# Patient Record
Sex: FEMALE | Race: WHITE | NOT HISPANIC OR LATINO | Employment: OTHER | URBAN - METROPOLITAN AREA
[De-identification: names, ages, dates, MRNs, and addresses within clinical notes are randomized per-mention and may not be internally consistent; named-entity substitution may affect disease eponyms.]

---

## 2017-01-20 ENCOUNTER — ALLIED HEALTH/NURSE VISIT (OUTPATIENT)
Dept: FAMILY MEDICINE | Facility: OTHER | Age: 57
End: 2017-01-20
Payer: COMMERCIAL

## 2017-01-20 DIAGNOSIS — Z23 NEED FOR PROPHYLACTIC VACCINATION AND INOCULATION AGAINST INFLUENZA: Primary | ICD-10-CM

## 2017-01-20 PROCEDURE — 90471 IMMUNIZATION ADMIN: CPT

## 2017-01-20 PROCEDURE — 99207 ZZC NO CHARGE NURSE ONLY: CPT

## 2017-01-20 PROCEDURE — 90686 IIV4 VACC NO PRSV 0.5 ML IM: CPT

## 2017-01-20 NOTE — NURSING NOTE
Prior to injection verified patient identity using patient's name and date of birth.Patient instructed to remain in clinic for 20 minutes afterwards, and to report any adverse reaction to me immediately. Mari Gonsalez CMA

## 2017-01-20 NOTE — PROGRESS NOTES
Injectable Influenza Immunization Documentation    1.  Is the person to be vaccinated sick today?  No    2. Does the person to be vaccinated have an allergy to eggs or to a component of the vaccine?  No    3. Has the person to be vaccinated today ever had a serious reaction to influenza vaccine in the past?  No    4. Has the person to be vaccinated ever had Guillain-Orient syndrome?  No     Form completed by Eva Solis

## 2017-02-02 DIAGNOSIS — I10 HYPERTENSION GOAL BP (BLOOD PRESSURE) < 140/90: Primary | ICD-10-CM

## 2017-02-02 NOTE — TELEPHONE ENCOUNTER
Furosemide 20 mg      Last Written Prescription Date: 02/09/2016  Last Fill Quantity: 90, # refills: 3  Last Office Visit with [facility], [facility] or [facility] prescribing provider: 11/14/2016       POTASSIUM   Date Value Ref Range Status   06/21/2016 4.1 3.4 - 5.3 mmol/L Final     CREATININE   Date Value Ref Range Status   06/21/2016 0.89 0.52 - 1.04 mg/dL Final     BP Readings from Last 3 Encounters:   11/14/16 118/80   06/21/16 152/85   04/06/16 128/78

## 2017-02-03 RX ORDER — FUROSEMIDE 20 MG
TABLET ORAL
Qty: 90 TABLET | Refills: 1 | Status: SHIPPED | OUTPATIENT
Start: 2017-02-03 | End: 2017-07-11

## 2017-02-14 DIAGNOSIS — F41.8 MIXED ANXIETY DEPRESSIVE DISORDER: ICD-10-CM

## 2017-02-14 RX ORDER — BUSPIRONE HYDROCHLORIDE 5 MG/1
TABLET ORAL
Qty: 90 TABLET | Refills: 3 | Status: SHIPPED | OUTPATIENT
Start: 2017-02-14 | End: 2017-07-11

## 2017-02-14 NOTE — TELEPHONE ENCOUNTER
Buspirone 5 mg       Last Written Prescription Date: 12/6/2016  Last Fill Quantity: 90; # refills: 1  Last Office Visit with [facility], [facility] or [facility] prescribing provider:  11/14/2016        Last PHQ-9 score on record=   PHQ-9 SCORE 8/16/2016   Total Score MyChart -   Total Score 7       Lab Results   Component Value Date    AST 20 11/05/2015     Lab Results   Component Value Date    ALT 31 11/05/2015

## 2017-04-18 DIAGNOSIS — I10 HTN, GOAL BELOW 140/90: ICD-10-CM

## 2017-04-18 NOTE — TELEPHONE ENCOUNTER
spironolactone (ALDACTONE) 25 MG tablet      Last Written Prescription Date: 4/6/16  Last Fill Quantity: 90, # refills: 3  Last Office Visit with [facility], [facility] or [facility] prescribing provider: 11/14/16 DRISS  Next 5 appointments (look out 90 days)     Jun 20, 2017  8:30 AM CDT   Return Visit with Narciso Leger MD   [facility]    [address] [place] [id number] [phone]                   Potassium   Date Value Ref Range Status   06/21/2016 4.1 3.4 - 5.3 mmol/L Final     Creatinine   Date Value Ref Range Status   06/21/2016 0.89 0.52 - 1.04 mg/dL Final     BP Readings from Last 3 Encounters:   11/14/16 118/80   06/21/16 152/85   04/06/16 128/78

## 2017-04-19 RX ORDER — SPIRONOLACTONE 25 MG/1
TABLET ORAL
Qty: 90 TABLET | Refills: 0 | Status: SHIPPED | OUTPATIENT
Start: 2017-04-19 | End: 2017-07-11

## 2017-06-09 DIAGNOSIS — I10 HTN, GOAL BELOW 140/90: ICD-10-CM

## 2017-06-12 NOTE — TELEPHONE ENCOUNTER
cozaar      Last Written Prescription Date: 04/06/16  Last Fill Quantity: 90, # refills: 3  Last Office Visit with [facility], [facility] or [facility] prescribing provider: 12/05/16- e-visit  Next 5 appointments (look out 90 days)     Jun 20, 2017  8:30 AM CDT   Return Visit with Narciso Leger MD   [facility]    [address] [place] [id number]   [phone]                   Potassium   Date Value Ref Range Status   06/21/2016 4.1 3.4 - 5.3 mmol/L Final     Creatinine   Date Value Ref Range Status   06/21/2016 0.89 0.52 - 1.04 mg/dL Final     BP Readings from Last 3 Encounters:   11/14/16 118/80   06/21/16 152/85   04/06/16 128/78

## 2017-06-13 RX ORDER — LOSARTAN POTASSIUM 100 MG/1
TABLET ORAL
Qty: 30 TABLET | Refills: 2 | Status: SHIPPED | OUTPATIENT
Start: 2017-06-13 | End: 2017-07-11

## 2017-06-13 NOTE — TELEPHONE ENCOUNTER
Routing refill request to provider for review/approval because:  Appears to be a break in medication - should have been out in April    Dina العراقي, RN, BSN

## 2017-06-13 NOTE — TELEPHONE ENCOUNTER
1 month given, she is due for blood work this month, suspect this will be done at upcoming Cardiology appointment.

## 2017-06-14 DIAGNOSIS — F41.8 MIXED ANXIETY DEPRESSIVE DISORDER: ICD-10-CM

## 2017-06-14 NOTE — TELEPHONE ENCOUNTER
Lexapro     Last Written Prescription Date: 12/6/2016  Last Fill Quantity: 90, # refills: 1  Last Office Visit with [facility] primary care provider:  11/14/2016   Next 5 appointments (look out 90 days)     Jun 20, 2017  8:30 AM CDT   Return Visit with Narciso Leger MD   [facility]    [address] [place] [address]   [phone]                   Last PHQ-9 score on record=   PHQ-9 SCORE 8/16/2016   Total Score MyChart -   Total Score 7       Karla Evans MA

## 2017-06-15 NOTE — TELEPHONE ENCOUNTER
Routing refill request to provider for review/approval because:  PHQ-9 not up to date.     Dina العراقي, RN, BSN

## 2017-06-16 RX ORDER — ESCITALOPRAM OXALATE 20 MG/1
20 TABLET ORAL DAILY
Qty: 30 TABLET | Refills: 0 | Status: SHIPPED | OUTPATIENT
Start: 2017-06-16 | End: 2017-07-11

## 2017-07-06 NOTE — PROGRESS NOTES
SUBJECTIVE:   CC: Eva Solis is an 57 year old woman who presents for preventive health visit.     Physical   Annual:     Getting at least 3 servings of Calcium per day::  NO    Bi-annual eye exam::  Yes    Dental care twice a year::  NO    Sleep apnea or symptoms of sleep apnea::  None    Frequency of exercise::  None    Taking medications regularly::  Yes    Medication side effects::  None    Additional concerns today::  YES    Pt would like to discuss numbness happens in both feet. Feels like there is something underneath her toenails.     Today's PHQ-2 Score:   PHQ-2 ( 1999 [employer]) 7/10/2017   Q1: Little interest or pleasure in doing things 2   Q2: Feeling down, depressed or hopeless 1   PHQ-2 Score 3   Q1: Little interest or pleasure in doing things More than half the days   Q2: Feeling down, depressed or hopeless Several days   PHQ-2 Score 3       Abuse: Current or Past(Physical, Sexual or Emotional)- No  Do you feel safe in your environment - Yes    Social History   Substance Use Topics     Smoking status: Never Smoker     Smokeless tobacco: Never Used      Comment: no smokers in household     Alcohol use No     The patient does not drink >3 drinks per day nor >7 drinks per week.    Reviewed orders with patient.  Reviewed health maintenance and updated orders accordingly - Yes    Patient over age 50, mutual decision to screen reflected in health maintenance.    Pertinent mammograms are reviewed under the imaging tab.  History of abnormal Pap smear: Status post benign hysterectomy. Health Maintenance and Surgical History updated.    Reviewed and updated as needed this visit by clinical staff  Tobacco  Allergies  Meds  Problems  Med Hx  Surg Hx  Fam Hx  Soc Hx          Reviewed and updated as needed this visit by Provider  Allergies  Meds  Problems          ROS:  C: NEGATIVE for fever, chills, change in weight  I: NEGATIVE for worrisome rashes, moles or lesions  E: NEGATIVE for vision changes  "or irritation  ENT: NEGATIVE for ear, mouth and throat problems  R: NEGATIVE for significant cough or SOB  B: NEGATIVE for masses, tenderness or discharge  CV: NEGATIVE for chest pain, palpitations or peripheral edema  GI: NEGATIVE for nausea, abdominal pain, heartburn, or change in bowel habits  : NEGATIVE for unusual urinary or vaginal symptoms. No vaginal bleeding.  M: NEGATIVE for significant arthralgias or myalgia  N: she complains of numbness in bilateral feet for \"a long time,\"  Feels like there is something under her toenails despite there not being anything there, seems to involve all toes, plantar and dorsal feet.  P: NEGATIVE for changes in mood or affect      OBJECTIVE:   /60 (BP Location: Other (Comment), Patient Position: Chair, Cuff Size: Adult Large)  Pulse 81  Temp 97.7  F (36.5  C) (Temporal)  Ht 5' 7\" (1.702 m)  Wt (!) 345 lb (156.5 kg)  SpO2 97%  BMI 54.03 kg/m2  EXAM:  GENERAL APPEARANCE: healthy, alert and no distress  EYES: Eyes grossly normal to inspection, PERRL and conjunctivae and sclerae normal  HENT: ear canals and TM's normal, nose and mouth without ulcers or lesions, oropharynx clear and oral mucous membranes moist  NECK: no adenopathy, no asymmetry, masses, or scars and thyroid normal to palpation  RESP: lungs clear to auscultation - no rales, rhonchi or wheezes  BREAST: normal without masses, tenderness or nipple discharge and no palpable axillary masses or adenopathy  CV: regular rate and rhythm, normal S1 S2, no S3 or S4, no murmur, click or rub, no peripheral edema and peripheral pulses strong  ABDOMEN: soft, nontender, no hepatosplenomegaly, no masses and bowel sounds normal  MS: no musculoskeletal defects are noted and gait is age appropriate without ataxia  SKIN: no suspicious lesions or rashes  NEURO: Normal strength and tone, sensory exam grossly normal, mentation intact and speech normal.    Feet:  Pedal pulses strong, capillary refill <3 seconds, moves toes " without restrictions, some mild pedal edema noted  PSYCH: mentation appears normal and affect normal/bright    ASSESSMENT/PLAN:   1. Encounter for routine adult health examination without abnormal findings      2. Hypertension goal BP (blood pressure) < 140/90  Well controlled, refills given, labs done today and reviewed at her visit    - spironolactone (ALDACTONE) 25 MG tablet; TAKE 1 TABLET (25 MG) BY MOUTH DAILY  Dispense: 90 tablet; Refill: 3  - losartan (COZAAR) 100 MG tablet; TAKE 1 TABLET (100 MG) BY MOUTH DAILY  Dispense: 90 tablet; Refill: 3  - furosemide (LASIX) 20 MG tablet; TAKE 1 TABLET (20 MG) BY MOUTH DAILY  Dispense: 90 tablet; Refill: 3    3. Mixed anxiety depressive disorder  Controlled, refills given.  She is stressed as just sold house, moving stuff into storage and living in son's [facility] basement until son finds new home (she bought her son's [facility]), so suspect PHQ-9 is related to this stress, patient agrees.    - busPIRone (BUSPAR) 5 MG tablet; Take 1 tablet (5 mg) by mouth 3 times daily  Dispense: 270 tablet; Refill: 3  - escitalopram (LEXAPRO) 20 MG tablet; Take 1 tablet (20 mg) by mouth daily  Dispense: 90 tablet; Refill: 3    4. Migraine without aura and without status migrainosus, not intractable  Stable.    - amitriptyline (ELAVIL) 25 MG tablet; TAKE 1 TABLET (25 MG) BY MOUTH AT BEDTIME  Dispense: 90 tablet; Refill: 3  - butalbital-aspirin-caffeine (FIORINAL) capsule; Take 1 capsule by mouth every 4 hours as needed for headaches  Dispense: 20 capsule; Refill: 0    5. Numbness in feet  Glucose is mildly elevated, but not to level of diabetes and her numbness has been ongoing, suspect neuropathy, discussed labs, EMG or Neurology consult, she prefers to see Neurology.    - NEUROLOGY ADULT REFERRAL    COUNSELING:  Reviewed preventive health counseling, as reflected in patient instructions     reports that she has never smoked. She has never used smokeless tobacco.    Estimated body mass  "index is 54.03 kg/(m^2) as calculated from the following:    Height as of this encounter: 5' 7\" (1.702 m).    Weight as of this encounter: 345 lb (156.5 kg).   Weight management plan: Discussed healthy diet and exercise guidelines and patient will follow up in 12 months in clinic to re-evaluate.    Counseling Resources:  ATP IV Guidelines  Pooled Cohorts Equation Calculator  Breast Cancer Risk Calculator  FRAX Risk Assessment  ICSI Preventive Guidelines  Dietary Guidelines for Americans, 2010  [employer] MyPlate  ASA Prophylaxis  Lung CA Screening    Renetta Benitez MD  [facility]  Answers for HPI/ROS submitted by the patient on 7/10/2017   PHQ-2 Score: 3  If you checked off any problems, how difficult have these problems made it for you to do your work, take care of things at home, or get along with other people?: Somewhat difficult  PHQ9 TOTAL SCORE: 11      The 10-year ASCVD risk score ([place] CAPUTO Jr, et al., 2013) is: 3.5%    Values used to calculate the score:      Age: 57 years      Sex: Female      Is Non- : No      Diabetic: No      Tobacco smoker: No      Systolic Blood Pressure: 118 mmHg      Is BP treated: Yes      HDL Cholesterol: 44 mg/dL      Total Cholesterol: 230 mg/dL    "

## 2017-07-10 ENCOUNTER — MYC MEDICAL ADVICE (OUTPATIENT)
Dept: FAMILY MEDICINE | Facility: OTHER | Age: 57
End: 2017-07-10

## 2017-07-10 DIAGNOSIS — I10 HTN, GOAL BELOW 140/90: Primary | ICD-10-CM

## 2017-07-10 DIAGNOSIS — Z13.6 ENCOUNTER FOR SCREENING FOR CARDIOVASCULAR DISORDERS: ICD-10-CM

## 2017-07-10 ASSESSMENT — PATIENT HEALTH QUESTIONNAIRE - PHQ9
10. IF YOU CHECKED OFF ANY PROBLEMS, HOW DIFFICULT HAVE THESE PROBLEMS MADE IT FOR YOU TO DO YOUR WORK, TAKE CARE OF THINGS AT HOME, OR GET ALONG WITH OTHER PEOPLE: SOMEWHAT DIFFICULT
SUM OF ALL RESPONSES TO PHQ QUESTIONS 1-9: 11
SUM OF ALL RESPONSES TO PHQ QUESTIONS 1-9: 11

## 2017-07-10 NOTE — TELEPHONE ENCOUNTER
Labs placed/pended. Please review/sign in TC absence if appropriate.  Can message patient back once labs are signed.  Emerald Frankel, CMA

## 2017-07-11 ENCOUNTER — OFFICE VISIT (OUTPATIENT)
Dept: FAMILY MEDICINE | Facility: OTHER | Age: 57
End: 2017-07-11
Payer: COMMERCIAL

## 2017-07-11 VITALS
TEMPERATURE: 97.7 F | DIASTOLIC BLOOD PRESSURE: 60 MMHG | BODY MASS INDEX: 45.99 KG/M2 | SYSTOLIC BLOOD PRESSURE: 118 MMHG | HEART RATE: 81 BPM | HEIGHT: 67 IN | WEIGHT: 293 LBS | OXYGEN SATURATION: 97 %

## 2017-07-11 DIAGNOSIS — G43.009 MIGRAINE WITHOUT AURA AND WITHOUT STATUS MIGRAINOSUS, NOT INTRACTABLE: ICD-10-CM

## 2017-07-11 DIAGNOSIS — I10 HTN, GOAL BELOW 140/90: ICD-10-CM

## 2017-07-11 DIAGNOSIS — I10 HYPERTENSION GOAL BP (BLOOD PRESSURE) < 140/90: ICD-10-CM

## 2017-07-11 DIAGNOSIS — R20.0 NUMBNESS IN FEET: ICD-10-CM

## 2017-07-11 DIAGNOSIS — Z13.6 ENCOUNTER FOR SCREENING FOR CARDIOVASCULAR DISORDERS: ICD-10-CM

## 2017-07-11 DIAGNOSIS — Z00.00 ENCOUNTER FOR ROUTINE ADULT HEALTH EXAMINATION WITHOUT ABNORMAL FINDINGS: Primary | ICD-10-CM

## 2017-07-11 DIAGNOSIS — F41.8 MIXED ANXIETY DEPRESSIVE DISORDER: ICD-10-CM

## 2017-07-11 LAB
ALBUMIN SERPL-MCNC: 3.5 G/DL (ref 3.4–5)
ALP SERPL-CCNC: 76 U/L (ref 40–150)
ALT SERPL W P-5'-P-CCNC: 34 U/L (ref 0–50)
ANION GAP SERPL CALCULATED.3IONS-SCNC: 10 MMOL/L (ref 3–14)
AST SERPL W P-5'-P-CCNC: 24 U/L (ref 0–45)
BILIRUB SERPL-MCNC: 0.7 MG/DL (ref 0.2–1.3)
BUN SERPL-MCNC: 14 MG/DL (ref 7–30)
CALCIUM SERPL-MCNC: 8.8 MG/DL (ref 8.5–10.1)
CHLORIDE SERPL-SCNC: 106 MMOL/L (ref 94–109)
CHOLEST SERPL-MCNC: 230 MG/DL
CO2 SERPL-SCNC: 26 MMOL/L (ref 20–32)
CREAT SERPL-MCNC: 0.89 MG/DL (ref 0.52–1.04)
GFR SERPL CREATININE-BSD FRML MDRD: 66 ML/MIN/1.7M2
GLUCOSE SERPL-MCNC: 106 MG/DL (ref 70–99)
HDLC SERPL-MCNC: 44 MG/DL
LDLC SERPL CALC-MCNC: 154 MG/DL
NONHDLC SERPL-MCNC: 186 MG/DL
POTASSIUM SERPL-SCNC: 4 MMOL/L (ref 3.4–5.3)
PROT SERPL-MCNC: 7.3 G/DL (ref 6.8–8.8)
SODIUM SERPL-SCNC: 142 MMOL/L (ref 133–144)
TRIGL SERPL-MCNC: 159 MG/DL

## 2017-07-11 PROCEDURE — 80061 LIPID PANEL: CPT | Performed by: STUDENT IN AN ORGANIZED HEALTH CARE EDUCATION/TRAINING PROGRAM

## 2017-07-11 PROCEDURE — 99396 PREV VISIT EST AGE 40-64: CPT | Performed by: FAMILY MEDICINE

## 2017-07-11 PROCEDURE — 80053 COMPREHEN METABOLIC PANEL: CPT | Performed by: STUDENT IN AN ORGANIZED HEALTH CARE EDUCATION/TRAINING PROGRAM

## 2017-07-11 PROCEDURE — 36415 COLL VENOUS BLD VENIPUNCTURE: CPT | Performed by: STUDENT IN AN ORGANIZED HEALTH CARE EDUCATION/TRAINING PROGRAM

## 2017-07-11 RX ORDER — BUTALBITAL, ASPIRIN, AND CAFFEINE 325; 50; 40 MG/1; MG/1; MG/1
1 CAPSULE ORAL EVERY 4 HOURS PRN
Qty: 20 CAPSULE | Refills: 0 | Status: SHIPPED | OUTPATIENT
Start: 2017-07-11 | End: 2019-10-16

## 2017-07-11 RX ORDER — FUROSEMIDE 20 MG
TABLET ORAL
Qty: 90 TABLET | Refills: 3 | Status: SHIPPED | OUTPATIENT
Start: 2017-07-11 | End: 2018-08-24

## 2017-07-11 RX ORDER — BUSPIRONE HYDROCHLORIDE 5 MG/1
5 TABLET ORAL 3 TIMES DAILY
Qty: 270 TABLET | Refills: 3 | Status: SHIPPED | OUTPATIENT
Start: 2017-07-11 | End: 2018-08-24

## 2017-07-11 RX ORDER — SPIRONOLACTONE 25 MG/1
TABLET ORAL
Qty: 90 TABLET | Refills: 3 | Status: SHIPPED | OUTPATIENT
Start: 2017-07-11 | End: 2018-07-01

## 2017-07-11 RX ORDER — ESCITALOPRAM OXALATE 20 MG/1
20 TABLET ORAL DAILY
Qty: 90 TABLET | Refills: 3 | Status: SHIPPED | OUTPATIENT
Start: 2017-07-11 | End: 2018-08-14

## 2017-07-11 RX ORDER — LOSARTAN POTASSIUM 100 MG/1
TABLET ORAL
Qty: 90 TABLET | Refills: 3 | Status: SHIPPED | OUTPATIENT
Start: 2017-07-11 | End: 2018-07-01

## 2017-07-11 ASSESSMENT — PATIENT HEALTH QUESTIONNAIRE - PHQ9: SUM OF ALL RESPONSES TO PHQ QUESTIONS 1-9: 11

## 2017-07-11 ASSESSMENT — PAIN SCALES - GENERAL: PAINLEVEL: NO PAIN (0)

## 2017-07-11 NOTE — NURSING NOTE
"Chief Complaint   Patient presents with     Physical     Panel Management     penuvmoax, height, honoring choices, dap, phq9, bmp, lipid, cbc, alt       Initial /60 (BP Location: Other (Comment), Patient Position: Chair, Cuff Size: Adult Large)  Pulse 81  Temp 97.7  F (36.5  C) (Temporal)  Ht 5' 7\" (1.702 m)  Wt (!) 345 lb (156.5 kg)  SpO2 97%  BMI 54.03 kg/m2 Estimated body mass index is 54.03 kg/(m^2) as calculated from the following:    Height as of this encounter: 5' 7\" (1.702 m).    Weight as of this encounter: 345 lb (156.5 kg).  Medication Reconciliation: complete   Isa Plunkett MA  July 11, 2017      "

## 2017-07-11 NOTE — MR AVS SNAPSHOT
After Visit Summary   7/11/2017    Eva Solis    MRN: [id number]           Patient Information     Date Of Birth          1960        Visit Information        Provider Department      7/11/2017 11:40 AM Renetta Benitez MD [facility]        Today's Diagnoses     Need for prophylactic vaccination against Streptococcus pneumoniae (pneumococcus)    -  1    HTN, goal below 140/90        Hypertension goal BP (blood pressure) < 140/90        Mixed anxiety depressive disorder        Migraine without aura and without status migrainosus, not intractable        Numbness in feet          Care Instructions      Preventive Health Recommendations  Female Ages 50 - 64    Yearly exam: See your health care provider every year in order to  o Review health changes.   o Discuss preventive care.    o Review your medicines if your doctor has prescribed any.      Get a Pap test every three years (unless you have an abnormal result and your provider advises testing more often).    If you get Pap tests with HPV test, you only need to test every 5 years, unless you have an abnormal result.     You do not need a Pap test if your uterus was removed (hysterectomy) and you have not had cancer.    You should be tested each year for STDs (sexually transmitted diseases) if you're at risk.     Have a mammogram every 1 to 2 years.    Have a colonoscopy at age 50, or have a yearly FIT test (stool test). These exams screen for colon cancer.      Have a cholesterol test every 5 years, or more often if advised.    Have a diabetes test (fasting glucose) every three years. If you are at risk for diabetes, you should have this test more often.     If you are at risk for osteoporosis (brittle bone disease), think about having a bone density scan (DEXA).    Shots: Get a flu shot each year. Get a tetanus shot every 10 years.    Nutrition:     Eat at least 5 servings of fruits and vegetables each day.    Eat  whole-grain bread, whole-wheat pasta and brown rice instead of white grains and rice.    Talk to your provider about Calcium and Vitamin D.     Lifestyle    Exercise at least 150 minutes a week (30 minutes a day, 5 days a week). This will help you control your weight and prevent disease.    Limit alcohol to one drink per day.    No smoking.     Wear sunscreen to prevent skin cancer.     See your dentist every six months for an exam and cleaning.    See your eye doctor every 1 to 2 years.            Follow-ups after your visit        Additional Services     NEUROLOGY ADULT REFERRAL       Your provider has referred you for the following:   Consult at [facility]: [facility] [phone]   [email or web address]    Please be aware that coverage of these services is subject to the terms and limitations of your health insurance plan.  Call member services at your health plan with any benefit or coverage questions.      Please bring the following with you to your appointment:    (1) Any X-Rays, CTs or MRIs which have been performed.  Contact the facility where they were done to arrange for  prior to your scheduled appointment.    (2) List of current medications  (3) This referral request   (4) Any documents/labs given to you for this referral                  Who to contact     If you have questions or need follow up information about today's clinic visit or your schedule please contact [facility] directly at [phone].  Normal or non-critical lab and imaging results will be communicated to you by MyChart, letter or phone within 4 business days after the clinic has received the results. If you do not hear from us within 7 days, please contact the clinic through MyChart or phone. If you have a critical or abnormal lab result, we will notify you by phone as soon as possible.  Submit refill requests through [employer] or call  "your pharmacy and they will forward the refill request to us. Please allow 3 business days for your refill to be completed.          Additional Information About Your Visit        [facility] [employer] Information     [employer] gives you secure access to your electronic health record. If you see a primary care provider, you can also send messages to your care team and make appointments. If you have questions, please call your primary care clinic.  If you do not have a primary care provider, please call [phone] and they will assist you.        Care EveryWhere ID     This is your Care EveryWhere ID. This could be used by other organizations to access your [facility] medical records  [id number]        Your Vitals Were     Pulse Temperature Height Pulse Oximetry BMI (Body Mass Index)       81 97.7  F (36.5  C) (Temporal) 5' 7\" (1.702 m) 97% 54.03 kg/m2        Blood Pressure from Last 3 Encounters:   07/11/17 118/60   11/14/16 118/80   06/21/16 152/85    Weight from Last 3 Encounters:   07/11/17 (!) 345 lb (156.5 kg)   11/14/16 (!) 337 lb (152.9 kg)   06/21/16 (!) 331 lb (150.1 kg)              We Performed the Following     NEUROLOGY ADULT REFERRAL          Today's Medication Changes          These changes are accurate as of: 7/11/17 12:20 PM.  If you have any questions, ask your nurse or doctor.               These medicines have changed or have updated prescriptions.        Dose/Directions    busPIRone 5 MG tablet   Commonly known as:  BUSPAR   This may have changed:  See the new instructions.   Used for:  Mixed anxiety depressive disorder   Changed by:  Renetta Benitez MD        Dose:  5 mg   Take 1 tablet (5 mg) by mouth 3 times daily   Quantity:  270 tablet   Refills:  3       escitalopram 20 MG tablet   Commonly known as:  LEXAPRO   This may have changed:  additional instructions   Used for:  Mixed anxiety depressive disorder   Changed by:  Renetta Benitez MD        Dose:  20 mg   Take 1 tablet (20 mg) by mouth " daily   Quantity:  90 tablet   Refills:  3       furosemide 20 MG tablet   Commonly known as:  LASIX   This may have changed:  See the new instructions.   Used for:  Hypertension goal BP (blood pressure) < 140/90   Changed by:  Renetta Benitez MD        TAKE 1 TABLET (20 MG) BY MOUTH DAILY   Quantity:  90 tablet   Refills:  3       losartan 100 MG tablet   Commonly known as:  COZAAR   This may have changed:  See the new instructions.   Used for:  HTN, goal below 140/90   Changed by:  Renetta Benitez MD        TAKE 1 TABLET (100 MG) BY MOUTH DAILY   Quantity:  90 tablet   Refills:  3       spironolactone 25 MG tablet   Commonly known as:  ALDACTONE   This may have changed:  See the new instructions.   Used for:  HTN, goal below 140/90   Changed by:  Renetta Benitez MD        TAKE 1 TABLET (25 MG) BY MOUTH DAILY   Quantity:  90 tablet   Refills:  3            Where to get your medicines      These medications were sent to [facility] [address] [place] [address]     Phone:  [phone]     amitriptyline 25 MG tablet    busPIRone 5 MG tablet    escitalopram 20 MG tablet    furosemide 20 MG tablet    losartan 100 MG tablet    spironolactone 25 MG tablet         Some of these will need a paper prescription and others can be bought over the counter.  Ask your nurse if you have questions.     Bring a paper prescription for each of these medications     butalbital-aspirin-caffeine capsule                Primary Care Provider Office Phone # Fax #    Renetta Benitez [phone]       [address]  [facility] [address]        Equal Access to Services     [facility]: Jeff brown Sodeanna, waaxda luqadaha, qaybta kaalmada lucy, julián danielle. So [place] -[phone].    ATENCIÓN: Si habla español, tiene a chaudhary disposición servicios gratuitos de asistencia  lingüística. Sherine al [phone].    We comply with applicable federal civil rights laws and [place] laws. We do not discriminate on the basis of race, color, national origin, age, disability sex, sexual orientation or gender identity.            Thank you!     Thank you for choosing [facility]  for your care. Our goal is always to provide you with excellent care. Hearing back from our patients is one way we can continue to improve our services. Please take a few minutes to complete the written survey that you may receive in the mail after your visit with us. Thank you!             Your Updated Medication List - Protect others around you: Learn how to safely use, store and throw away your medicines at www.disposemymeds.org.          This list is accurate as of: 7/11/17 12:20 PM.  Always use your most recent med list.                   Brand Name Dispense Instructions for use Diagnosis    ALPRAZolam 0.25 MG tablet    XANAX    15 tablet    Take 1-2 tabs 30 minutes before flight    Flying phobia       amitriptyline 25 MG tablet    ELAVIL    90 tablet    TAKE 1 TABLET (25 MG) BY MOUTH AT BEDTIME    Migraine without aura and without status migrainosus, not intractable       busPIRone 5 MG tablet    BUSPAR    270 tablet    Take 1 tablet (5 mg) by mouth 3 times daily    Mixed anxiety depressive disorder       butalbital-aspirin-caffeine capsule    FIORINAL    20 capsule    Take 1 capsule by mouth every 4 hours as needed for headaches    Migraine without aura and without status migrainosus, not intractable       escitalopram 20 MG tablet    LEXAPRO    90 tablet    Take 1 tablet (20 mg) by mouth daily    Mixed anxiety depressive disorder       furosemide 20 MG tablet    LASIX    90 tablet    TAKE 1 TABLET (20 MG) BY MOUTH DAILY    Hypertension goal BP (blood pressure) < 140/90       IBUPROFEN PO      Take 4 tablets by mouth as needed.        losartan 100 MG tablet    COZAAR    90 tablet    TAKE 1 TABLET  (100 MG) BY MOUTH DAILY    HTN, goal below 140/90       MIRALAX powder   Generic drug:  polyethylene glycol     1 Bottle    17 GM DAILY    Slow transit constipation       spironolactone 25 MG tablet    ALDACTONE    90 tablet    TAKE 1 TABLET (25 MG) BY MOUTH DAILY    HTN, goal below 140/90

## 2017-08-10 ENCOUNTER — TELEPHONE (OUTPATIENT)
Dept: FAMILY MEDICINE | Facility: OTHER | Age: 57
End: 2017-08-10

## 2017-08-10 NOTE — TELEPHONE ENCOUNTER
Reason for Call:  Form, our goal is to have forms completed with 72 hours, however, some forms may require a visit or additional information.    Type of letter, form or note:  medical    Who is the form from?: [facility]  (if other please explain)    Where did the form come from: Patient or family brought in       What clinic location was the form placed at?: [facility]   Where the form was placed: [facility]    What number is listed as a contact on the form?: [phone]       Additional comments: Please contact patient at [phone] when form is complete she will  at the   Call taken on 8/10/2017 at 5:19 PM by Rima Martinez

## 2017-08-11 NOTE — TELEPHONE ENCOUNTER
Form filled out. Needs signature from TC. Will call patient when form is complete.  Emerald Frankel, CMA

## 2017-08-11 NOTE — TELEPHONE ENCOUNTER
Copy made and placed in scanning. Left message for patient to call back. Please let her know her form is at the  for .  Emerald Frankel, JO

## 2017-08-23 ENCOUNTER — MYC MEDICAL ADVICE (OUTPATIENT)
Dept: FAMILY MEDICINE | Facility: OTHER | Age: 57
End: 2017-08-23

## 2017-08-23 NOTE — TELEPHONE ENCOUNTER
Patient was seen on 07/11/2017.   Patient could to e-visit for migraines to discuss mediations.    Andrei Jaffe RN, BSN

## 2017-08-30 ENCOUNTER — E-VISIT (OUTPATIENT)
Dept: FAMILY MEDICINE | Facility: OTHER | Age: 57
End: 2017-08-30
Payer: COMMERCIAL

## 2017-08-30 DIAGNOSIS — G43.009 MIGRAINE WITHOUT AURA AND WITHOUT STATUS MIGRAINOSUS, NOT INTRACTABLE: Primary | ICD-10-CM

## 2017-08-30 PROCEDURE — 99444 ZZC PHYSICIAN ONLINE EVALUATION & MANAGEMENT SERVICE: CPT | Performed by: FAMILY MEDICINE

## 2017-08-30 RX ORDER — TOPIRAMATE 25 MG/1
TABLET, FILM COATED ORAL
Qty: 120 TABLET | Refills: 0 | Status: SHIPPED | OUTPATIENT
Start: 2017-08-30 | End: 2017-10-07

## 2017-08-30 RX ORDER — TOPIRAMATE 25 MG/1
TABLET, FILM COATED ORAL
Qty: 120 TABLET | Refills: 0 | Status: SHIPPED | OUTPATIENT
Start: 2017-08-30 | End: 2017-08-30

## 2017-10-07 DIAGNOSIS — G43.009 MIGRAINE WITHOUT AURA AND WITHOUT STATUS MIGRAINOSUS, NOT INTRACTABLE: ICD-10-CM

## 2017-10-09 NOTE — TELEPHONE ENCOUNTER
topiramate (TOPAMAX) 25 MG tablet       Last Written Prescription Date: 08/30/17  Last Fill Quantity: 120, # refills: 0    Last Office Visit with [facility], [facility] or [facility] prescribing provider:  07/11/17   Future Office Visit:    Next 5 appointments (look out 90 days)     Oct 17, 2017 12:00 PM CDT   [facility]   [facility]    [address] [address] [phone]                  Creatinine   Date Value Ref Range Status   07/11/2017 0.89 0.52 - 1.04 mg/dL Final

## 2017-10-10 RX ORDER — TOPIRAMATE 25 MG/1
TABLET, FILM COATED ORAL
Qty: 90 TABLET | Refills: 0 | Status: SHIPPED | OUTPATIENT
Start: 2017-10-10 | End: 2017-10-17

## 2017-10-10 NOTE — TELEPHONE ENCOUNTER
Pending Prescriptions:                       Disp   Refills    topiramate (TOPAMAX) 25 MG tablet [Pharma*120 ta*0            Sig: TAKE 1 TABLET BY MOUTH AT BEDTIME FOR 1 WEEK,           THEN 1 TABLET TWICE DAILY FOR 1 WEEK, THEN 1 IN           THE MORNING AND 2 IN THE EVENING FOR 1 WEEK, T    Prescription approved per [facility] Refill Protocol.  Andrei Jaffe, RN, BSN

## 2017-10-13 NOTE — PROGRESS NOTES
SUBJECTIVE:                                                    Eva Solis is a 57 year old female who presents to clinic today for the following health issues:    HPI       Migraine Follow-Up    Headaches symptoms:  Improved     Frequency: Only one migraine since starting Topiramate     Duration of headaches: Difficult to determine as she usually wakes up with them, can then take 3-4 hours to resolve    Able to do normal daily activities/work with migraines: Depends    Rescue/Relief medication:Fiorinal              Effectiveness: Unsure if she has ever taken     Preventative medication: Topiramate    Neurologic complications: blurred vision    In the past 4 weeks, how often have you gone to Urgent Care or the emergency room because of your headaches?  0    Answers for HPI/ROS submitted by the patient on 10/17/2017   If you checked off any problems, how difficult have these problems made it for you to do your work, take care of things at home, or get along with other people?: Somewhat difficult  PHQ9 TOTAL SCORE: 11  LORETA 7 TOTAL SCORE: 11      Problem list and histories reviewed & adjusted, as indicated.  Additional history: as documented    Patient Active Problem List   Diagnosis     Slow transit constipation     HTN, goal below 140/90     Diastolic CHF (H)     Decreased calculated GFR     Morbid obesity, unspecified obesity type (H)     Mixed anxiety depressive disorder     Migraine without aura and without status migrainosus, not intractable     Past Surgical History:   Procedure Laterality Date     C  DELIVERY ONLY      , Low Cervical     C  DELIVERY ONLY       C VAGINAL HYSTERECTOMY      without oophorectomy     COLONOSCOPY  10/06/10    attempt at colonscopy to 50cm     HC COLONOSCOPY W/WO BRUSH/WASH  2005    Diverticulosis.  Internal hemorrhoids.     JOINT REPLACEMTN, KNEE RT/LT  2006    Right total knee arthroplasty.     JOINT REPLACEMTN, KNEE RT/LT   "07/19/2006    Left total knee arthroplasty.       Social History   Substance Use Topics     Smoking status: Never Smoker     Smokeless tobacco: Never Used      Comment: no smokers in household     Alcohol use No     Family History   Problem Relation Age of Onset     CANCER Father      squamous cell ca     CANCER Maternal Grandmother      lung     CEREBROVASCULAR DISEASE Maternal Grandmother      CANCER Paternal Grandmother      squamous cell ca     DIABETES Sister      CANCER Sister      kidney cancer     Kidney Cancer Sister      Connective Tissue Disorder Brother      lupus     Lupus Brother      KIDNEY DISEASE Mother      atrophic kidney     KIDNEY DISEASE Maternal Uncle      unclear kidney issue     Hypertension No family hx of      Colon Cancer No family hx of      Anxiety Disorder No family hx of      Asthma No family hx of              ROS:  C: NEGATIVE for fever, chills, change in weight  E/M: NEGATIVE for ear, mouth and throat problems  R: NEGATIVE for significant cough or SOB  CV: NEGATIVE for chest pain, palpitations or peripheral edema    OBJECTIVE:     /76 (BP Location: Right arm, Patient Position: Chair, Cuff Size: Adult Large)  Pulse 77  Temp 97.6  F (36.4  C) (Temporal)  Resp 18  Ht 5' 7\" (1.702 m)  Wt (!) 350 lb (158.8 kg)  SpO2 95%  BMI 54.82 kg/m2  Body mass index is 54.82 kg/(m^2).  Gen: no apparent distress  Neuro:  Normal gait  Psych: Alert and oriented times 3; coherent speech, normal   rate and volume, able to articulate logical thoughts, able   to abstract reason, no tangential thoughts, no hallucinations   or delusions  Her affect is neutral.    ASSESSMENT/PLAN:     BMI:   Estimated body mass index is 54.82 kg/(m^2) as calculated from the following:    Height as of this encounter: 5' 7\" (1.702 m).    Weight as of this encounter: 350 lb (158.8 kg).   Weight management plan: Discussed healthy diet and exercise guidelines and patient will follow up in 12 months in clinic to " re-evaluate.        1. Migraine without aura and without status migrainosus, not intractable  Much improved, continue on Topamax.  Stop amitriptyline.    - topiramate (TOPAMAX) 25 MG tablet; Take 2 tablets (50 mg) by mouth 2 times daily  Dispense: 180 tablet; Refill: 1    2. Visit for screening mammogram    - MA SCREENING DIGITAL BILAT - Future  (s+30); Future    3. Need for prophylactic vaccination and inoculation against influenza    - FLU VAC, SPLIT VIRUS IM > 3 YO (QUADRIVALENT) [84836]  - Vaccine Administration, Initial [50400]    Renetta Benitez MD  [facility]  Injectable Influenza Immunization Documentation    1.  Is the person to be vaccinated sick today?   No    2. Does the person to be vaccinated have an allergy to a component   of the vaccine?   No    3. Has the person to be vaccinated ever had a serious reaction   to influenza vaccine in the past?   No    4. Has the person to be vaccinated ever had Guillain-Barré syndrome?   No    Form completed by     Prior to injection verified patient identity using patient's name and date of birth.

## 2017-10-17 ENCOUNTER — OFFICE VISIT (OUTPATIENT)
Dept: FAMILY MEDICINE | Facility: OTHER | Age: 57
End: 2017-10-17
Payer: COMMERCIAL

## 2017-10-17 VITALS
TEMPERATURE: 97.6 F | HEART RATE: 77 BPM | BODY MASS INDEX: 45.99 KG/M2 | OXYGEN SATURATION: 95 % | WEIGHT: 293 LBS | SYSTOLIC BLOOD PRESSURE: 132 MMHG | RESPIRATION RATE: 18 BRPM | HEIGHT: 67 IN | DIASTOLIC BLOOD PRESSURE: 76 MMHG

## 2017-10-17 DIAGNOSIS — G43.009 MIGRAINE WITHOUT AURA AND WITHOUT STATUS MIGRAINOSUS, NOT INTRACTABLE: Primary | ICD-10-CM

## 2017-10-17 DIAGNOSIS — Z23 NEED FOR PROPHYLACTIC VACCINATION AND INOCULATION AGAINST INFLUENZA: ICD-10-CM

## 2017-10-17 DIAGNOSIS — Z12.31 VISIT FOR SCREENING MAMMOGRAM: ICD-10-CM

## 2017-10-17 PROCEDURE — 90471 IMMUNIZATION ADMIN: CPT | Performed by: FAMILY MEDICINE

## 2017-10-17 PROCEDURE — 99213 OFFICE O/P EST LOW 20 MIN: CPT | Mod: 25 | Performed by: FAMILY MEDICINE

## 2017-10-17 PROCEDURE — 90686 IIV4 VACC NO PRSV 0.5 ML IM: CPT | Performed by: FAMILY MEDICINE

## 2017-10-17 RX ORDER — TOPIRAMATE 25 MG/1
50 TABLET, FILM COATED ORAL 2 TIMES DAILY
Qty: 180 TABLET | Refills: 1 | Status: SHIPPED | OUTPATIENT
Start: 2017-10-17 | End: 2017-11-07

## 2017-10-17 ASSESSMENT — ANXIETY QUESTIONNAIRES
1. FEELING NERVOUS, ANXIOUS, OR ON EDGE: NEARLY EVERY DAY
3. WORRYING TOO MUCH ABOUT DIFFERENT THINGS: NEARLY EVERY DAY
4. TROUBLE RELAXING: NOT AT ALL
GAD7 TOTAL SCORE: 11
6. BECOMING EASILY ANNOYED OR IRRITABLE: SEVERAL DAYS
7. FEELING AFRAID AS IF SOMETHING AWFUL MIGHT HAPPEN: SEVERAL DAYS
GAD7 TOTAL SCORE: 11
2. NOT BEING ABLE TO STOP OR CONTROL WORRYING: NEARLY EVERY DAY
5. BEING SO RESTLESS THAT IT IS HARD TO SIT STILL: NOT AT ALL
GAD7 TOTAL SCORE: 11
7. FEELING AFRAID AS IF SOMETHING AWFUL MIGHT HAPPEN: SEVERAL DAYS

## 2017-10-17 ASSESSMENT — PATIENT HEALTH QUESTIONNAIRE - PHQ9
SUM OF ALL RESPONSES TO PHQ QUESTIONS 1-9: 11
10. IF YOU CHECKED OFF ANY PROBLEMS, HOW DIFFICULT HAVE THESE PROBLEMS MADE IT FOR YOU TO DO YOUR WORK, TAKE CARE OF THINGS AT HOME, OR GET ALONG WITH OTHER PEOPLE: SOMEWHAT DIFFICULT
SUM OF ALL RESPONSES TO PHQ QUESTIONS 1-9: 11

## 2017-10-17 NOTE — NURSING NOTE
"Chief Complaint   Patient presents with     Recheck Medication     Panel Management     pneumovax, flu, mammogram, honoring choices, DAP, HF action plan       Initial /76 (BP Location: Right arm, Patient Position: Chair, Cuff Size: Adult Large)  Pulse 77  Temp 97.6  F (36.4  C) (Temporal)  Resp 18  Ht 5' 7\" (1.702 m)  Wt (!) 350 lb (158.8 kg)  SpO2 95%  BMI 54.82 kg/m2 Estimated body mass index is 54.82 kg/(m^2) as calculated from the following:    Height as of this encounter: 5' 7\" (1.702 m).    Weight as of this encounter: 350 lb (158.8 kg).  Medication Reconciliation: complete   Santa Awan CMA      "

## 2017-10-17 NOTE — MR AVS SNAPSHOT
After Visit Summary   10/17/2017    Eva Solis    MRN: [id number]           Patient Information     Date Of Birth          1960        Visit Information        Provider Department      10/17/2017 12:00 PM Renetta Benitez MD [facility]        Today's Diagnoses     Migraine without aura and without status migrainosus, not intractable    -  1    Visit for screening mammogram        Need for prophylactic vaccination and inoculation against influenza           Follow-ups after your visit        Future tests that were ordered for you today     Open Future Orders        Priority Expected Expires Ordered    MA SCREENING DIGITAL BILAT - Future  (s+30) Routine  10/13/2018 10/17/2017            Who to contact     If you have questions or need follow up information about today's clinic visit or your schedule please contact [facility] directly at [phone].  Normal or non-critical lab and imaging results will be communicated to you by [facility], letter or phone within 4 business days after the clinic has received the results. If you do not hear from us within 7 days, please contact the clinic through [facility] or phone. If you have a critical or abnormal lab result, we will notify you by phone as soon as possible.  Submit refill requests through [facility] or call your pharmacy and they will forward the refill request to us. Please allow 3 business days for your refill to be completed.          Additional Information About Your Visit        MyChart Information     [facility] gives you secure access to your electronic health record. If you see a primary care provider, you can also send messages to your care team and make appointments. If you have questions, please call your primary care clinic.  If you do not have a primary care provider, please call [phone] and they will assist you.        Care EveryWhere ID     This is your Care EveryWhere ID. This could be used by  "other organizations to access your [facility] medical records  [id number]        Your Vitals Were     Pulse Temperature Respirations Height Pulse Oximetry BMI (Body Mass Index)    77 97.6  F (36.4  C) (Temporal) 18 5' 7\" (1.702 m) 95% 54.82 kg/m2       Blood Pressure from Last 3 Encounters:   10/17/17 132/76   07/11/17 118/60   11/14/16 118/80    Weight from Last 3 Encounters:   10/17/17 (!) 350 lb (158.8 kg)   07/11/17 (!) 345 lb (156.5 kg)   11/14/16 (!) 337 lb (152.9 kg)              We Performed the Following     FLU VAC, SPLIT VIRUS IM > 3 YO (QUADRIVALENT) [66275]     Vaccine Administration, Initial [69963]          Today's Medication Changes          These changes are accurate as of: 10/17/17 12:47 PM.  If you have any questions, ask your nurse or doctor.               These medicines have changed or have updated prescriptions.        Dose/Directions    topiramate 25 MG tablet   Commonly known as:  TOPAMAX   This may have changed:    - how much to take  - how to take this  - when to take this  - additional instructions   Used for:  Migraine without aura and without status migrainosus, not intractable   Changed by:  Renetta Benitez MD        Dose:  50 mg   Take 2 tablets (50 mg) by mouth 2 times daily   Quantity:  180 tablet   Refills:  1         Stop taking these medicines if you haven't already. Please contact your care team if you have questions.     amitriptyline 25 MG tablet   Commonly known as:  ELAVIL   Stopped by:  Renetta Benitez MD                Where to get your medicines      These medications were sent to [facility] [address] [facility] [address] [facility]  [address] [facility] [address]     Phone:  [phone]     topiramate 25 MG tablet                Primary Care Provider Office Phone # Fax #    Renetta Benitez [phone]       [address] [facility] [address]  [facility] [address]        Equal Access to Services     TREMAYNE SYLVESTER AH: Jeff fuentes " stephanie Augustine, waaxda luqadaha, qaybta kaalmada lucy, julián leelashae lolis. So [facility] [place] [facility] [phone].    ATENCIÓN: Si coreyla jeannine, tiene a chaudhary disposición servicios gratuitos de asistencia lingüística. Sherine al [phone].    We comply with applicable federal civil rights laws and [place] laws. We do not discriminate on the basis of race, color, national origin, age, disability, sex, sexual orientation, or gender identity.            Thank you!     Thank you for choosing [facility]  for your care. Our goal is always to provide you with excellent care. Hearing back from our patients is one way we can continue to improve our services. Please take a few minutes to complete the written survey that you may receive in the mail after your visit with us. Thank you!             Your Updated Medication List - Protect others around you: Learn how to safely use, store and throw away your medicines at www.disposemymeds.org.          This list is accurate as of: 10/17/17 12:47 PM.  Always use your most recent med list.                   Brand Name Dispense Instructions for use Diagnosis    ALPRAZolam 0.25 MG tablet    XANAX    15 tablet    Take 1-2 tabs 30 minutes before flight    Flying phobia       busPIRone 5 MG tablet    BUSPAR    270 tablet    Take 1 tablet (5 mg) by mouth 3 times daily    Mixed anxiety depressive disorder       butalbital-aspirin-caffeine capsule    FIORINAL    20 capsule    Take 1 capsule by mouth every 4 hours as needed for headaches    Migraine without aura and without status migrainosus, not intractable       escitalopram 20 MG tablet    LEXAPRO    90 tablet    Take 1 tablet (20 mg) by mouth daily    Mixed anxiety depressive disorder       furosemide 20 MG tablet    LASIX    90 tablet    TAKE 1 TABLET (20 MG) BY MOUTH DAILY    Hypertension goal BP (blood pressure) < 140/90       IBUPROFEN PO      Take 4 tablets by mouth as needed.        losartan 100 MG  tablet    COZAAR    90 tablet    TAKE 1 TABLET (100 MG) BY MOUTH DAILY    Hypertension goal BP (blood pressure) < 140/90       MIRALAX powder   Generic drug:  polyethylene glycol     1 Bottle    17 GM DAILY    Slow transit constipation       spironolactone 25 MG tablet    ALDACTONE    90 tablet    TAKE 1 TABLET (25 MG) BY MOUTH DAILY    Hypertension goal BP (blood pressure) < 140/90       topiramate 25 MG tablet    TOPAMAX    180 tablet    Take 2 tablets (50 mg) by mouth 2 times daily    Migraine without aura and without status migrainosus, not intractable

## 2017-10-17 NOTE — LETTER
My Depression Action Plan  Name: Eva Solis   Date of Birth 1960  Date: 10/13/2017    My doctor: Renetta Benitez   My clinic: [address]  [facility] [address] [phone]          GREEN    ZONE   Good Control    What it looks like:     Things are going generally well. You have normal up s and down s. You may even feel depressed from time to time, but bad moods usually last less than a day.   What you need to do:  1. Continue to care for yourself (see self care plan)  2. Check your depression survival kit and update it as needed  3. Follow your physician s recommendations including any medication.  4. Do not stop taking medication unless you consult with your physician first.           YELLOW         ZONE Getting Worse    What it looks like:     Depression is starting to interfere with your life.     It may be hard to get out of bed; you may be starting to isolate yourself from others.    Symptoms of depression are starting to last most all day and this has happened for several days.     You may have suicidal thoughts but they are not constant.   What you need to do:     1. Call your care team, your response to treatment will improve if you keep your care team informed of your progress. Yellow periods are signs an adjustment may need to be made.     2. Continue your self-care, even if you have to fake it!    3. Talk to someone in your support network    4. Open up your depression survival kit           RED    ZONE Medical Alert - Get Help    What it looks like:     Depression is seriously interfering with your life.     You may experience these or other symptoms: You can t get out of bed most days, can t work or engage in other necessary activities, you have trouble taking care of basic hygiene, or basic responsibilities, thoughts of suicide or death that will not go away, self-injurious behavior.     What you need to do:  1. Call your care team  and request a same-day appointment. If they are not available (weekends or after hours) call your local crisis line, emergency room or 911.      Electronically signed by: Santa Awan, October 13, 2017    Depression Self Care Plan / Survival Kit    Self-Care for Depression  Here s the deal. Your body and mind are really not as separate as most people think.  What you do and think affects how you feel and how you feel influences what you do and think. This means if you do things that people who feel good do, it will help you feel better.  Sometimes this is all it takes.  There is also a place for medication and therapy depending on how severe your depression is, so be sure to consult with your medical provider and/ or Behavioral Health Consultant if your symptoms are worsening or not improving.     In order to better manage my stress, I will:    Exercise  Get some form of exercise, every day. This will help reduce pain and release endorphins, the  feel good  chemicals in your brain. This is almost as good as taking antidepressants!  This is not the same as joining a gym and then never going! (they count on that by the way ) It can be as simple as just going for a walk or doing some gardening, anything that will get you moving.      Hygiene   Maintain good hygiene (Get out of bed in the morning, Make your bed, Brush your teeth, Take a shower, and Get dressed like you were going to work, even if you are unemployed).  If your clothes don't fit try to get ones that do.    Diet  I will strive to eat foods that are good for me, drink plenty of water, and avoid excessive sugar, caffeine, alcohol, and other mood-altering substances.  Some foods that are helpful in depression are: complex carbohydrates, B vitamins, flaxseed, fish or fish oil, fresh fruits and vegetables.    Psychotherapy  I agree to participate in Individual Therapy (if recommended).    Medication  If prescribed medications, I agree to take them.  Missing  doses can result in serious side effects.  I understand that drinking alcohol, or other illicit drug use, may cause potential side effects.  I will not stop my medication abruptly without first discussing it with my provider.    Staying Connected With Others  I will stay in touch with my friends, family members, and my primary care provider/team.    Use your imagination  Be creative.  We all have a creative side; it doesn t matter if it s oil painting, sand castles, or mud pies! This will also kick up the endorphins.    Witness Beauty  (AKA stop and smell the roses) Take a look outside, even in mid-winter. Notice colors, textures. Watch the squirrels and birds.     Service to others  Be of service to others.  There is always someone else in need.  By helping others we can  get out of ourselves  and remember the really important things.  This also provides opportunities for practicing all the other parts of the program.    Humor  Laugh and be silly!  Adjust your TV habits for less news and crime-drama and more comedy.    Control your stress  Try breathing deep, massage therapy, biofeedback, and meditation. Find time to relax each day.     My support system    Clinic Contact:  Phone number:    Contact 1:  Phone number:    Contact 2:  Phone number:    Sikh/:  Phone number:    Therapist:  Phone number:    Local crisis center:    Phone number:    Other community support:  Phone number:

## 2017-10-18 ASSESSMENT — PATIENT HEALTH QUESTIONNAIRE - PHQ9: SUM OF ALL RESPONSES TO PHQ QUESTIONS 1-9: 11

## 2017-10-18 ASSESSMENT — ANXIETY QUESTIONNAIRES: GAD7 TOTAL SCORE: 11

## 2017-11-06 ENCOUNTER — MYC MEDICAL ADVICE (OUTPATIENT)
Dept: FAMILY MEDICINE | Facility: OTHER | Age: 57
End: 2017-11-06

## 2017-11-06 DIAGNOSIS — G43.009 MIGRAINE WITHOUT AURA AND WITHOUT STATUS MIGRAINOSUS, NOT INTRACTABLE: ICD-10-CM

## 2017-11-06 NOTE — TELEPHONE ENCOUNTER
Routing to TC: Please review and advise. Patient has been experiencing increasing itching since starting Topamax. At this time is scratching so much that she is bruseing her legs. Please review and advise if you have recommendations for the patient or if you would like the patient to follow up via phone, YASMEEN Spangler.     Eva Solis is a 57 year old female who calls with itching all over.    NURSING ASSESSMENT:  Description:  Has been having very itchy skin for a while, but it is getting worse. Believes this is related to Topamax, which it is a possible side effect. Taking topamax 2 tablets in the morning and 2 at night. Legs are bruised from scratching. Is able to sleep. Scratches her skin all day long. Has not tried any medications to decrease itching. Denies rashes, stomach concerns, fevers, hives, breathing concerns.    Onset/duration:  After starting Topamax   Pain scale (0-10)   Just itching   LMP/preg/breast feeding:  No LMP recorded. Patient has had a hysterectomy.  Last exam/Treatment:  10/17/2017  Allergies:   Allergies   Allergen Reactions     Lisinopril Cough     NURSING PLAN: Routed to provider Yes    RECOMMENDED DISPOSITION:  Home care advice - for itching, advised following up as this is a known side effect of Topamax  Will comply with recommendation: No- Barriers to comply with plan of care patient declined OV, would like TC to review and advise.  If further questions/concerns or if symptoms do not improve, worsen or new symptoms develop, call your PCP or [facility] as soon as possible.    NOTES:  Disposition was determined by the first positive assessment question, therefore all previous assessment questions were negative    Guideline used:  Telephone Triage Protocols for Nurses, Fifth Edition, Yuly Metcalf  Itching  UpToDate Clinical Reference  Nursing judgment  Routing to  to review and advise    Hayley Hood, RN, BSN

## 2017-11-07 RX ORDER — TOPIRAMATE 25 MG/1
25 TABLET, FILM COATED ORAL 2 TIMES DAILY
Qty: 180 TABLET | Refills: 1
Start: 2017-11-07 | End: 2018-08-24

## 2017-12-22 ENCOUNTER — TELEPHONE (OUTPATIENT)
Dept: FAMILY MEDICINE | Facility: OTHER | Age: 57
End: 2017-12-22

## 2017-12-22 NOTE — LETTER
[facility]  [address]   [place] [address]  Phone: [phone]  January 26, 2018      Eva Solis  [address]  [place] [address]      Dear Eva,    We care about your health and have reviewed your health plan including your medical conditions, medications, and lab results.  Based on this review, it is recommended that you follow up regarding the following health topic(s):  -Breast Cancer Screening    We recommend you take the following action(s):  -schedule a MAMMOGRAM which is due. Please disregard this reminder if you have had this exam elsewhere within the last 1-2 years please let us know so we can update your records.     Please call us at the [facility] - [phone] (or use [employer]) to address the above recommendations.     Thank you for trusting [facility] and we appreciate the opportunity to serve you.  We look forward to supporting your healthcare needs in the future.    Healthy Regards,    Your Health Care Team  [facility]

## 2017-12-22 NOTE — TELEPHONE ENCOUNTER
Summary:    Patient is due/failing the following:   MAMMOGRAM    Action needed:   Schedule a mammogram     Type of outreach:    Phone, left message for patient to call back.     Questions for provider review:    None                                                                                                                                    Nati Sanchez       Chart routed to Care Team .        Panel Management Review      Patient has the following on her problem list:     Hypertension   Last three blood pressure readings:  BP Readings from Last 3 Encounters:   10/17/17 132/76   07/11/17 118/60   11/14/16 118/80     Blood pressure: Passed    HTN Guidelines:  Age 18-59 BP range:  Less than 140/90  Age 60-85 with Diabetes:  Less than 140/90  Age 60-85 without Diabetes:  less than 150/90        Composite cancer screening  Chart review shows that this patient is due/due soon for the following Mammogram

## 2018-03-30 DIAGNOSIS — G43.009 MIGRAINE WITHOUT AURA AND WITHOUT STATUS MIGRAINOSUS, NOT INTRACTABLE: ICD-10-CM

## 2018-03-30 RX ORDER — TOPIRAMATE 25 MG/1
TABLET, FILM COATED ORAL
Qty: 180 TABLET | Refills: 0 | Status: SHIPPED | OUTPATIENT
Start: 2018-03-30 | End: 2018-06-07

## 2018-04-27 ENCOUNTER — TELEPHONE (OUTPATIENT)
Dept: FAMILY MEDICINE | Facility: OTHER | Age: 58
End: 2018-04-27

## 2018-04-27 NOTE — TELEPHONE ENCOUNTER
Summary:    Patient is due/failing the following:   BMP, CBC, MAMMOGRAM and PHQ9    Action needed:   Patient needs to do PHQ9., Patient needs nurse only appointment. and schedule a mammogram     Type of outreach:    Phone, left message for patient to call back.     Questions for provider review:    None                                                                                                                                    Nati Sanchez         Chart routed to Care Team .        Panel Management Review      Patient has the following on her problem list:     Hypertension   Last three blood pressure readings:  BP Readings from Last 3 Encounters:   10/17/17 132/76   07/11/17 118/60   11/14/16 118/80     Blood pressure: Passed    HTN Guidelines:  Age 18-59 BP range:  Less than 140/90  Age 60-85 with Diabetes:  Less than 140/90  Age 60-85 without Diabetes:  less than 150/90      Composite cancer screening  Chart review shows that this patient is due/due soon for the following Mammogram

## 2018-04-27 NOTE — LETTER
[facility]  [address]   [facility] [facility] [address]  Phone: [phone]  May 2, 2018      Eva Solis  [address] [facility] [address]      Dear Eva,    We care about your health and have reviewed your health plan including your medical conditions, medications, and lab results.  Based on this review, it is recommended that you follow up regarding the following health topic(s):  -Depression  -Breast Cancer Screening    We recommend you take the following action(s):  -schedule a LAB ONLY APPOINTMENT to recheck your: BMP (basic metabolic panel) and CBC (complete blood cell counts) within the next 1-4 weeks.  If' you have had labs completed eslewhere, please let us know so we can update your records.    -schedule a MAMMOGRAM which is due. Please disregard this reminder if you have had this exam elsewhere within the last 1-2 years please let us know so we can update your records.  -Complete and return the attached PHQ-9 Form.  If your total score is greater than 9, please schedule a followup appointment.  If you answer Yes to question 9, call your clinic between the hours of 8 to 5.  You may also call the Suicide Hotline at 0-831-404-KYIL (2101) any time.     Please call us at the [facility] - [phone] (or use [employer]) to address the above recommendations.     Thank you for trusting [facility] and we appreciate the opportunity to serve you.  We look forward to supporting your healthcare needs in the future.    Healthy Regards,    Your Health Care Team  [facility]

## 2018-07-01 DIAGNOSIS — I10 HYPERTENSION GOAL BP (BLOOD PRESSURE) < 140/90: ICD-10-CM

## 2018-07-01 NOTE — LETTER
[facility]  [address]  [place] [facility] [address]  [phone]        July 5, 2018    Eva Solis  [address]  [facility] [facility] [address]              Dear Eva Solis    This is to remind you that you're due for an office visit and labs    You may call our office at [phone] to schedule an appointment.    Please disregard this notice if you have already had your labs drawn or made an appointment.        Sincerely,        Renetta Benitez MD

## 2018-07-02 RX ORDER — SPIRONOLACTONE 25 MG/1
TABLET ORAL
Qty: 30 TABLET | Refills: 0 | Status: SHIPPED | OUTPATIENT
Start: 2018-07-02 | End: 2018-08-24

## 2018-07-02 RX ORDER — LOSARTAN POTASSIUM 100 MG/1
TABLET ORAL
Qty: 30 TABLET | Refills: 0 | Status: SHIPPED | OUTPATIENT
Start: 2018-07-02 | End: 2018-08-24

## 2018-07-02 NOTE — TELEPHONE ENCOUNTER
"Please call patient and help schedule labs and and office visit.      Requested Prescriptions   Pending Prescriptions Disp Refills     losartan (COZAAR) 100 MG tablet [Pharmacy Med Name: Losartan Potassium Oral Tablet 100 MG] 90 tablet 2     Sig: TAKE ONE TABLET BY MOUTH ONE TIME DAILY    Angiotensin-II Receptors Passed    7/1/2018  7:00 AM       Passed - Blood pressure under 140/90 in past 12 months    BP Readings from Last 3 Encounters:   10/17/17 132/76   07/11/17 118/60   11/14/16 118/80                Passed - Recent (12 mo) or future (30 days) visit within the authorizing provider's specialty    Patient had office visit in the last 12 months or has a visit in the next 30 days with authorizing provider or within the authorizing provider's specialty.  See \"Patient Info\" tab in inbasket, or \"Choose Columns\" in Meds & Orders section of the refill encounter.           Passed - Patient is age 18 or older       Passed - No active pregnancy on record       Passed - Normal serum creatinine on file in past 12 months    Recent Labs   Lab Test  07/11/17   0838   CR  0.89            Passed - Normal serum potassium on file in past 12 months    Recent Labs   Lab Test  07/11/17   0838   POTASSIUM  4.0                   Passed - No positive pregnancy test in past 12 months        spironolactone (ALDACTONE) 25 MG tablet [Pharmacy Med Name: Spironolactone Oral Tablet 25 MG] 90 tablet 2     Sig: TAKE ONE TABLET BY MOUTH ONE TIME DAILY    Diuretics (Including Combos) Protocol Passed    7/1/2018  7:00 AM       Passed - Blood pressure under 140/90 in past 12 months    BP Readings from Last 3 Encounters:   10/17/17 132/76   07/11/17 118/60   11/14/16 118/80                Passed - Recent (12 mo) or future (30 days) visit within the authorizing provider's specialty    Patient had office visit in the last 12 months or has a visit in the next 30 days with authorizing provider or within the authorizing provider's specialty.  See \"Patient " "Info\" tab in inbasket, or \"Choose Columns\" in Meds & Orders section of the refill encounter.           Passed - Patient is age 18 or older       Passed - No active pregancy on record       Passed - Normal serum creatinine on file in past 12 months    Recent Labs   Lab Test  07/11/17   0838   CR  0.89             Passed - Normal serum potassium on file in past 12 months    Recent Labs   Lab Test  07/11/17   0838   POTASSIUM  4.0                   Passed - Normal serum sodium on file in past 12 months    Recent Labs   Lab Test  07/11/17   0838   NA  142             Passed - No positive pregnancy test in past 12 months        Medication is being filled for 1 time refill only due to:  Patient needs to be seen because needs labs and office visit.      "

## 2018-07-03 NOTE — TELEPHONE ENCOUNTER
LM for patient to return phone call to clinic about message below.  Bee Maher CMA ([facility])

## 2018-07-19 DIAGNOSIS — G43.009 MIGRAINE WITHOUT AURA AND WITHOUT STATUS MIGRAINOSUS, NOT INTRACTABLE: ICD-10-CM

## 2018-07-20 RX ORDER — TOPIRAMATE 25 MG/1
TABLET, FILM COATED ORAL
Qty: 180 TABLET | Refills: 0 | Status: SHIPPED | OUTPATIENT
Start: 2018-07-20 | End: 2018-08-24

## 2018-07-20 NOTE — TELEPHONE ENCOUNTER
Topamax    Routing refill request to provider for review/approval because:  Labs not current:  CBC and pl    Elizabeth Burris, RN, BSN

## 2018-08-14 ENCOUNTER — TELEPHONE (OUTPATIENT)
Dept: FAMILY MEDICINE | Facility: OTHER | Age: 58
End: 2018-08-14

## 2018-08-14 NOTE — TELEPHONE ENCOUNTER
Summary:    Patient is due/failing the following:   CBC, BMP, ALT, LDL, MAMMOGRAM and PHYSICAL    Action needed:   Patient needs office visit for Physical ., Patient needs fasting lab only appointment and schedule a mammogram     Type of outreach:    phone no answer or voicemail.   Reminder letter sent  Questions for provider review:    None                                                                                                                                    Nati Sanchez       Chart routed to Care Team .      Panel Management Review      Patient has the following on her problem list:     Hypertension   Last three blood pressure readings:  BP Readings from Last 3 Encounters:   10/17/17 132/76   07/11/17 118/60   11/14/16 118/80     Blood pressure: Passed    HTN Guidelines:  Age 18-59 BP range:  Less than 140/90  Age 60-85 with Diabetes:  Less than 140/90  Age 60-85 without Diabetes:  less than 150/90      Composite cancer screening  Chart review shows that this patient is due/due soon for the following Mammogram

## 2018-08-14 NOTE — LETTER
[facility]  [address]   [facility] [address]  Phone: [phone]  August 14, 2018      Eva Solis  [address]  [facility] [address]      Dear Eva,    We care about your health and have reviewed your health plan including your medical conditions, medications, and lab results.  Based on this review, it is recommended that you follow up regarding the following health topic(s):  -Breast Cancer Screening  -Wellness (Physical) Visit     We recommend you take the following action(s):  -schedule a WELLNESS (Physical) APPOINTMENT.  We will perform the following labs: Lipids (fasting cholesterol - nothing to eat except water and/or meds for 8-10 hours), BMP (basic metabolic panel), ALT/AST and CBC (complete blood cell counts).  -schedule a MAMMOGRAM which is due. Please disregard this reminder if you have had this exam elsewhere within the last 1-2 years please let us know so we can update your records.     Please call us at the [facility] - [phone] (or use [employer]) to address the above recommendations.     Thank you for trusting [facility] and we appreciate the opportunity to serve you.  We look forward to supporting your healthcare needs in the future.    Healthy Regards,    Your Health Care Team  [facility]

## 2018-08-22 ENCOUNTER — TELEPHONE (OUTPATIENT)
Dept: FAMILY MEDICINE | Facility: OTHER | Age: 58
End: 2018-08-22

## 2018-08-22 NOTE — TELEPHONE ENCOUNTER
Patient last physical was 07/11/2017 - so would need a new one in order to fill out this form. I helped her schedule for 9:00 on Friday.    Form placed on Santa L's desk to attach to chart prep for Friday. Will postpone until then.  Emerald Frankel, [facility]

## 2018-08-22 NOTE — TELEPHONE ENCOUNTER
Reason for Call:  Form, our goal is to have forms completed with 72 hours, however, some forms may require a visit or additional information.    Type of letter, form or note:  health provider screening form    Who is the form from?: Patient    Where did the form come from: Patient or family brought in       What clinic location was the form placed at?: [facility] - [phone]    Where the form was placed: 's Box    What number is listed as a contact on the form?: no number listed       Additional comments: please patient when ready to .  Thank you    Call taken on 8/22/2018 at 12:33 PM by Rosa Yeung

## 2018-08-22 NOTE — PROGRESS NOTES
SUBJECTIVE:   CC: Eva Solis is an 58 year old woman who presents for preventive health visit.     Physical   Annual:     Getting at least 3 servings of Calcium per day:  NO    Bi-annual eye exam:  Yes    Dental care twice a year:  NO    Sleep apnea or symptoms of sleep apnea:  Daytime drowsiness    Diet:  Regular (no restrictions)    Frequency of exercise:  None    Taking medications regularly:  Yes    Additional concerns today:  YES      Today's PHQ-2 Score:   PHQ-2 ( 1999 [employer]) 8/24/2018   Q1: Little interest or pleasure in doing things 3   Q2: Feeling down, depressed or hopeless 2   PHQ-2 Score 5   Q1: Little interest or pleasure in doing things Nearly every day   Q2: Feeling down, depressed or hopeless More than half the days   PHQ-2 Score 5   Answers for HPI/ROS submitted by the patient on 8/24/2018   PHQ-2 Score: 5  PHQ9 TOTAL SCORE: Incomplete      Abuse: Current or Past(Physical, Sexual or Emotional)- No  Do you feel safe in your environment - Yes    Social History   Substance Use Topics     Smoking status: Never Smoker     Smokeless tobacco: Never Used      Comment: no smokers in household     Alcohol use No     Alcohol Use 8/24/2018   If you drink alcohol do you typically have greater than 3 drinks per day OR greater than 7 drinks per week? Not Applicable   No flowsheet data found.    Reviewed orders with patient.  Reviewed health maintenance and updated orders accordingly - Yes    Patient over age 50, mutual decision to screen reflected in health maintenance.    Pertinent mammograms are reviewed under the imaging tab.  History of abnormal Pap smear: Status post benign hysterectomy. Health Maintenance and Surgical History updated.  PAP / HPV 11/15/2005   PAP NIL     Reviewed and updated as needed this visit by clinical staff  Tobacco  Allergies  Meds  Problems  Med Hx  Surg Hx  Fam Hx  Soc Hx          Reviewed and updated as needed this visit by Provider  Allergies  Meds  Problems       "      Review of Systems   Constitutional: Negative for chills and fever.   HENT: Negative for congestion, ear pain, hearing loss and sore throat.    Eyes: Negative for pain and visual disturbance.   Respiratory: Negative for cough and shortness of breath.    Cardiovascular: Negative for chest pain, palpitations and peripheral edema.   Gastrointestinal: Negative for abdominal pain, constipation, diarrhea, heartburn, hematochezia and nausea.   Genitourinary: Negative for difficulty urinating, dysuria, genital sores, hematuria, pelvic pain, urgency, vaginal bleeding and vaginal discharge.   Musculoskeletal: Negative for arthralgias, joint swelling and myalgias.   Skin: Negative for rash.   Neurological: Positive for numbness (right ball of foot.  Wears Crocs.) and headaches (Only been taking total of 75 mg topiramate a day). Negative for dizziness, weakness and paresthesias.   Psychiatric/Behavioral: Negative for mood changes. The patient is nervous/anxious (States extended family has cut her off, including her mother.  However, her , kids and grandkids are great.).        OBJECTIVE:   /72 (BP Location: Left arm, Patient Position: Chair, Cuff Size: Adult Large)  Pulse 61  Temp 98.2  F (36.8  C) (Temporal)  Resp 16  Ht 5' 7\" (1.702 m)  Wt (!) 346 lb (156.9 kg)  SpO2 98%  BMI 54.19 kg/m2  Physical Exam   Constitutional: She is oriented to person, place, and time. She appears well-developed and well-nourished. No distress.   HENT:   Right Ear: Tympanic membrane and external ear normal.   Left Ear: Tympanic membrane and external ear normal.   Nose: Nose normal.   Mouth/Throat: Oropharynx is clear and moist. No oropharyngeal exudate.   Eyes: Conjunctivae are normal. Pupils are equal, round, and reactive to light. Right eye exhibits no discharge. Left eye exhibits no discharge.   Neck: Neck supple. No tracheal deviation present. No thyromegaly present.   Cardiovascular: Normal rate, regular rhythm, S1 " normal, S2 normal, normal heart sounds and normal pulses.  Exam reveals no S3, no S4 and no friction rub.    No murmur heard.  Pulmonary/Chest: Effort normal and breath sounds normal. No respiratory distress. She has no wheezes. She has no rales. Right breast exhibits no mass, no nipple discharge and no tenderness. Left breast exhibits no mass, no nipple discharge and no tenderness.   Abdominal: Soft. Bowel sounds are normal. She exhibits no mass. There is no hepatosplenomegaly. There is no tenderness.   Musculoskeletal: Normal range of motion. She exhibits no edema.   Lymphadenopathy:     She has no cervical adenopathy.   Neurological: She is alert and oriented to person, place, and time. She has normal strength and normal reflexes. She exhibits normal muscle tone.   Skin: Skin is warm and dry. No rash noted.   Psychiatric: She has a normal mood and affect. Judgment and thought content normal. Cognition and memory are normal.         ASSESSMENT/PLAN:   1. Encounter for routine adult health examination without abnormal findings    2. Chronic diastolic congestive heart failure (H)  Weight stable.  Thought to have been secondary to uncontrolled hypertension.  BP has been well controlled and she is no longer following with Cardiology.  Labs drawn, refills given.      - Lipid panel reflex to direct LDL Fasting  - spironolactone (ALDACTONE) 25 MG tablet; Take 1 tablet (25 mg) by mouth daily  Dispense: 90 tablet; Refill: 3  - losartan (COZAAR) 100 MG tablet; Take 1 tablet (100 mg) by mouth daily  Dispense: 90 tablet; Refill: 3  - furosemide (LASIX) 20 MG tablet; TAKE 1 TABLET (20 MG) BY MOUTH DAILY  Dispense: 90 tablet; Refill: 3    3. HTN, goal below 140/90  Well controlled.    - Lipid panel reflex to direct LDL Fasting  - Comprehensive metabolic panel  - CBC with platelets  - TSH with free T4 reflex  - spironolactone (ALDACTONE) 25 MG tablet; Take 1 tablet (25 mg) by mouth daily  Dispense: 90 tablet; Refill: 3  -  losartan (COZAAR) 100 MG tablet; Take 1 tablet (100 mg) by mouth daily  Dispense: 90 tablet; Refill: 3  - furosemide (LASIX) 20 MG tablet; TAKE 1 TABLET (20 MG) BY MOUTH DAILY  Dispense: 90 tablet; Refill: 3    4. Morbid obesity, unspecified obesity type (H)  Discussed portion controlled diet.    5. Migraine without aura and without status migrainosus, not intractable  She does not recall why she is not taking 50 mg twice daily of her topiramate.  I have encouraged her to increase this and will see if this helps out with her increased migraines.  Suspect the increase in her migraines is secondary to her stress.    - topiramate (TOPAMAX) 25 MG tablet; Take 2 tablets (50 mg) by mouth 2 times daily  Dispense: 180 tablet; Refill: 3    6. Mixed anxiety depressive disorder  She finds it very distressing and her extended family has nothing to do with her and she does not know why.  She plans to seek out counseling from her [employer].  We discussed that this can be as effective as medication.  I have encouraged her to seek this out soon.  She will continue on her Lexapro at this point.  If, is not helpful she will then return and can consider changing her medications at this time.  Patient really would like to try to minimize medication changes.    - escitalopram (LEXAPRO) 20 MG tablet; Take 1 tablet (20 mg) by mouth daily  Dispense: 90 tablet; Refill: 3  - busPIRone (BUSPAR) 5 MG tablet; Take 1 tablet (5 mg) by mouth 3 times daily  Dispense: 270 tablet; Refill: 3    7. Numbness and tingling of right lower extremity  She complains of a numbness sensation on the ball of her right foot.  This been going on for over a year.  She gets some of this in her left side but she really feels the right side is worse.  She wears crocs her shoes.  Will check a B12 level.  Will also be checking glucose.  Cysts have encouraged her to wear better supportive shoes.    - Vitamin B12  - NEUROLOGY ADULT REFERRAL    8. Screening for HIV (human  "immunodeficiency virus)  She agrees to screening.  - HIV Screening    COUNSELING:  Reviewed preventive health counseling, as reflected in patient instructions    BP Readings from Last 1 Encounters:   08/24/18 132/72     Estimated body mass index is 54.19 kg/(m^2) as calculated from the following:    Height as of this encounter: 5' 7\" (1.702 m).    Weight as of this encounter: 346 lb (156.9 kg).      Weight management plan: Discussed healthy diet and exercise guidelines and patient will follow up in 12 months in clinic to re-evaluate.     reports that she has never smoked. She has never used smokeless tobacco.      Counseling Resources:  ATP IV Guidelines  Pooled Cohorts Equation Calculator  Breast Cancer Risk Calculator  FRAX Risk Assessment  ICSI Preventive Guidelines  Dietary Guidelines for Americans, 2010  USDA's MyPlate  ASA Prophylaxis  Lung CA Screening    Renetta Benitez MD  [facility]"

## 2018-08-24 ENCOUNTER — OFFICE VISIT (OUTPATIENT)
Dept: FAMILY MEDICINE | Facility: OTHER | Age: 58
End: 2018-08-24
Payer: COMMERCIAL

## 2018-08-24 VITALS
SYSTOLIC BLOOD PRESSURE: 132 MMHG | BODY MASS INDEX: 45.99 KG/M2 | RESPIRATION RATE: 16 BRPM | TEMPERATURE: 98.2 F | DIASTOLIC BLOOD PRESSURE: 72 MMHG | WEIGHT: 293 LBS | HEIGHT: 67 IN | OXYGEN SATURATION: 98 % | HEART RATE: 61 BPM

## 2018-08-24 DIAGNOSIS — Z11.4 SCREENING FOR HIV (HUMAN IMMUNODEFICIENCY VIRUS): ICD-10-CM

## 2018-08-24 DIAGNOSIS — R20.2 NUMBNESS AND TINGLING OF RIGHT LOWER EXTREMITY: ICD-10-CM

## 2018-08-24 DIAGNOSIS — R20.0 NUMBNESS AND TINGLING OF RIGHT LOWER EXTREMITY: ICD-10-CM

## 2018-08-24 DIAGNOSIS — G43.009 MIGRAINE WITHOUT AURA AND WITHOUT STATUS MIGRAINOSUS, NOT INTRACTABLE: ICD-10-CM

## 2018-08-24 DIAGNOSIS — I10 HTN, GOAL BELOW 140/90: ICD-10-CM

## 2018-08-24 DIAGNOSIS — F41.8 MIXED ANXIETY DEPRESSIVE DISORDER: ICD-10-CM

## 2018-08-24 DIAGNOSIS — Z00.00 ENCOUNTER FOR ROUTINE ADULT HEALTH EXAMINATION WITHOUT ABNORMAL FINDINGS: Primary | ICD-10-CM

## 2018-08-24 DIAGNOSIS — I50.32 CHRONIC DIASTOLIC CONGESTIVE HEART FAILURE (H): ICD-10-CM

## 2018-08-24 DIAGNOSIS — E66.01 MORBID OBESITY, UNSPECIFIED OBESITY TYPE (H): ICD-10-CM

## 2018-08-24 LAB
ALBUMIN SERPL-MCNC: 3.6 G/DL (ref 3.4–5)
ALP SERPL-CCNC: 83 U/L (ref 40–150)
ALT SERPL W P-5'-P-CCNC: 25 U/L (ref 0–50)
ANION GAP SERPL CALCULATED.3IONS-SCNC: 10 MMOL/L (ref 3–14)
AST SERPL W P-5'-P-CCNC: 22 U/L (ref 0–45)
BILIRUB SERPL-MCNC: 0.7 MG/DL (ref 0.2–1.3)
BUN SERPL-MCNC: 16 MG/DL (ref 7–30)
CALCIUM SERPL-MCNC: 8.7 MG/DL (ref 8.5–10.1)
CHLORIDE SERPL-SCNC: 105 MMOL/L (ref 94–109)
CHOLEST SERPL-MCNC: 248 MG/DL
CO2 SERPL-SCNC: 24 MMOL/L (ref 20–32)
CREAT SERPL-MCNC: 0.91 MG/DL (ref 0.52–1.04)
ERYTHROCYTE [DISTWIDTH] IN BLOOD BY AUTOMATED COUNT: 13.7 % (ref 10–15)
GFR SERPL CREATININE-BSD FRML MDRD: 64 ML/MIN/1.7M2
GLUCOSE SERPL-MCNC: 91 MG/DL (ref 70–99)
HCT VFR BLD AUTO: 41.6 % (ref 35–47)
HDLC SERPL-MCNC: 41 MG/DL
HGB BLD-MCNC: 14.1 G/DL (ref 11.7–15.7)
LDLC SERPL CALC-MCNC: 174 MG/DL
MCH RBC QN AUTO: 29.4 PG (ref 26.5–33)
MCHC RBC AUTO-ENTMCNC: 33.9 G/DL (ref 31.5–36.5)
MCV RBC AUTO: 87 FL (ref 78–100)
NONHDLC SERPL-MCNC: 207 MG/DL
PLATELET # BLD AUTO: 258 10E9/L (ref 150–450)
POTASSIUM SERPL-SCNC: 4 MMOL/L (ref 3.4–5.3)
PROT SERPL-MCNC: 7.7 G/DL (ref 6.8–8.8)
RBC # BLD AUTO: 4.79 10E12/L (ref 3.8–5.2)
SODIUM SERPL-SCNC: 139 MMOL/L (ref 133–144)
TRIGL SERPL-MCNC: 166 MG/DL
TSH SERPL DL<=0.005 MIU/L-ACNC: 1.78 MU/L (ref 0.4–4)
VIT B12 SERPL-MCNC: 258 PG/ML (ref 193–986)
WBC # BLD AUTO: 7.6 10E9/L (ref 4–11)

## 2018-08-24 PROCEDURE — 99396 PREV VISIT EST AGE 40-64: CPT | Performed by: FAMILY MEDICINE

## 2018-08-24 PROCEDURE — 87389 HIV-1 AG W/HIV-1&-2 AB AG IA: CPT | Performed by: FAMILY MEDICINE

## 2018-08-24 PROCEDURE — 80061 LIPID PANEL: CPT | Performed by: FAMILY MEDICINE

## 2018-08-24 PROCEDURE — 82607 VITAMIN B-12: CPT | Performed by: FAMILY MEDICINE

## 2018-08-24 PROCEDURE — 85027 COMPLETE CBC AUTOMATED: CPT | Performed by: FAMILY MEDICINE

## 2018-08-24 PROCEDURE — 36415 COLL VENOUS BLD VENIPUNCTURE: CPT | Performed by: FAMILY MEDICINE

## 2018-08-24 PROCEDURE — 80053 COMPREHEN METABOLIC PANEL: CPT | Performed by: FAMILY MEDICINE

## 2018-08-24 PROCEDURE — 84443 ASSAY THYROID STIM HORMONE: CPT | Performed by: FAMILY MEDICINE

## 2018-08-24 RX ORDER — TOPIRAMATE 25 MG/1
50 TABLET, FILM COATED ORAL 2 TIMES DAILY
Qty: 180 TABLET | Refills: 3 | Status: SHIPPED | OUTPATIENT
Start: 2018-08-24 | End: 2019-03-06

## 2018-08-24 RX ORDER — SPIRONOLACTONE 25 MG/1
25 TABLET ORAL DAILY
Qty: 90 TABLET | Refills: 3 | Status: SHIPPED | OUTPATIENT
Start: 2018-08-24 | End: 2019-08-12

## 2018-08-24 RX ORDER — ESCITALOPRAM OXALATE 20 MG/1
20 TABLET ORAL DAILY
Qty: 90 TABLET | Refills: 3 | Status: SHIPPED | OUTPATIENT
Start: 2018-08-24 | End: 2019-08-27

## 2018-08-24 RX ORDER — BUSPIRONE HYDROCHLORIDE 5 MG/1
5 TABLET ORAL 3 TIMES DAILY
Qty: 270 TABLET | Refills: 3 | Status: SHIPPED | OUTPATIENT
Start: 2018-08-24 | End: 2019-08-12

## 2018-08-24 RX ORDER — FUROSEMIDE 20 MG
TABLET ORAL
Qty: 90 TABLET | Refills: 3 | Status: SHIPPED | OUTPATIENT
Start: 2018-08-24 | End: 2019-08-12

## 2018-08-24 RX ORDER — LOSARTAN POTASSIUM 100 MG/1
100 TABLET ORAL DAILY
Qty: 90 TABLET | Refills: 3 | Status: SHIPPED | OUTPATIENT
Start: 2018-08-24 | End: 2019-08-12

## 2018-08-24 ASSESSMENT — ENCOUNTER SYMPTOMS
PALPITATIONS: 0
PARESTHESIAS: 0
DIZZINESS: 0
DIARRHEA: 0
COUGH: 0
DIFFICULTY URINATING: 0
EYE PAIN: 0
HEMATURIA: 0
NAUSEA: 0
HEARTBURN: 0
ARTHRALGIAS: 0
FEVER: 0
DYSURIA: 0
CONSTIPATION: 0
NUMBNESS: 1
JOINT SWELLING: 0
HEMATOCHEZIA: 0
CHILLS: 0
MYALGIAS: 0
SORE THROAT: 0
HEADACHES: 1
ABDOMINAL PAIN: 0
SHORTNESS OF BREATH: 0
WEAKNESS: 0
NERVOUS/ANXIOUS: 1

## 2018-08-24 NOTE — TELEPHONE ENCOUNTER
Client was informed that this form was faxed.   Original was mailed to client.   Copy made for chart.   Isa Plunkett MA  August 24, 2018

## 2018-08-24 NOTE — MR AVS SNAPSHOT
After Visit Summary   8/24/2018    Eva Solis    MRN: [id number]           Patient Information     Date Of Birth          1960        Visit Information        Provider Department      8/24/2018 9:00 AM Renetta Benitez MD [facility]        Today's Diagnoses     Encounter for routine adult health examination without abnormal findings    -  1    Screening for HIV (human immunodeficiency virus)        Need for prophylactic vaccination against Streptococcus pneumoniae (pneumococcus)        Chronic diastolic congestive heart failure (H)        HTN, goal below 140/90        Morbid obesity, unspecified obesity type (H)        Migraine without aura and without status migrainosus, not intractable        Mixed anxiety depressive disorder        Numbness and tingling of right lower extremity          Care Instructions      Preventive Health Recommendations  Female Ages 50 - 64    Yearly exam: See your health care provider every year in order to  o Review health changes.   o Discuss preventive care.    o Review your medicines if your doctor has prescribed any.      Get a Pap test every three years (unless you have an abnormal result and your provider advises testing more often).    If you get Pap tests with HPV test, you only need to test every 5 years, unless you have an abnormal result.     You do not need a Pap test if your uterus was removed (hysterectomy) and you have not had cancer.    You should be tested each year for STDs (sexually transmitted diseases) if you're at risk.     Have a mammogram every 1 to 2 years.    Have a colonoscopy at age 50, or have a yearly FIT test (stool test). These exams screen for colon cancer.      Have a cholesterol test every 5 years, or more often if advised.    Have a diabetes test (fasting glucose) every three years. If you are at risk for diabetes, you should have this test more often.     If you are at risk for osteoporosis (brittle bone  disease), think about having a bone density scan (DEXA).    Shots: Get a flu shot each year. Get a tetanus shot every 10 years.    Nutrition:     Eat at least 5 servings of fruits and vegetables each day.    Eat whole-grain bread, whole-wheat pasta and brown rice instead of white grains and rice.    Get adequate Calcium and Vitamin D.     Lifestyle    Exercise at least 150 minutes a week (30 minutes a day, 5 days a week). This will help you control your weight and prevent disease.    Limit alcohol to one drink per day.    No smoking.     Wear sunscreen to prevent skin cancer.     See your dentist every six months for an exam and cleaning.    See your eye doctor every 1 to 2 years.            Follow-ups after your visit        Additional Services     NEUROLOGY ADULT REFERRAL       Your provider has referred you for the following:   EMG at [facility]: [facility] [phone]   [email or web address]    Please be aware that coverage of these services is subject to the terms and limitations of your health insurance plan.  Call member services at your health plan with any benefit or coverage questions.      Please bring the following with you to your appointment:    (1) Any X-Rays, CTs or MRIs which have been performed.  Contact the facility where they were done to arrange for  prior to your scheduled appointment.    (2) List of current medications  (3) This referral request   (4) Any documents/labs given to you for this referral                  Your next 10 appointments already scheduled     Aug 28, 2018 10:00 AM CDT   MA SCREENING DIGITAL BILATERAL with MGMA1, MG MA [facility] ([facility])    [address] [address] [place] [address] [phone]           Do not use any powder, lotion or deodorant under your arms or on your breast. If you do, we will ask you to remove it before your  "exam.  Wear comfortable, two-piece clothing.  If you have any allergies, tell your care team.  Bring any previous mammograms from other facilities or have them mailed to the breast center. Three-dimensional (3D) mammograms are available at [facility] in [facility], [facility], [facility], [facility], and [place]. [facility] locations include [place] and [facility] in [place]. Benefits of 3D mammograms include: - Improved rate of cancer detection - Decreases your chance of having to go back for more tests, which means fewer: - \"False-positive\" results (This means that there is an abnormal area but it isn't cancer.) - Invasive testing procedures, such as a biopsy or surgery - Can provide clearer images of the breast if you have dense breast tissue. 3D mammography is an optional exam that anyone can have with a 2D mammogram. It doesn't replace or take the place of a 2D mammogram. 2D mammograms remain an effective screening test for all women.  Not all insurance companies cover the cost of a 3D mammogram. Check with your insurance.            Sep 24, 2018  2:30 PM CDT   EMG with Ambrocio See MD   [facility] ([facility]    [address] [place] [address] [phone]              Who to contact     If you have questions or need follow up information about today's clinic visit or your schedule please contact [facility] directly at [phone].  Normal or non-critical lab and imaging results will be communicated to you by MyChart, letter or phone within 4 business days after the clinic has received the results. If you do not hear from us within 7 days, please contact the clinic through BitWallhart or phone. If you have a critical or abnormal lab result, we will notify you by phone as soon as possible.  Submit refill requests through RingCaptcha or call your pharmacy and they will forward the refill request to " "us. Please allow 3 business days for your refill to be completed.          Additional Information About Your Visit        [email or web address] Information     [employer] gives you secure access to your electronic health record. If you see a primary care provider, you can also send messages to your care team and make appointments. If you have questions, please call your primary care clinic.  If you do not have a primary care provider, please call [phone] and they will assist you.        Care EveryWhere ID     This is your Care EveryWhere ID. This could be used by other organizations to access your [facility] medical records  [id number]        Your Vitals Were     Pulse Temperature Respirations Height Pulse Oximetry BMI (Body Mass Index)    61 98.2  F (36.8  C) (Temporal) 16 5' 7\" (1.702 m) 98% 54.19 kg/m2       Blood Pressure from Last 3 Encounters:   08/24/18 132/72   10/17/17 132/76   07/11/17 118/60    Weight from Last 3 Encounters:   08/24/18 (!) 346 lb (156.9 kg)   10/17/17 (!) 350 lb (158.8 kg)   07/11/17 (!) 345 lb (156.5 kg)              We Performed the Following     CBC with platelets     Comprehensive metabolic panel     HIV Screening     Lipid panel reflex to direct LDL Fasting     NEUROLOGY ADULT REFERRAL     TSH with free T4 reflex     Vitamin B12          Today's Medication Changes          These changes are accurate as of 8/24/18  9:57 AM.  If you have any questions, ask your nurse or doctor.               These medicines have changed or have updated prescriptions.        Dose/Directions    escitalopram 20 MG tablet   Commonly known as:  LEXAPRO   This may have changed:  See the new instructions.   Used for:  Mixed anxiety depressive disorder   Changed by:  Renetta Benitez MD        Dose:  20 mg   Take 1 tablet (20 mg) by mouth daily   Quantity:  90 tablet   Refills:  3       losartan 100 MG tablet   Commonly known as:  COZAAR   This may have changed:  See the new instructions.   Used for:  Chronic " diastolic congestive heart failure (H), HTN, goal below 140/90   Changed by:  Renetta Benitez MD        Dose:  100 mg   Take 1 tablet (100 mg) by mouth daily   Quantity:  90 tablet   Refills:  3       spironolactone 25 MG tablet   Commonly known as:  ALDACTONE   This may have changed:  See the new instructions.   Used for:  Chronic diastolic congestive heart failure (H), HTN, goal below 140/90   Changed by:  Renetta Benitez MD        Dose:  25 mg   Take 1 tablet (25 mg) by mouth daily   Quantity:  90 tablet   Refills:  3       topiramate 25 MG tablet   Commonly known as:  TOPAMAX   This may have changed:  See the new instructions.   Used for:  Migraine without aura and without status migrainosus, not intractable   Changed by:  Renetta Benitez MD        Dose:  50 mg   Take 2 tablets (50 mg) by mouth 2 times daily   Quantity:  180 tablet   Refills:  3            Where to get your medicines      These medications were sent to [facility] [address] [place], [place] [id number]     Phone:  [phone]     busPIRone 5 MG tablet    escitalopram 20 MG tablet    furosemide 20 MG tablet    losartan 100 MG tablet    spironolactone 25 MG tablet    topiramate 25 MG tablet                Primary Care Provider Office Phone # Fax #    Renetta Benitez [phone]       [address] [place] [address] 100  [facility] [address]        Equal Access to Services     [facility] AH: Hadii aad ku hadasho Soomaali, waaxda luqadaha, qaybta kaalmada adeegyada, julián lucas haymarlonn dulce dupree . So [facility] [phone].    ATENCIÓN: Si habla español, tiene a chaudhary disposición servicios gratuitos de asistencia lingüística. Llame al [phone].    We comply with applicable federal civil rights laws and [place] laws. We do not discriminate on the basis of race, color, national origin, age, disability, sex, sexual orientation, or gender identity.            Thank  you!     Thank you for choosing [facility]  for your care. Our goal is always to provide you with excellent care. Hearing back from our patients is one way we can continue to improve our services. Please take a few minutes to complete the written survey that you may receive in the mail after your visit with us. Thank you!             Your Updated Medication List - Protect others around you: Learn how to safely use, store and throw away your medicines at www.disposemymeds.org.          This list is accurate as of 8/24/18  9:57 AM.  Always use your most recent med list.                   Brand Name Dispense Instructions for use Diagnosis    ALPRAZolam 0.25 MG tablet    XANAX    15 tablet    Take 1-2 tabs 30 minutes before flight    Flying phobia       busPIRone 5 MG tablet    BUSPAR    270 tablet    Take 1 tablet (5 mg) by mouth 3 times daily    Mixed anxiety depressive disorder       butalbital-aspirin-caffeine capsule    FIORINAL    20 capsule    Take 1 capsule by mouth every 4 hours as needed for headaches    Migraine without aura and without status migrainosus, not intractable       escitalopram 20 MG tablet    LEXAPRO    90 tablet    Take 1 tablet (20 mg) by mouth daily    Mixed anxiety depressive disorder       furosemide 20 MG tablet    LASIX    90 tablet    TAKE 1 TABLET (20 MG) BY MOUTH DAILY    Chronic diastolic congestive heart failure (H), HTN, goal below 140/90       IBUPROFEN PO      Take 4 tablets by mouth as needed.        losartan 100 MG tablet    COZAAR    90 tablet    Take 1 tablet (100 mg) by mouth daily    Chronic diastolic congestive heart failure (H), HTN, goal below 140/90       MIRALAX powder   Generic drug:  polyethylene glycol     1 Bottle    17 GM DAILY    Slow transit constipation       spironolactone 25 MG tablet    ALDACTONE    90 tablet    Take 1 tablet (25 mg) by mouth daily    Chronic diastolic congestive heart failure (H), HTN, goal below 140/90       topiramate 25 MG  tablet    TOPAMAX    180 tablet    Take 2 tablets (50 mg) by mouth 2 times daily    Migraine without aura and without status migrainosus, not intractable

## 2018-08-27 LAB — HIV 1+2 AB+HIV1 P24 AG SERPL QL IA: NONREACTIVE

## 2018-08-28 ENCOUNTER — RADIANT APPOINTMENT (OUTPATIENT)
Dept: MAMMOGRAPHY | Facility: CLINIC | Age: 58
End: 2018-08-28
Attending: FAMILY MEDICINE
Payer: COMMERCIAL

## 2018-08-28 DIAGNOSIS — Z12.31 VISIT FOR SCREENING MAMMOGRAM: ICD-10-CM

## 2018-08-28 PROCEDURE — 77067 SCR MAMMO BI INCL CAD: CPT | Performed by: RADIOLOGY

## 2018-09-24 ENCOUNTER — OFFICE VISIT (OUTPATIENT)
Dept: NEUROLOGY | Facility: CLINIC | Age: 58
End: 2018-09-24
Attending: FAMILY MEDICINE
Payer: COMMERCIAL

## 2018-09-24 DIAGNOSIS — R20.2 NUMBNESS AND TINGLING OF RIGHT LOWER EXTREMITY: ICD-10-CM

## 2018-09-24 DIAGNOSIS — R20.0 NUMBNESS AND TINGLING OF RIGHT LOWER EXTREMITY: ICD-10-CM

## 2018-09-24 DIAGNOSIS — R29.898 WEAKNESS OF FOOT, RIGHT: Primary | ICD-10-CM

## 2018-09-24 PROCEDURE — 95886 MUSC TEST DONE W/N TEST COMP: CPT | Performed by: PSYCHIATRY & NEUROLOGY

## 2018-09-24 PROCEDURE — 95885 MUSC TST DONE W/NERV TST LIM: CPT | Mod: 59 | Performed by: PSYCHIATRY & NEUROLOGY

## 2018-09-24 PROCEDURE — 95911 NRV CNDJ TEST 9-10 STUDIES: CPT | Performed by: PSYCHIATRY & NEUROLOGY

## 2018-09-24 NOTE — MR AVS SNAPSHOT
After Visit Summary   9/24/2018    Eva Solis    MRN: [id number]           Patient Information     Date Of Birth          1960        Visit Information        Provider Department      9/24/2018 2:30 PM Ambrocio See MD; PROC RM 1 PEDS [facility]        Today's Diagnoses     Weakness of foot, right    -  1       Follow-ups after your visit        Future tests that were ordered for you today     Open Future Orders        Priority Expected Expires Ordered    Needle EMG Each Extremity w/Paraspinal Area Complete (58887) Routine  9/24/2019 9/24/2018    NCS Motor with or without F-Wave, 7-8 nerves Routine  9/24/2019 9/24/2018            Who to contact     If you have questions or need follow up information about today's clinic visit or your schedule please contact [facility] directly at [phone].  Normal or non-critical lab and imaging results will be communicated to you by Omgilihart, letter or phone within 4 business days after the clinic has received the results. If you do not hear from us within 7 days, please contact the clinic through Omgilihart or phone. If you have a critical or abnormal lab result, we will notify you by phone as soon as possible.  Submit refill requests through [facility] or call your pharmacy and they will forward the refill request to us. Please allow 3 business days for your refill to be completed.          Additional Information About Your Visit        MyChart Information     [facility] gives you secure access to your electronic health record. If you see a primary care provider, you can also send messages to your care team and make appointments. If you have questions, please call your primary care clinic.  If you do not have a primary care provider, please call [phone] and they will assist you.      [facility] is an electronic gateway that provides easy, online access to your medical records. With [facility], you can request a clinic  appointment, read your test results, renew a prescription or communicate with your care team.     To access your existing account, please contact your [facility] or call [phone] for assistance.        Care EveryWhere ID     This is your Care EveryWhere ID. This could be used by other organizations to access your [facility] medical records  [id number]         Blood Pressure from Last 3 Encounters:   08/24/18 132/72   10/17/17 132/76   07/11/17 118/60    Weight from Last 3 Encounters:   08/24/18 (!) 156.9 kg (346 lb)   10/17/17 (!) 158.8 kg (350 lb)   07/11/17 (!) 156.5 kg (345 lb)               Primary Care Provider Office Phone # Fax #    Renetta FRANCES MD [facility] [phone]       [address] [address]        Equal Access to Services     [facility] : Hadii aad ku hadasho Soomaali, waaxda luqadaha, qaybta kaalmada adeegyada, waxay ruchiin haymarlonn dulce dupree . So [facility] [phone].    ATENCIÓN: Si habla español, tiene a chaudhary disposición servicios gratuitos de asistencia lingüística. Sherine al [phone].    We comply with applicable federal civil rights laws and [place] laws. We do not discriminate on the basis of race, color, national origin, age, disability, sex, sexual orientation, or gender identity.            Thank you!     Thank you for choosing [facility]  for your care. Our goal is always to provide you with excellent care. Hearing back from our patients is one way we can continue to improve our services. Please take a few minutes to complete the written survey that you may receive in the mail after your visit with us. Thank you!             Your Updated Medication List - Protect others around you: Learn how to safely use, store and throw away your medicines at www.disposemymeds.org.          This list is accurate as of 9/24/18  5:12 PM.  Always use your most recent med list.                   Brand Name  Dispense Instructions for use Diagnosis    ALPRAZolam 0.25 MG tablet    XANAX    15 tablet    Take 1-2 tabs 30 minutes before flight    Flying phobia       busPIRone 5 MG tablet    BUSPAR    270 tablet    Take 1 tablet (5 mg) by mouth 3 times daily    Mixed anxiety depressive disorder       butalbital-aspirin-caffeine capsule    FIORINAL    20 capsule    Take 1 capsule by mouth every 4 hours as needed for headaches    Migraine without aura and without status migrainosus, not intractable       escitalopram 20 MG tablet    LEXAPRO    90 tablet    Take 1 tablet (20 mg) by mouth daily    Mixed anxiety depressive disorder       furosemide 20 MG tablet    LASIX    90 tablet    TAKE 1 TABLET (20 MG) BY MOUTH DAILY    Chronic diastolic congestive heart failure (H), HTN, goal below 140/90       IBUPROFEN PO      Take 4 tablets by mouth as needed.        losartan 100 MG tablet    COZAAR    90 tablet    Take 1 tablet (100 mg) by mouth daily    Chronic diastolic congestive heart failure (H), HTN, goal below 140/90       MIRALAX powder   Generic drug:  polyethylene glycol     1 Bottle    17 GM DAILY    Slow transit constipation       spironolactone 25 MG tablet    ALDACTONE    90 tablet    Take 1 tablet (25 mg) by mouth daily    Chronic diastolic congestive heart failure (H), HTN, goal below 140/90       topiramate 25 MG tablet    TOPAMAX    180 tablet    Take 2 tablets (50 mg) by mouth 2 times daily    Migraine without aura and without status migrainosus, not intractable

## 2018-09-24 NOTE — PROGRESS NOTES
[facility] EMG Laboratory      Nerve Conduction & EMG Report        Patient:       Eva Solis  Patient ID:    [id number]  Gender:        Female  YOB: 1960  Age:           58 Years 3 Months      History and Examination:  Eva Solis is a 58 year old woman with a 1  year history of sensory disturbance in the metatarsals and toes of the right foot as well as concern about weakness of the toes. Recently symptoms have come to involve the left foot to a much less severe degree. Examination is notable for mild probable weakness of dorsiflexion and plantarflexion of the toes of the right foot, as well as apparent moderate atrophy of the left extensor digitorum brevis muscle.     Techniques:  Motor conduction studies were done with surface recording electrodes. Sensory conduction studies were performed with surface electrodes, unless indicated otherwise by (n), designating the use of subdermal recording electrodes. Temperature was monitored and recorded throughout the study. Lower extremities were maintained at a temperature of 31degrees Centigrade or higher. EMG was done with a concentric needle electrode.     Results:  The right superficial peroneal sensory nerve action potential was absent. Medial plantar nerve action potentials were absent on the left and likely absent, or severely attenuated, on the right. Tibial compound muscle action potential amplitudes were markedly attenuated bilaterally. There was a marked segmental amplitude drop between stimulation at the ankle and at the popliteal fossa on the left; however this may well have been artefactual. Bilateral peroneal motor conduction studies demonstrated moderate attenuation of the compound muscle action potential amplitude on the right only. Electromyography demonstrated prominent fibrillation potentials in most muscles studied, including the L5 paraspinal level, as noted, but sparing the vastus lateralis. Voluntary  activation demonstrated evidence of chronic denervation and reinnervation as noted in the table as well.     Interpretation:  This is an abnormal study, demonstrating electrophysiologic evidence of a moderately severe, chronic bilateral lumbosacral polyradiculoneuropathy or, alternatively, a radiculopathy or anterior myelopathy with a superimposed distal sensory polyneuropathy. Correlation with clinical evaluation, imaging studies, and, if indicated, electrodiagnostic study of the upper limb is needed to better characterize the process.       Ambrocio See MD        Sensory NCS      Nerve / Sites Rec. Site Onset Peak NP Amp Ref. PP Amp Dist Viet Ref. Temp     ms ms  V  V  V cm m/s m/s  C   R SURAL - Lat Mall      Calf Ankle (N) 2.81 4.01 10.2 8.0 12.0 14 49.8 38.0 33.1   R SUP PERONEAL      Lat Leg Agudelo NR NR NR  NR 12.5 NR 38.0 22.1   R MEDIAL PLANTAR      Sole Med Mall NR NR NR  NR 14 NR  31   L MEDIAL PLANTAR      Sole Med Mall NR NR NR  NR 14 NR  31.1       Motor NCS      Nerve / Sites Rec. Site Lat Ref. Amp Ref. Rel Amp Dist Viet Ref. Dur. Area Temp.     ms ms mV mV % cm m/s m/s ms %  C   R DEEP PERONEAL - EDB      Ankle EDB 4.69  1.4  100 8  38.0 6.41 100 31.5      Fib Head EDB 12.97  1.2  84.3 32 38.6  6.82 93.9 31.1      Pop Fos EDB 14.48  1.2  89.3 6 39.7  7.14 91.7 31   L DEEP PERONEAL - EDB      Ankle EDB 3.91 6.00 7.2 2.5 100 8   6.98 100 31.2   R TIBIAL - AH      Ankle AH 5.78 6.00 0.9 4.0 100 8   7.92 100 31.1      Pop Fos AH 16.41  0.1  15.9 47 44.2 38.0 6.04 18.2 31   L TIBIAL - AH      Ankle AH 5.00 6.00 1.3 4.0 100 8   6.30 100 31.3   R PERONEAL - Tib Ant      Fib Head Tib Ant 3.28  1.0  100    11.09 100 31.3      Knee Tib Ant 4.95  1.3  126 8.5 51.0  10.52 144 31.2       EMG Summary Table     Spontaneous MUAP Recruitment    IA Fib PSW Fasc H.F. Amp Dur. PPP Pattern   R. EXT DIG BREVIS N 2+ None None None 2+ 2+ N Moderately Reduced   R. ABD HALLUCIS N 2+ None None None 2+ 2+ N Severely Reduced   R.  GASTROCN (MED) N 2+ None None None N N N N   R. TIB ANTERIOR Increased None None None None N N N Mildly Reduced   R. PERON LONGUS N 3+ None None None 2+ 2+ N Mildly Reduced   L. GASTROCN (MED) N 2+ None None None N N N Distant interference pattern   R. VAST LATERALIS N None None None None N N N N   R. BIC FEM (S HEAD) N 2+ None None None 2+ 2+ N Moderately Reduced   R. L5 PSP N 2+ None None None N N N N

## 2018-09-24 NOTE — LETTER
9/24/2018         RE: Eva Solis  [address] [facility] [address]        Dear Colleague,    Thank you for referring your patient, Eva Solis, to the [facility]. Please see a copy of my visit note below.    [facility] EMG Laboratory      Nerve Conduction & EMG Report        Patient:       Eva Solis  Patient ID:    [id number]  Gender:        Female  YOB: 1960  Age:           58 Years 3 Months      History and Examination:  Eva Solis is a 58 year old woman with a 1  year history of sensory disturbance in the metatarsals and toes of the right foot as well as concern about weakness of the toes. Recently symptoms have come to involve the left foot to a much less severe degree. Examination is notable for mild probable weakness of dorsiflexion and plantarflexion of the toes of the right foot, as well as apparent moderate atrophy of the left extensor digitorum brevis muscle.     Techniques:  Motor conduction studies were done with surface recording electrodes. Sensory conduction studies were performed with surface electrodes, unless indicated otherwise by (n), designating the use of subdermal recording electrodes. Temperature was monitored and recorded throughout the study. Lower extremities were maintained at a temperature of 31degrees Centigrade or higher. EMG was done with a concentric needle electrode.     Results:  The right superficial peroneal sensory nerve action potential was absent. Medial plantar nerve action potentials were absent on the left and likely absent, or severely attenuated, on the right. Tibial compound muscle action potential amplitudes were markedly attenuated bilaterally. There was a marked segmental amplitude drop between stimulation at the ankle and at the popliteal fossa on the left; however this may well have been artefactual. Bilateral peroneal motor conduction studies demonstrated moderate attenuation  of the compound muscle action potential amplitude on the right only. Electromyography demonstrated prominent fibrillation potentials in most muscles studied, including the L5 paraspinal level, as noted, but sparing the vastus lateralis. Voluntary activation demonstrated evidence of chronic denervation and reinnervation as noted in the table as well.     Interpretation:  This is an abnormal study, demonstrating electrophysiologic evidence of a moderately severe, chronic bilateral lumbosacral polyradiculoneuropathy or, alternatively, a radiculopathy or anterior myelopathy with a superimposed distal sensory polyneuropathy. Correlation with clinical evaluation, imaging studies, and, if indicated, electrodiagnostic study of the upper limb is needed to better characterize the process.       Ambrocio See MD        Sensory NCS      Nerve / Sites Rec. Site Onset Peak NP Amp Ref. PP Amp Dist Viet Ref. Temp     ms ms  V  V  V cm m/s m/s  C   R SURAL - Lat Mall      Calf Ankle (N) 2.81 4.01 10.2 8.0 12.0 14 49.8 38.0 33.1   R SUP PERONEAL      Lat Leg Agudelo NR NR NR  NR 12.5 NR 38.0 22.1   R MEDIAL PLANTAR      Sole Med Mall NR NR NR  NR 14 NR  31   L MEDIAL PLANTAR      Sole Med Mall NR NR NR  NR 14 NR  31.1       Motor NCS      Nerve / Sites Rec. Site Lat Ref. Amp Ref. Rel Amp Dist Viet Ref. Dur. Area Temp.     ms ms mV mV % cm m/s m/s ms %  C   R DEEP PERONEAL - EDB      Ankle EDB 4.69  1.4  100 8  38.0 6.41 100 31.5      Fib Head EDB 12.97  1.2  84.3 32 38.6  6.82 93.9 31.1      Pop Fos EDB 14.48  1.2  89.3 6 39.7  7.14 91.7 31   L DEEP PERONEAL - EDB      Ankle EDB 3.91 6.00 7.2 2.5 100 8   6.98 100 31.2   R TIBIAL - AH      Ankle AH 5.78 6.00 0.9 4.0 100 8   7.92 100 31.1      Pop Fos AH 16.41  0.1  15.9 47 44.2 38.0 6.04 18.2 31   L TIBIAL - AH      Ankle AH 5.00 6.00 1.3 4.0 100 8   6.30 100 31.3   R PERONEAL - Tib Ant      Fib Head Tib Ant 3.28  1.0  100    11.09 100 31.3      Knee Tib Ant 4.95  1.3  126 8.5 51.0  10.52  144 31.2       EMG Summary Table     Spontaneous MUAP Recruitment    IA Fib PSW Fasc H.F. Amp Dur. PPP Pattern   R. EXT DIG BREVIS N 2+ None None None 2+ 2+ N Moderately Reduced   R. ABD HALLUCIS N 2+ None None None 2+ 2+ N Severely Reduced   R. GASTROCN (MED) N 2+ None None None N N N N   R. TIB ANTERIOR Increased None None None None N N N Mildly Reduced   R. PERON LONGUS N 3+ None None None 2+ 2+ N Mildly Reduced   L. GASTROCN (MED) N 2+ None None None N N N Distant interference pattern   R. VAST LATERALIS N None None None None N N N N   R. BIC FEM (S HEAD) N 2+ None None None 2+ 2+ N Moderately Reduced   R. L5 PSP N 2+ None None None N N N N                              Again, thank you for allowing me to participate in the care of your patient.        Sincerely,        Ambrocio See MD

## 2018-09-25 ENCOUNTER — MYC MEDICAL ADVICE (OUTPATIENT)
Dept: FAMILY MEDICINE | Facility: OTHER | Age: 58
End: 2018-09-25

## 2018-09-25 DIAGNOSIS — F40.240 CLAUSTROPHOBIA: Primary | ICD-10-CM

## 2018-09-25 RX ORDER — ALPRAZOLAM 0.25 MG
TABLET ORAL
Qty: 15 TABLET | Refills: 0 | Status: CANCELLED | OUTPATIENT
Start: 2018-09-25

## 2018-09-25 RX ORDER — ALPRAZOLAM 0.25 MG
TABLET ORAL
Qty: 2 TABLET | Refills: 0 | Status: SHIPPED | OUTPATIENT
Start: 2018-09-25 | End: 2018-10-29

## 2018-09-25 NOTE — TELEPHONE ENCOUNTER
Rx for Xanax printed and signed.  Take 1-2 about 30 minutes before MRI, do not drive after taking it.

## 2018-09-25 NOTE — TELEPHONE ENCOUNTER
Left message for patient, asking what pharmacy she would like to use.   Leigh OLEA CMA ([facility])

## 2018-10-01 ENCOUNTER — RADIANT APPOINTMENT (OUTPATIENT)
Dept: MRI IMAGING | Facility: CLINIC | Age: 58
End: 2018-10-01
Attending: FAMILY MEDICINE
Payer: COMMERCIAL

## 2018-10-01 ENCOUNTER — TELEPHONE (OUTPATIENT)
Dept: FAMILY MEDICINE | Facility: OTHER | Age: 58
End: 2018-10-01

## 2018-10-01 DIAGNOSIS — M48.061 SPINAL STENOSIS, LUMBAR REGION, WITHOUT NEUROGENIC CLAUDICATION: Primary | ICD-10-CM

## 2018-10-01 DIAGNOSIS — R20.2 NUMBNESS AND TINGLING OF RIGHT LOWER EXTREMITY: ICD-10-CM

## 2018-10-01 DIAGNOSIS — R20.0 NUMBNESS AND TINGLING OF RIGHT LOWER EXTREMITY: ICD-10-CM

## 2018-10-01 PROCEDURE — 72148 MRI LUMBAR SPINE W/O DYE: CPT | Performed by: RADIOLOGY

## 2018-10-01 NOTE — TELEPHONE ENCOUNTER
----- Message from Andi Randolph PA-C sent at 10/1/2018  5:52 PM CDT -----  TC patient. Please call and notify patient that her lumbar MRI revealed severe stenosis in her low back (pressure on the spinal cord) which can cause pain, numbness, tingling, and weakness. I recommend she see neurosurgery for further evaluation so order has been placed. Please make sure she is not having any loss of bowel/bladder control or any new numbness in the groin or buttocks area. Thanks.    Andi Randolph PA-C

## 2018-10-01 NOTE — TELEPHONE ENCOUNTER
Left message for patient to return to clinic.  Please inform of message below.    Courtney Schumacher, [facility]  October 1, 2018

## 2018-10-02 ENCOUNTER — MYC MEDICAL ADVICE (OUTPATIENT)
Dept: FAMILY MEDICINE | Facility: OTHER | Age: 58
End: 2018-10-02

## 2018-10-02 NOTE — TELEPHONE ENCOUNTER
Called and spoke with patient regarding lab results.   Patient verbalized understanding.  No further questions.  Bee Maher CMA ([facility])

## 2018-10-02 NOTE — TELEPHONE ENCOUNTER
Called and spoke with patient regarding lab results.   Patient verbalized understanding.  No further questions.  Bee Maher CMA ([facility])

## 2018-10-05 ENCOUNTER — MYC MEDICAL ADVICE (OUTPATIENT)
Dept: FAMILY MEDICINE | Facility: OTHER | Age: 58
End: 2018-10-05

## 2018-10-29 ENCOUNTER — OFFICE VISIT (OUTPATIENT)
Dept: NEUROSURGERY | Facility: CLINIC | Age: 58
End: 2018-10-29
Attending: PHYSICIAN ASSISTANT
Payer: COMMERCIAL

## 2018-10-29 VITALS
WEIGHT: 293 LBS | TEMPERATURE: 97 F | SYSTOLIC BLOOD PRESSURE: 116 MMHG | DIASTOLIC BLOOD PRESSURE: 64 MMHG | HEIGHT: 67 IN | BODY MASS INDEX: 45.99 KG/M2

## 2018-10-29 DIAGNOSIS — G89.29 CHRONIC BILATERAL LOW BACK PAIN WITH BILATERAL SCIATICA: Primary | ICD-10-CM

## 2018-10-29 DIAGNOSIS — M54.41 CHRONIC BILATERAL LOW BACK PAIN WITH BILATERAL SCIATICA: Primary | ICD-10-CM

## 2018-10-29 DIAGNOSIS — M54.42 CHRONIC BILATERAL LOW BACK PAIN WITH BILATERAL SCIATICA: Primary | ICD-10-CM

## 2018-10-29 PROCEDURE — 99243 OFF/OP CNSLTJ NEW/EST LOW 30: CPT | Performed by: PHYSICIAN ASSISTANT

## 2018-10-29 RX ORDER — AMOXICILLIN 500 MG/1
CAPSULE ORAL
Refills: 3 | COMMUNITY
Start: 2018-10-22 | End: 2021-02-09

## 2018-10-29 ASSESSMENT — PAIN SCALES - GENERAL: PAINLEVEL: MILD PAIN (2)

## 2018-10-29 NOTE — NURSING NOTE
"Eva Solis is a 58 year old female who presents for:  Chief Complaint   Patient presents with     Neurologic Problem     low back pain with bilateral leg pain        Initial Vitals:  /64 (BP Location: Right arm, Patient Position: Chair, Cuff Size: Adult Regular)  Temp 97  F (36.1  C) (Temporal)  Ht 1.702 m (5' 7\")  Wt (!) 157.8 kg (347 lb 12.8 oz)  BMI 54.47 kg/m2 Estimated body mass index is 54.47 kg/(m^2) as calculated from the following:    Height as of this encounter: 1.702 m (5' 7\").    Weight as of this encounter: 157.8 kg (347 lb 12.8 oz).. Body surface area is 2.73 meters squared. BP completed using cuff size: regular  Mild Pain (2)    Do you feel safe in your environment?  Yes  Do you need any refills today? No    Nursing Comments:         Marcelle Gupta CMA    "

## 2018-10-29 NOTE — PROGRESS NOTES
Dr. Leonard Wallis  [facility]  Neurosurgery Clinic Visit      CC: back pain, leg numbness    Primary care Provider: Renetta Benitez    Referring Provider:  Renetta Benitez      Reason For Visit:   I was asked to consult on the patient for back pain, leg numbness.      HPI: Eva Solis is a 58 year old female who presents for evaluation of her chief complaint of low back pain and bilateral numbness in her lower extremities.  Her symptoms initially started about 2 years ago, with some numbness in her feet, and have gradually increased to having a lot of numbness and her entire leg bilaterally.  She has a lot of leg pain as well.  Regarding her back pain, symptoms are exacerbated with any physical activity.  She and her  have been working on weight reduction, but she states that she has so much back pain when walking that her attempts so far have been unsuccessful.  She denies bowel or bladder changes, or any problems with balance or coordination.  She has not had any conservative treatment.    Past Medical History:   Diagnosis Date     DEPRESSIVE DISORDER NEC 2004     Diastolic dysfunction      Essential hypertension, benign      Generalized osteoarthrosis, unspecified site     knees     Headache(784.0)      PANIC DISORDER 2004       Past Medical History reviewed with patient during visit.    Past Surgical History:   Procedure Laterality Date     C  DELIVERY ONLY      , Low Cervical     C  DELIVERY ONLY       C VAGINAL HYSTERECTOMY      without oophorectomy     COLONOSCOPY  10/06/10    attempt at colonscopy to 50cm     HC COLONOSCOPY W/WO BRUSH/WASH  2005    Diverticulosis.  Internal hemorrhoids.     JOINT REPLACEMTN, KNEE RT/LT  2006    Right total knee arthroplasty.     JOINT REPLACEMTN, KNEE RT/LT  2006    Left total knee arthroplasty.     Past Surgical History reviewed with patient during visit.    Current  Outpatient Prescriptions   Medication     amoxicillin (AMOXIL) 500 MG capsule     busPIRone (BUSPAR) 5 MG tablet     butalbital-aspirin-caffeine (FIORINAL) capsule     escitalopram (LEXAPRO) 20 MG tablet     furosemide (LASIX) 20 MG tablet     IBUPROFEN PO     losartan (COZAAR) 100 MG tablet     MIRALAX PO POWD     spironolactone (ALDACTONE) 25 MG tablet     topiramate (TOPAMAX) 25 MG tablet     No current facility-administered medications for this visit.        Allergies   Allergen Reactions     Lisinopril Cough       Social History     Social History     Marital status:      Spouse name: Shun     Number of children: 3     Years of education: N/A     Occupational History     Processor      Social History Main Topics     Smoking status: Never Smoker     Smokeless tobacco: Never Used      Comment: no smokers in household     Alcohol use No     Drug use: No     Sexual activity: Yes     Partners: Male     Birth control/ protection: Surgical      Comment: hysterectomy/bilateral ovaries remain     Other Topics Concern      Service No     Blood Transfusions No     Caffeine Concern No     Occupational Exposure No     Hobby Hazards No     Sleep Concern No     Stress Concern Yes     Weight Concern Yes     Exercise No     Seat Belt Yes     Self-Exams No     Social History Narrative       Family History   Problem Relation Age of Onset     Cancer Father      squamous cell ca     Cancer Maternal Grandmother      lung     Cerebrovascular Disease Maternal Grandmother      Cancer Paternal Grandmother      squamous cell ca     Diabetes Sister      Cancer Sister      kidney cancer     Kidney Cancer Sister      Connective Tissue Disorder Brother      lupus     Lupus Brother      KIDNEY DISEASE Mother      atrophic kidney     KIDNEY DISEASE Maternal Uncle      unclear kidney issue     Hypertension No family hx of      Colon Cancer No family hx of      Anxiety Disorder No family hx of      Asthma No family hx of   "         ROS: 10 point ROS neg other than the symptoms noted above in the HPI.    Vital Signs: /64 (BP Location: Right arm, Patient Position: Chair, Cuff Size: Adult Regular)  Temp 97  F (36.1  C) (Temporal)  Ht 5' 7\" (1.702 m)  Wt (!) 347 lb 12.8 oz (157.8 kg)  BMI 54.47 kg/m2    Examination:  Constitutional:  Alert, well nourished, NAD.  HEENT: Normocephalic, atraumatic.   Pulmonary:  Without shortness of breath, normal effort.   Lymph: no lymphadenopathy to low back or LE.   Integumentary: Skin is free of rashes or lesions.   Cardiovascular:  No pitting edema of BLE.    Psych: Normal affect, no apparent distress    Neurological:  Awake  Alert  Oriented x 3  Speech clear  Cranial nerves II - XII grossly intact  Motor exam   Hip Flexor:                Right: 5/5  Left:  5/5  Hip Adductor:             Right:  5/5  Left:  5/5  Hip Abductor:             Right:  5/5  Left:  5/5  Gastroc Soleus:        Right:  5/5  Left:  5/5  Tib/Ant:                      Right:  5/5  Left:  5/5  EHL:                          Right:  5/5  Left:  5/5       Sensation normal to bilateral upper and lower extremities.    Reflexes are 2+ in the patellar and Achilles. There is no clonus. Downgoing Babinski.      Musculoskeletal:  Gait: Able to stand from a seated position. Normal non-antalgic, non-myelopathic gait.    Lumbar examination reveals no tenderness of the spine or paraspinous muscles.  Hip height is symmetrical. Negative SI joint, sciatic notch or greater trochanteric tenderness to palpation bilaterally.  Straight leg raise is negative bilaterally.      Imaging:   MRI of the lumbar spine was reviewed in the office today.  It demonstrates multiple levels of moderate to severe spinal stenosis, most prominent at L3-4 and L4-5.  There are multiple levels of moderate to severe disc degeneration.    Assessment/Plan:     Low back pain   Bilateral lower extremity paresthesias  Multilevel disc degeneration  Lumbar stenosis L3-4 and " L4-5      Eva Solis is a 58 year old female.  I did have a discussion with the patient regarding her symptoms.  She has had symptoms for more than 2 years, with bilateral lower extremity pain and numbness, as well as central low back pain.  Her MRI shows a lot of disc degeneration and central stenosis at L3-4 and L4-5.  She has not have any neurological deficits.  She has not had any conservative treatment.  I encouraged her to do whatever she can to lose some body weight, which will almost certainly help with her symptoms.  I also ordered some physical therapy and an epidural injection.  She did voice agreement and wished to proceed with these options.          Ganesh Connors PA-C  [facility]  [address] [address]    Tel [phone]  Pager [phone]

## 2018-10-29 NOTE — MR AVS SNAPSHOT
After Visit Summary   10/29/2018    Eva Solis    MRN: [id number]           Patient Information     Date Of Birth          1960        Visit Information        Provider Department      10/29/2018 9:10 AM Ganesh Connors PA-C [facility]        Today's Diagnoses     Chronic bilateral low back pain with bilateral sciatica    -  1       Follow-ups after your visit        Additional Services     PHYSICAL THERAPY REFERRAL       If you have not heard from the scheduling office within 2 business days, please call [phone] for all locations, with the exception of [place], please call [phone] and [place], please call [phone].    Please be aware that coverage of these services is subject to the terms and limitations of your health insurance plan.  Call member services at your health plan with any benefit or coverage questions.                  Future tests that were ordered for you today     Open Future Orders        Priority Expected Expires Ordered    PHYSICAL THERAPY REFERRAL Routine  10/29/2019 10/29/2018    XR Lumbar Translaminar Inj Incl Imaging Routine 10/29/2018 10/29/2019 10/29/2018            Who to contact     If you have questions or need follow up information about today's clinic visit or your schedule please contact [facility] directly at [phone].  Normal or non-critical lab and imaging results will be communicated to you by MyChart, letter or phone within 4 business days after the clinic has received the results. If you do not hear from us within 7 days, please contact the clinic through MyChart or phone. If you have a critical or abnormal lab result, we will notify you by phone as soon as possible.  Submit refill requests through [employer] or call your pharmacy and they will forward the refill request to us. Please allow 3 business days for your refill to be completed.          Additional Information About Your Visit        "[employer]hart  "Information     [employer] gives you secure access to your electronic health record. If you see a primary care provider, you can also send messages to your care team and make appointments. If you have questions, please call your primary care clinic.  If you do not have a primary care provider, please call [phone] and they will assist you.        Care EveryWhere ID     This is your Care EveryWhere ID. This could be used by other organizations to access your [place] medical records  [id number]        Your Vitals Were     Temperature Height BMI (Body Mass Index)             97  F (36.1  C) (Temporal) 5' 7\" (1.702 m) 54.47 kg/m2          Blood Pressure from Last 3 Encounters:   10/29/18 116/64   08/24/18 132/72   10/17/17 132/76    Weight from Last 3 Encounters:   10/29/18 (!) 347 lb 12.8 oz (157.8 kg)   08/24/18 (!) 346 lb (156.9 kg)   10/17/17 (!) 350 lb (158.8 kg)               Primary Care Provider Office Phone # Fax #    Renetta FRANCES MD Emmanuel [phone]       [address] [facility] [address]  [facility] [address]        Equal Access to Services     TREMAYNE SYLVESTER AH: Hadii aad ku hadasho Soomaali, waaxda luqadaha, qaybta kaalmada adeegyada, julián hopperin haymarlonn dulce dupree . So [facility] [phone].    ATENCIÓN: Si habla español, tiene a chaduhary disposición servicios gratuitos de asistencia lingüística. Llame al [phone].    We comply with applicable federal civil rights laws and [place] laws. We do not discriminate on the basis of race, color, national origin, age, disability, sex, sexual orientation, or gender identity.            Thank you!     Thank you for choosing [facility]  for your care. Our goal is always to provide you with excellent care. Hearing back from our patients is one way we can continue to improve our services. Please take a few minutes to complete the written survey that you may receive in the mail after your visit with us. Thank you!             Your Updated " Medication List - Protect others around you: Learn how to safely use, store and throw away your medicines at www.disposemymeds.org.          This list is accurate as of 10/29/18  9:50 AM.  Always use your most recent med list.                   Brand Name Dispense Instructions for use Diagnosis    amoxicillin 500 MG capsule    AMOXIL     Only for dental work due to knee surgeries        busPIRone 5 MG tablet    BUSPAR    270 tablet    Take 1 tablet (5 mg) by mouth 3 times daily    Mixed anxiety depressive disorder       butalbital-aspirin-caffeine capsule    FIORINAL    20 capsule    Take 1 capsule by mouth every 4 hours as needed for headaches    Migraine without aura and without status migrainosus, not intractable       escitalopram 20 MG tablet    LEXAPRO    90 tablet    Take 1 tablet (20 mg) by mouth daily    Mixed anxiety depressive disorder       furosemide 20 MG tablet    LASIX    90 tablet    TAKE 1 TABLET (20 MG) BY MOUTH DAILY    Chronic diastolic congestive heart failure (H), HTN, goal below 140/90       IBUPROFEN PO      Take 4 tablets by mouth as needed.        losartan 100 MG tablet    COZAAR    90 tablet    Take 1 tablet (100 mg) by mouth daily    Chronic diastolic congestive heart failure (H), HTN, goal below 140/90       MIRALAX powder   Generic drug:  polyethylene glycol     1 Bottle    17 GM DAILY    Slow transit constipation       spironolactone 25 MG tablet    ALDACTONE    90 tablet    Take 1 tablet (25 mg) by mouth daily    Chronic diastolic congestive heart failure (H), HTN, goal below 140/90       topiramate 25 MG tablet    TOPAMAX    180 tablet    Take 2 tablets (50 mg) by mouth 2 times daily    Migraine without aura and without status migrainosus, not intractable

## 2018-10-29 NOTE — LETTER
10/29/2018         RE: Eva Solis  [address] [facility] [address]        Dear Colleague,    Thank you for referring your patient, Eva Solis, to the [facility]. Please see a copy of my visit note below.    Dr. Leonard Wallis  [facility]  Neurosurgery Clinic Visit      CC: back pain, leg numbness    Primary care Provider: Renetta Benitez    Referring Provider:  Renetta Benitez      Reason For Visit:   I was asked to consult on the patient for back pain, leg numbness.      HPI: Eva Solis is a 58 year old female who presents for evaluation of her chief complaint of low back pain and bilateral numbness in her lower extremities.  Her symptoms initially started about 2 years ago, with some numbness in her feet, and have gradually increased to having a lot of numbness and her entire leg bilaterally.  She has a lot of leg pain as well.  Regarding her back pain, symptoms are exacerbated with any physical activity.  She and her  have been working on weight reduction, but she states that she has so much back pain when walking that her attempts so far have been unsuccessful.  She denies bowel or bladder changes, or any problems with balance or coordination.  She has not had any conservative treatment.    Past Medical History:   Diagnosis Date     DEPRESSIVE DISORDER NEC 2004     Diastolic dysfunction      Essential hypertension, benign      Generalized osteoarthrosis, unspecified site     knees     Headache(784.0)      PANIC DISORDER 2004       Past Medical History reviewed with patient during visit.    Past Surgical History:   Procedure Laterality Date     C  DELIVERY ONLY      , Low Cervical     C  DELIVERY ONLY       C VAGINAL HYSTERECTOMY      without oophorectomy     COLONOSCOPY  10/06/10    attempt at colonscopy to 50cm     HC COLONOSCOPY W/WO BRUSH/WASH  2005    Diverticulosis.   Internal hemorrhoids.     JOINT REPLACEMTN, KNEE RT/LT  5/11/2006    Right total knee arthroplasty.     JOINT REPLACEMTN, KNEE RT/LT  07/19/2006    Left total knee arthroplasty.     Past Surgical History reviewed with patient during visit.    Current Outpatient Prescriptions   Medication     amoxicillin (AMOXIL) 500 MG capsule     busPIRone (BUSPAR) 5 MG tablet     butalbital-aspirin-caffeine (FIORINAL) capsule     escitalopram (LEXAPRO) 20 MG tablet     furosemide (LASIX) 20 MG tablet     IBUPROFEN PO     losartan (COZAAR) 100 MG tablet     MIRALAX PO POWD     spironolactone (ALDACTONE) 25 MG tablet     topiramate (TOPAMAX) 25 MG tablet     No current facility-administered medications for this visit.        Allergies   Allergen Reactions     Lisinopril Cough       Social History     Social History     Marital status:      Spouse name: Shun     Number of children: 3     Years of education: N/A     Occupational History     Processor      Social History Main Topics     Smoking status: Never Smoker     Smokeless tobacco: Never Used      Comment: no smokers in household     Alcohol use No     Drug use: No     Sexual activity: Yes     Partners: Male     Birth control/ protection: Surgical      Comment: hysterectomy/bilateral ovaries remain     Other Topics Concern      Service No     Blood Transfusions No     Caffeine Concern No     Occupational Exposure No     Hobby Hazards No     Sleep Concern No     Stress Concern Yes     Weight Concern Yes     Exercise No     Seat Belt Yes     Self-Exams No     Social History Narrative       Family History   Problem Relation Age of Onset     Cancer Father      squamous cell ca     Cancer Maternal Grandmother      lung     Cerebrovascular Disease Maternal Grandmother      Cancer Paternal Grandmother      squamous cell ca     Diabetes Sister      Cancer Sister      kidney cancer     Kidney Cancer Sister      Connective Tissue Disorder Brother      lupus     Lupus  "Brother      KIDNEY DISEASE Mother      atrophic kidney     KIDNEY DISEASE Maternal Uncle      unclear kidney issue     Hypertension No family hx of      Colon Cancer No family hx of      Anxiety Disorder No family hx of      Asthma No family hx of           ROS: 10 point ROS neg other than the symptoms noted above in the HPI.    Vital Signs: /64 (BP Location: Right arm, Patient Position: Chair, Cuff Size: Adult Regular)  Temp 97  F (36.1  C) (Temporal)  Ht 5' 7\" (1.702 m)  Wt (!) 347 lb 12.8 oz (157.8 kg)  BMI 54.47 kg/m2    Examination:  Constitutional:  Alert, well nourished, NAD.  HEENT: Normocephalic, atraumatic.   Pulmonary:  Without shortness of breath, normal effort.   Lymph: no lymphadenopathy to low back or LE.   Integumentary: Skin is free of rashes or lesions.   Cardiovascular:  No pitting edema of BLE.    Psych: Normal affect, no apparent distress    Neurological:  Awake  Alert  Oriented x 3  Speech clear  Cranial nerves II - XII grossly intact  Motor exam   Hip Flexor:                Right: 5/5  Left:  5/5  Hip Adductor:             Right:  5/5  Left:  5/5  Hip Abductor:             Right:  5/5  Left:  5/5  Gastroc Soleus:        Right:  5/5  Left:  5/5  Tib/Ant:                      Right:  5/5  Left:  5/5  EHL:                          Right:  5/5  Left:  5/5       Sensation normal to bilateral upper and lower extremities.    Reflexes are 2+ in the patellar and Achilles. There is no clonus. Downgoing Babinski.      Musculoskeletal:  Gait: Able to stand from a seated position. Normal non-antalgic, non-myelopathic gait.    Lumbar examination reveals no tenderness of the spine or paraspinous muscles.  Hip height is symmetrical. Negative SI joint, sciatic notch or greater trochanteric tenderness to palpation bilaterally.  Straight leg raise is negative bilaterally.      Imaging:   MRI of the lumbar spine was reviewed in the office today.  It demonstrates multiple levels of moderate to severe " spinal stenosis, most prominent at L3-4 and L4-5.  There are multiple levels of moderate to severe disc degeneration.    Assessment/Plan:     Low back pain   Bilateral lower extremity paresthesias  Multilevel disc degeneration  Lumbar stenosis L3-4 and L4-5      Eva Solis is a 58 year old female.  I did have a discussion with the patient regarding her symptoms.  She has had symptoms for more than 2 years, with bilateral lower extremity pain and numbness, as well as central low back pain.  Her MRI shows a lot of disc degeneration and central stenosis at L3-4 and L4-5.  She has not have any neurological deficits.  She has not had any conservative treatment.  I encouraged her to do whatever she can to lose some body weight, which will almost certainly help with her symptoms.  I also ordered some physical therapy and an epidural injection.  She did voice agreement and wished to proceed with these options.          Ganesh Connors PA-C  [facility]  [address] [address]    Tel [phone]  Pager [phone]      Again, thank you for allowing me to participate in the care of your patient.        Sincerely,        Ganesh Connors PA-C

## 2018-11-19 ENCOUNTER — MYC MEDICAL ADVICE (OUTPATIENT)
Dept: FAMILY MEDICINE | Facility: OTHER | Age: 58
End: 2018-11-19

## 2018-11-19 ENCOUNTER — THERAPY VISIT (OUTPATIENT)
Dept: PHYSICAL THERAPY | Facility: CLINIC | Age: 58
End: 2018-11-19
Payer: COMMERCIAL

## 2018-11-19 DIAGNOSIS — M54.41 CHRONIC BILATERAL LOW BACK PAIN WITH BILATERAL SCIATICA: ICD-10-CM

## 2018-11-19 DIAGNOSIS — G89.29 CHRONIC BILATERAL LOW BACK PAIN WITH BILATERAL SCIATICA: ICD-10-CM

## 2018-11-19 DIAGNOSIS — M54.42 CHRONIC BILATERAL LOW BACK PAIN WITH BILATERAL SCIATICA: ICD-10-CM

## 2018-11-19 PROCEDURE — 97110 THERAPEUTIC EXERCISES: CPT | Mod: GP | Performed by: PHYSICAL THERAPIST

## 2018-11-19 PROCEDURE — 97161 PT EVAL LOW COMPLEX 20 MIN: CPT | Mod: GP | Performed by: PHYSICAL THERAPIST

## 2018-11-19 NOTE — PROGRESS NOTES
[facility] Initial Evaluation  Subjective:  Patient is a 58 year old female presenting with rehab back hpi.   Eva Solis is a 58 year old female with a lumbar condition.  Condition occurred with:  Insidious onset (Just orginally trying to figure out why her feet are going numb, Had EMG testing which did show nerve damage so at that point was suggested to have a MRI done. MRI findings show severe degenerative disc disease, Difficult standing up straight. ).  Condition occurred: other.  This is a chronic condition  Has been going on for 3 years now 10/29/18 date of MD order.    Patient reports pain:  Lower lumbar spine.  Radiates to:  Knee left, knee right, thigh right, thigh left, foot right and foot left.  Pain is described as aching and is constant and intermittent (feet constantly numb, pain in legs and low back  intermittent) and reported as 6/10.  Associated symptoms:  Numbness, tingling, loss of strength and loss of motion. Pain is the same all the time.  Exacerbated by: walking, standing, lying on back. Relieved by: sitting, lying on stomach, sitting bent forward.  Since onset symptoms are gradually worsening.  Special tests:  MRI (severe degeneration L1-L5, and anteriorlithesis).      General health as reported by patient is fair.  Pertinent medical history includes:  Overweight, high blood pressure, depression, migraines/headaches and numbness/tingling.  Medical allergies: yes (lisinipril).  Other surgeries include:  Orthopedic surgery and other (bilateral knee replacement, hysterectomy).  Current medications:  Anti-depressants, high blood pressure medication and cardiac medication.  Current occupation is  / Insurance.    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    Barriers include:  None as reported by the patient.    Red flags:  None as reported by the patient.                        Objective:  Standing Alignment:        Lumbar:  Lordosis incr and  anterior pelvic tilt                           Lumbar/SI Evaluation    Lumbar Myotomes:  normal            Lumbar DTR's:    L4 (Quad):  Left:  0   Right:  0  S1 (Achilles):  Left:  0   Right:  0    Lumbar Dermtomes:        L2 Left:  Normal-light touch     L2 Right:  Normal-light touch  L3 Left:  Normal-light touch     L3 Right:  Normal-light touch  L4 Left:  Normal-light touch       L4 Right:  Normal-light touch  L5 Right:  Hypo-light touch  S1 Left:  Hypo-light touch     S1 Right:  Hypo-light touch  Neural Tension/Mobility:      Left side:SLR or Slump  negative.     Right side:   Slump or SLR  negative.   Lumbar Palpation:      Tenderness not present at Left:    Quadratus Lumborum or Erector Spinae    Tenderness not present at Right:  Quadratus Lumborum or Erector Spinae                                                       Irina Lumbar Evaluation    Posture:  Sitting: fair  Standing: fair    Lateral Shift: no  Correction of Posture: no effect    Movement Loss:  Flexion (Flex): nil  Extension (EXT): mod and pain  Side [place] R (SG R): min and pain  Side [place] L (SG L): min and pain  Test Movements:  FIS: During: decreases  After: no better  Pretest Movements: 4/10 low back   Repeat FIS: During: decreases  After: no better  Mechanical Response: no effect      EIL: During: increases  After: worse    Repeat EIL: During: increases  After: worse          Conclusion: other (stenosis)  Principle of Treatment:    Flexion: repeated flexion for pain relief x 10 reps every 2 hours with core strengthening.                                              ROS    Assessment/Plan:    Patient is a 58 year old female with lumbar complaints.    Patient has the following significant findings with corresponding treatment plan.                Diagnosis 1:  Chronic low back pain with bilateral leg numbness  Pain -  hot/cold therapy, US, mechanical traction, manual therapy, self management, education, directional preference exercise and  home program  Decreased ROM/flexibility - manual therapy, therapeutic exercise, therapeutic activity and home program  Decreased joint mobility - manual therapy, therapeutic exercise, therapeutic activity and home program  Decreased function - therapeutic activities and home program  Impaired posture - neuro re-education, therapeutic activities and home program    Therapy Evaluation Codes:   1) History comprised of:   Personal factors that impact the plan of care:      Anxiety and Time since onset of symptoms.    Comorbidity factors that impact the plan of care are:      Depression, Heart problems, High blood pressure, Migraines/headaches, Numbness/tingling and Overweight.     Medications impacting care: Anti-depressant, Cardiac and High blood pressure.  2) Examination of Body Systems comprised of:   Body structures and functions that impact the plan of care:      Lumbar spine.   Activity limitations that impact the plan of care are:      Standing and Walking.  3) Clinical presentation characteristics are:   Stable/Uncomplicated.  4) Decision-Making    Low complexity using standardized patient assessment instrument and/or measureable assessment of functional outcome.  Cumulative Therapy Evaluation is: Low complexity.    Previous and current functional limitations:  (See Goal Flow Sheet for this information)    Short term and Long term goals: (See Goal Flow Sheet for this information)     Communication ability:  Patient appears to be able to clearly communicate and understand verbal and written communication and follow directions correctly.  Treatment Explanation - The following has been discussed with the patient:   RX ordered/plan of care  Anticipated outcomes  Possible risks and side effects  This patient would benefit from PT intervention to resume normal activities.   Rehab potential is good.    Frequency:  1 X week, once daily  Duration:  for 8 weeks  Discharge Plan:  Achieve all LTG.  Independent in home  treatment program.  Reach maximal therapeutic benefit.    Please refer to the daily flowsheet for treatment today, total treatment time and time spent performing 1:1 timed codes.

## 2018-11-19 NOTE — LETTER
[facility] ATHLETIC [facility] PHYSICAL [facility]  [address]  [facility] [address] [phone]    2018    Re: Eva Solis   :   1960  MRN:  [id number]   REFERRING PHYSICIAN:   Ganesh Connors    [facility]THLETIC [facility]    Date of Initial Evaluation:  ***  Visits:  Rxs Used: 1  Reason for Referral:  Chronic bilateral low back pain with bilateral sciatica    EVALUATION SUMMARY    [facility]  Subjective:  Patient is a 58 year old female presenting with rehab back hpi.   Eva Solis is a 58 year old female with a lumbar condition.  Condition occurred with:  Insidious onset (Just orginally trying to figure out why her feet are going numb, Had EMG testing which did show nerve damage so at that point was suggested to have a MRI done. MRI findings show severe degenerative disc disease, Difficult standing up straight. ).  Condition occurred: other.  This is a chronic condition  Has been going on for 3 years now 10/29/18 date of MD order.    Patient reports pain:  Lower lumbar spine.  Radiates to:  Knee left, knee right, thigh right, thigh left, foot right and foot left.  Pain is described as aching and is constant and intermittent (feet constantly numb, pain in legs and low back  intermittent) and reported as 6/10.  Associated symptoms:  Numbness, tingling, loss of strength and loss of motion. Pain is the same all the time.  Exacerbated by: walking, standing, lying on back. Relieved by: sitting, lying on stomach, sitting bent forward.  Since onset symptoms are gradually worsening.  Special tests:  MRI (severe degeneration L1-L5, and anteriorlithesis).      General health as reported by patient is fair.  Pertinent medical history includes:  Overweight, high blood pressure, depression, migraines/headaches and numbness/tingling.  Medical allergies: yes (lisinipril).  Other surgeries include:   Orthopedic surgery and other (bilateral knee replacement, hysterectomy).  Current medications:  Anti-depressants, high blood pressure medication and cardiac medication.  Current occupation is  / Insurance.    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    Barriers include:  None as reported by the patient.    Red flags:  None as reported by the patient.                        Objective:  Standing Alignment:        Lumbar:  Lordosis incr and anterior pelvic tilt                           Lumbar/SI Evaluation    Lumbar Myotomes:  normal            Lumbar DTR's:    L4 (Quad):  Left:  0   Right:  0  S1 (Achilles):  Left:  0   Right:  0    Lumbar Dermtomes:        L2 Left:  Normal-light touch     L2 Right:  Normal-light touch  L3 Left:  Normal-light touch     L3 Right:  Normal-light touch  L4 Left:  Normal-light touch       L4 Right:  Normal-light touch  L5 Right:  Hypo-light touch  S1 Left:  Hypo-light touch     S1 Right:  Hypo-light touch  Neural Tension/Mobility:      Left side:SLR or Slump  negative.     Right side:   Slump or SLR  negative.   Lumbar Palpation:      Tenderness not present at Left:    Quadratus Lumborum or Erector Spinae    Tenderness not present at Right:  Quadratus Lumborum or Erector Spinae                                                       Irina Lumbar Evaluation    Posture:  Sitting: fair  Standing: fair    Lateral Shift: no  Correction of Posture: no effect    Movement Loss:  Flexion (Flex): nil  Extension (EXT): mod and pain  Side [place] R (SG R): min and pain  Side [place] L (SG L): min and pain  Test Movements:  FIS: During: decreases  After: no better  Pretest Movements: 4/10 low back   Repeat FIS: During: decreases  After: no better  Mechanical Response: no effect      EIL: During: increases  After: worse    Repeat EIL: During: increases  After: worse          Conclusion: other (stenosis)  Principle of Treatment:    Flexion: repeated flexion for pain relief x 10  reps every 2 hours with core strengthening.                                              ROS    Assessment/Plan:    Patient is a 58 year old female with lumbar complaints.    Patient has the following significant findings with corresponding treatment plan.                Diagnosis 1:  Chronic low back pain with bilateral leg numbness  Pain -  hot/cold therapy, US, mechanical traction, manual therapy, self management, education, directional preference exercise and home program  Decreased ROM/flexibility - manual therapy, therapeutic exercise, therapeutic activity and home program  Decreased joint mobility - manual therapy, therapeutic exercise, therapeutic activity and home program  Decreased function - therapeutic activities and home program  Impaired posture - neuro re-education, therapeutic activities and home program    Therapy Evaluation Codes:   1) History comprised of:   Personal factors that impact the plan of care:      Anxiety and Time since onset of symptoms.    Comorbidity factors that impact the plan of care are:      Depression, Heart problems, High blood pressure, Migraines/headaches, Numbness/tingling and Overweight.     Medications impacting care: Anti-depressant, Cardiac and High blood pressure.  2) Examination of Body Systems comprised of:   Body structures and functions that impact the plan of care:      Lumbar spine.   Activity limitations that impact the plan of care are:      Standing and Walking.  3) Clinical presentation characteristics are:   Stable/Uncomplicated.  4) Decision-Making    Low complexity using standardized patient assessment instrument and/or measureable assessment of functional outcome.  Cumulative Therapy Evaluation is: Low complexity.    Previous and current functional limitations:  (See Goal Flow Sheet for this information)    Short term and Long term goals: (See Goal Flow Sheet for this information)     Communication ability:  Patient appears to be able to clearly  communicate and understand verbal and written communication and follow directions correctly.  Treatment Explanation - The following has been discussed with the patient:   RX ordered/plan of care  Anticipated outcomes  Possible risks and side effects  This patient would benefit from PT intervention to resume normal activities.   Rehab potential is good.    Frequency:  1 X week, once daily  Duration:  for 8 weeks  Discharge Plan:  Achieve all LTG.  Independent in home treatment program.  Reach maximal therapeutic benefit.    Please refer to the daily flowsheet for treatment today, total treatment time and time spent performing 1:1 timed codes.         Thank you for your referral.    INQUIRIES  Therapist:   [facility]  [address] [place] [address] [address]  [facility] [address]  Phone: [phone]  Fax: [phone]

## 2018-11-19 NOTE — MR AVS SNAPSHOT
After Visit Summary   11/19/2018    Eva Solis    MRN: [id number]           Patient Information     Date Of Birth          1960        Visit Information        Provider Department      11/19/2018 5:20 PM Fish Giles, PT [facility] Physical Therapy        Today's Diagnoses     Chronic bilateral low back pain with bilateral sciatica           Follow-ups after your visit        Your next 10 appointments already scheduled     Dec 05, 2018  5:20 PM CST   KATI Spine with Fish Giles PT   [facility] Physical Therapy ([facility]  )    [address]  [facility] [address]   [phone]            Dec 10, 2018  8:10 AM CST   KATI Spine with Fish iGles PT   [facility] Physical Therapy ([facility]  )    [address]  [facility] [address]   [phone]            Dec 19, 2018  5:20 PM CST   KATI Spine with Fish Giles PT   [facility] Physical Therapy ([facility]  )    [address]  [facility] [address]   [phone]              Who to contact     If you have questions or need follow up information about today's clinic visit or your schedule please contact [facility] PHYSICAL THERAPY directly at [phone].  Normal or non-critical lab and imaging results will be communicated to you by MyChart, letter or phone within 4 business days after the clinic has received the results. If you do not hear from us within 7 days, please contact the clinic through MyChart or phone. If you have a critical or abnormal lab result, we will notify you by phone as soon as possible.  Submit refill requests through [employer] or call your pharmacy and they will forward the refill request to us. Please allow 3 business days for your refill to be completed.          Additional Information About Your  Visit        Wehart Information     [facility]ia gives you secure access to your electronic health record. If you see a primary care provider, you can also send messages to your care team and make appointments. If you have questions, please call your primary care clinic.  If you do not have a primary care provider, please call [phone] and they will assist you.        Care EveryWhere ID     This is your Care EveryWhere ID. This could be used by other organizations to access your [place] medical records  [id number]         Blood Pressure from Last 3 Encounters:   10/29/18 116/64   08/24/18 132/72   10/17/17 132/76    Weight from Last 3 Encounters:   10/29/18 (!) 157.8 kg (347 lb 12.8 oz)   08/24/18 (!) 156.9 kg (346 lb)   10/17/17 (!) 158.8 kg (350 lb)              We Performed the Following     HC PT EVAL, LOW COMPLEXITY     KATI INITIAL EVAL REPORT     PHYSICAL THERAPY REFERRAL     THERAPEUTIC EXERCISES        Primary Care Provider Office Phone # Fax #    Renetta FRANCES MD Emmanuel [phone]       [address] [place] [address] [address]  [facility] [address]        Equal Access to Services     [facility] AH: Hadii aad ku hadasho Soomaali, waaxda luqadaha, qaybta kaalmada adeegyada, julián dupree . So [facility] [phone].    ATENCIÓN: Si habla español, tiene a chaudhary disposición servicios gratuitos de asistencia lingüística. Ll[facility] [phone].    We comply with applicable federal civil rights laws and [place] laws. We do not discriminate on the basis of race, color, national origin, age, disability, sex, sexual orientation, or gender identity.            Thank you!     Thank you for choosing [facility]YSICAL THER[facility]  for your care. Our goal is always to provide you with excellent care. Hearing back from our patients is one way we can continue to improve our services. Please take a few minutes to complete the written survey that you may receive in the mail  after your visit with us. Thank you!             Your Updated Medication List - Protect others around you: Learn how to safely use, store and throw away your medicines at www.disposemymeds.org.          This list is accurate as of 11/19/18  6:24 PM.  Always use your most recent med list.                   Brand Name Dispense Instructions for use Diagnosis    amoxicillin 500 MG capsule    AMOXIL     Only for dental work due to knee surgeries        busPIRone 5 MG tablet    BUSPAR    270 tablet    Take 1 tablet (5 mg) by mouth 3 times daily    Mixed anxiety depressive disorder       butalbital-aspirin-caffeine capsule    FIORINAL    20 capsule    Take 1 capsule by mouth every 4 hours as needed for headaches    Migraine without aura and without status migrainosus, not intractable       escitalopram 20 MG tablet    LEXAPRO    90 tablet    Take 1 tablet (20 mg) by mouth daily    Mixed anxiety depressive disorder       furosemide 20 MG tablet    LASIX    90 tablet    TAKE 1 TABLET (20 MG) BY MOUTH DAILY    Chronic diastolic congestive heart failure (H), HTN, goal below 140/90       IBUPROFEN PO      Take 4 tablets by mouth as needed.        losartan 100 MG tablet    COZAAR    90 tablet    Take 1 tablet (100 mg) by mouth daily    Chronic diastolic congestive heart failure (H), HTN, goal below 140/90       MIRALAX powder   Generic drug:  polyethylene glycol     1 Bottle    17 GM DAILY    Slow transit constipation       spironolactone 25 MG tablet    ALDACTONE    90 tablet    Take 1 tablet (25 mg) by mouth daily    Chronic diastolic congestive heart failure (H), HTN, goal below 140/90       topiramate 25 MG tablet    TOPAMAX    180 tablet    Take 2 tablets (50 mg) by mouth 2 times daily    Migraine without aura and without status migrainosus, not intractable

## 2018-12-19 ENCOUNTER — THERAPY VISIT (OUTPATIENT)
Dept: PHYSICAL THERAPY | Facility: CLINIC | Age: 58
End: 2018-12-19
Payer: COMMERCIAL

## 2018-12-19 DIAGNOSIS — M54.41 CHRONIC BILATERAL LOW BACK PAIN WITH BILATERAL SCIATICA: ICD-10-CM

## 2018-12-19 DIAGNOSIS — G89.29 CHRONIC BILATERAL LOW BACK PAIN WITH BILATERAL SCIATICA: ICD-10-CM

## 2018-12-19 DIAGNOSIS — M54.42 CHRONIC BILATERAL LOW BACK PAIN WITH BILATERAL SCIATICA: ICD-10-CM

## 2018-12-19 PROCEDURE — 97110 THERAPEUTIC EXERCISES: CPT | Mod: GP | Performed by: PHYSICAL THERAPIST

## 2018-12-19 NOTE — LETTER
[facility]  [address]  [facility] [address] [phone]    2018    Re: Eva Solis   :   1960  MRN:  [id number]   REFERRING PHYSICIAN:   Ganesh Connors    [facility]    Date of Initial Evaluation:  ***  Visits:  Rxs Used: 2  Reason for Referral:  Chronic bilateral low back pain with bilateral sciatica    EVALUATION SUMMARY    Subjective:  HPI                    Objective:  System    Physical Exam    General     ROS    Assessment/Plan:    PROGRESS  REPORT    Progress reporting period is from 18 to 18.       SUBJECTIVE  Patient reports she has been sick which is why there has been a gap in therapy. Patient reports her back has been fine and did a wurly ball thing for work and her back got pretty sore. Legs and feet are just always feeling numb.     Current Pain level: 6/10.     Initial Pain level: 6/10.   Changes in function:  None  Adverse reaction to treatment or activity: None    OBJECTIVE  Lumbar flexion hands to mid lower leg no pain, Lumbar extension moderate limitaton with end range pain, bilateral SB WNL with end range pain.      ASSESSMENT/PLAN  Updated problem list and treatment plan: Diagnosis 1:  Chronic low back pain  Pain -  hot/cold therapy, US, manual therapy, self management, education, directional preference exercise and home program  Decreased ROM/flexibility - manual therapy, therapeutic exercise, therapeutic activity and home program  Decreased joint mobility - manual therapy, therapeutic exercise, therapeutic activity and home program  Decreased strength - therapeutic exercise, therapeutic activities and home program  Decreased function - therapeutic activities and home program  Impaired posture - neuro re-education, therapeutic activities and home program  STG/LTGs have been met or progress has been made towards goals:  None  Assessment of  Progress: The patient's condition has potential to improve.  Self Management Plans:  Patient has been instructed in a home treatment program.  I have re-evaluated this patient and find that the nature, scope, duration and intensity of the therapy is appropriate for the medical condition of the patient.  Eva continues to require the following intervention to meet STG and LTG's:  PT    Recommendations:  This patient would benefit from continued therapy.     Frequency:  2 X a month, once daily  Duration:  for 3 months        Thank you for your referral.    INQUIRIES  Therapist:   [facility]  [address]  [facility] [address]  Phone: [phone]  Fax: [phone]

## 2018-12-20 NOTE — PROGRESS NOTES
Subjective:  HPI                    Objective:  System    Physical Exam    General     ROS    Assessment/Plan:    PROGRESS  REPORT    Progress reporting period is from 11/19/18 to 12/19/18.       SUBJECTIVE  Patient reports she has been sick which is why there has been a gap in therapy. Patient reports her back has been fine and did a wurly ball thing for work and her back got pretty sore. Legs and feet are just always feeling numb.     Current Pain level: 6/10.     Initial Pain level: 6/10.   Changes in function:  None  Adverse reaction to treatment or activity: None    OBJECTIVE  Lumbar flexion hands to mid lower leg no pain, Lumbar extension moderate limitaton with end range pain, bilateral SB WNL with end range pain.      ASSESSMENT/PLAN  Updated problem list and treatment plan: Diagnosis 1:  Chronic low back pain  Pain -  hot/cold therapy, US, manual therapy, self management, education, directional preference exercise and home program  Decreased ROM/flexibility - manual therapy, therapeutic exercise, therapeutic activity and home program  Decreased joint mobility - manual therapy, therapeutic exercise, therapeutic activity and home program  Decreased strength - therapeutic exercise, therapeutic activities and home program  Decreased function - therapeutic activities and home program  Impaired posture - neuro re-education, therapeutic activities and home program  STG/LTGs have been met or progress has been made towards goals:  None  Assessment of Progress: The patient's condition has potential to improve.  Self Management Plans:  Patient has been instructed in a home treatment program.  I have re-evaluated this patient and find that the nature, scope, duration and intensity of the therapy is appropriate for the medical condition of the patient.  Eva continues to require the following intervention to meet STG and LTG's:  PT    Recommendations:  This patient would benefit from continued therapy.     Frequency:  2  X a month, once daily  Duration:  for 3 months        Please refer to the daily flowsheet for treatment today, total treatment time and time spent performing 1:1 timed codes.

## 2019-01-02 ENCOUNTER — THERAPY VISIT (OUTPATIENT)
Dept: PHYSICAL THERAPY | Facility: CLINIC | Age: 59
End: 2019-01-02
Payer: COMMERCIAL

## 2019-01-02 DIAGNOSIS — M54.42 CHRONIC BILATERAL LOW BACK PAIN WITH BILATERAL SCIATICA: ICD-10-CM

## 2019-01-02 DIAGNOSIS — M54.41 CHRONIC BILATERAL LOW BACK PAIN WITH BILATERAL SCIATICA: ICD-10-CM

## 2019-01-02 DIAGNOSIS — G89.29 CHRONIC BILATERAL LOW BACK PAIN WITH BILATERAL SCIATICA: ICD-10-CM

## 2019-01-02 PROCEDURE — 97110 THERAPEUTIC EXERCISES: CPT | Mod: GP | Performed by: PHYSICAL THERAPIST

## 2019-01-16 ENCOUNTER — THERAPY VISIT (OUTPATIENT)
Dept: PHYSICAL THERAPY | Facility: CLINIC | Age: 59
End: 2019-01-16
Payer: COMMERCIAL

## 2019-01-16 DIAGNOSIS — M54.41 CHRONIC BILATERAL LOW BACK PAIN WITH BILATERAL SCIATICA: ICD-10-CM

## 2019-01-16 DIAGNOSIS — G89.29 CHRONIC BILATERAL LOW BACK PAIN WITH BILATERAL SCIATICA: ICD-10-CM

## 2019-01-16 DIAGNOSIS — M54.42 CHRONIC BILATERAL LOW BACK PAIN WITH BILATERAL SCIATICA: ICD-10-CM

## 2019-01-16 PROCEDURE — 97110 THERAPEUTIC EXERCISES: CPT | Mod: GP | Performed by: PHYSICAL THERAPIST

## 2019-03-06 DIAGNOSIS — G43.009 MIGRAINE WITHOUT AURA AND WITHOUT STATUS MIGRAINOSUS, NOT INTRACTABLE: ICD-10-CM

## 2019-03-06 RX ORDER — TOPIRAMATE 25 MG/1
TABLET, FILM COATED ORAL
Qty: 60 TABLET | Refills: 2 | Status: SHIPPED | OUTPATIENT
Start: 2019-03-06 | End: 2019-04-22

## 2019-04-22 DIAGNOSIS — G43.009 MIGRAINE WITHOUT AURA AND WITHOUT STATUS MIGRAINOSUS, NOT INTRACTABLE: ICD-10-CM

## 2019-04-22 RX ORDER — TOPIRAMATE 25 MG/1
TABLET, FILM COATED ORAL
Qty: 360 TABLET | Refills: 0 | Status: SHIPPED | OUTPATIENT
Start: 2019-04-22 | End: 2019-08-27

## 2019-04-22 NOTE — TELEPHONE ENCOUNTER
Topamax  Prescription approved per [facility] Refill Protocol.    Dina العراقي, RN, BSN

## 2019-06-04 PROBLEM — G89.29 CHRONIC BILATERAL LOW BACK PAIN WITH BILATERAL SCIATICA: Status: RESOLVED | Noted: 2018-11-19 | Resolved: 2019-06-04

## 2019-06-04 PROBLEM — M54.42 CHRONIC BILATERAL LOW BACK PAIN WITH BILATERAL SCIATICA: Status: RESOLVED | Noted: 2018-11-19 | Resolved: 2019-06-04

## 2019-06-04 PROBLEM — M54.41 CHRONIC BILATERAL LOW BACK PAIN WITH BILATERAL SCIATICA: Status: RESOLVED | Noted: 2018-11-19 | Resolved: 2019-06-04

## 2019-06-04 NOTE — PROGRESS NOTES
Discharge Note    Progress reporting period is from initial evaluation date (please see noted date below) to Jan 16, 2019.  Linked Episodes   Type: Episode: Status: Noted: Resolved: Last update: Updated by:   PHYSICAL THERAPY low back pain 11/19/18 Active 11/19/2018 1/16/2019  5:20 PM Fish Giles, PT      Comments:       Eva failed to follow up and current status is unknown.  Please see information below for last relevant information on current status.  Patient seen for 4 visits.    SUBJECTIVE  Subjective changes noted by patient:  Patient reports she and her  are trying to start exercise at home more. Nothing much but trying. Was able to get on the bike.   .  Current pain level is 0/10.     Previous pain level was  6/10.   Changes in function:  Yes (See Goal flowsheet attached for changes in current functional level)  Adverse reaction to treatment or activity: None    OBJECTIVE  Changes noted in objective findings: Lumbar flexion hands to floor with no pain, Lumbar extension modeate limitation with pain in low back. bridge unable. sit up x 30 reps 4/5 difficulty.      ASSESSMENT/PLAN  Diagnosis: chronic low back pain   Updated problem list and treatment plan:   Decreased strength - HEP  STG/LTGs have been met or progress has been made towards goals:  Yes, please see goal flowsheet for most current information  Assessment of Progress: current status is unknown.    Last current status: Pt is progressing as expected   Self Management Plans:  HEP  I have re-evaluated this patient and find that the nature, scope, duration and intensity of the therapy is appropriate for the medical condition of the patient.  Eva continues to require the following intervention to meet STG and LTG's:  HEP.    Recommendations:  Discharge with current home program.  Patient to follow up with MD as needed.    Please refer to the daily flowsheet for treatment today, total treatment time and time spent performing 1:1 timed  codes.

## 2019-08-12 DIAGNOSIS — F41.8 MIXED ANXIETY DEPRESSIVE DISORDER: ICD-10-CM

## 2019-08-12 DIAGNOSIS — I50.32 CHRONIC DIASTOLIC CONGESTIVE HEART FAILURE (H): ICD-10-CM

## 2019-08-12 DIAGNOSIS — I10 HTN, GOAL BELOW 140/90: ICD-10-CM

## 2019-08-13 RX ORDER — LOSARTAN POTASSIUM 100 MG/1
TABLET ORAL
Qty: 30 TABLET | Refills: 0 | Status: SHIPPED | OUTPATIENT
Start: 2019-08-13 | End: 2019-08-27

## 2019-08-13 RX ORDER — SPIRONOLACTONE 25 MG/1
TABLET ORAL
Qty: 30 TABLET | Refills: 0 | Status: SHIPPED | OUTPATIENT
Start: 2019-08-13 | End: 2019-08-27

## 2019-08-13 RX ORDER — FUROSEMIDE 20 MG
TABLET ORAL
Qty: 30 TABLET | Refills: 0 | Status: SHIPPED | OUTPATIENT
Start: 2019-08-13 | End: 2019-08-27

## 2019-08-13 RX ORDER — BUSPIRONE HYDROCHLORIDE 5 MG/1
TABLET ORAL
Qty: 90 TABLET | Refills: 0 | Status: SHIPPED | OUTPATIENT
Start: 2019-08-13 | End: 2019-08-27

## 2019-08-13 NOTE — TELEPHONE ENCOUNTER
Pending Prescriptions:                       Disp   Refills    spironolactone (ALDACTONE) 25 MG tablet [*30 tab*2            Sig: TAKE ONE TABLET BY MOUTH ONE TIME DAILY     busPIRone (BUSPAR) 5 MG tablet [Pharmacy *90 tab*2            Sig: TAKE ONE TABLET BY MOUTH THREE TIMES DAILY     losartan (COZAAR) 100 MG tablet [Pharmacy*30 tab*2            Sig: TAKE ONE TABLET BY MOUTH ONE TIME DAILY     furosemide (LASIX) 20 MG tablet [Pharmacy*30 tab*2            Sig: TAKE ONE TABLET BY MOUTH ONE TIME DAILY    Next 5 appointments (look out 90 days)    Aug 27, 2019  9:00 AM CDT  PHYSICAL with Renetta Benitez MD  [facility] ([facility] [address] [place] [address] [phone]        Medication is being filled for 1 time refill only due to:  Patient needs to be seen because LOV 8/24/2018.     Elizabeth Burris, RN, BSN

## 2019-08-16 NOTE — PROGRESS NOTES
SUBJECTIVE:   CC: Eva Solis is an 59 year old woman who presents for preventive health visit.     Healthy Habits:     Getting at least 3 servings of Calcium per day:  Yes    Bi-annual eye exam:  Yes    Dental care twice a year:  Yes    Sleep apnea or symptoms of sleep apnea:  Daytime drowsiness and Excessive snoring    Diet:  Regular (no restrictions)    Frequency of exercise:  None    Taking medications regularly:  Yes    Medication side effects:  None    PHQ-2 Total Score: 3    Additional concerns today:  Yes (Significant hair loss. Extreme low right sided back pain)    Has seen neurosurgery for her back pain, did Physical Therapy 7 months ago.  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  Patient did not feel that physical therapy was helping and stopped.  She then did not return for further follow-up.  Now she states about 2 to 3 months ago she developed this mid right back pain which is sharp and worse with movements.  She feels she is in a lot of pain.  She has felt that she really needs to focus on weight loss but states she is having a lot of trouble with movement because of his pain.  She states that this new pain does not radiate.    Also complaining of hair loss.  This is been going on many months.  She states she is losing a lot more hair than what she considers normal.  She states she cleans a shower every day because of her hair loss.  She states she is having issues with all her hair that she is vacuuming.  She states her hairstylist has commented.  She admits to being under a lot of stress.  She states there is a lot of family dysfunction that she does not want to go into today.  She has tried counseling but felt that she fell through the cracks at her Religion.  She is trying to do some counseling with her sister but they live hours apart and are unable to coordinate this.  She is not interested in doing counseling by herself around this  area.  She admits to being very anxious.  Her  wonders why she her anxiety is not better controlled.    Today's PHQ-2 Score:   PHQ-2 ( 1999 [employer]) 8/26/2019   Q1: Little interest or pleasure in doing things 1   Q2: Feeling down, depressed or hopeless 2   PHQ-2 Score 3   Q1: Little interest or pleasure in doing things Several days   Q2: Feeling down, depressed or hopeless More than half the days   PHQ-2 Score 3   Answers for HPI/ROS submitted by the patient on 8/26/2019   Annual Exam:  If you checked off any problems, how difficult have these problems made it for you to do your work, take care of things at home, or get along with other people?: Somewhat difficult  PHQ9 TOTAL SCORE: 14  LORETA 7 TOTAL SCORE: 15    Abuse: Current or Past(Physical, Sexual or Emotional)- No  Do you feel safe in your environment? Yes    Social History     Tobacco Use     Smoking status: Never Smoker     Smokeless tobacco: Never Used     Tobacco comment: no smokers in household   Substance Use Topics     Alcohol use: No     If you drink alcohol do you typically have >3 drinks per day or >7 drinks per week? No    No flowsheet data found.    Reviewed orders with patient.  Reviewed health maintenance and updated orders accordingly - Yes    Mammogram Screening: Patient over age 50, mutual decision to screen reflected in health maintenance.    Pertinent mammograms are reviewed under the imaging tab.  History of abnormal Pap smear: Status post benign hysterectomy. Health Maintenance and Surgical History updated.  PAP / HPV 11/15/2005   PAP NIL     Reviewed and updated as needed this visit by clinical staff  Tobacco  Allergies  Meds  Problems  Med Hx  Surg Hx  Fam Hx  Soc Hx          Reviewed and updated as needed this visit by Provider  Tobacco  Allergies  Meds  Problems          Review of Systems   Constitutional: Negative for chills and fever.   HENT: Negative for congestion, ear pain, hearing loss and sore throat.    Eyes:  "Negative for pain and visual disturbance.   Respiratory: Negative for cough and shortness of breath.    Cardiovascular: Negative for chest pain, palpitations and peripheral edema.   Gastrointestinal: Negative for abdominal pain, constipation, diarrhea, heartburn, hematochezia and nausea.   Breasts:  Negative for tenderness, breast mass and discharge.   Genitourinary: Positive for urgency. Negative for dysuria, frequency, genital sores, hematuria, pelvic pain, vaginal bleeding and vaginal discharge.   Musculoskeletal: Negative for arthralgias, joint swelling and myalgias.   Skin: Negative for rash.   Neurological: Negative for dizziness, weakness, headaches and paresthesias.   Psychiatric/Behavioral: Negative for mood changes. The patient is nervous/anxious.         OBJECTIVE:   /76 (BP Location: Left arm, Patient Position: Chair, Cuff Size: Adult Large)   Pulse 59   Temp 97  F (36.1  C) (Temporal)   Resp 14   Ht 1.676 m (5' 6\")   Wt 147 kg (324 lb)   SpO2 97%   BMI 52.29 kg/m    Physical Exam   Constitutional: She is oriented to person, place, and time. She appears well-developed and well-nourished. No distress.   HENT:   Right Ear: Tympanic membrane and external ear normal.   Left Ear: Tympanic membrane and external ear normal.   Nose: Nose normal.   Mouth/Throat: Oropharynx is clear and moist. No oropharyngeal exudate.   Eyes: Pupils are equal, round, and reactive to light. Conjunctivae are normal. Right eye exhibits no discharge. Left eye exhibits no discharge.   Neck: Neck supple. No tracheal deviation present. No thyromegaly present.   Cardiovascular: Normal rate, regular rhythm, S1 normal, S2 normal, normal heart sounds and normal pulses. Exam reveals no S3, no S4 and no friction rub.   No murmur heard.  Pulmonary/Chest: Effort normal and breath sounds normal. No respiratory distress. She has no wheezes. She has no rales. Right breast exhibits no mass, no nipple discharge and no tenderness. Left " breast exhibits no mass, no nipple discharge and no tenderness.   Abdominal: Soft. Bowel sounds are normal. She exhibits no mass. There is no hepatosplenomegaly. There is no tenderness.   Musculoskeletal: Normal range of motion. She exhibits no edema.   Lymphadenopathy:     She has no cervical adenopathy.   Neurological: She is alert and oriented to person, place, and time. She has normal strength and normal reflexes. She exhibits normal muscle tone.   Skin: Skin is warm and dry. No rash noted.   Psychiatric: She has a normal mood and affect. Judgment and thought content normal. Cognition and memory are normal.       ASSESSMENT/PLAN:   1. Routine general medical examination at a health care facility    2. Mixed anxiety depressive disorder  She endorses a lot of anxiety.  Recommended counseling.  She declined having you put in a consult for our counselors.  She wants to try to work this out with her family.  She did agree to increase her BuSpar from 5 mg 3 times daily to 10 mg 3 times daily.    - escitalopram (LEXAPRO) 20 MG tablet; Take 1 tablet (20 mg) by mouth daily  Dispense: 90 tablet; Refill: 3  - busPIRone (BUSPAR) 10 MG tablet; Take 1 tablet (10 mg) by mouth 3 times daily  Dispense: 270 tablet; Refill: 3    3. Chronic diastolic congestive heart failure (H)  Appears euvolemic.  Continue her medications without change.  Labs are drawn.    - furosemide (LASIX) 20 MG tablet; TAKE ONE TABLET BY MOUTH ONE TIME DAILY  Dispense: 90 tablet; Refill: 3  - losartan (COZAAR) 100 MG tablet; Take 1 tablet (100 mg) by mouth daily  Dispense: 90 tablet; Refill: 3  - spironolactone (ALDACTONE) 25 MG tablet; Take 1 tablet (25 mg) by mouth daily  Dispense: 90 tablet; Refill: 3    4. HTN, goal below 140/90  Blood pressure controlled.  Labs drawn.  Refills given.    - Lipid panel reflex to direct LDL Fasting  - CBC with Platelets    - furosemide (LASIX) 20 MG tablet; TAKE ONE TABLET BY MOUTH ONE TIME DAILY  Dispense: 90 tablet;  "Refill: 3  - losartan (COZAAR) 100 MG tablet; Take 1 tablet (100 mg) by mouth daily  Dispense: 90 tablet; Refill: 3  - spironolactone (ALDACTONE) 25 MG tablet; Take 1 tablet (25 mg) by mouth daily  Dispense: 90 tablet; Refill: 3  - Comprehensive metabolic panel    5. Migraine without aura and without status migrainosus, not intractable  Currently gets a migraine every 2-3 weeks which is much better than the previous.  We will continue her Topamax without change.    - topiramate (TOPAMAX) 50 MG tablet; Take 1 tablet (50 mg) by mouth 2 times daily  Dispense: 180 tablet; Refill: 3    6. Visit for screening mammogram    - MA SCREENING DIGITAL BILAT - Future  (s+30); Future    7. Spinal stenosis of lumbar region, unspecified whether neurogenic claudication present  Discussed with patient that she needs to return to the specialist as they would be unaware that she is not improved.  She is agreeable to this.    - NEUROSURGERY REFERRAL    8. Paresthesia of both legs  As above    - NEUROSURGERY REFERRAL    9. Chronic right-sided back pain, unspecified back location  Will refer back to neurosurgery.  She states she is very claustrophobic and refuses to do another MRI.  She does agree to Flexeril as needed for muscle spasms.    - NEUROSURGERY REFERRAL  - cyclobenzaprine (FLEXERIL) 5 MG tablet; Take 1 tablet (5 mg) by mouth 3 times daily as needed for muscle spasms  Dispense: 30 tablet; Refill: 3    10. Hair loss  Discussed that most likely this is due to the stress that she is under.  Will check a thyroid.  Offered dermatology consult but she declines.    - TSH with free T4 reflex    COUNSELING:  Reviewed preventive health counseling, as reflected in patient instructions    Estimated body mass index is 52.29 kg/m  as calculated from the following:    Height as of this encounter: 1.676 m (5' 6\").    Weight as of this encounter: 147 kg (324 lb).    Weight management plan: Discussed healthy diet and exercise guidelines     " reports that she has never smoked. She has never used smokeless tobacco.      Counseling Resources:  ATP IV Guidelines  Pooled Cohorts Equation Calculator  Breast Cancer Risk Calculator  FRAX Risk Assessment  ICSI Preventive Guidelines  Dietary Guidelines for Americans, 2010  USDA's MyPlate  ASA Prophylaxis  Lung CA Screening    Renetta Benitez MD  [facility]

## 2019-08-26 ASSESSMENT — ENCOUNTER SYMPTOMS
CONSTIPATION: 0
HEMATURIA: 0
NERVOUS/ANXIOUS: 1
PARESTHESIAS: 0
COUGH: 0
ABDOMINAL PAIN: 0
SHORTNESS OF BREATH: 0
DIARRHEA: 0
FREQUENCY: 0
DYSURIA: 0
EYE PAIN: 0
SORE THROAT: 0
FEVER: 0
HEARTBURN: 0
JOINT SWELLING: 0
BREAST MASS: 0
ARTHRALGIAS: 0
WEAKNESS: 0
HEADACHES: 0
PALPITATIONS: 0
DIZZINESS: 0
HEMATOCHEZIA: 0
CHILLS: 0
MYALGIAS: 0
NAUSEA: 0

## 2019-08-26 ASSESSMENT — PATIENT HEALTH QUESTIONNAIRE - PHQ9
SUM OF ALL RESPONSES TO PHQ QUESTIONS 1-9: 14
SUM OF ALL RESPONSES TO PHQ QUESTIONS 1-9: 14
10. IF YOU CHECKED OFF ANY PROBLEMS, HOW DIFFICULT HAVE THESE PROBLEMS MADE IT FOR YOU TO DO YOUR WORK, TAKE CARE OF THINGS AT HOME, OR GET ALONG WITH OTHER PEOPLE: SOMEWHAT DIFFICULT

## 2019-08-26 ASSESSMENT — ANXIETY QUESTIONNAIRES
2. NOT BEING ABLE TO STOP OR CONTROL WORRYING: NEARLY EVERY DAY
5. BEING SO RESTLESS THAT IT IS HARD TO SIT STILL: MORE THAN HALF THE DAYS
7. FEELING AFRAID AS IF SOMETHING AWFUL MIGHT HAPPEN: SEVERAL DAYS
GAD7 TOTAL SCORE: 15
4. TROUBLE RELAXING: MORE THAN HALF THE DAYS
1. FEELING NERVOUS, ANXIOUS, OR ON EDGE: NEARLY EVERY DAY
6. BECOMING EASILY ANNOYED OR IRRITABLE: SEVERAL DAYS
7. FEELING AFRAID AS IF SOMETHING AWFUL MIGHT HAPPEN: SEVERAL DAYS
3. WORRYING TOO MUCH ABOUT DIFFERENT THINGS: NEARLY EVERY DAY
GAD7 TOTAL SCORE: 15
GAD7 TOTAL SCORE: 15

## 2019-08-27 ENCOUNTER — OFFICE VISIT (OUTPATIENT)
Dept: FAMILY MEDICINE | Facility: OTHER | Age: 59
End: 2019-08-27
Payer: COMMERCIAL

## 2019-08-27 VITALS
TEMPERATURE: 97 F | OXYGEN SATURATION: 97 % | RESPIRATION RATE: 14 BRPM | SYSTOLIC BLOOD PRESSURE: 124 MMHG | HEART RATE: 59 BPM | WEIGHT: 293 LBS | BODY MASS INDEX: 47.09 KG/M2 | HEIGHT: 66 IN | DIASTOLIC BLOOD PRESSURE: 76 MMHG

## 2019-08-27 DIAGNOSIS — I10 HTN, GOAL BELOW 140/90: ICD-10-CM

## 2019-08-27 DIAGNOSIS — F41.8 MIXED ANXIETY DEPRESSIVE DISORDER: ICD-10-CM

## 2019-08-27 DIAGNOSIS — G43.009 MIGRAINE WITHOUT AURA AND WITHOUT STATUS MIGRAINOSUS, NOT INTRACTABLE: ICD-10-CM

## 2019-08-27 DIAGNOSIS — M54.9 CHRONIC RIGHT-SIDED BACK PAIN, UNSPECIFIED BACK LOCATION: ICD-10-CM

## 2019-08-27 DIAGNOSIS — G89.29 CHRONIC RIGHT-SIDED BACK PAIN, UNSPECIFIED BACK LOCATION: ICD-10-CM

## 2019-08-27 DIAGNOSIS — Z00.00 ROUTINE GENERAL MEDICAL EXAMINATION AT A HEALTH CARE FACILITY: Primary | ICD-10-CM

## 2019-08-27 DIAGNOSIS — I50.32 CHRONIC DIASTOLIC CONGESTIVE HEART FAILURE (H): ICD-10-CM

## 2019-08-27 DIAGNOSIS — Z12.31 VISIT FOR SCREENING MAMMOGRAM: ICD-10-CM

## 2019-08-27 DIAGNOSIS — R20.2 PARESTHESIA OF BOTH LEGS: ICD-10-CM

## 2019-08-27 DIAGNOSIS — L65.9 HAIR LOSS: ICD-10-CM

## 2019-08-27 DIAGNOSIS — M48.061 SPINAL STENOSIS OF LUMBAR REGION, UNSPECIFIED WHETHER NEUROGENIC CLAUDICATION PRESENT: ICD-10-CM

## 2019-08-27 LAB
ALBUMIN SERPL-MCNC: 3.6 G/DL (ref 3.4–5)
ALP SERPL-CCNC: 75 U/L (ref 40–150)
ALT SERPL W P-5'-P-CCNC: 23 U/L (ref 0–50)
ANION GAP SERPL CALCULATED.3IONS-SCNC: 7 MMOL/L (ref 3–14)
AST SERPL W P-5'-P-CCNC: 19 U/L (ref 0–45)
BILIRUB SERPL-MCNC: 0.8 MG/DL (ref 0.2–1.3)
BUN SERPL-MCNC: 17 MG/DL (ref 7–30)
CALCIUM SERPL-MCNC: 9 MG/DL (ref 8.5–10.1)
CHLORIDE SERPL-SCNC: 108 MMOL/L (ref 94–109)
CHOLEST SERPL-MCNC: 218 MG/DL
CO2 SERPL-SCNC: 28 MMOL/L (ref 20–32)
CREAT SERPL-MCNC: 1.05 MG/DL (ref 0.52–1.04)
ERYTHROCYTE [DISTWIDTH] IN BLOOD BY AUTOMATED COUNT: 13.6 % (ref 10–15)
GFR SERPL CREATININE-BSD FRML MDRD: 58 ML/MIN/{1.73_M2}
GLUCOSE SERPL-MCNC: 80 MG/DL (ref 70–99)
HCT VFR BLD AUTO: 40.9 % (ref 35–47)
HDLC SERPL-MCNC: 43 MG/DL
HGB BLD-MCNC: 13.8 G/DL (ref 11.7–15.7)
LDLC SERPL CALC-MCNC: 147 MG/DL
MCH RBC QN AUTO: 29.4 PG (ref 26.5–33)
MCHC RBC AUTO-ENTMCNC: 33.7 G/DL (ref 31.5–36.5)
MCV RBC AUTO: 87 FL (ref 78–100)
NONHDLC SERPL-MCNC: 175 MG/DL
PLATELET # BLD AUTO: 241 10E9/L (ref 150–450)
POTASSIUM SERPL-SCNC: 4.1 MMOL/L (ref 3.4–5.3)
PROT SERPL-MCNC: 7.5 G/DL (ref 6.8–8.8)
RBC # BLD AUTO: 4.69 10E12/L (ref 3.8–5.2)
SODIUM SERPL-SCNC: 143 MMOL/L (ref 133–144)
TRIGL SERPL-MCNC: 138 MG/DL
TSH SERPL DL<=0.005 MIU/L-ACNC: 1.25 MU/L (ref 0.4–4)
WBC # BLD AUTO: 6.2 10E9/L (ref 4–11)

## 2019-08-27 PROCEDURE — 80053 COMPREHEN METABOLIC PANEL: CPT | Performed by: FAMILY MEDICINE

## 2019-08-27 PROCEDURE — 99213 OFFICE O/P EST LOW 20 MIN: CPT | Mod: 25 | Performed by: FAMILY MEDICINE

## 2019-08-27 PROCEDURE — 99396 PREV VISIT EST AGE 40-64: CPT | Performed by: FAMILY MEDICINE

## 2019-08-27 PROCEDURE — 36415 COLL VENOUS BLD VENIPUNCTURE: CPT | Performed by: FAMILY MEDICINE

## 2019-08-27 PROCEDURE — 80061 LIPID PANEL: CPT | Performed by: FAMILY MEDICINE

## 2019-08-27 PROCEDURE — 84443 ASSAY THYROID STIM HORMONE: CPT | Performed by: FAMILY MEDICINE

## 2019-08-27 PROCEDURE — 85027 COMPLETE CBC AUTOMATED: CPT | Performed by: FAMILY MEDICINE

## 2019-08-27 RX ORDER — FUROSEMIDE 20 MG
TABLET ORAL
Qty: 90 TABLET | Refills: 3 | Status: SHIPPED | OUTPATIENT
Start: 2019-08-27 | End: 2020-08-27

## 2019-08-27 RX ORDER — LOSARTAN POTASSIUM 100 MG/1
100 TABLET ORAL DAILY
Qty: 90 TABLET | Refills: 3 | Status: SHIPPED | OUTPATIENT
Start: 2019-08-27 | End: 2020-08-27

## 2019-08-27 RX ORDER — CYCLOBENZAPRINE HCL 5 MG
5 TABLET ORAL 3 TIMES DAILY PRN
Qty: 30 TABLET | Refills: 3 | Status: SHIPPED | OUTPATIENT
Start: 2019-08-27 | End: 2021-03-24

## 2019-08-27 RX ORDER — TOPIRAMATE 50 MG/1
50 TABLET, FILM COATED ORAL 2 TIMES DAILY
Qty: 180 TABLET | Refills: 3 | Status: SHIPPED | OUTPATIENT
Start: 2019-08-27 | End: 2020-08-28

## 2019-08-27 RX ORDER — SPIRONOLACTONE 25 MG/1
25 TABLET ORAL DAILY
Qty: 90 TABLET | Refills: 3 | Status: SHIPPED | OUTPATIENT
Start: 2019-08-27 | End: 2020-08-27

## 2019-08-27 RX ORDER — BUSPIRONE HYDROCHLORIDE 5 MG/1
5 TABLET ORAL 3 TIMES DAILY
Qty: 270 TABLET | Refills: 3 | Status: CANCELLED | OUTPATIENT
Start: 2019-08-27

## 2019-08-27 RX ORDER — ESCITALOPRAM OXALATE 20 MG/1
20 TABLET ORAL DAILY
Qty: 90 TABLET | Refills: 3 | Status: SHIPPED | OUTPATIENT
Start: 2019-08-27 | End: 2020-08-28

## 2019-08-27 RX ORDER — BUSPIRONE HYDROCHLORIDE 10 MG/1
10 TABLET ORAL 3 TIMES DAILY
Qty: 270 TABLET | Refills: 3 | Status: SHIPPED | OUTPATIENT
Start: 2019-08-27 | End: 2020-08-28

## 2019-08-27 ASSESSMENT — ENCOUNTER SYMPTOMS
WEAKNESS: 0
HEMATOCHEZIA: 0
EYE PAIN: 0
PALPITATIONS: 0
FREQUENCY: 0
MYALGIAS: 0
COUGH: 0
CHILLS: 0
SHORTNESS OF BREATH: 0
DIZZINESS: 0
NAUSEA: 0
DIARRHEA: 0
BREAST MASS: 0
NERVOUS/ANXIOUS: 1
HEARTBURN: 0
HEMATURIA: 0
ARTHRALGIAS: 0
DYSURIA: 0
PARESTHESIAS: 0
JOINT SWELLING: 0
CONSTIPATION: 0
ABDOMINAL PAIN: 0
HEADACHES: 0
SORE THROAT: 0
FEVER: 0

## 2019-08-27 ASSESSMENT — MIFFLIN-ST. JEOR: SCORE: 2066.4

## 2019-08-27 ASSESSMENT — PATIENT HEALTH QUESTIONNAIRE - PHQ9: SUM OF ALL RESPONSES TO PHQ QUESTIONS 1-9: 14

## 2019-08-27 ASSESSMENT — ANXIETY QUESTIONNAIRES: GAD7 TOTAL SCORE: 15

## 2019-08-28 ENCOUNTER — MYC MEDICAL ADVICE (OUTPATIENT)
Dept: FAMILY MEDICINE | Facility: OTHER | Age: 59
End: 2019-08-28

## 2019-09-13 ENCOUNTER — OFFICE VISIT (OUTPATIENT)
Dept: NEUROSURGERY | Facility: CLINIC | Age: 59
End: 2019-09-13
Payer: COMMERCIAL

## 2019-09-13 ENCOUNTER — TELEPHONE (OUTPATIENT)
Dept: FAMILY MEDICINE | Facility: OTHER | Age: 59
End: 2019-09-13

## 2019-09-13 VITALS
BODY MASS INDEX: 47.09 KG/M2 | OXYGEN SATURATION: 96 % | SYSTOLIC BLOOD PRESSURE: 126 MMHG | HEIGHT: 66 IN | TEMPERATURE: 97.7 F | WEIGHT: 293 LBS | HEART RATE: 72 BPM | DIASTOLIC BLOOD PRESSURE: 74 MMHG

## 2019-09-13 DIAGNOSIS — M54.42 CHRONIC BILATERAL LOW BACK PAIN WITH BILATERAL SCIATICA: Primary | ICD-10-CM

## 2019-09-13 DIAGNOSIS — M54.41 CHRONIC BILATERAL LOW BACK PAIN WITH BILATERAL SCIATICA: Primary | ICD-10-CM

## 2019-09-13 DIAGNOSIS — M54.6 ACUTE RIGHT-SIDED THORACIC BACK PAIN: ICD-10-CM

## 2019-09-13 DIAGNOSIS — G89.29 CHRONIC BILATERAL LOW BACK PAIN WITH BILATERAL SCIATICA: Primary | ICD-10-CM

## 2019-09-13 PROCEDURE — 99214 OFFICE O/P EST MOD 30 MIN: CPT | Performed by: PHYSICIAN ASSISTANT

## 2019-09-13 ASSESSMENT — MIFFLIN-ST. JEOR: SCORE: 2070.94

## 2019-09-13 NOTE — NURSING NOTE
"Eva Solis is a 59 year old female who presents for:  Chief Complaint   Patient presents with     Neurologic Problem     low back pain        Initial Vitals:  /74   Pulse 72   Temp 97.7  F (36.5  C) (Temporal)   Ht 5' 6\" (1.676 m)   Wt 325 lb (147.4 kg)   SpO2 96%   BMI 52.46 kg/m   Estimated body mass index is 52.46 kg/m  as calculated from the following:    Height as of this encounter: 5' 6\" (1.676 m).    Weight as of this encounter: 325 lb (147.4 kg).. Body surface area is 2.62 meters squared. BP completed using cuff size: regular  Data Unavailable        Nursing Comments:         Marcelle Gupta    "

## 2019-09-13 NOTE — LETTER
"    9/13/2019         RE: Eva Solis  [address] [facility] [address]        Dear Colleague,    Thank you for referring your patient, Eva Solis, to the [facility]. Please see a copy of my visit note below.    [facility]  Neurosurgery:    HPI: Eva Solis is a 59 year old female who presents for follow up in regards to new symptoms of right-sided thoracic pain.  Symptoms have been present for about 2 months, with no event or injury.  She notes that sharp pain in the right flank region.  Symptoms are exacerbated by turning quickly or by inhaling and exhaling deeply.  She also continues to have aching low back pain, with intermittent paresthesias and pain in her legs.  I did see her in October of last year, and referred her to physical therapy.    The patient denies any changes in bowel or bladder function, or any new problems with balance or coordination.     ROS negative for fever, chills, chest pain or shortness of breath.     Exam:  /74   Pulse 72   Temp 97.7  F (36.5  C) (Temporal)   Ht 5' 6\" (1.676 m)   Wt 325 lb (147.4 kg)   SpO2 96%   BMI 52.46 kg/m     Constitutional:  Alert, well nourished, NAD.  HEENT: Normocephalic, atraumatic.   Pulm:  Without shortness of breath, normal effort.   CV:  No pitting edema of BLE.   Skin: No rashes or lesions.     Neurological:  Awake  Alert  Oriented x 3  CN II-XII grossly intact.     Motor exam:       Hip Flexor:                Right: 5/5  Left:  5/5  Hip Adductor:             Right:  5/5  Left:  5/5  Hip Abductor:             Right:  5/5  Left:  5/5  Gastroc Soleus:        Right:  5/5  Left:  5/5  Tib/Ant:                      Right:  5/5  Left:  5/5  EHL:                     Right:  5/5  Left:  5/5     Able to spontaneously move U/E bilaterally  Sensation intact throughout all U/E dermatomes    There is no tenderness to palpation of the cervical/lumbar paraspinous musculature. No tenderness to SI joints or " greater trochanters bilaterally.     Imaging:   MRI of the lumbar spine was again reviewed in the office today. It shows severe disc degeneration and central stenosis at L3-4 and L4-5.    A/P:   1) right sided thoracic pain  2) low back pain  3) lumbar central stenosis multilevel    I did have a discussion with the patient regarding her symptoms.  I do not think it is likely that her right-sided flank pain is related to her spine, but we will check an MRI to rule out any nerve compression that could be contributing.  Regarding her low back, I again reviewed her MRI with her and discussed the possibilities of epidural injections versus following up with Dr. Wallis to discuss surgical options.  At this point, she is quite reluctant to proceed with any surgical options, so we will try an injection.  I placed an order for this to be done with Dr. Wheatley.  We will also check the MRI of her thoracic spine and follow-up with her by phone once the results are available.  She voiced agreement and understanding.    The patient voiced agreement and understanding of the plan of care. All questions were answered today.         Ganesh Connors PA-C  [facility]  [address] [address]    Tel [phone]      Again, thank you for allowing me to participate in the care of your patient.        Sincerely,        Ganesh Connors PA-C

## 2019-09-13 NOTE — PROGRESS NOTES
"[facility]  Neurosurgery:    HPI: Eva Solis is a 59 year old female who presents for follow up in regards to new symptoms of right-sided thoracic pain.  Symptoms have been present for about 2 months, with no event or injury.  She notes that sharp pain in the right flank region.  Symptoms are exacerbated by turning quickly or by inhaling and exhaling deeply.  She also continues to have aching low back pain, with intermittent paresthesias and pain in her legs.  I did see her in October of last year, and referred her to physical therapy.    The patient denies any changes in bowel or bladder function, or any new problems with balance or coordination.     ROS negative for fever, chills, chest pain or shortness of breath.     Exam:  /74   Pulse 72   Temp 97.7  F (36.5  C) (Temporal)   Ht 5' 6\" (1.676 m)   Wt 325 lb (147.4 kg)   SpO2 96%   BMI 52.46 kg/m    Constitutional:  Alert, well nourished, NAD.  HEENT: Normocephalic, atraumatic.   Pulm:  Without shortness of breath, normal effort.   CV:  No pitting edema of BLE.   Skin: No rashes or lesions.     Neurological:  Awake  Alert  Oriented x 3  CN II-XII grossly intact.     Motor exam:       Hip Flexor:                Right: 5/5  Left:  5/5  Hip Adductor:             Right:  5/5  Left:  5/5  Hip Abductor:             Right:  5/5  Left:  5/5  Gastroc Soleus:        Right:  5/5  Left:  5/5  Tib/Ant:                      Right:  5/5  Left:  5/5  EHL:                     Right:  5/5  Left:  5/5     Able to spontaneously move U/E bilaterally  Sensation intact throughout all U/E dermatomes    There is no tenderness to palpation of the cervical/lumbar paraspinous musculature. No tenderness to SI joints or greater trochanters bilaterally.     Imaging:   MRI of the lumbar spine was again reviewed in the office today. It shows severe disc degeneration and central stenosis at L3-4 and L4-5.    A/P:   1) right sided thoracic pain  2) low back pain  3) " lumbar central stenosis multilevel    I did have a discussion with the patient regarding her symptoms.  I do not think it is likely that her right-sided flank pain is related to her spine, but we will check an MRI to rule out any nerve compression that could be contributing.  Regarding her low back, I again reviewed her MRI with her and discussed the possibilities of epidural injections versus following up with Dr. Wallis to discuss surgical options.  At this point, she is quite reluctant to proceed with any surgical options, so we will try an injection.  I placed an order for this to be done with Dr. Wheatley.  We will also check the MRI of her thoracic spine and follow-up with her by phone once the results are available.  She voiced agreement and understanding.    The patient voiced agreement and understanding of the plan of care. All questions were answered today.         Ganesh Connors PA-C  [facility]  [address] [address]    Tel [phone]

## 2019-09-25 ENCOUNTER — TELEPHONE (OUTPATIENT)
Dept: NEUROSURGERY | Facility: CLINIC | Age: 59
End: 2019-09-25

## 2019-09-25 NOTE — TELEPHONE ENCOUNTER
Found PT notes from 1/16/19, 12/19/18 and 11/19/18 from [facility]. Faxed to number below.

## 2019-09-25 NOTE — TELEPHONE ENCOUNTER
REASON FOR CALL: [facility] LVM stating that pt's insurance is denying the MRI, due to no records of conservative treatment being done. They are wondering if clinic can forward any conservative treatment pt has done to their clinic, so that they can forward it to pt's insurance. If there are any questions, can give [facility] a call back at [phone], or fax treatment records to [phone].

## 2019-09-26 NOTE — TELEPHONE ENCOUNTER
Contacted patient to schedule SILVIA  Date: 10/18/19  Time: 300pm  Dr. Wheatley    Instructed pt to have H&P and  for procedure.

## 2019-09-28 ENCOUNTER — HEALTH MAINTENANCE LETTER (OUTPATIENT)
Age: 59
End: 2019-09-28

## 2019-10-03 NOTE — PROGRESS NOTES
[address]  [facility] [address]  [phone]  Dept: [phone]    PRE-OP EVALUATION:  Today's date: 10/16/2019    Eva Solis (: 1960) presents for pre-operative evaluation assessment as requested by Dr. Wheatley.  She requires evaluation and anesthesia risk assessment prior to undergoing surgery/procedure for treatment of epidural .    Fax number for surgical facility: in [facility]  Primary Physician: Renetta Benitez  Type of Anesthesia Anticipated: monitor anesthesia care    Patient has a Health Care Directive or Living Will:  NO    Preop Questions 10/16/2019   Who is doing your surgery? [facility]    What are you having done? steroid injection in back   Date of Surgery/Procedure:    Facility or Hospital where procedure/surgery will be performed: [facility]   1.  Do you have a history of Heart attack, stroke, stent, coronary bypass surgery, or other heart surgery? No   2.  Do you ever have any pain or discomfort in your chest? No   3.  Do you have a history of  Heart Failure? No   4.   Are you troubled by shortness of breath when:  walking on a level surface, or up a slight hill, or at night? YES, deconditioning and weight    5.  Do you currently have a cold, bronchitis or other respiratory infection? No   6.  Do you have a cough, shortness of breath, or wheezing? No   7.  Do you sometimes get pains in the calves of your legs when you walk? No   8. Do you or anyone in your family have previous history of blood clots? No   9.  Do you or does anyone in your family have a serious bleeding problem such as prolonged bleeding following surgeries or cuts? No   10. Have you ever had problems with anemia or been told to take iron pills? No   11. Have you had any abnormal blood loss such as black, tarry or bloody stools, or abnormal vaginal bleeding? No   12. Have you ever had a blood transfusion? No   13. Have you or any of your relatives ever had problems with  anesthesia? No   14. Do you have sleep apnea, excessive snoring or daytime drowsiness? YES - undiagnosed but always tired   15. Do you have any prosthetic heart valves? No   16. Do you have prosthetic joints? YES - knees   17. Is there any chance that you may be pregnant? No         HPI:     HPI related to upcoming procedure: Patient with chronic bilateral low back pain with bilateral sciatica, mri found pathology of spondylosis and spinal canal stenosis, completed physical therapy, continued pain, now presenting for SILVIA      See problem list for active medical problems.  Problems all longstanding and stable, except as noted/documented.  See ROS for pertinent symptoms related to these conditions.    CHF - Patient has a longstanding history of moderate-severe CHF. Exacerbating conditions include hypertension. Currently the patient's condition is same. Current treatment regimen includes Angiotensin 2 receptor blocker, diuretic and spirolactone. The patient denies chest pain, edema, orthopnea, SOB or recent weight gain. Last Echocardiogram 2013 - EF 60-65%, EKG done today showed sinus bradycardia with no acute changes.     DEPRESSION - Patient has a long history of Depression of moderate severity requiring medication for control with recent symptoms being stable..Current symptoms of depression include none.     HYPERTENSION - Patient has longstanding history of HTN , currently denies any symptoms referable to elevated blood pressure. Specifically denies chest pain, palpitations, dyspnea, orthopnea, PND or peripheral edema. Blood pressure readings have been in normal range. Current medication regimen is as listed below. Patient denies any side effects of medication.       MEDICAL HISTORY:     Patient Active Problem List    Diagnosis Date Noted     Mixed anxiety depressive disorder 01/15/2016     Priority: Medium     Migraine without aura and without status migrainosus, not intractable 01/15/2016     Priority: Medium      Morbid obesity, unspecified obesity type (H) 2015     Priority: Medium     Decreased calculated GFR 2015     Priority: Medium     Diastolic CHF (H) 2014     Priority: Medium     HTN, goal below 140/90 2013     Priority: Medium     Slow transit constipation 2005     Priority: Medium      Past Medical History:   Diagnosis Date     DEPRESSIVE DISORDER NEC 2004     Diastolic dysfunction      Essential hypertension, benign      Generalized osteoarthrosis, unspecified site     knees     Headache(784.0)      PANIC DISORDER 2004     Past Surgical History:   Procedure Laterality Date     C  DELIVERY ONLY      , Low Cervical     C  DELIVERY ONLY       C VAGINAL HYSTERECTOMY      without oophorectomy     COLONOSCOPY  10/06/10    attempt at colonscopy to 50cm     HC COLONOSCOPY W/WO BRUSH/WASH  2005    Diverticulosis.  Internal hemorrhoids.     JOINT REPLACEMTN, KNEE RT/LT  2006    Right total knee arthroplasty.     JOINT REPLACEMTN, KNEE RT/LT  2006    Left total knee arthroplasty.     Current Outpatient Medications   Medication Sig Dispense Refill     busPIRone (BUSPAR) 10 MG tablet Take 1 tablet (10 mg) by mouth 3 times daily 270 tablet 3     cyclobenzaprine (FLEXERIL) 5 MG tablet Take 1 tablet (5 mg) by mouth 3 times daily as needed for muscle spasms 30 tablet 3     escitalopram (LEXAPRO) 20 MG tablet Take 1 tablet (20 mg) by mouth daily 90 tablet 3     furosemide (LASIX) 20 MG tablet TAKE ONE TABLET BY MOUTH ONE TIME DAILY 90 tablet 3     losartan (COZAAR) 100 MG tablet Take 1 tablet (100 mg) by mouth daily 90 tablet 3     MIRALAX PO POWD 17 GM DAILY 1 Bottle 11     spironolactone (ALDACTONE) 25 MG tablet Take 1 tablet (25 mg) by mouth daily 90 tablet 3     topiramate (TOPAMAX) 50 MG tablet Take 1 tablet (50 mg) by mouth 2 times daily 180 tablet 3     amoxicillin (AMOXIL) 500 MG capsule Only for dental work due to knee surgeries  3  "    OTC products: None, except as noted above    Allergies   Allergen Reactions     Lisinopril Cough      Latex Allergy: NO    Social History     Tobacco Use     Smoking status: Never Smoker     Smokeless tobacco: Never Used     Tobacco comment: no smokers in household   Substance Use Topics     Alcohol use: No     History   Drug Use No       REVIEW OF SYSTEMS:   Constitutional, neuro, ENT, endocrine, pulmonary, cardiac, gastrointestinal, genitourinary, musculoskeletal, integument and psychiatric systems are negative, except as otherwise noted.    EXAM:   /82   Pulse 70   Temp 96.7  F (35.9  C) (Temporal)   Resp 16   Ht 1.676 m (5' 6\")   Wt 147.9 kg (326 lb)   SpO2 97%   BMI 52.62 kg/m      GENERAL APPEARANCE: healthy, alert and no distress     EYES: EOMI, PERRL     HENT: ear canals and TM's normal and nose and mouth without ulcers or lesions     NECK: no adenopathy, no asymmetry, masses, or scars and thyroid normal to palpation     RESP: lungs clear to auscultation - no rales, rhonchi or wheezes     CV: regular rates and rhythm, normal S1 S2, no S3 or S4 and no murmur, click or rub     ABDOMEN:  Declined by patient      MS: extremities normal- no gross deformities noted, no evidence of inflammation in joints, FROM in all extremities.     SKIN: no suspicious lesions or rashes     NEURO: Normal strength and tone, sensory exam grossly normal, mentation intact and speech normal     PSYCH: mentation appears normal. and affect normal/bright     LYMPHATICS: No cervical adenopathy    DIAGNOSTICS:   EKG: sinus bradycardia, normal axis, normal intervals, no acute ST/T changes c/w ischemia, no LVH by voltage criteria, unchanged from previous tracings    Recent Labs   Lab Test 08/27/19  0958 08/24/18  1006  09/25/13  1948   HGB 13.8 14.1   < > 12.9    258   < > 206   INR  --   --   --  0.98    139   < > 144   POTASSIUM 4.1 4.0   < > 3.4   CR 1.05* 0.91   < > 1.07*    < > = values in this interval not " displayed.      IMPRESSION:   Reason for surgery/procedure: Chronic bilateral lower back pain with bilateral sciatica  Diagnosis/reason for consult: Pre operative consult     The proposed surgical procedure is considered LOW risk.    REVISED CARDIAC RISK INDEX  The patient has the following serious cardiovascular risks for perioperative complications such as (MI, PE, VFib and 3  AV Block):  Congestive Heart Failure (pulmonary edema, PND, s3 ilia, CXR with pulmonary congestion, basilar rales)  INTERPRETATION: 1 risks: Class II (low risk - 0.9% complication rate)    The patient has the following additional risks for perioperative complications:  Morbid obesity      ICD-10-CM    1. Preop general physical exam Z01.818        RECOMMENDATIONS:     ACE Inhibitor or Angiotensin Receptor Blocker (ARB) Use  Ace inhibitor or Angiotensin Receptor Blocker (ARB) and should HOLD this medication for the 24 hours prior to surgery.      Pending review of diagnostic evaluation, APPROVAL GIVEN to proceed with proposed procedure, without further diagnostic evaluation.       Signed Electronically by: Melia Villegas PA-C    Copy of this evaluation report is provided to requesting physician.    [facility] Preop Guidelines    Revised Cardiac Risk Index

## 2019-10-03 NOTE — PATIENT INSTRUCTIONS
- No Losartan on the day of procedure      Everything else ok         Before Your Surgery      Call your surgeon if there is any change in your health. This includes signs of a cold or flu (such as a sore throat, runny nose, cough, rash or fever).    Do not smoke, drink alcohol or take over the counter medicine (unless your surgeon or primary care doctor tells you to) for the 24 hours before and after surgery.    If you take prescribed drugs: Follow your doctor s orders about which medicines to take and which to stop until after surgery.    Eating and drinking prior to surgery: follow the instructions from your surgeon    Take a shower or bath the night before surgery. Use the soap your surgeon gave you to gently clean your skin. If you do not have soap from your surgeon, use your regular soap. Do not shave or scrub the surgery site.  Wear clean pajamas and have clean sheets on your bed.

## 2019-10-16 ENCOUNTER — OFFICE VISIT (OUTPATIENT)
Dept: FAMILY MEDICINE | Facility: OTHER | Age: 59
End: 2019-10-16
Payer: COMMERCIAL

## 2019-10-16 VITALS
SYSTOLIC BLOOD PRESSURE: 128 MMHG | OXYGEN SATURATION: 97 % | HEIGHT: 66 IN | TEMPERATURE: 96.7 F | RESPIRATION RATE: 16 BRPM | WEIGHT: 293 LBS | BODY MASS INDEX: 47.09 KG/M2 | DIASTOLIC BLOOD PRESSURE: 82 MMHG | HEART RATE: 70 BPM

## 2019-10-16 DIAGNOSIS — G43.009 MIGRAINE WITHOUT AURA AND WITHOUT STATUS MIGRAINOSUS, NOT INTRACTABLE: ICD-10-CM

## 2019-10-16 DIAGNOSIS — I50.32 CHRONIC DIASTOLIC CONGESTIVE HEART FAILURE (H): ICD-10-CM

## 2019-10-16 DIAGNOSIS — G89.29 CHRONIC BILATERAL LOW BACK PAIN WITH BILATERAL SCIATICA: ICD-10-CM

## 2019-10-16 DIAGNOSIS — M54.41 CHRONIC BILATERAL LOW BACK PAIN WITH BILATERAL SCIATICA: ICD-10-CM

## 2019-10-16 DIAGNOSIS — F41.8 MIXED ANXIETY DEPRESSIVE DISORDER: ICD-10-CM

## 2019-10-16 DIAGNOSIS — Z23 NEED FOR PROPHYLACTIC VACCINATION AND INOCULATION AGAINST INFLUENZA: ICD-10-CM

## 2019-10-16 DIAGNOSIS — R94.4 DECREASED CALCULATED GFR: ICD-10-CM

## 2019-10-16 DIAGNOSIS — M54.42 CHRONIC BILATERAL LOW BACK PAIN WITH BILATERAL SCIATICA: ICD-10-CM

## 2019-10-16 DIAGNOSIS — E66.01 MORBID OBESITY, UNSPECIFIED OBESITY TYPE (H): ICD-10-CM

## 2019-10-16 DIAGNOSIS — I10 HTN, GOAL BELOW 140/90: ICD-10-CM

## 2019-10-16 DIAGNOSIS — Z01.818 PREOP GENERAL PHYSICAL EXAM: Primary | ICD-10-CM

## 2019-10-16 LAB
ANION GAP SERPL CALCULATED.3IONS-SCNC: 3 MMOL/L (ref 3–14)
BUN SERPL-MCNC: 13 MG/DL (ref 7–30)
CALCIUM SERPL-MCNC: 8.7 MG/DL (ref 8.5–10.1)
CHLORIDE SERPL-SCNC: 109 MMOL/L (ref 94–109)
CO2 SERPL-SCNC: 29 MMOL/L (ref 20–32)
CREAT SERPL-MCNC: 1.01 MG/DL (ref 0.52–1.04)
ERYTHROCYTE [DISTWIDTH] IN BLOOD BY AUTOMATED COUNT: 13.8 % (ref 10–15)
GFR SERPL CREATININE-BSD FRML MDRD: 61 ML/MIN/{1.73_M2}
GLUCOSE SERPL-MCNC: 88 MG/DL (ref 70–99)
HCT VFR BLD AUTO: 41 % (ref 35–47)
HGB BLD-MCNC: 13.8 G/DL (ref 11.7–15.7)
MCH RBC QN AUTO: 29.7 PG (ref 26.5–33)
MCHC RBC AUTO-ENTMCNC: 33.7 G/DL (ref 31.5–36.5)
MCV RBC AUTO: 88 FL (ref 78–100)
PLATELET # BLD AUTO: 254 10E9/L (ref 150–450)
POTASSIUM SERPL-SCNC: 4.1 MMOL/L (ref 3.4–5.3)
RBC # BLD AUTO: 4.65 10E12/L (ref 3.8–5.2)
SODIUM SERPL-SCNC: 141 MMOL/L (ref 133–144)
WBC # BLD AUTO: 6.5 10E9/L (ref 4–11)

## 2019-10-16 PROCEDURE — 90471 IMMUNIZATION ADMIN: CPT | Performed by: PHYSICIAN ASSISTANT

## 2019-10-16 PROCEDURE — 85027 COMPLETE CBC AUTOMATED: CPT | Performed by: PHYSICIAN ASSISTANT

## 2019-10-16 PROCEDURE — 90682 RIV4 VACC RECOMBINANT DNA IM: CPT | Performed by: PHYSICIAN ASSISTANT

## 2019-10-16 PROCEDURE — 36415 COLL VENOUS BLD VENIPUNCTURE: CPT | Performed by: PHYSICIAN ASSISTANT

## 2019-10-16 PROCEDURE — 80048 BASIC METABOLIC PNL TOTAL CA: CPT | Performed by: PHYSICIAN ASSISTANT

## 2019-10-16 PROCEDURE — 99214 OFFICE O/P EST MOD 30 MIN: CPT | Mod: 25 | Performed by: PHYSICIAN ASSISTANT

## 2019-10-16 PROCEDURE — 93000 ELECTROCARDIOGRAM COMPLETE: CPT | Performed by: PHYSICIAN ASSISTANT

## 2019-10-16 ASSESSMENT — MIFFLIN-ST. JEOR: SCORE: 2075.48

## 2019-10-16 ASSESSMENT — PAIN SCALES - GENERAL: PAINLEVEL: MILD PAIN (3)

## 2019-10-17 NOTE — RESULT ENCOUNTER NOTE
Pushpa Velasquez    Your results were normal. OK for surgery.     The results are attached for your review.       Balta Villegas PA-C

## 2019-10-18 ENCOUNTER — HOSPITAL ENCOUNTER (OUTPATIENT)
Dept: GENERAL RADIOLOGY | Facility: CLINIC | Age: 59
End: 2019-10-18
Attending: ANESTHESIOLOGY | Admitting: ANESTHESIOLOGY
Payer: COMMERCIAL

## 2019-10-18 ENCOUNTER — HOSPITAL ENCOUNTER (OUTPATIENT)
Facility: CLINIC | Age: 59
Discharge: HOME | End: 2019-10-18
Attending: ANESTHESIOLOGY | Admitting: ANESTHESIOLOGY
Payer: COMMERCIAL

## 2019-10-18 ENCOUNTER — ANESTHESIA EVENT (OUTPATIENT)
Dept: SURGERY | Facility: CLINIC | Age: 59
End: 2019-10-18
Payer: COMMERCIAL

## 2019-10-18 ENCOUNTER — ANESTHESIA (OUTPATIENT)
Dept: SURGERY | Facility: CLINIC | Age: 59
End: 2019-10-18
Payer: COMMERCIAL

## 2019-10-18 VITALS
RESPIRATION RATE: 16 BRPM | OXYGEN SATURATION: 96 % | HEART RATE: 56 BPM | TEMPERATURE: 97.8 F | DIASTOLIC BLOOD PRESSURE: 77 MMHG | SYSTOLIC BLOOD PRESSURE: 139 MMHG

## 2019-10-18 DIAGNOSIS — M54.50 CHRONIC BILATERAL LOW BACK PAIN: ICD-10-CM

## 2019-10-18 DIAGNOSIS — G89.29 CHRONIC BILATERAL LOW BACK PAIN: ICD-10-CM

## 2019-10-18 PROCEDURE — 37000008 ZZH ANESTHESIA TECHNICAL FEE, 1ST 30 MIN: Performed by: ANESTHESIOLOGY

## 2019-10-18 PROCEDURE — 40000277 XR SURGERY CARM FLUORO LESS THAN 5 MIN W STILLS: Mod: TC

## 2019-10-18 PROCEDURE — 62323 NJX INTERLAMINAR LMBR/SAC: CPT | Performed by: ANESTHESIOLOGY

## 2019-10-18 PROCEDURE — 25000125 ZZHC RX 250: Performed by: NURSE ANESTHETIST, CERTIFIED REGISTERED

## 2019-10-18 PROCEDURE — 25000128 H RX IP 250 OP 636: Performed by: NURSE ANESTHETIST, CERTIFIED REGISTERED

## 2019-10-18 RX ORDER — PROPOFOL 10 MG/ML
INJECTION, EMULSION INTRAVENOUS PRN
Status: DISCONTINUED | OUTPATIENT
Start: 2019-10-18 | End: 2019-10-18

## 2019-10-18 RX ORDER — LIDOCAINE 40 MG/G
CREAM TOPICAL
Status: DISCONTINUED | OUTPATIENT
Start: 2019-10-18 | End: 2019-10-18 | Stop reason: HOSPADM

## 2019-10-18 RX ORDER — LIDOCAINE HYDROCHLORIDE 20 MG/ML
INJECTION, SOLUTION INFILTRATION; PERINEURAL PRN
Status: DISCONTINUED | OUTPATIENT
Start: 2019-10-18 | End: 2019-10-18

## 2019-10-18 RX ADMIN — LIDOCAINE HYDROCHLORIDE 5 ML: 10 INJECTION, SOLUTION EPIDURAL; INFILTRATION; INTRACAUDAL; PERINEURAL at 14:30

## 2019-10-18 RX ADMIN — PROPOFOL 20 MG: 10 INJECTION, EMULSION INTRAVENOUS at 15:39

## 2019-10-18 RX ADMIN — PROPOFOL 50 MG: 10 INJECTION, EMULSION INTRAVENOUS at 15:37

## 2019-10-18 RX ADMIN — LIDOCAINE HYDROCHLORIDE 30 MG: 20 INJECTION, SOLUTION INFILTRATION; PERINEURAL at 15:37

## 2019-10-18 NOTE — ANESTHESIA CARE TRANSFER NOTE
Patient: Eva Solis    Procedure(s):  INJECTION, SPINE, LUMBAR 4 - LUMBAR 5, EPIDURAL TRANSLAMINAR    Diagnosis: Chronic bilateral low back pain [M54.5, G89.29]  Diagnosis Additional Information: No value filed.    Anesthesia Type:   MAC     Note:  Airway :Room Air  Patient transferred to:Phase II  Handoff Report: Identifed the Patient, Identified the Reponsible Provider, Reviewed the pertinent medical history, Discussed the surgical course, Reviewed Intra-OP anesthesia mangement and issues during anesthesia, Set expectations for post-procedure period and Allowed opportunity for questions and acknowledgement of understanding      Vitals: (Last set prior to Anesthesia Care Transfer)    CRNA VITALS  10/18/2019 1516 - 10/18/2019 1556      10/18/2019             Temp:  98  F (36.7  C)    Resp Rate (observed):  17    EKG:  Sinus rhythm                Electronically Signed By: STACEY Barba CRNA  October 18, 2019  3:56 PM

## 2019-10-18 NOTE — ANESTHESIA PREPROCEDURE EVALUATION
Anesthesia Pre-Procedure Evaluation    Patient: Eva Solis   MRN: [id number] : 1960          Preoperative Diagnosis: Chronic bilateral low back pain [M54.5, G89.29]    Procedure(s):  INJECTION, SPINE, LUMBAR, EPIDURAL TRANSLAMINAR    Past Medical History:   Diagnosis Date     DEPRESSIVE DISORDER NEC 2004     Diastolic dysfunction      Essential hypertension, benign      Generalized osteoarthrosis, unspecified site     knees     Headache(784.0)      PANIC DISORDER 2004     Past Surgical History:   Procedure Laterality Date     C  DELIVERY ONLY      , Low Cervical     C  DELIVERY ONLY       C VAGINAL HYSTERECTOMY      without oophorectomy     COLONOSCOPY  10/06/10    attempt at colonscopy to 50cm     HC COLONOSCOPY W/WO BRUSH/WASH  2005    Diverticulosis.  Internal hemorrhoids.     JOINT REPLACEMTN, KNEE RT/LT  2006    Right total knee arthroplasty.     JOINT REPLACEMTN, KNEE RT/LT  2006    Left total knee arthroplasty.       Anesthesia Evaluation     . Pt has had prior anesthetic. Type: Regional, General and MAC           ROS/MED HX    ENT/Pulmonary:     (+)VALERIE risk factors hypertension, obese, daytime somnolence, , . .    Neurologic:     (+)migraines,     Cardiovascular: Comment: H/O diastolic CHF    (+) hypertension----. : . CHF . . :. . Previous cardiac testing Echodate:10/9/13results:  1.Left ventricular systolic function is normal. There is mild concentric left   ventricular hypertrophy. The visual ejection fraction is estimated at 60-65%.   Grade I left ventricular diastolic dysfunction is noted. 2. The right   ventricle is normal size. The right ventricular systolic function is normal.   3. No significant valvular abnormalities 4. Normal CVP. PA pressure cannot be   estimated.  No prior echo for comparision  PatientHeight: 67 in  PatientWeight: 309 lbs  SystolicPressure: 108 mmHg  DiastolicPressure: 86 mmHg  HeartRate: 67 bpm  BSA 2.4  m^2date: results:ECG reviewed date:10/16/19 results:SB rate 55 date: results:          METS/Exercise Tolerance:  4 - Raking leaves, gardening   Hematologic:         Musculoskeletal:   (+) arthritis,  -       GI/Hepatic: Comment: constipation        Renal/Genitourinary: Comment: Decreased calculated GFR    (+) Pt has no history of transplant,       Endo:     (+) Obesity, .      Psychiatric:     (+) psychiatric history anxiety and depression      Infectious Disease:  - neg infectious disease ROS       Malignancy:      - no malignancy   Other:    (+) No chance of pregnancy C-spine cleared: N/A, H/O Chronic Pain,                        Physical Exam  Normal systems: cardiovascular, pulmonary and dental    Airway   Mallampati: II  TM distance: >3 FB  Neck ROM: full    Dental     Cardiovascular   Rhythm and rate: regular and normal      Pulmonary    breath sounds clear to auscultation            Lab Results   Component Value Date    WBC 6.5 10/16/2019    HGB 13.8 10/16/2019    HCT 41.0 10/16/2019     10/16/2019     10/16/2019    POTASSIUM 4.1 10/16/2019    CHLORIDE 109 10/16/2019    CO2 29 10/16/2019    BUN 13 10/16/2019    CR 1.01 10/16/2019    GLC 88 10/16/2019    DEVANTE 8.7 10/16/2019    PHOS 2.9 05/18/2015    MAG 1.7 02/06/2015    ALBUMIN 3.6 08/27/2019    PROTTOTAL 7.5 08/27/2019    ALT 23 08/27/2019    AST 19 08/27/2019    ALKPHOS 75 08/27/2019    BILITOTAL 0.8 08/27/2019    INR 0.98 09/25/2013    TSH 1.25 08/27/2019       Preop Vitals  BP Readings from Last 3 Encounters:   10/18/19 (!) 190/96   10/16/19 128/82   09/13/19 126/74    Pulse Readings from Last 3 Encounters:   10/16/19 70   09/13/19 72   08/27/19 59      Resp Readings from Last 3 Encounters:   10/18/19 18   10/16/19 16   08/27/19 14    SpO2 Readings from Last 3 Encounters:   10/16/19 97%   09/13/19 96%   08/27/19 97%      Temp Readings from Last 1 Encounters:   10/18/19 97.8  F (36.6  C) (Oral)    Ht Readings from Last 1 Encounters:   10/16/19  "1.676 m (5' 6\")      Wt Readings from Last 1 Encounters:   10/16/19 147.9 kg (326 lb)    Estimated body mass index is 52.62 kg/m  as calculated from the following:    Height as of 10/16/19: 1.676 m (5' 6\").    Weight as of 10/16/19: 147.9 kg (326 lb).       Anesthesia Plan      History & Physical Review  History and physical reviewed and following examination; no interval change.    ASA Status:  3 .    NPO Status:  > 8 hours    Plan for MAC with Intravenous and Propofol induction. Maintenance will be TIVA.  Reason for MAC:  Deep or markedly invasive procedure (G8)         Postoperative Care  Postoperative pain management:  Oral pain medications.      Consents                 STACEY Barba CRNA  "

## 2019-10-18 NOTE — DISCHARGE INSTRUCTIONS
Home Care Instructions                Procedure: Epidural injection or joint injection    Activity:    Rest today    Do not work today    Resume normal activity tomorrow    Pain:    You may experience soreness at the injection site for 1 to 3 days.    You may use an ice pack for 20 minutes every 2 hours for the first 24 hours    You may use a heating pad after the first 24 hours    You may use Tylenol  (acetaminophen) every 4 hours or other pain medicines as directed by your physician    Safety  Sedation medicine, if given may remain active for many hours.    It is important for the next 24 hours that you do not:    Drive a car    Operate machines or power tools    Consume alcohol, including beer    Sign any important papers or legal documents    You may experience numbness radiating into your legs or arms, (depending on the procedure location)  This numbness may last several hours.  Until the numb sensation returns to normal please use caution in walking, climbing stairs, stepping out of your vehicle, etc.    Common side effects of steroids:  Not everyone will experience corticosteroid side effects. If side effects are experienced they will gradually subside in the 7-10 day period following an injection.    Most common side effects include:    Flushed face and/or chest    Feeling of warmth, particularly in face but could be overall feeling of warmth    Increased blood sugar in diabetic patients    Menstrual irregularities may occur.  If taking hormone based birth control an alternate method of birth control is recommended    Sleep disturbances and/or mood swings are possible    Leg cramps    Please contact us if you have:  Severe pain   Fever more than 101.5 degrees Fahrenheit  Signs of infection (redness, swelling or drainage)      If you have questions during normal business hours (8am-5pm Monday-Friday) contact the [facility] at [phone]. If you need help after hours, we recommend that you go to a  hospital emergency room or dial 911.

## 2019-10-18 NOTE — OP NOTE
CHIEF COMPLAINT: Low back and buttock pain and bilateral lower extremity pains     PROCEDURE: L4-5 interlaminar epidural steroid injection using fluoroscopic guidance with contrast dye.     PROCEDURE DETAILS: After written informed consent was obtained from the patient, the patient was escorted to the procedure room.  The patient was placed in the prone position.  A  time out  was conducted to verify patient identity, procedure to be performed, side, site, allergies and any special requirements.  The skin over the neck and upper back region were prepped and draped in normal sterile fashion. Fluoroscopy was used to identify the interspace in an AP view and the skin was anesthetized with 2 mL of 1% lidocaine with bicarbonate buffer.  A 20-gauge 3-1/2 inch Tuohy needle was advanced using the loss of resistance technique with preservative free normal saline with fluoroscopic guidance. After negative aspiration for CSF and blood, 1.5 cc of Omnipaque contrast dye was injected revealing the appropriate epidurogram without evidence of intrathecal or intravascular spread. Following this, a 3-mL solution of 40 mg of triamcinolone with 2 cc preservative-free normal saline was slowly injected.  There is good washout from L2 all the way down to S1.  After injection of the medication, as the needle tip was withdrawn, it was flushed with local anesthetic.  The patient was monitored with blood pressure and pulse oximetry machines with the assistance of an RN throughout the procedure.  The patient was alert and responsive to questions throughout the procedure.   The patient tolerated the procedure well and was observed in the post-procedural area.  The patient was dismissed without apparent complications.     DIAGNOSIS:  1.  Severe spinal stenosis at L3-4 and L4-5  PLAN:  1. Performed a L4-5 interlaminar epidural steroid injection with good dispersion from L2 down to S1.   2. The patient was instructed to follow-up at the [place] if today's procedure is not helpful.  Trever Wheatley MD  Diplomate of the American Board of Anesthesiology, Pain Medicine

## 2019-10-18 NOTE — ANESTHESIA POSTPROCEDURE EVALUATION
Patient: Eva Solis    Procedure(s):  INJECTION, SPINE, LUMBAR 4 - LUMBAR 5, EPIDURAL TRANSLAMINAR    Diagnosis:Chronic bilateral low back pain [M54.5, G89.29]  Diagnosis Additional Information: No value filed.    Anesthesia Type:  MAC    Note:  Anesthesia Post Evaluation    Patient location during evaluation: Phase 2  Patient participation: Able to fully participate in evaluation  Level of consciousness: awake and alert  Pain management: adequate  Airway patency: patent  Cardiovascular status: blood pressure returned to baseline  Respiratory status: spontaneous ventilation and room air  Hydration status: acceptable  PONV: none     Anesthetic complications: None    Comments: Patient was very pleased with her anesthesia today. No anesthesia concerns.         Last vitals:  Vitals:    10/18/19 1419   BP: (!) 190/96   Resp: 18   Temp: 97.8  F (36.6  C)         Electronically Signed By: TSACEY Barba CRNA  October 18, 2019  3:56 PM

## 2019-10-21 ENCOUNTER — MYC MEDICAL ADVICE (OUTPATIENT)
Dept: FAMILY MEDICINE | Facility: OTHER | Age: 59
End: 2019-10-21

## 2020-01-20 NOTE — PROGRESS NOTES
Subjective     Eva Solis is a 59 year old female who presents to clinic today for the following health issues:    HPI   Joint Pain    Onset: about a month.  She was getting out of a vehicle and fell onto her left hip and feels that the pain has been ongoing since that time.  It has been worsening.  She was able to get up and walk without assistance afterwards.    Description:   Location: left hip  Character: Sharp, shooting, aching, burning, Stabbing, worse with moving her leg    Intensity: severe    Progression of Symptoms: worse    Accompanying Signs & Symptoms:  Other symptoms: radiation of pain to entire leg    History:   Previous similar pain: no       Precipitating factors:   Trauma or overuse: unsure, states she has fallen a couple of times this winter but does not feel it is related to that     Alleviating factors:  Improved by: Flexeril, Ibuprofen    Therapies Tried and outcome: Flexeril, Ibuprofen - very minimal relief, cold pack - no relief     Patient Active Problem List   Diagnosis     Slow transit constipation     HTN, goal below 140/90     Diastolic CHF (H)     Decreased calculated GFR     Morbid obesity, unspecified obesity type (H)     Mixed anxiety depressive disorder     Migraine without aura and without status migrainosus, not intractable     Primary osteoarthritis of left hip     Past Surgical History:   Procedure Laterality Date     C  DELIVERY ONLY      , Low Cervical     C  DELIVERY ONLY       C VAGINAL HYSTERECTOMY      without oophorectomy     COLONOSCOPY  10/06/10    attempt at colonscopy to 50cm     HC COLONOSCOPY W/WO BRUSH/WASH  2005    Diverticulosis.  Internal hemorrhoids.     INJECT EPIDURAL LUMBAR N/A 10/18/2019    Procedure: INJECTION, SPINE, LUMBAR 4 - LUMBAR 5, EPIDURAL TRANSLAMINAR;  Surgeon: Trever Wheatley MD;  Location: [facility]     JOINT REPLACEMTN, KNEE RT/LT  2006    Right total knee arthroplasty.     JOINT REPLACEMTN,  "KNEE RT/LT  07/19/2006    Left total knee arthroplasty.       Social History     Tobacco Use     Smoking status: Never Smoker     Smokeless tobacco: Never Used     Tobacco comment: no smokers in household   Substance Use Topics     Alcohol use: No     Family History   Problem Relation Age of Onset     Cancer Father         squamous cell ca     Cancer Maternal Grandmother         lung     Cerebrovascular Disease Maternal Grandmother      Cancer Paternal Grandmother         squamous cell ca     Diabetes Sister      Cancer Sister         kidney cancer     Kidney Cancer Sister      Connective Tissue Disorder Brother         lupus     Lupus Brother      Kidney Disease Mother         atrophic kidney     Kidney Disease Maternal Uncle         unclear kidney issue     Hypertension No family hx of      Colon Cancer No family hx of      Anxiety Disorder No family hx of      Asthma No family hx of            Reviewed and updated as needed this visit by Provider  Tobacco  Allergies  Meds  Problems  Med Hx  Surg Hx  Fam Hx         Review of Systems    CONSTITUTIONAL: NEGATIVE for fever, chills, change in weight  NEURO: NEGATIVE for weakness or paresthesias      Objective    /86 (Patient Position: Chair, Cuff Size: Adult Large)   Pulse 71   Temp 97.6  F (36.4  C) (Temporal)   Resp 16   Ht 1.676 m (5' 6\")   Wt (!) 152.4 kg (336 lb)   SpO2 98%   BMI 54.23 kg/m    Body mass index is 54.23 kg/m .  Physical Exam   Gen: No acute respiratory distress, has difficulty with walking or other movements  Left hip: Pain with internal and external rotation.  Tenderness to palpation over her lateral hip.  Antalgic gait.    X-ray: No obvious fracture, does have some degenerative changes, radiology review pending        Assessment & Plan     1. Hip pain, left  No obvious fracture on x-ray but patient has worsening symptoms impacting her ability to ambulate.  Will refer to orthopedics.    - XR Pelvis and Hip Left 1 View; " Future  - ORTHO  REFERRAL  - traMADol (ULTRAM) 50 MG tablet; Take 1 tablet (50 mg) by mouth every 6 hours as needed for severe pain  Dispense: 10 tablet; Refill: 0  - meloxicam (MOBIC) 15 MG tablet; Take 1 tablet (15 mg) by mouth daily  Dispense: 30 tablet; Refill: 0     Renetta Benitez MD  [facility]

## 2020-01-21 ENCOUNTER — OFFICE VISIT (OUTPATIENT)
Dept: FAMILY MEDICINE | Facility: OTHER | Age: 60
End: 2020-01-21
Payer: COMMERCIAL

## 2020-01-21 VITALS
HEIGHT: 66 IN | RESPIRATION RATE: 16 BRPM | BODY MASS INDEX: 47.09 KG/M2 | SYSTOLIC BLOOD PRESSURE: 126 MMHG | DIASTOLIC BLOOD PRESSURE: 86 MMHG | TEMPERATURE: 97.6 F | WEIGHT: 293 LBS | OXYGEN SATURATION: 98 % | HEART RATE: 71 BPM

## 2020-01-21 DIAGNOSIS — M25.552 HIP PAIN, LEFT: Primary | ICD-10-CM

## 2020-01-21 DIAGNOSIS — M25.552 HIP PAIN, LEFT: ICD-10-CM

## 2020-01-21 PROCEDURE — 73502 X-RAY EXAM HIP UNI 2-3 VIEWS: CPT

## 2020-01-21 PROCEDURE — 99213 OFFICE O/P EST LOW 20 MIN: CPT | Performed by: FAMILY MEDICINE

## 2020-01-21 RX ORDER — MELOXICAM 15 MG/1
15 TABLET ORAL DAILY
Qty: 30 TABLET | Refills: 0 | Status: SHIPPED | OUTPATIENT
Start: 2020-01-21 | End: 2020-02-20

## 2020-01-21 RX ORDER — TRAMADOL HYDROCHLORIDE 50 MG/1
50 TABLET ORAL EVERY 6 HOURS PRN
Qty: 10 TABLET | Refills: 0 | Status: SHIPPED | OUTPATIENT
Start: 2020-01-21 | End: 2020-03-10

## 2020-01-21 ASSESSMENT — PAIN SCALES - GENERAL: PAINLEVEL: WORST PAIN (10)

## 2020-01-21 ASSESSMENT — MIFFLIN-ST. JEOR: SCORE: 2120.84

## 2020-01-23 ENCOUNTER — TELEPHONE (OUTPATIENT)
Dept: ORTHOPEDICS | Facility: OTHER | Age: 60
End: 2020-01-23

## 2020-01-23 ENCOUNTER — OFFICE VISIT (OUTPATIENT)
Dept: ORTHOPEDICS | Facility: OTHER | Age: 60
End: 2020-01-23
Payer: COMMERCIAL

## 2020-01-23 VITALS
DIASTOLIC BLOOD PRESSURE: 90 MMHG | HEIGHT: 66 IN | BODY MASS INDEX: 47.09 KG/M2 | WEIGHT: 293 LBS | RESPIRATION RATE: 18 BRPM | SYSTOLIC BLOOD PRESSURE: 122 MMHG

## 2020-01-23 DIAGNOSIS — F40.240 CLAUSTROPHOBIA: Primary | ICD-10-CM

## 2020-01-23 DIAGNOSIS — M16.12 PRIMARY OSTEOARTHRITIS OF LEFT HIP: ICD-10-CM

## 2020-01-23 DIAGNOSIS — M25.552 LEFT HIP PAIN: Primary | ICD-10-CM

## 2020-01-23 PROCEDURE — 99204 OFFICE O/P NEW MOD 45 MIN: CPT | Performed by: ORTHOPAEDIC SURGERY

## 2020-01-23 ASSESSMENT — MIFFLIN-ST. JEOR: SCORE: 2120.84

## 2020-01-23 NOTE — PROGRESS NOTES
Eva Solis is a 59 year old female who is seen in consultation at the request of Dr. Benitez for left hip pain.  She states that her pain began on  when she had a fall getting out of her truck.  She states her  got out of the truck on the  side.  She then tried to get out on the passenger side, but when she stepped down she hit ice and fell onto her buttocks.   Since that time she is been having left hip pain that is on the side of her leg some radiation into the groin and she also has whole global left leg pain at night with numbness and tingling down the leg.  She describes her pain as constant and can be anything from sharp to dull rated a 10 out of 10.  Her pain is worse with walking and stairs.  She has not found anything to make her pain better.  If anything she thinks is getting worse.    X-ray was obtained on 2020 of the pelvis and hip.  X-ray images are reviewed with the patient today.  This shows mild osteoarthritis of the left hip compared to the right.  No fracture is evident.    Past Medical History:   Diagnosis Date     DEPRESSIVE DISORDER NEC 2004     Diastolic dysfunction      Essential hypertension, benign      Generalized osteoarthrosis, unspecified site     knees     Headache(784.0)      PANIC DISORDER 2004       Past Surgical History:   Procedure Laterality Date     C  DELIVERY ONLY      , Low Cervical     C  DELIVERY ONLY       C VAGINAL HYSTERECTOMY      without oophorectomy     COLONOSCOPY  10/06/10    attempt at colonscopy to 50cm     HC COLONOSCOPY W/WO BRUSH/WASH  2005    Diverticulosis.  Internal hemorrhoids.     INJECT EPIDURAL LUMBAR N/A 10/18/2019    Procedure: INJECTION, SPINE, LUMBAR 4 - LUMBAR 5, EPIDURAL TRANSLAMINAR;  Surgeon: Trever Wheatley MD;  Location: [facility]     JOINT REPLACEMTN, KNEE RT/LT  2006    Right total knee arthroplasty.     JOINT REPLACEMTN, KNEE RT/LT  2006    Left  total knee arthroplasty.       Family History   Problem Relation Age of Onset     Cancer Father         squamous cell ca     Cancer Maternal Grandmother         lung     Cerebrovascular Disease Maternal Grandmother      Cancer Paternal Grandmother         squamous cell ca     Diabetes Sister      Cancer Sister         kidney cancer     Kidney Cancer Sister      Connective Tissue Disorder Brother         lupus     Lupus Brother      Kidney Disease Mother         atrophic kidney     Kidney Disease Maternal Uncle         unclear kidney issue     Hypertension No family hx of      Colon Cancer No family hx of      Anxiety Disorder No family hx of      Asthma No family hx of        Social History     Socioeconomic History     Marital status:      Spouse name: Shun     Number of children: 3     Years of education: Not on file     Highest education level: Not on file   Occupational History     Occupation: Processor   Social Needs     Financial resource strain: Not on file     Food insecurity:     Worry: Not on file     Inability: Not on file     Transportation needs:     Medical: Not on file     Non-medical: Not on file   Tobacco Use     Smoking status: Never Smoker     Smokeless tobacco: Never Used     Tobacco comment: no smokers in household   Substance and Sexual Activity     Alcohol use: No     Drug use: No     Sexual activity: Yes     Partners: Male     Birth control/protection: Surgical     Comment: hysterectomy/bilateral ovaries remain   Lifestyle     Physical activity:     Days per week: Not on file     Minutes per session: Not on file     Stress: Not on file   Relationships     Social connections:     Talks on phone: Not on file     Gets together: Not on file     Attends Congregation service: Not on file     Active member of club or organization: Not on file     Attends meetings of clubs or organizations: Not on file     Relationship status: Not on file     Intimate partner violence:     Fear of current or ex  partner: Not on file     Emotionally abused: Not on file     Physically abused: Not on file     Forced sexual activity: Not on file   Other Topics Concern      Service No     Blood Transfusions No     Caffeine Concern No     Occupational Exposure No     Hobby Hazards No     Sleep Concern No     Stress Concern Yes     Weight Concern Yes     Special Diet Not Asked     Back Care Not Asked     Exercise No     Bike Helmet Not Asked     Seat Belt Yes     Self-Exams No     Parent/sibling w/ CABG, MI or angioplasty before 65F 55M? Not Asked   Social History Narrative     Not on file       Current Outpatient Medications   Medication Sig Dispense Refill     amoxicillin (AMOXIL) 500 MG capsule Only for dental work due to knee surgeries  3     busPIRone (BUSPAR) 10 MG tablet Take 1 tablet (10 mg) by mouth 3 times daily 270 tablet 3     cyclobenzaprine (FLEXERIL) 5 MG tablet Take 1 tablet (5 mg) by mouth 3 times daily as needed for muscle spasms 30 tablet 3     escitalopram (LEXAPRO) 20 MG tablet Take 1 tablet (20 mg) by mouth daily 90 tablet 3     furosemide (LASIX) 20 MG tablet TAKE ONE TABLET BY MOUTH ONE TIME DAILY 90 tablet 3     losartan (COZAAR) 100 MG tablet Take 1 tablet (100 mg) by mouth daily 90 tablet 3     meloxicam (MOBIC) 15 MG tablet Take 1 tablet (15 mg) by mouth daily 30 tablet 0     MIRALAX PO POWD 17 GM DAILY 1 Bottle 11     spironolactone (ALDACTONE) 25 MG tablet Take 1 tablet (25 mg) by mouth daily 90 tablet 3     topiramate (TOPAMAX) 50 MG tablet Take 1 tablet (50 mg) by mouth 2 times daily 180 tablet 3     traMADol (ULTRAM) 50 MG tablet Take 1 tablet (50 mg) by mouth every 6 hours as needed for severe pain 10 tablet 0       Allergies   Allergen Reactions     Lisinopril Cough       REVIEW OF SYSTEMS:  CONSTITUTIONAL:  NEGATIVE for fever, chills, change in weight, not feeling tired  SKIN:  NEGATIVE for worrisome rashes, no skin lumps, no skin ulcers and no non-healing wounds  EYES:  NEGATIVE for  "vision changes or irritation.  ENT/MOUTH:  NEGATIVE.  No hearing loss, no hoarseness, no difficulty swallowing.  RESP:  NEGATIVE. No cough or shortness of breath.  CV:  NEGATIVE for chest pain, palpitations or peripheral edema  GI:  NEGATIVE for nausea, abdominal pain, heartburn, or change in bowel habits  :  Negative. No dysuria, no hematuria  MUSCULOSKELETAL:  See HPI above  NEURO:  NEGATIVE . No headaches, no dizziness,  no numbness  ENDOCRINE:  NEGATIVE for temperature intolerance, skin/hair changes  HEME/ALLERGY/IMMUNE:  NEGATIVE for bleeding problems  PSYCHIATRIC:  NEGATIVE. no anxiety, no depression.     Exam:  Vitals: BP (!) 122/90   Resp 18   Ht 1.676 m (5' 6\")   Wt (!) 152.4 kg (336 lb)   BMI 54.23 kg/m    BMI= Body mass index is 54.23 kg/m .  Constitutional:  healthy, alert and no distress  Neuro: Alert and Oriented x 3, Sensation grossly WNL.  Psych: Affect normal   Respiratory: Breathing not labored.  Cardiovascular: normal peripheral pulses  Lymph: no adenopathy  Skin: No rashes,worrisome lesions or skin problems.  There is mild tenderness at the left sciatic notch.  No tenderness at the SI joints.  She did have some pain with straight leg raising to 70 degrees, but this seemed to be with motion of the hip.  She had no pain with straight leg raising on the right leg to 90 degrees.  She has significant pain in the groin and lateral hip with rotation of the left hip.  She has no pain with rotation of the right hip.  Both hips have about 45 degrees external rotation and internal rotation to 30 degrees.  She has pain with active hip flexion on the left, no pain on the right.  Sensation, motor and circulation are intact.    Assessment: Exam is concerning for possible occult fracture of the left hip.  Although x-ray is negative, pain is getting worse and I would recommend MRI scan to rule out fracture.  Plan:  MRI left hip.    "

## 2020-01-23 NOTE — LETTER
2020         RE: Eva Solis  [address] [address]        Dear Colleague,    Thank you for referring your patient, Eva Solis, to the [facility]. Please see a copy of my visit note below.    Eva Solis is a 59 year old female who is seen in consultation at the request of Dr. Benitez for left hip pain.  She states that her pain began on  when she had a fall getting out of her truck.  She states her  got out of the truck on the  side.  She then tried to get out on the passenger side, but when she stepped down she hit ice and fell onto her buttocks.   Since that time she is been having left hip pain that is on the side of her leg some radiation into the groin and she also has whole global left leg pain at night with numbness and tingling down the leg.  She describes her pain as constant and can be anything from sharp to dull rated a 10 out of 10.  Her pain is worse with walking and stairs.  She has not found anything to make her pain better.  If anything she thinks is getting worse.    X-ray was obtained on 2020 of the pelvis and hip.  X-ray images are reviewed with the patient today.  This shows mild osteoarthritis of the left hip compared to the right.  No fracture is evident.    Past Medical History:   Diagnosis Date     DEPRESSIVE DISORDER NEC 2004     Diastolic dysfunction      Essential hypertension, benign      Generalized osteoarthrosis, unspecified site     knees     Headache(784.0)      PANIC DISORDER 2004       Past Surgical History:   Procedure Laterality Date     C  DELIVERY ONLY      , Low Cervical     C  DELIVERY ONLY       C VAGINAL HYSTERECTOMY      without oophorectomy     COLONOSCOPY  10/06/10    attempt at colonscopy to 50cm     HC COLONOSCOPY W/WO BRUSH/WASH  2005    Diverticulosis.  Internal hemorrhoids.     INJECT EPIDURAL LUMBAR N/A 10/18/2019     Procedure: INJECTION, SPINE, LUMBAR 4 - LUMBAR 5, EPIDURAL TRANSLAMINAR;  Surgeon: Trever Wheatley MD;  Location: [facility]     JOINT REPLACEMTN, KNEE RT/LT  5/11/2006    Right total knee arthroplasty.     JOINT REPLACEMTN, KNEE RT/LT  07/19/2006    Left total knee arthroplasty.       Family History   Problem Relation Age of Onset     Cancer Father         squamous cell ca     Cancer Maternal Grandmother         lung     Cerebrovascular Disease Maternal Grandmother      Cancer Paternal Grandmother         squamous cell ca     Diabetes Sister      Cancer Sister         kidney cancer     Kidney Cancer Sister      Connective Tissue Disorder Brother         lupus     Lupus Brother      Kidney Disease Mother         atrophic kidney     Kidney Disease Maternal Uncle         unclear kidney issue     Hypertension No family hx of      Colon Cancer No family hx of      Anxiety Disorder No family hx of      Asthma No family hx of        Social History     Socioeconomic History     Marital status:      Spouse name: Shun     Number of children: 3     Years of education: Not on file     Highest education level: Not on file   Occupational History     Occupation: Processor   Social Needs     Financial resource strain: Not on file     Food insecurity:     Worry: Not on file     Inability: Not on file     Transportation needs:     Medical: Not on file     Non-medical: Not on file   Tobacco Use     Smoking status: Never Smoker     Smokeless tobacco: Never Used     Tobacco comment: no smokers in household   Substance and Sexual Activity     Alcohol use: No     Drug use: No     Sexual activity: Yes     Partners: Male     Birth control/protection: Surgical     Comment: hysterectomy/bilateral ovaries remain   Lifestyle     Physical activity:     Days per week: Not on file     Minutes per session: Not on file     Stress: Not on file   Relationships     Social connections:     Talks on phone: Not on file     Gets together: Not on file      Attends Confucianist service: Not on file     Active member of club or organization: Not on file     Attends meetings of clubs or organizations: Not on file     Relationship status: Not on file     Intimate partner violence:     Fear of current or ex partner: Not on file     Emotionally abused: Not on file     Physically abused: Not on file     Forced sexual activity: Not on file   Other Topics Concern      Service No     Blood Transfusions No     Caffeine Concern No     Occupational Exposure No     Hobby Hazards No     Sleep Concern No     Stress Concern Yes     Weight Concern Yes     Special Diet Not Asked     Back Care Not Asked     Exercise No     Bike Helmet Not Asked     Seat Belt Yes     Self-Exams No     Parent/sibling w/ CABG, MI or angioplasty before 65F 55M? Not Asked   Social History Narrative     Not on file       Current Outpatient Medications   Medication Sig Dispense Refill     amoxicillin (AMOXIL) 500 MG capsule Only for dental work due to knee surgeries  3     busPIRone (BUSPAR) 10 MG tablet Take 1 tablet (10 mg) by mouth 3 times daily 270 tablet 3     cyclobenzaprine (FLEXERIL) 5 MG tablet Take 1 tablet (5 mg) by mouth 3 times daily as needed for muscle spasms 30 tablet 3     escitalopram (LEXAPRO) 20 MG tablet Take 1 tablet (20 mg) by mouth daily 90 tablet 3     furosemide (LASIX) 20 MG tablet TAKE ONE TABLET BY MOUTH ONE TIME DAILY 90 tablet 3     losartan (COZAAR) 100 MG tablet Take 1 tablet (100 mg) by mouth daily 90 tablet 3     meloxicam (MOBIC) 15 MG tablet Take 1 tablet (15 mg) by mouth daily 30 tablet 0     MIRALAX PO POWD 17 GM DAILY 1 Bottle 11     spironolactone (ALDACTONE) 25 MG tablet Take 1 tablet (25 mg) by mouth daily 90 tablet 3     topiramate (TOPAMAX) 50 MG tablet Take 1 tablet (50 mg) by mouth 2 times daily 180 tablet 3     traMADol (ULTRAM) 50 MG tablet Take 1 tablet (50 mg) by mouth every 6 hours as needed for severe pain 10 tablet 0       Allergies   Allergen  "Reactions     Lisinopril Cough       REVIEW OF SYSTEMS:  CONSTITUTIONAL:  NEGATIVE for fever, chills, change in weight, not feeling tired  SKIN:  NEGATIVE for worrisome rashes, no skin lumps, no skin ulcers and no non-healing wounds  EYES:  NEGATIVE for vision changes or irritation.  ENT/MOUTH:  NEGATIVE.  No hearing loss, no hoarseness, no difficulty swallowing.  RESP:  NEGATIVE. No cough or shortness of breath.  CV:  NEGATIVE for chest pain, palpitations or peripheral edema  GI:  NEGATIVE for nausea, abdominal pain, heartburn, or change in bowel habits  :  Negative. No dysuria, no hematuria  MUSCULOSKELETAL:  See HPI above  NEURO:  NEGATIVE . No headaches, no dizziness,  no numbness  ENDOCRINE:  NEGATIVE for temperature intolerance, skin/hair changes  HEME/ALLERGY/IMMUNE:  NEGATIVE for bleeding problems  PSYCHIATRIC:  NEGATIVE. no anxiety, no depression.     Exam:  Vitals: BP (!) 122/90   Resp 18   Ht 1.676 m (5' 6\")   Wt (!) 152.4 kg (336 lb)   BMI 54.23 kg/m     BMI= Body mass index is 54.23 kg/m .  Constitutional:  healthy, alert and no distress  Neuro: Alert and Oriented x 3, Sensation grossly WNL.  Psych: Affect normal   Respiratory: Breathing not labored.  Cardiovascular: normal peripheral pulses  Lymph: no adenopathy  Skin: No rashes,worrisome lesions or skin problems.  There is mild tenderness at the left sciatic notch.  No tenderness at the SI joints.  She did have some pain with straight leg raising to 70 degrees, but this seemed to be with motion of the hip.  She had no pain with straight leg raising on the right leg to 90 degrees.  She has significant pain in the groin and lateral hip with rotation of the left hip.  She has no pain with rotation of the right hip.  Both hips have about 45 degrees external rotation and internal rotation to 30 degrees.  She has pain with active hip flexion on the left, no pain on the right.  Sensation, motor and circulation are intact.    Assessment: Exam is " concerning for possible occult fracture of the left hip.  Although x-ray is negative, pain is getting worse and I would recommend MRI scan to rule out fracture.  Plan:  MRI left hip.      Again, thank you for allowing me to participate in the care of your patient.        Sincerely,        Ambrocio Schumacher MD

## 2020-01-23 NOTE — TELEPHONE ENCOUNTER
Reason for Call:  Medication or medication refill:    Do you use a [facility]?  Name of the pharmacy and phone number for the current request:  [facility] - [phone]    Name of the medication requested: Sedative    Other request: Patient called clinic stating she has her MRI scheduled for 1/27/20, but she gets claustrophobic and is asking for an oral sedative for this. Please let patient know if something can be described.     Can we leave a detailed message on this number? YES    Phone number patient can be reached at: Home number on file [phone] (home)    Best Time: any    Call taken on 1/23/2020 at 10:19 AM by Stephanie Spence

## 2020-01-24 RX ORDER — DIAZEPAM 5 MG
5 TABLET ORAL SEE ADMIN INSTRUCTIONS
Qty: 2 TABLET | Refills: 1 | Status: SHIPPED | OUTPATIENT
Start: 2020-01-24 | End: 2020-04-17

## 2020-01-27 ENCOUNTER — HOSPITAL ENCOUNTER (OUTPATIENT)
Dept: MRI IMAGING | Facility: CLINIC | Age: 60
Discharge: HOME | End: 2020-01-27
Attending: PHYSICIAN ASSISTANT | Admitting: PHYSICIAN ASSISTANT
Payer: COMMERCIAL

## 2020-01-27 ENCOUNTER — MYC MEDICAL ADVICE (OUTPATIENT)
Dept: ORTHOPEDICS | Facility: OTHER | Age: 60
End: 2020-01-27

## 2020-01-27 DIAGNOSIS — M25.552 LEFT HIP PAIN: ICD-10-CM

## 2020-01-27 LAB — RADIOLOGIST FLAGS: NORMAL

## 2020-01-27 PROCEDURE — 73721 MRI JNT OF LWR EXTRE W/O DYE: CPT | Mod: LT

## 2020-01-28 ENCOUNTER — TELEPHONE (OUTPATIENT)
Dept: ORTHOPEDICS | Facility: OTHER | Age: 60
End: 2020-01-28

## 2020-01-28 ENCOUNTER — TELEPHONE (OUTPATIENT)
Dept: FAMILY MEDICINE | Facility: OTHER | Age: 60
End: 2020-01-28

## 2020-01-28 NOTE — TELEPHONE ENCOUNTER
Reason for Call:  Other call back    Detailed comments: Patient called clinic. She stated she is waiting for a call back from [facility] regarding her MRI results. Please call patient back.     Phone Number Patient can be reached at: Home number on file [phone] (home)    Best Time: any    Can we leave a detailed message on this number? YES    Call taken on 1/28/2020 at 9:41 AM by Stephanie Spence

## 2020-01-28 NOTE — TELEPHONE ENCOUNTER
I called Eva Solis on 1/28/2020 at 11:35 AM..  She has mild osteoarthritis and an acetabular nondisplaced crack.  She should avoid painful activities.  May advance as tolerated.  Nothing we can do to speed up process except use crutches to avoid pain.

## 2020-01-28 NOTE — TELEPHONE ENCOUNTER
Call from radiology, incidental finding MR Left hip    [Consider Follow Up: Acetabular insufficiency fracture with  superimposed impaction fracture.]    Tried to contacted [facility] JAYDA Katz, gloria route to [facility] Burris, MSN, RN

## 2020-01-30 NOTE — TELEPHONE ENCOUNTER
Opened by: Ambrocio Schumacher MD            on Tue Jan 28, 2020  3:39 PM        Doned by: Ambrocio Schumacher MD            on Tue Jan 28, 2020  3:39 PM        Closing encounter    Elizabeth Burris, MSN, RN

## 2020-02-14 ENCOUNTER — MYC MEDICAL ADVICE (OUTPATIENT)
Dept: ORTHOPEDICS | Facility: OTHER | Age: 60
End: 2020-02-14

## 2020-02-20 ENCOUNTER — OFFICE VISIT (OUTPATIENT)
Dept: ORTHOPEDICS | Facility: OTHER | Age: 60
End: 2020-02-20
Payer: COMMERCIAL

## 2020-02-20 ENCOUNTER — ANCILLARY PROCEDURE (OUTPATIENT)
Dept: GENERAL RADIOLOGY | Facility: OTHER | Age: 60
End: 2020-02-20
Attending: ORTHOPAEDIC SURGERY
Payer: COMMERCIAL

## 2020-02-20 VITALS
DIASTOLIC BLOOD PRESSURE: 90 MMHG | BODY MASS INDEX: 47.09 KG/M2 | HEIGHT: 66 IN | WEIGHT: 293 LBS | SYSTOLIC BLOOD PRESSURE: 120 MMHG | RESPIRATION RATE: 16 BRPM

## 2020-02-20 DIAGNOSIS — M16.12 PRIMARY OSTEOARTHRITIS OF LEFT HIP: Primary | ICD-10-CM

## 2020-02-20 DIAGNOSIS — M16.12 PRIMARY OSTEOARTHRITIS OF LEFT HIP: ICD-10-CM

## 2020-02-20 DIAGNOSIS — M25.552 HIP PAIN, LEFT: ICD-10-CM

## 2020-02-20 DIAGNOSIS — S32.485D CLOSED NONDISPLACED DOME FRACTURE OF LEFT ACETABULUM WITH ROUTINE HEALING, SUBSEQUENT ENCOUNTER: ICD-10-CM

## 2020-02-20 PROCEDURE — 99213 OFFICE O/P EST LOW 20 MIN: CPT | Performed by: ORTHOPAEDIC SURGERY

## 2020-02-20 PROCEDURE — 73502 X-RAY EXAM HIP UNI 2-3 VIEWS: CPT

## 2020-02-20 RX ORDER — MELOXICAM 15 MG/1
15 TABLET ORAL DAILY
Qty: 30 TABLET | Refills: 11 | Status: SHIPPED | OUTPATIENT
Start: 2020-02-20 | End: 2020-04-17

## 2020-02-20 ASSESSMENT — MIFFLIN-ST. JEOR: SCORE: 2120.84

## 2020-02-20 NOTE — LETTER
2/20/2020         RE: Eva Solis  [address] [facility] [address]        Dear Colleague,    Thank you for referring your patient, Eva Solis, to the [facility]. Please see a copy of my visit note below.    Eva Solis is a 59 year old female who is seen in follow up for left hip pain.  Her pain began on Christmas when she had a fall getting out of her truck.  She states her  got out of the truck on the  side.  She then tried to get out on the passenger side, but when she stepped down she hit ice and fell onto her buttocks.   Since that time she has been having left hip pain that is on the side of her leg some radiation into the groin and she also has whole global left leg pain at night with numbness and tingling down the leg.  She has been having a significant amount of back pain also, but feels her hip pain is different.  She describes her pain as constant and can be anything from sharp to dull rated a 10 out of 10.  Her pain is worse with walking and stairs.  She has not found anything to make her pain better.  If anything she thinks is getting worse.    X-ray was obtained on 1/21/2020 of the pelvis and hip.  This showed arthritis of the left hip.  There was also an area on the acetabulum which was very indistinct and maybe resembling a cyst.  MRI scan had been performed on 1/27/2020.  This showed edema in the previous area of abnormality seen on the x-ray.  They had a question of a subchondral insufficiency fracture there.  Repeat x-ray was performed today showing possibly progression of the osteoarthritis with more narrowing of the joint.  The acetabulum appears unchanged from prior x-ray and MRI.    Past Medical History:   Diagnosis Date     DEPRESSIVE DISORDER NEC 5/4/2004     Diastolic dysfunction      Essential hypertension, benign      Generalized osteoarthrosis, unspecified site     knees     Headache(784.0)      PANIC DISORDER 5/4/2004       Past  Surgical History:   Procedure Laterality Date     C  DELIVERY ONLY      , Low Cervical     C  DELIVERY ONLY       C VAGINAL HYSTERECTOMY      without oophorectomy     COLONOSCOPY  10/06/10    attempt at colonscopy to 50cm     HC COLONOSCOPY W/WO BRUSH/WASH  2005    Diverticulosis.  Internal hemorrhoids.     INJECT EPIDURAL LUMBAR N/A 10/18/2019    Procedure: INJECTION, SPINE, LUMBAR 4 - LUMBAR 5, EPIDURAL TRANSLAMINAR;  Surgeon: Trever Wheatley MD;  Location: [facility] OR     JOINT REPLACEMTN, KNEE RT/LT  2006    Right total knee arthroplasty.     JOINT REPLACEMTN, KNEE RT/LT  2006    Left total knee arthroplasty.       Family History   Problem Relation Age of Onset     Cancer Father         squamous cell ca     Cancer Maternal Grandmother         lung     Cerebrovascular Disease Maternal Grandmother      Cancer Paternal Grandmother         squamous cell ca     Diabetes Sister      Cancer Sister         kidney cancer     Kidney Cancer Sister      Connective Tissue Disorder Brother         lupus     Lupus Brother      Kidney Disease Mother         atrophic kidney     Kidney Disease Maternal Uncle         unclear kidney issue     Hypertension No family hx of      Colon Cancer No family hx of      Anxiety Disorder No family hx of      Asthma No family hx of        Social History     Socioeconomic History     Marital status:      Spouse name: Shun     Number of children: 3     Years of education: Not on file     Highest education level: Not on file   Occupational History     Occupation: Processor   Social Needs     Financial resource strain: Not on file     Food insecurity:     Worry: Not on file     Inability: Not on file     Transportation needs:     Medical: Not on file     Non-medical: Not on file   Tobacco Use     Smoking status: Never Smoker     Smokeless tobacco: Never Used     Tobacco comment: no smokers in household   Substance and Sexual Activity      Alcohol use: No     Drug use: No     Sexual activity: Yes     Partners: Male     Birth control/protection: Surgical     Comment: hysterectomy/bilateral ovaries remain   Lifestyle     Physical activity:     Days per week: Not on file     Minutes per session: Not on file     Stress: Not on file   Relationships     Social connections:     Talks on phone: Not on file     Gets together: Not on file     Attends Rastafari service: Not on file     Active member of club or organization: Not on file     Attends meetings of clubs or organizations: Not on file     Relationship status: Not on file     Intimate partner violence:     Fear of current or ex partner: Not on file     Emotionally abused: Not on file     Physically abused: Not on file     Forced sexual activity: Not on file   Other Topics Concern      Service No     Blood Transfusions No     Caffeine Concern No     Occupational Exposure No     Hobby Hazards No     Sleep Concern No     Stress Concern Yes     Weight Concern Yes     Special Diet Not Asked     Back Care Not Asked     Exercise No     Bike Helmet Not Asked     Seat Belt Yes     Self-Exams No     Parent/sibling w/ CABG, MI or angioplasty before 65F 55M? Not Asked   Social History Narrative     Not on file       Current Outpatient Medications   Medication Sig Dispense Refill     amoxicillin (AMOXIL) 500 MG capsule Only for dental work due to knee surgeries  3     busPIRone (BUSPAR) 10 MG tablet Take 1 tablet (10 mg) by mouth 3 times daily 270 tablet 3     cyclobenzaprine (FLEXERIL) 5 MG tablet Take 1 tablet (5 mg) by mouth 3 times daily as needed for muscle spasms 30 tablet 3     diazepam (VALIUM) 5 MG tablet Take 1 tablet (5 mg) by mouth See Admin Instructions 1 hour before procedure.  Repeat in 30 minutes if needed. 2 tablet 1     escitalopram (LEXAPRO) 20 MG tablet Take 1 tablet (20 mg) by mouth daily 90 tablet 3     furosemide (LASIX) 20 MG tablet TAKE ONE TABLET BY MOUTH ONE TIME DAILY 90 tablet 3  "    losartan (COZAAR) 100 MG tablet Take 1 tablet (100 mg) by mouth daily 90 tablet 3     meloxicam 15 MG PO tablet Take 1 tablet (15 mg) by mouth daily 30 tablet 11     MIRALAX PO POWD 17 GM DAILY 1 Bottle 11     spironolactone (ALDACTONE) 25 MG tablet Take 1 tablet (25 mg) by mouth daily 90 tablet 3     topiramate (TOPAMAX) 50 MG tablet Take 1 tablet (50 mg) by mouth 2 times daily 180 tablet 3       Allergies   Allergen Reactions     Lisinopril Cough       REVIEW OF SYSTEMS:  CONSTITUTIONAL:  NEGATIVE for fever, chills, change in weight, not feeling tired  SKIN:  NEGATIVE for worrisome rashes, no skin lumps, no skin ulcers and no non-healing wounds  EYES:  NEGATIVE for vision changes or irritation.  ENT/MOUTH:  NEGATIVE.  No hearing loss, no hoarseness, no difficulty swallowing.  RESP:  NEGATIVE. No cough or shortness of breath.  CV:  NEGATIVE for chest pain, palpitations or peripheral edema  GI:  NEGATIVE for nausea, abdominal pain, heartburn, or change in bowel habits  :  Negative. No dysuria, no hematuria  MUSCULOSKELETAL:  See HPI above  NEURO:  NEGATIVE . No headaches, no dizziness,  no numbness  ENDOCRINE:  NEGATIVE for temperature intolerance, skin/hair changes  HEME/ALLERGY/IMMUNE:  NEGATIVE for bleeding problems  PSYCHIATRIC:  NEGATIVE. no anxiety, no depression.     Exam:  Vitals: BP (!) 120/90   Resp 16   Ht 1.676 m (5' 6\")   Wt (!) 152.4 kg (336 lb)   BMI 54.23 kg/m     BMI= Body mass index is 54.23 kg/m .  Constitutional:  healthy, alert and no distress  Neuro: Alert and Oriented x 3, Sensation grossly WNL.  Psych: Affect normal   Respiratory: Breathing not labored.  Cardiovascular: normal peripheral pulses  Lymph: no adenopathy  Skin: No rashes,worrisome lesions or skin problems.  There is mild tenderness at the left sciatic notch.  No tenderness at the SI joints.  She did have pain with straight leg raising to 70 degrees, but this seemed to be with motion of the hip.  She had no pain with " straight leg raising on the right leg to 90 degrees.  She has significant pain in the groin and lateral hip with rotation of the left hip.  She has no pain with rotation of the right hip.  Right hip has external rotation to 45 degrees and internal rotation to 30 degrees.  At this time she is guarding with rotation of the left hip to 20 degrees internal or external rotation.  She has pain with this rotation.  She has pain with active hip flexion on the left, no pain on the right.  Sensation, motor and circulation are intact.    Assessment: Left hip osteoarthritis.  There is probably also been an insufficiency fracture of the acetabulum which should be slowly healing.  I do not see an acetabular change on x-ray.  Plan: We discussed options of continuing the meloxicam versus adding steroid injection or referral to Dr. Nicholas Diggs for advice on the hip to see if we are missing something.  At this time she wants to extend her prescription for the meloxicam and I have sent this.  She will think about injection or referral and let us know what she decides.    Again, thank you for allowing me to participate in the care of your patient.        Sincerely,        Ambrocio Schumacher MD

## 2020-02-20 NOTE — PROGRESS NOTES
Eva Solis is a 59 year old female who is seen in follow up for left hip pain.  Her pain began on  when she had a fall getting out of her truck.  She states her  got out of the truck on the  side.  She then tried to get out on the passenger side, but when she stepped down she hit ice and fell onto her buttocks.   Since that time she has been having left hip pain that is on the side of her leg some radiation into the groin and she also has whole global left leg pain at night with numbness and tingling down the leg.  She has been having a significant amount of back pain also, but feels her hip pain is different.  She describes her pain as constant and can be anything from sharp to dull rated a 10 out of 10.  Her pain is worse with walking and stairs.  She has not found anything to make her pain better.  If anything she thinks is getting worse.    X-ray was obtained on 2020 of the pelvis and hip.  This showed arthritis of the left hip.  There was also an area on the acetabulum which was very indistinct and maybe resembling a cyst.  MRI scan had been performed on 2020.  This showed edema in the previous area of abnormality seen on the x-ray.  They had a question of a subchondral insufficiency fracture there.  Repeat x-ray was performed today showing possibly progression of the osteoarthritis with more narrowing of the joint.  The acetabulum appears unchanged from prior x-ray and MRI.    Past Medical History:   Diagnosis Date     DEPRESSIVE DISORDER NEC 2004     Diastolic dysfunction      Essential hypertension, benign      Generalized osteoarthrosis, unspecified site     knees     Headache(784.0)      PANIC DISORDER 2004       Past Surgical History:   Procedure Laterality Date     C  DELIVERY ONLY      , Low Cervical     C  DELIVERY ONLY       C VAGINAL HYSTERECTOMY      without oophorectomy     COLONOSCOPY  10/06/10    attempt at  colonscopy to 50cm     HC COLONOSCOPY W/WO BRUSH/WASH  11/21/2005    Diverticulosis.  Internal hemorrhoids.     INJECT EPIDURAL LUMBAR N/A 10/18/2019    Procedure: INJECTION, SPINE, LUMBAR 4 - LUMBAR 5, EPIDURAL TRANSLAMINAR;  Surgeon: Trever Wheatley MD;  Location: [facility]     JOINT REPLACEMTN, KNEE RT/LT  5/11/2006    Right total knee arthroplasty.     JOINT REPLACEMTN, KNEE RT/LT  07/19/2006    Left total knee arthroplasty.       Family History   Problem Relation Age of Onset     Cancer Father         squamous cell ca     Cancer Maternal Grandmother         lung     Cerebrovascular Disease Maternal Grandmother      Cancer Paternal Grandmother         squamous cell ca     Diabetes Sister      Cancer Sister         kidney cancer     Kidney Cancer Sister      Connective Tissue Disorder Brother         lupus     Lupus Brother      Kidney Disease Mother         atrophic kidney     Kidney Disease Maternal Uncle         unclear kidney issue     Hypertension No family hx of      Colon Cancer No family hx of      Anxiety Disorder No family hx of      Asthma No family hx of        Social History     Socioeconomic History     Marital status:      Spouse name: Shun     Number of children: 3     Years of education: Not on file     Highest education level: Not on file   Occupational History     Occupation: Processor   Social Needs     Financial resource strain: Not on file     Food insecurity:     Worry: Not on file     Inability: Not on file     Transportation needs:     Medical: Not on file     Non-medical: Not on file   Tobacco Use     Smoking status: Never Smoker     Smokeless tobacco: Never Used     Tobacco comment: no smokers in household   Substance and Sexual Activity     Alcohol use: No     Drug use: No     Sexual activity: Yes     Partners: Male     Birth control/protection: Surgical     Comment: hysterectomy/bilateral ovaries remain   Lifestyle     Physical activity:     Days per week: Not on file     Minutes  per session: Not on file     Stress: Not on file   Relationships     Social connections:     Talks on phone: Not on file     Gets together: Not on file     Attends Druze service: Not on file     Active member of club or organization: Not on file     Attends meetings of clubs or organizations: Not on file     Relationship status: Not on file     Intimate partner violence:     Fear of current or ex partner: Not on file     Emotionally abused: Not on file     Physically abused: Not on file     Forced sexual activity: Not on file   Other Topics Concern      Service No     Blood Transfusions No     Caffeine Concern No     Occupational Exposure No     Hobby Hazards No     Sleep Concern No     Stress Concern Yes     Weight Concern Yes     Special Diet Not Asked     Back Care Not Asked     Exercise No     Bike Helmet Not Asked     Seat Belt Yes     Self-Exams No     Parent/sibling w/ CABG, MI or angioplasty before 65F 55M? Not Asked   Social History Narrative     Not on file       Current Outpatient Medications   Medication Sig Dispense Refill     amoxicillin (AMOXIL) 500 MG capsule Only for dental work due to knee surgeries  3     busPIRone (BUSPAR) 10 MG tablet Take 1 tablet (10 mg) by mouth 3 times daily 270 tablet 3     cyclobenzaprine (FLEXERIL) 5 MG tablet Take 1 tablet (5 mg) by mouth 3 times daily as needed for muscle spasms 30 tablet 3     diazepam (VALIUM) 5 MG tablet Take 1 tablet (5 mg) by mouth See Admin Instructions 1 hour before procedure.  Repeat in 30 minutes if needed. 2 tablet 1     escitalopram (LEXAPRO) 20 MG tablet Take 1 tablet (20 mg) by mouth daily 90 tablet 3     furosemide (LASIX) 20 MG tablet TAKE ONE TABLET BY MOUTH ONE TIME DAILY 90 tablet 3     losartan (COZAAR) 100 MG tablet Take 1 tablet (100 mg) by mouth daily 90 tablet 3     meloxicam 15 MG PO tablet Take 1 tablet (15 mg) by mouth daily 30 tablet 11     MIRALAX PO POWD 17 GM DAILY 1 Bottle 11     spironolactone (ALDACTONE) 25  "MG tablet Take 1 tablet (25 mg) by mouth daily 90 tablet 3     topiramate (TOPAMAX) 50 MG tablet Take 1 tablet (50 mg) by mouth 2 times daily 180 tablet 3       Allergies   Allergen Reactions     Lisinopril Cough       REVIEW OF SYSTEMS:  CONSTITUTIONAL:  NEGATIVE for fever, chills, change in weight, not feeling tired  SKIN:  NEGATIVE for worrisome rashes, no skin lumps, no skin ulcers and no non-healing wounds  EYES:  NEGATIVE for vision changes or irritation.  ENT/MOUTH:  NEGATIVE.  No hearing loss, no hoarseness, no difficulty swallowing.  RESP:  NEGATIVE. No cough or shortness of breath.  CV:  NEGATIVE for chest pain, palpitations or peripheral edema  GI:  NEGATIVE for nausea, abdominal pain, heartburn, or change in bowel habits  :  Negative. No dysuria, no hematuria  MUSCULOSKELETAL:  See HPI above  NEURO:  NEGATIVE . No headaches, no dizziness,  no numbness  ENDOCRINE:  NEGATIVE for temperature intolerance, skin/hair changes  HEME/ALLERGY/IMMUNE:  NEGATIVE for bleeding problems  PSYCHIATRIC:  NEGATIVE. no anxiety, no depression.     Exam:  Vitals: BP (!) 120/90   Resp 16   Ht 1.676 m (5' 6\")   Wt (!) 152.4 kg (336 lb)   BMI 54.23 kg/m    BMI= Body mass index is 54.23 kg/m .  Constitutional:  healthy, alert and no distress  Neuro: Alert and Oriented x 3, Sensation grossly WNL.  Psych: Affect normal   Respiratory: Breathing not labored.  Cardiovascular: normal peripheral pulses  Lymph: no adenopathy  Skin: No rashes,worrisome lesions or skin problems.  There is mild tenderness at the left sciatic notch.  No tenderness at the SI joints.  She did have pain with straight leg raising to 70 degrees, but this seemed to be with motion of the hip.  She had no pain with straight leg raising on the right leg to 90 degrees.  She has significant pain in the groin and lateral hip with rotation of the left hip.  She has no pain with rotation of the right hip.  Right hip has external rotation to 45 degrees and internal " rotation to 30 degrees.  At this time she is guarding with rotation of the left hip to 20 degrees internal or external rotation.  She has pain with this rotation.  She has pain with active hip flexion on the left, no pain on the right.  Sensation, motor and circulation are intact.    Assessment: Left hip osteoarthritis.  There is probably also been an insufficiency fracture of the acetabulum which should be slowly healing.  I do not see an acetabular change on x-ray.  Plan: We discussed options of continuing the meloxicam versus adding steroid injection or referral to Dr. Nicholas Diggs for advice on the hip to see if we are missing something.  At this time she wants to extend her prescription for the meloxicam and I have sent this.  She will think about injection or referral and let us know what she decides.

## 2020-02-20 NOTE — PATIENT INSTRUCTIONS
Diagnosis:  Left hip osteoarthritis.  Socket bone looks weak.  Options are:  1. Continue meloxicam and cane.  Can add tylenol.  2. Consider steroid injection into hip.  3. Refer to Dr Nicholas Diggs at [place] to see if I am missing something in the hip.    Total hip arthroplasty is in future.  Weight loss advised for that.

## 2020-02-24 ENCOUNTER — MYC MEDICAL ADVICE (OUTPATIENT)
Dept: NEUROSURGERY | Facility: CLINIC | Age: 60
End: 2020-02-24

## 2020-02-24 DIAGNOSIS — G89.29 CHRONIC BILATERAL LOW BACK PAIN WITH BILATERAL SCIATICA: Primary | ICD-10-CM

## 2020-02-24 DIAGNOSIS — M54.41 CHRONIC BILATERAL LOW BACK PAIN WITH BILATERAL SCIATICA: Primary | ICD-10-CM

## 2020-02-24 DIAGNOSIS — M54.42 CHRONIC BILATERAL LOW BACK PAIN WITH BILATERAL SCIATICA: Primary | ICD-10-CM

## 2020-03-02 ENCOUNTER — TELEPHONE (OUTPATIENT)
Dept: SURGERY | Facility: CLINIC | Age: 60
End: 2020-03-02

## 2020-03-02 ENCOUNTER — HOSPITAL ENCOUNTER (OUTPATIENT)
Facility: CLINIC | Age: 60
End: 2020-03-02
Attending: ANESTHESIOLOGY | Admitting: ANESTHESIOLOGY
Payer: COMMERCIAL

## 2020-03-02 NOTE — TELEPHONE ENCOUNTER
Pt scheduled for LESI   Date: 3/13/20   Time: 245pm  Dr. Wheatley    Instructed pt to have H&P and  for procedure.

## 2020-03-04 ENCOUNTER — TELEPHONE (OUTPATIENT)
Dept: FAMILY MEDICINE | Facility: OTHER | Age: 60
End: 2020-03-04

## 2020-03-04 NOTE — TELEPHONE ENCOUNTER
Summary:    Patient is due/failing the following:   Depression follow up, BP check and mammogram     Action needed:   Patient needs office visit for follow up.    Type of outreach:    Phone, left message for patient to call back.     Questions for provider review:    None                                                                                                                                    Nati Perry CMA       Chart routed to Care Team .          Panel Management Review      Patient has the following on her problem list:     Depression / Dysthymia review    Measure:  Needs PHQ-9 score of 4 or less during index window.  Administer PHQ-9 and if score is 5 or more, send encounter to provider for next steps.        PHQ-9 SCORE 8/24/2018 8/26/2019 3/4/2020   PHQ-9 Total Score - - -   PHQ-9 Total Score MyChart Incomplete 14 (Moderate depression) 16 (Moderately severe depression)   PHQ-9 Total Score - 14 16       If PHQ-9 recheck is 5 or more, route to provider for next steps.    Patient is due for:  PHQ9    Hypertension   Last three blood pressure readings:  BP Readings from Last 3 Encounters:   02/20/20 (!) 120/90   01/23/20 (!) 122/90   01/21/20 126/86     Blood pressure: FAILED    HTN Guidelines:  Less than 140/90      Composite cancer screening  Chart review shows that this patient is due/due soon for the following Mammogram

## 2020-03-10 ENCOUNTER — OFFICE VISIT (OUTPATIENT)
Dept: FAMILY MEDICINE | Facility: OTHER | Age: 60
End: 2020-03-10
Payer: COMMERCIAL

## 2020-03-10 VITALS
SYSTOLIC BLOOD PRESSURE: 138 MMHG | TEMPERATURE: 97.6 F | DIASTOLIC BLOOD PRESSURE: 88 MMHG | HEART RATE: 67 BPM | WEIGHT: 293 LBS | OXYGEN SATURATION: 97 % | BODY MASS INDEX: 47.09 KG/M2 | RESPIRATION RATE: 18 BRPM | HEIGHT: 66 IN

## 2020-03-10 DIAGNOSIS — Z12.39 BREAST CANCER SCREENING: ICD-10-CM

## 2020-03-10 DIAGNOSIS — G43.009 MIGRAINE WITHOUT AURA AND WITHOUT STATUS MIGRAINOSUS, NOT INTRACTABLE: ICD-10-CM

## 2020-03-10 DIAGNOSIS — Z01.818 PREOP GENERAL PHYSICAL EXAM: Primary | ICD-10-CM

## 2020-03-10 DIAGNOSIS — M54.41 CHRONIC MIDLINE LOW BACK PAIN WITH RIGHT-SIDED SCIATICA: ICD-10-CM

## 2020-03-10 DIAGNOSIS — I10 HTN, GOAL BELOW 140/90: ICD-10-CM

## 2020-03-10 DIAGNOSIS — E66.01 MORBID OBESITY, UNSPECIFIED OBESITY TYPE (H): ICD-10-CM

## 2020-03-10 DIAGNOSIS — G89.29 CHRONIC MIDLINE LOW BACK PAIN WITH RIGHT-SIDED SCIATICA: ICD-10-CM

## 2020-03-10 DIAGNOSIS — I50.32 CHRONIC DIASTOLIC CONGESTIVE HEART FAILURE (H): ICD-10-CM

## 2020-03-10 PROCEDURE — 99214 OFFICE O/P EST MOD 30 MIN: CPT | Performed by: FAMILY MEDICINE

## 2020-03-10 ASSESSMENT — PATIENT HEALTH QUESTIONNAIRE - PHQ9
SUM OF ALL RESPONSES TO PHQ QUESTIONS 1-9: 19
10. IF YOU CHECKED OFF ANY PROBLEMS, HOW DIFFICULT HAVE THESE PROBLEMS MADE IT FOR YOU TO DO YOUR WORK, TAKE CARE OF THINGS AT HOME, OR GET ALONG WITH OTHER PEOPLE: VERY DIFFICULT
SUM OF ALL RESPONSES TO PHQ QUESTIONS 1-9: 19

## 2020-03-10 ASSESSMENT — ANXIETY QUESTIONNAIRES
1. FEELING NERVOUS, ANXIOUS, OR ON EDGE: MORE THAN HALF THE DAYS
6. BECOMING EASILY ANNOYED OR IRRITABLE: SEVERAL DAYS
2. NOT BEING ABLE TO STOP OR CONTROL WORRYING: NEARLY EVERY DAY
GAD7 TOTAL SCORE: 14
7. FEELING AFRAID AS IF SOMETHING AWFUL MIGHT HAPPEN: SEVERAL DAYS
GAD7 TOTAL SCORE: 14
5. BEING SO RESTLESS THAT IT IS HARD TO SIT STILL: SEVERAL DAYS
GAD7 TOTAL SCORE: 14
4. TROUBLE RELAXING: NEARLY EVERY DAY
7. FEELING AFRAID AS IF SOMETHING AWFUL MIGHT HAPPEN: SEVERAL DAYS
3. WORRYING TOO MUCH ABOUT DIFFERENT THINGS: NEARLY EVERY DAY

## 2020-03-10 ASSESSMENT — MIFFLIN-ST. JEOR: SCORE: 2130.91

## 2020-03-11 ASSESSMENT — PATIENT HEALTH QUESTIONNAIRE - PHQ9: SUM OF ALL RESPONSES TO PHQ QUESTIONS 1-9: 19

## 2020-03-11 ASSESSMENT — ANXIETY QUESTIONNAIRES: GAD7 TOTAL SCORE: 14

## 2020-03-13 ENCOUNTER — VIRTUAL VISIT (OUTPATIENT)
Dept: FAMILY MEDICINE | Facility: OTHER | Age: 60
End: 2020-03-13

## 2020-03-13 NOTE — PROGRESS NOTES
"Date: 2020 16:43:02  Clinician: Reba Juarez  Clinician NPI: [id number]  Patient: ROSANNE ALFRED  Patient : 1960  Patient Address: [address] [place] [address]  Patient Phone: [phone]  Visit Protocol: URI  Patient Summary:  ROSANNE is a 59 year old ( : 1960 ) female who initiated a Visit for cold, sinus infection, or influenza. When asked the question \"Please sign me up to receive news, health information and promotions. \", ROSANNE responded \"No\".    ROSANNE states her symptoms started suddenly 3-6 days ago.   Her symptoms consist of a cough and a headache.   Symptom details     Cough: ROSANNE coughs every 5-10 minutes and her cough is more bothersome at night. Phlegm comes into her throat when she coughs. She believes her cough is caused by post-nasal drip. The color of the phlegm is green.     Headache: She states the headache is mild (1-3 on a 10 point pain scale).      ROSANNE denies having rhinitis, wheezing, ear pain, malaise, sore throat, nasal congestion, fever, teeth pain, chills, facial pain or pressure, and myalgias. She also denies double sickening (worsening symptoms after initial improvement), taking antibiotic medication for the symptoms, and having recent facial or sinus surgery in the past 60 days. She is not experiencing dyspnea.   Precipitating events  She has not recently been exposed to someone with influenza. ROSANNE has not been in close contact with any high risk individuals.   Pertinent COVID-19 (Coronavirus) information  ROSANNE has not traveled internationally in the last 14 days before the start of her symptoms.   ROSANNE has not had close contact with a laboratory confirmed positive COVID-19 patient within 14 days of symptom onset.   Pertinent medical history  ROSANNE does not get yeast infections when she takes antibiotics.   ROSANNE does not need a return to work/school note.   Weight: 335 lbs   ROSANNE does not smoke or use smokeless tobacco.   Additional information as " reported by the patient (free text): I was scheduled to go in for a steroid Injection into my lower back for my severe stenosis tomorrow, March 13th but with my cough and with no voice, and all that is going on with the coronavirus, i decided i should at least call in and let you know.  Im also recovering from a fractured right hip.  I really do feel healthy other than this cough and my voice being gone Im fine.   Weight: 335 lbs    MEDICATIONS: cyclobenzaprine oral, topiramate oral, spironolactone-hydrochlorothiazide oral, furosemide oral, escitalopram oxalate oral, buspirone oral, losartan-hydrochlorothiazide oral, ALLERGIES: lisinopril  Clinician Response:  Dear ROSANNE,  Based on the information provided, you have a viral upper respiratory infection, otherwise known as a cold. Symptoms vary from person to person, but can include sneezing, coughing, a runny nose, sore throat, and headache and range from mild to severe.  Unfortunately, there are no medications that can cure a cold, so treatment is focused on controlling symptoms as much as possible. Most people gradually feel better until symptoms are gone in 1-2 weeks.  Medication information  Because you have a viral infection, antibiotics will not help you get better. Treating a viral infection with antibiotics could actually make you feel worse.  Self care  The following tips will keep you as comfortable as possible while you recover:     Rest    Drink plenty of water and other liquids    Take a hot shower to loosen congestion    Take a spoonful of honey to reduce your cough     When to seek care  Please be seen in a clinic or urgent care if new symptoms develop, or symptoms become worse.  COVID-19 (Coronavirus) General Information  We understand it may be concerning to be ill with symptoms that overlap with COVID-19 (Coronavirus) symptoms. Below are some helpful information on COVID-19 (Coronavirus).  How can I protect myself and others from the COVID-19  (Coronavirus)?  Because there is currently no vaccine to prevent infection, the best way to protect yourself is to avoid being exposed to this virus. The CDC recommends the following additional steps:     Wash your hands often with soap and water for at least 20 seconds, especially after blowing your nose, coughing, or sneezing; going to the bathroom; and before eating or preparing food.  Use an alcohol-based hand  that contains at least 60 percent alcohol if soap and water are not available.        Avoid touching your eyes, nose and mouth with unwashed hands.    Avoid close contact with people who are sick.    Stay home when you are sick.    Cover your cough or sneeze with a tissue, then throw the tissue in the trash.    Clean and disinfect frequently touched objects and surfaces.     You can help stop COVID-19 (Coronavirus) by knowing the signs and symptoms:     Fever    Cough    Shortness of breath     Contact your healthcare provider if   Develop symptoms   AND   Have been in close contact with a person known to have COVID-19 (Coronavirus) or live in or have recently traveled from an area with ongoing spread of COVID-19 (Coronavirus). Call ahead before you go to a doctor's office or emergency room. Tell them about your recent travel and your symptoms.   For the most up to date information, visit the CDC's website.  Steps to help prevent the spread of COVID-19 (Coronavirus) if you are sick  If you are sick with COVID-19 (Coronavirus) or suspect you are infected with the virus that causes COVID-19 (Coronavirus), follow the steps below to help prevent the disease from spreading&nbsp;to people in your home and community.     Stay home except to get medical care. Home isolation may be started in consultation with your healthcare clinician.    Separate yourself from other people and animals in your home.    Call ahead before visiting your doctor if you have a medical appointment.    Wear a facemask when you  "are around other people.    Cover your cough and sneezes.    Clean your hands often.    Avoid sharing personal household items.    Clean and disinfect frequently touched objects and surfaces everyday.    You will need to have someone drop off medications or household supplies (if needed) at your house without coming inside or in contact with you or others living in your house.    Monitor your symptoms and seek prompt medical care if your illness is worsening (e.g. Difficulty breathing).    Discontinue home isolation only in consultation with your healthcare provider.     For more detailed and up to date information on what to do if you are sick, visit this link: What to Do If You Are Sick With Coronavirus Disease 2019 (COVID-19).  Do I need to be tested for COVID-19 (Coronavirus)?     At this time, the limited number of tests available are controlled by the state and local health departments and are being reserved for more seriously ill patients, those with known exposure to confirmed patients, and those with recent travel (within 14 days) to countries with high rates of COVID-19 (Coronavirus).    Decisions on which patients receive testing will be based on the local spread of COVID-19 (Coronavirus) as well as the symptoms. Your healthcare provider will make the final decision on whether you should be tested.    In the meantime, if you have concerns that you may have been exposed, it is reasonable to practice \"social distancing.\"&nbsp; If you are ill with a cold or flu like illness, please monitor your symptoms and reach out to your healthcare provider if your symptoms worsen.    For more up to date information, visit this link: COVID-19 (Coronavirus) Frequently Asked Questions and Answers.      Diagnosis: Viral URI  Diagnosis ICD: J06.9  Additional Clinician Notes: We do not share records in Epic - please notify your orthopedic doctor directly regarding your injection. I hope you feel better soon!  "

## 2020-03-15 ENCOUNTER — HEALTH MAINTENANCE LETTER (OUTPATIENT)
Age: 60
End: 2020-03-15

## 2020-03-16 ENCOUNTER — MYC MEDICAL ADVICE (OUTPATIENT)
Dept: FAMILY MEDICINE | Facility: OTHER | Age: 60
End: 2020-03-16

## 2020-03-17 NOTE — TELEPHONE ENCOUNTER
Attempted to reach the patient with the following information.  Left message for patient to return call to clinic.     Linda Spence MA

## 2020-03-17 NOTE — TELEPHONE ENCOUNTER
TC patient. Offer phone visit with another provider in TC absence.     Balta Villegas PA-C  [facility]

## 2020-04-16 ENCOUNTER — MYC MEDICAL ADVICE (OUTPATIENT)
Dept: FAMILY MEDICINE | Facility: OTHER | Age: 60
End: 2020-04-16

## 2020-04-16 ASSESSMENT — ANXIETY QUESTIONNAIRES
2. NOT BEING ABLE TO STOP OR CONTROL WORRYING: NEARLY EVERY DAY
5. BEING SO RESTLESS THAT IT IS HARD TO SIT STILL: MORE THAN HALF THE DAYS
GAD7 TOTAL SCORE: 18
4. TROUBLE RELAXING: MORE THAN HALF THE DAYS
GAD7 TOTAL SCORE: 18
1. FEELING NERVOUS, ANXIOUS, OR ON EDGE: NEARLY EVERY DAY
7. FEELING AFRAID AS IF SOMETHING AWFUL MIGHT HAPPEN: MORE THAN HALF THE DAYS
4. TROUBLE RELAXING: NEARLY EVERY DAY
3. WORRYING TOO MUCH ABOUT DIFFERENT THINGS: NEARLY EVERY DAY
GAD7 TOTAL SCORE: 18
6. BECOMING EASILY ANNOYED OR IRRITABLE: MORE THAN HALF THE DAYS
5. BEING SO RESTLESS THAT IT IS HARD TO SIT STILL: MORE THAN HALF THE DAYS
7. FEELING AFRAID AS IF SOMETHING AWFUL MIGHT HAPPEN: MORE THAN HALF THE DAYS
GAD7 TOTAL SCORE: 17
1. FEELING NERVOUS, ANXIOUS, OR ON EDGE: NEARLY EVERY DAY
2. NOT BEING ABLE TO STOP OR CONTROL WORRYING: NEARLY EVERY DAY
3. WORRYING TOO MUCH ABOUT DIFFERENT THINGS: NEARLY EVERY DAY
GAD7 TOTAL SCORE: 17
7. FEELING AFRAID AS IF SOMETHING AWFUL MIGHT HAPPEN: MORE THAN HALF THE DAYS
6. BECOMING EASILY ANNOYED OR IRRITABLE: MORE THAN HALF THE DAYS

## 2020-04-16 ASSESSMENT — PATIENT HEALTH QUESTIONNAIRE - PHQ9
SUM OF ALL RESPONSES TO PHQ QUESTIONS 1-9: 22
SUM OF ALL RESPONSES TO PHQ QUESTIONS 1-9: 21
10. IF YOU CHECKED OFF ANY PROBLEMS, HOW DIFFICULT HAVE THESE PROBLEMS MADE IT FOR YOU TO DO YOUR WORK, TAKE CARE OF THINGS AT HOME, OR GET ALONG WITH OTHER PEOPLE: VERY DIFFICULT
5. POOR APPETITE OR OVEREATING: MORE THAN HALF THE DAYS
SUM OF ALL RESPONSES TO PHQ QUESTIONS 1-9: 21
3. TROUBLE FALLING OR STAYING ASLEEP OR SLEEPING TOO MUCH: NEARLY EVERY DAY
7. TROUBLE CONCENTRATING ON THINGS, SUCH AS READING THE NEWSPAPER OR WATCHING TELEVISION: MORE THAN HALF THE DAYS
1. LITTLE INTEREST OR PLEASURE IN DOING THINGS: NEARLY EVERY DAY
SUM OF ALL RESPONSES TO PHQ QUESTIONS 1-9: 21
4. FEELING TIRED OR HAVING LITTLE ENERGY: NEARLY EVERY DAY
10. IF YOU CHECKED OFF ANY PROBLEMS, HOW DIFFICULT HAVE THESE PROBLEMS MADE IT FOR YOU TO DO YOUR WORK, TAKE CARE OF THINGS AT HOME, OR GET ALONG WITH OTHER PEOPLE: VERY DIFFICULT
2. FEELING DOWN, DEPRESSED, IRRITABLE, OR HOPELESS: NEARLY EVERY DAY
9. THOUGHTS THAT YOU WOULD BE BETTER OFF DEAD, OR OF HURTING YOURSELF: NOT AT ALL

## 2020-04-16 NOTE — TELEPHONE ENCOUNTER
Summary:    Patient is due/failing the following:   Virtual visit for mood follow up    Action needed:   Patient needs office visit for follow up.    Type of outreach:    Phone, left message for patient to call back.     Questions for provider review:    None                                                                                                                                    Nati Perry CMA       Chart routed to Care Team .

## 2020-04-17 ENCOUNTER — VIRTUAL VISIT (OUTPATIENT)
Dept: FAMILY MEDICINE | Facility: OTHER | Age: 60
End: 2020-04-17
Payer: COMMERCIAL

## 2020-04-17 DIAGNOSIS — F33.1 MAJOR DEPRESSIVE DISORDER, RECURRENT EPISODE, MODERATE (H): Primary | ICD-10-CM

## 2020-04-17 DIAGNOSIS — M16.12 PRIMARY OSTEOARTHRITIS OF LEFT HIP: ICD-10-CM

## 2020-04-17 DIAGNOSIS — N39.46 MIXED INCONTINENCE URGE AND STRESS (MALE)(FEMALE): ICD-10-CM

## 2020-04-17 PROCEDURE — 99214 OFFICE O/P EST MOD 30 MIN: CPT | Mod: TEL | Performed by: FAMILY MEDICINE

## 2020-04-17 ASSESSMENT — PATIENT HEALTH QUESTIONNAIRE - PHQ9: SUM OF ALL RESPONSES TO PHQ QUESTIONS 1-9: 21

## 2020-04-17 ASSESSMENT — ANXIETY QUESTIONNAIRES: GAD7 TOTAL SCORE: 18

## 2020-04-17 NOTE — PROGRESS NOTES
"Eva Solis is a 59 year old female who is being evaluated via a billable telephone visit.      The patient has been notified of following:     \"This telephone visit will be conducted via a call between you and your physician/provider. We have found that certain health care needs can be provided without the need for a physical exam.  This service lets us provide the care you need with a short phone conversation.  If a prescription is necessary we can send it directly to your pharmacy.  If lab work is needed we can place an order for that and you can then stop by our lab to have the test done at a later time.    Telephone visits are billed at different rates depending on your insurance coverage. During this emergency period, for some insurers they may be billed the same as an in-person visit.  Please reach out to your insurance provider with any questions.    If during the course of the call the physician/provider feels a telephone visit is not appropriate, you will not be charged for this service.\"    Patient has given verbal consent for Telephone visit?  Yes    How would you like to obtain your AVS? MyChart    Carmelita     Eva Solis is a 59 year old female who presents to clinic today for the following health issues:    Depression and Anxiety Follow-Up    How are you doing with your depression since your last visit? Worsened     How are you doing with your anxiety since your last visit?  Worsened     Are you having other symptoms that might be associated with depression or anxiety? No    Have you had a significant life event? OTHER: \"dealing with stuff\"     Do you have any concerns with your use of alcohol or other drugs? No    Social History     Tobacco Use     Smoking status: Never Smoker     Smokeless tobacco: Never Used     Tobacco comment: no smokers in household   Substance Use Topics     Alcohol use: No     Drug use: No     PHQ 3/10/2020 4/16/2020 4/16/2020   PHQ-9 Total Score 19 22 21   Q9: " Thoughts of better off dead/self-harm past 2 weeks Not at all Not at all Not at all     LORETA-7 SCORE 3/10/2020 2020 2020   Total Score 14 (moderate anxiety) 18 (severe anxiety) 17 (severe anxiety)   Total Score 14 17 18       Suicide Assessment Five-step Evaluation and Treatment (SAFE-T)      How many servings of fruits and vegetables do you eat daily?  0-1    On average, how many sweetened beverages do you drink each day (Examples: soda, juice, sweet tea, etc.  Do NOT count diet or artificially sweetened beverages)?   1    How many days per week do you exercise enough to make your heart beat faster? 3 or less    How many minutes a day do you exercise enough to make your heart beat faster? 9 or less    How many days per week do you miss taking your medication? 0        Patient Active Problem List   Diagnosis     Slow transit constipation     HTN, goal below 140/90     Diastolic CHF (H)     Decreased calculated GFR     Morbid obesity, unspecified obesity type (H)     Mixed anxiety depressive disorder     Migraine without aura and without status migrainosus, not intractable     Primary osteoarthritis of left hip     Past Surgical History:   Procedure Laterality Date     C  DELIVERY ONLY      , Low Cervical     C  DELIVERY ONLY       C VAGINAL HYSTERECTOMY      without oophorectomy     COLONOSCOPY  10/06/10    attempt at colonscopy to 50cm     HC COLONOSCOPY W/WO BRUSH/WASH  2005    Diverticulosis.  Internal hemorrhoids.     INJECT EPIDURAL LUMBAR N/A 10/18/2019    Procedure: INJECTION, SPINE, LUMBAR 4 - LUMBAR 5, EPIDURAL TRANSLAMINAR;  Surgeon: Trever Wheatley MD;  Location: [facility]     JOINT REPLACEMTN, KNEE RT/LT  2006    Right total knee arthroplasty.     JOINT REPLACEMTN, KNEE RT/LT  2006    Left total knee arthroplasty.       Social History     Tobacco Use     Smoking status: Never Smoker     Smokeless tobacco: Never Used     Tobacco comment: no  smokers in household   Substance Use Topics     Alcohol use: No     Family History   Problem Relation Age of Onset     Cancer Father         squamous cell ca     Cancer Maternal Grandmother         lung     Cerebrovascular Disease Maternal Grandmother      Cancer Paternal Grandmother         squamous cell ca     Diabetes Sister      Cancer Sister         kidney cancer     Kidney Cancer Sister      Connective Tissue Disorder Brother         lupus     Lupus Brother      Kidney Disease Mother         atrophic kidney     Kidney Disease Maternal Uncle         unclear kidney issue     Hypertension No family hx of      Colon Cancer No family hx of      Anxiety Disorder No family hx of      Asthma No family hx of            Reviewed and updated as needed this visit by Provider  Tobacco  Allergies  Meds  Problems  Med Hx  Surg Hx  Fam Hx         Review of Systems   CONSTITUTIONAL: NEGATIVE for fever, chills, change in weight  RESP: NEGATIVE for significant cough or shortness of breath   CV: NEGATIVE for chest pain, palpitations or peripheral edema  PSYCHIATRIC: Patient feels down and overwhelmed all the time.  She denies being suicidal.  She was unable to connect with her [facility] for counseling services.  She does not feel like doing anything.  :         Objective   Reported vitals:  There were no vitals taken for this visit.   Gen: alert and no distress  PSYCH: Alert and oriented times 3; coherent speech, normal   rate and volume, able to articulate logical thoughts, able   to abstract reason, no tangential thoughts, no hallucinations   or delusions  Her affect is flat and her responses are slow  RESP: No cough, no audible wheezing, able to talk in full sentences  Remainder of exam unable to be completed due to telephone visits  : Because she cannot move quite as fast because of her hip she is noticing urge incontinence.  She also notices some stress incontinence when she is changing position.  MSK: She feels her  hip is slowly been getting better but she still has pain.  She is no longer using a cane to walk.  She is not sure what kind of exercise she should be doing.        Assessment/Plan:    1. Major depressive disorder, recurrent episode, moderate (H)  Patient is quite depressed.  She is frustrated with the unknowns about the novel coronavirus.  She is feeling overwhelmed.  She does not want to take additional medications.  She would like to get rid of some medications if she could.  We discussed considering the addition of Abilify.  However she does not want to try of more medications.  We discussed counseling and she is open to this.  We also discussed having her see psychiatry to help with medication management and she declines for now but may consider it in the future.    - MENTAL HEALTH REFERRAL  - Adult; Outpatient Treatment; Individual/Couples/Family/Group Therapy/Health Psychology; [facility]: [facility] [phone]; We will contact you to schedule the appointment or please call with any questions    2. Mixed incontinence urge and stress (male)(female)  She is not interested in medication.  Discussed pelvic floor therapy and she is interested in this and referral was placed.    - [facility] REFERRAL; Future    3. Primary osteoarthritis of left hip  She has known arthritis and possible insufficiency fracture that happened 3 to 4 months ago.  She does not want to return to the orthopedist she has seen.  Offered a second opinion elsewhere but as it has been this long and things have been improving she would like to try physical therapy first.  I feel this is reasonable.  Referral was placed.  If she does not continue to improve would then consider seeing Dr. Diggs in [place] as that is who her previous specialist had recommended.    - [facility] REFERRAL; Future    Return in about 3 weeks (around 5/8/2020) for depression follow up.      Phone call duration:   24 minutes    Renetta Benitez MD

## 2020-05-05 ENCOUNTER — VIRTUAL VISIT (OUTPATIENT)
Dept: PSYCHOLOGY | Facility: CLINIC | Age: 60
End: 2020-05-05
Attending: FAMILY MEDICINE
Payer: COMMERCIAL

## 2020-05-05 DIAGNOSIS — F33.1 MAJOR DEPRESSIVE DISORDER, RECURRENT EPISODE, MODERATE (H): Primary | ICD-10-CM

## 2020-05-05 DIAGNOSIS — F41.9 ANXIETY DISORDER, UNSPECIFIED: ICD-10-CM

## 2020-05-05 PROCEDURE — 99207 ZZC NO BILLABLE SERVICE THIS VISIT: CPT | Mod: TEL | Performed by: SOCIAL WORKER

## 2020-05-05 ASSESSMENT — ANXIETY QUESTIONNAIRES
IF YOU CHECKED OFF ANY PROBLEMS ON THIS QUESTIONNAIRE, HOW DIFFICULT HAVE THESE PROBLEMS MADE IT FOR YOU TO DO YOUR WORK, TAKE CARE OF THINGS AT HOME, OR GET ALONG WITH OTHER PEOPLE: VERY DIFFICULT
6. BECOMING EASILY ANNOYED OR IRRITABLE: NOT AT ALL
5. BEING SO RESTLESS THAT IT IS HARD TO SIT STILL: NOT AT ALL
3. WORRYING TOO MUCH ABOUT DIFFERENT THINGS: NEARLY EVERY DAY
2. NOT BEING ABLE TO STOP OR CONTROL WORRYING: NEARLY EVERY DAY
GAD7 TOTAL SCORE: 13
4. TROUBLE RELAXING: SEVERAL DAYS
1. FEELING NERVOUS, ANXIOUS, OR ON EDGE: NEARLY EVERY DAY
7. FEELING AFRAID AS IF SOMETHING AWFUL MIGHT HAPPEN: NEARLY EVERY DAY

## 2020-05-05 ASSESSMENT — COLUMBIA-SUICIDE SEVERITY RATING SCALE - C-SSRS
5. HAVE YOU STARTED TO WORK OUT OR WORKED OUT THE DETAILS OF HOW TO KILL YOURSELF? DO YOU INTEND TO CARRY OUT THIS PLAN?: NO
4. HAVE YOU HAD THESE THOUGHTS AND HAD SOME INTENTION OF ACTING ON THEM?: NO
ATTEMPT PAST THREE MONTHS: NO
3. HAVE YOU BEEN THINKING ABOUT HOW YOU MIGHT KILL YOURSELF?: NO
4. HAVE YOU HAD THESE THOUGHTS AND HAD SOME INTENTION OF ACTING ON THEM?: NO
2. HAVE YOU ACTUALLY HAD ANY THOUGHTS OF KILLING YOURSELF LIFETIME?: NO
ATTEMPT LIFETIME: NO
1. IN THE PAST MONTH, HAVE YOU WISHED YOU WERE DEAD OR WISHED YOU COULD GO TO SLEEP AND NOT WAKE UP?: NO
5. HAVE YOU STARTED TO WORK OUT OR WORKED OUT THE DETAILS OF HOW TO KILL YOURSELF? DO YOU INTEND TO CARRY OUT THIS PLAN?: NO
1. IN THE PAST MONTH, HAVE YOU WISHED YOU WERE DEAD OR WISHED YOU COULD GO TO SLEEP AND NOT WAKE UP?: NO
2. HAVE YOU ACTUALLY HAD ANY THOUGHTS OF KILLING YOURSELF?: NO

## 2020-05-05 ASSESSMENT — PATIENT HEALTH QUESTIONNAIRE - PHQ9: SUM OF ALL RESPONSES TO PHQ QUESTIONS 1-9: 21

## 2020-05-05 NOTE — PROGRESS NOTES
"               Progress Note - Initial Session    Client Name:  Eva Solis Date: 5/5/2020         Service Type: Phone Visit     Session Start Time: 7:05 am  Session End Time: 7:55 am     Session Length:   50 minutes    Session #: 1    Attendees: Client attended alone    Telemedicine Visit: The patient's condition can be safely assessed and treated via synchronous audio and visual telemedicine encounter.      Reason for Telemedicine Visit: Services only offered telehealth    Originating Site (Patient Location): Patient's home    Distant Site (Provider Location): Provider Remote Setting     The patient has been notified of the following:      \"We have found that certain health care needs can be provided without the need for a face to face visit.  This service lets us provide the care you need with a phone conversation.       I will have full access to your [facility] medical record during this entire phone call.   I will be taking notes for your medical record.      Since this is like an office visit, we will bill your insurance company for this service.       There are potential benefits and risks of telephone visits (e.g. limits to patient confidentiality) that differ from in-person visits.?  Confidentiality still applies for telephone services, and nobody will record the visit.  It is important to be in a quiet, private space that is free of distractions (including cell phone or other devices) during the visit.??      If during the course of the call I believe a telephone visit is not appropriate, you will not be charged for this service\"     Consent has been obtained for this service by care team member: Yes      DATA:  Diagnostic Assessment in progress.  Unable to complete documentation at the conclusion of the first session due to needing time to build rapport, assess for safety, and complete questionnaires.     Interactive Complexity: No  Crisis: No    Intervention:  Building Rapport   Assessing for " Safety  Reflective Listening  Identifying Patient Strengths    ASSESSMENT:  Mental Status Assessment:  Appearance:   N/A due to phone session   Eye Contact:   N/A due to phone session   Psychomotor Behavior: N/A due to phone session   Attitude:   Cooperative   Orientation:   All  Speech   Rate / Production: Normal/ Responsive Emotional   Volume:  Normal   Mood:    Depressed  Sad   Affect:    Appropriate   Thought Content:  Clear  Perservative   Thought Form:  Coherent  Logical  Circumstantial  Insight:    Good  and Fair       Safety Issues and Plan for Safety and Risk Management:     [place] Suicide Severity Rating Scale (Lifetime/Recent)  [place] Suicide Severity Rating (Lifetime/Recent) 5/5/2020   1. Wish to be Dead (Lifetime) No   1. Wish to be Dead (Recent) No   2. Non-Specific Active Suicidal Thoughts (Lifetime) No   2. Non-Specific Active Suicidal Thoughts (Recent) No   3. Active Suicidal Ideation with any Methods (Not Plan) Without Intent to Act (Lifetime) No   3. Active Sucidal Ideation with any Methods (Not Plan) Without Intent to Act (Recent) No   4. Active Suicidal Ideation with Some Intent to Act, Without Specific Plan (Lifetime) No   4. Active Suicidal Ideation with Some Intent to Act, Without Specific Plan (Recent) No   5. Active Suicidal Ideation with Specific Plan and Intent (Lifetime) No   5. Active Suicidal Ideation with Specific Plan and Intent (Recent) No   Actual Attempt (Lifetime) No   Actual Attempt (Past 3 Months) No   Has subject engaged in non-suicidal self-injurious behavior? (Lifetime) No   Has subject engaged in non-suicidal self-injurious behavior? (Past 3 Months) No     Patient denies current fears or concerns for personal safety.  Patient denies current or recent suicidal ideation or behaviors.  Patient denies current or recent homicidal ideation or behaviors.  Patient denies current or recent self injurious behavior or ideation.  Patient denies other safety concerns.  Recommended  that patient call 911 or go to the local ED should there be a change in any of these risk factors.  Patient reports there are firearms in the house. The firearms are secured in a locked space.     Diagnostic Criteria:  The client does not report enough symptoms for the full criteria of any specific Anxiety Disorder to have been met   - Excessive anxiety and worry about a number of events or activities (such as work or school performance).    - The person finds it difficult to control the worry.   - Being easily fatigued.    - Difficulty concentrating or mind going blank.    - Sleep disturbance (difficulty falling or staying asleep, or restless unsatisfying sleep).    - The anxiety, worry, or physical symptoms cause clinically significant distress or impairment in social, occupational, or other important areas of functioning.   A) Recurrent episode(s) - symptoms have been present during the same 2-week period and represent a change from previous functioning 5 or more symptoms (required for diagnosis)   - Depressed mood. Note: In children and adolescents, can be irritable mood.     - Diminished interest or pleasure in all, or almost all, activities.    - Increased sleep.    - Psychomotor activity retardation.    - Fatigue or loss of energy.    - Feelings of worthlessness or inappropriate and excessive guilt.    - Diminished ability to think or concentrate, or indecisiveness.   B) The symptoms cause clinically significant distress or impairment in social, occupational, or other important areas of functioning  D) The occurence of major depressive episode is not better explained by other thought / psychotic disorders      DSM5 Diagnoses: (Sustained by DSM5 Criteria Listed Above)  Diagnoses: 296.32 (F33.1) Major Depressive Disorder, Recurrent Episode, Moderate _  300.00 (F41.9) Unspecified Anxiety Disorder  Psychosocial & Contextual Factors: Chronic health conditions, Strained relationships with extended family members,  Adult sons, has grandchildren.  Some difficulty functioning at work.    WHODAS 2.0 (12 item):   WHODAS 2.0 Total Score 5/12/2020   Total Score 34       Collateral Reports Completed:  Not Applicable      PLAN: (Homework, other):  Complete DA at next session.        Courtney Zuniga, [facility]  May 10, 2020

## 2020-05-06 ASSESSMENT — ANXIETY QUESTIONNAIRES: GAD7 TOTAL SCORE: 13

## 2020-05-07 NOTE — PROGRESS NOTES
"Eva Solis is a 59 year old female who is being evaluated via a billable telephone visit.      The patient has been notified of following:     \"This telephone visit will be conducted via a call between you and your physician/provider. We have found that certain health care needs can be provided without the need for a physical exam.  This service lets us provide the care you need with a short phone conversation.  If a prescription is necessary we can send it directly to your pharmacy.  If lab work is needed we can place an order for that and you can then stop by our lab to have the test done at a later time.    Telephone visits are billed at different rates depending on your insurance coverage. During this emergency period, for some insurers they may be billed the same as an in-person visit.  Please reach out to your insurance provider with any questions.    If during the course of the call the physician/provider feels a telephone visit is not appropriate, you will not be charged for this service.\"    Patient has given verbal consent for Telephone visit?  Yes    What phone number would you like to be contacted at? [phone]    How would you like to obtain your AVS? Cate Lassiter  CMA    Subjective     Eva Solis is a 59 year old female who presents to clinic today for the following health issues:    HPI  Medication Followup for Anxiety/ Depression    Taking Medication as prescribed: yes    Side Effects:  headaches    Medication Helping Symptoms:  \"Not sure\"      Declined PHQ/LORETA, recent visit with psych.  Stephan it went well, has 2 more sessions scheduled.  She still is afraid to leave her house because of the COVID-19 pandemic.  She disagrees with business is opening up.  She still feels quite sad.  She is not interested in medication changes at this time.         Patient Active Problem List   Diagnosis     Slow transit constipation     HTN, goal below 140/90     Diastolic CHF (H)     " Decreased calculated GFR     Morbid obesity, unspecified obesity type (H)     Migraine without aura and without status migrainosus, not intractable     Primary osteoarthritis of left hip     Major depressive disorder, recurrent episode, moderate (H)     Past Surgical History:   Procedure Laterality Date     C  DELIVERY ONLY      , Low Cervical     C  DELIVERY ONLY       C VAGINAL HYSTERECTOMY      without oophorectomy     COLONOSCOPY  10/06/10    attempt at colonscopy to 50cm     HC COLONOSCOPY W/WO BRUSH/WASH  2005    Diverticulosis.  Internal hemorrhoids.     INJECT EPIDURAL LUMBAR N/A 10/18/2019    Procedure: INJECTION, SPINE, LUMBAR 4 - LUMBAR 5, EPIDURAL TRANSLAMINAR;  Surgeon: Trever Wheatley MD;  Location: [facility]     JOINT REPLACEMTN, KNEE RT/LT  2006    Right total knee arthroplasty.     JOINT REPLACEMTN, KNEE RT/LT  2006    Left total knee arthroplasty.       Social History     Tobacco Use     Smoking status: Never Smoker     Smokeless tobacco: Never Used     Tobacco comment: no smokers in household   Substance Use Topics     Alcohol use: No     Family History   Problem Relation Age of Onset     Cancer Father         squamous cell ca     Cancer Maternal Grandmother         lung     Cerebrovascular Disease Maternal Grandmother      Cancer Paternal Grandmother         squamous cell ca     Diabetes Sister      Cancer Sister         kidney cancer     Kidney Cancer Sister      Connective Tissue Disorder Brother         lupus     Lupus Brother      Kidney Disease Mother         atrophic kidney     Kidney Disease Maternal Uncle         unclear kidney issue     Hypertension No family hx of      Colon Cancer No family hx of      Anxiety Disorder No family hx of      Asthma No family hx of            Reviewed and updated as needed this visit by Provider  Tobacco  Allergies  Meds  Problems  Med Hx  Surg Hx  Fam Hx         Review of Systems   CONSTITUTIONAL:  NEGATIVE for fever, chills, change in weight       Objective   Reported vitals:  There were no vitals taken for this visit.   Gen: alert and no distress  PSYCH: Alert and oriented times 3; coherent speech, normal   rate and volume, able to articulate logical thoughts, able   to abstract reason, no tangential thoughts, no hallucinations   or delusions  Her affect is sad  RESP: No cough, no audible wheezing, able to talk in full sentences  Remainder of exam unable to be completed due to telephone visits            Assessment/Plan:  1. Major depressive disorder, recurrent episode, moderate (H)  Patient prefers to stay on her current medications and continue with counseling.  Did offer her again ability to see psychiatry for medication management she declines.  Will follow-up in 1 month.    2. Migraine without aura and without status migrainosus, not intractable  Currently having migraines about once every 2 to 3 weeks.  She tells me she wakes herself up in the morning at 4:30 AM to see if she has a headache.  If she does she will then use Excedrin.  She states that she does not do this and she wakes up later with a migraine then she is unable to function for the rest of the day so she feels that this scheduling of waking up early has been helpful to abort her headaches.      Return in about 4 weeks (around 6/5/2020) for depression follow up.      Phone call duration:  16 minutes    Renetta Benitez MD

## 2020-05-08 ENCOUNTER — VIRTUAL VISIT (OUTPATIENT)
Dept: FAMILY MEDICINE | Facility: OTHER | Age: 60
End: 2020-05-08
Payer: COMMERCIAL

## 2020-05-08 DIAGNOSIS — G43.009 MIGRAINE WITHOUT AURA AND WITHOUT STATUS MIGRAINOSUS, NOT INTRACTABLE: ICD-10-CM

## 2020-05-08 DIAGNOSIS — F33.1 MAJOR DEPRESSIVE DISORDER, RECURRENT EPISODE, MODERATE (H): Primary | ICD-10-CM

## 2020-05-08 PROCEDURE — 99214 OFFICE O/P EST MOD 30 MIN: CPT | Mod: TEL | Performed by: FAMILY MEDICINE

## 2020-05-12 ENCOUNTER — VIRTUAL VISIT (OUTPATIENT)
Dept: PSYCHOLOGY | Facility: CLINIC | Age: 60
End: 2020-05-12
Payer: COMMERCIAL

## 2020-05-12 DIAGNOSIS — F33.1 MAJOR DEPRESSIVE DISORDER, RECURRENT EPISODE, MODERATE (H): Primary | ICD-10-CM

## 2020-05-12 DIAGNOSIS — F41.9 ANXIETY DISORDER, UNSPECIFIED: ICD-10-CM

## 2020-05-12 PROCEDURE — 90791 PSYCH DIAGNOSTIC EVALUATION: CPT | Mod: TEL | Performed by: SOCIAL WORKER
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
## 2020-05-13 ENCOUNTER — THERAPY VISIT (OUTPATIENT)
Dept: PHYSICAL THERAPY | Facility: CLINIC | Age: 60
End: 2020-05-13
Attending: FAMILY MEDICINE
Payer: COMMERCIAL

## 2020-05-13 DIAGNOSIS — N39.46 MIXED INCONTINENCE URGE AND STRESS (MALE)(FEMALE): ICD-10-CM

## 2020-05-13 DIAGNOSIS — M16.12 PRIMARY OSTEOARTHRITIS OF LEFT HIP: ICD-10-CM

## 2020-05-13 PROCEDURE — 97535 SELF CARE MNGMENT TRAINING: CPT | Mod: GP | Performed by: PHYSICAL THERAPIST

## 2020-05-13 PROCEDURE — 97162 PT EVAL MOD COMPLEX 30 MIN: CPT | Mod: GP | Performed by: PHYSICAL THERAPIST

## 2020-05-13 PROCEDURE — 97112 NEUROMUSCULAR REEDUCATION: CPT | Mod: GP | Performed by: PHYSICAL THERAPIST

## 2020-05-13 PROCEDURE — 97110 THERAPEUTIC EXERCISES: CPT | Mod: GP | Performed by: PHYSICAL THERAPIST

## 2020-05-13 PROCEDURE — 97140 MANUAL THERAPY 1/> REGIONS: CPT | Mod: GP | Performed by: PHYSICAL THERAPIST

## 2020-05-13 NOTE — PROGRESS NOTES
[facility] Initial Evaluation  Subjective:  Eva Solis is a 59 year old year old female with a pelvic floor condition. Patient reports onset of symptoms at least 1 year ago but worse since she fell and injured her L hip 12/26/19. Symptoms include stress and urge incontinence. Since onset symptoms have been getting better.   Urination:  Do you leak on the way to the bathroom or with a strong urge to void? Yes   Do you leak with cough,sneeze, jumping, running?Yes   Any other activities that cause leaking? Yelling, getting into a vehicle   Do you have triggers that make you feel you can't wait to go to the bathroom? No.  Type of pad and number used per day? Poise- 1-2  When you leak what is the amount? Medium (pad soiled)  How long can you delay the need to urinate? 3 - 10 minutes.   How many times do you get up to urinate at night? 1x/night (but has done this for years and is not concerned to change it)   Can you stop the flow of urine when on the toilet? No  Is the volume of urine passed usually: medium. (8sec rule= 250ml with average bladder storing 400-600ml)  Do you strain to pass urine? No  Do you have a slow or hesitant urinary stream? No  Do you have difficulty initiating the urine stream? No  Is urination painful? No  How many bladder infections have you had in last 12 months? No known bladder infections in past year  Fluid intake(one glass is 8oz or one cup) 5-7 glasses/day, 0 caffinated glasses/day  0 alcohol glasses/day.  Bowel habits:  Frequency of bowel movements? 1-2 times/day  Consistancy of stool? soft formed  Do you ignore the urge to defecate? No  Do you strain to pass stool? No (takes Mirilax since having TKA)  Pelvic Pain:  Do you have any pelvic pain with intercourse, exams, use of tampons? No  Are you sexually active?Yes  Is initial penetration during intercourse painful? No  Is deeper penetration painful? No  Do you use lubricant? Yes What kind? Baby Oil  ?  Given birth?  Yes   Any complications? Yes (difficulty w/ vaginal deliver)  # of vaginal delieveries?1  # of C-sections?2  # of episiotomies?0.  Have you ever been worried for your physical safety? No  Any abdominal or pelvic surgeries? n  Are you having any regular exercise? some  Have you practiced the PF(kegel) exercises for 4 or more weeks? no  Thyroid checked? Unsure (related to hair loss, flu-like symptoms, wt gain/loss, fatigue, menopause)  Changed diet lately? no      The history is provided by the patient. No  was used.   Patient Health History  Eva Solis being seen for Pelvic Floor dysfunction and L hip pain.     Problem began: 12/26/2019.   Problem occurred: fell out of of her vehicle onto ice   Pain is reported as 5/10 (at worst; none today at rest) on pain scale.  General health as reported by patient is fair.  Pertinent medical history includes: depression, heart problems, high blood pressure, migraines/headaches, numbness/tingling and overweight.   Red flags:  Other.     Surgeries include:  Orthopedic surgery (B TKA; 2 c-sections).    Current medications:  Anti-depressants and high blood pressure medication.    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   Primary job tasks include:  Computer work and prolonged sitting.                  Therapist Generated HPI Evaluation         Type of problem:  Left hip.    This is a chronic condition.  Condition occurred with:  A fall/slip.  Where condition occurred: at home.  Patient reports pain:  Lateral and groin.  Pain is described as aching, stabbing and sharp and is intermittent.  Pain radiates to:  Gluteals. Pain is worse in the P.M..  Since onset symptoms are gradually improving.  Associated symptoms:  Loss of strength and loss of motion/stiffness (denies buckling/giving out; numbness in R>L (feels this is related to low back as she had it prior to hip pain)). Symptoms  are exacerbated by transfers, walking and ascending stairs (changing position in bed)  and relieved by rest and NSAID's.  Special tests included:  X-ray.    Restrictions due to condition include:  Working in normal job without restrictions.  Barriers include:  None as reported by patient.                        Objective:  OBSERVATION  Lumbar Posture: Positive  Introitus: unremarkable  Prolapse: Cyctocele/Rectocele/Uterine stgstrstastdstest:st st1st Urethrocele: none, Enterocele: none.  PALPATION: Non-tender all superficial structures with digital evaluation  MUSCLE PERFORMANCE  Baseline PF tone:hyper  PF Tone with cough: bulge  Valsalva: bulge  PF Response quality: sluggish  PF Power: Center: 3.  Endurance: Maximum contraction in seconds: 5  # of endurance contractions before fatigue: 5  Quick contraction repetitions prior to fatigue: 10.  Specificity/accessory muscles: intermittent overuse of adductors and glutes,   Synergy with abdominals: Not Tested.    BIOFEEDBACK:         FUNCTIONAL QUESTIONNAIRES:  Incontinence Impact Questionnaire     Has urine leakage or prolapse affected the following?  (0=Not at all, 1= slightly, 2= moderately, 3= Greatly)   Household Chores   1   Physical recreation 1     Entertainment activities 1     Travel away from home (>30') 1     Social Activites   1   Emotional Health   1   Feeling Frustrated   2     Total= 8/21      Urogenital Distress Inventory     How much are you bothered by the following?  (0=Not at all, 1= slightly, 2= moderately, 3= Greatly)   Frequent Urination 1   Leakage related to feeling of Urgency 1   Leakage related to activity (coughing or sneezing) 3   Small amounts of leakage (drops) 3   Difficulty Emptying Bladder 0   Pain or discomfort in lower abdominal or genital area 0     Total=8/18        Standing Alignment:        Lumbar:  Lordosis incr            Gait:    Gait Type:  Antalgic   Assistive Devices:  None                 Lumbar/SI Evaluation  ROM:    AROM Lumbar:   Flexion:             To mid lower leg ++pull B LE and ++pain low back  Ext:                    50% ++pain central low back    Side Bend:        Left:  To mid thigh ++pain    Right:  To mid thigh ++pain  Rotation:           Left:  25% ++pain in L hip    Right:  50% ++pain low back  Side Glide:        Left:     Right:                                                               Hip Evaluation  HIP AROM:    Flexion: Left: 85 ++ pain    Right:  95      Abduction: Left:  30 ++ pain    Right:  55              Hip PROM:        Abduction: Left: 45 +pain   Right:                    Hip Strength:    Flexion:   Left: 3-/5   ++  Pain:                                            Hip Palpation:  Palpations normal left hip: L proximal quads.  Left hip tenderness present at:   hip flexors               General     ROS    Assessment/Plan:    Patient is a 59 year old female with pelvic and left side hip complaints.    Patient has the following significant findings with corresponding treatment plan.                Diagnosis 1:  Pelvic Floor Dysfunction  Decreased strength - therapeutic exercise, therapeutic activities and home program  Impaired muscle performance - neuro re-education and home program  Decreased function - therapeutic activities, home program and self management  Diagnosis 2:  Left Hip pain   Pain -  hot/cold therapy, manual therapy, self management, education, directional preference exercise and home program  Decreased ROM/flexibility - manual therapy, therapeutic exercise, therapeutic activity and home program  Decreased strength - therapeutic exercise, therapeutic activities and home program  Impaired balance - neuro re-education, therapeutic activities and home program  Impaired gait - gait training and home program  Impaired muscle performance - neuro re-education and home program  Decreased function - therapeutic activities and home program  Impaired posture - neuro re-education, therapeutic activities and home  program    Therapy Evaluation Codes:   1) History comprised of:   Personal factors that impact the plan of care:      Age, Past/current experiences, Profession and Time since onset of symptoms.    Comorbidity factors that impact the plan of care are:      Depression, Heart problems, High blood pressure, Migraines/headaches, Numbness/tingling and Overweight.     Medications impacting care: Anti-depressant and High blood pressure.  2) Examination of Body Systems comprised of:   Body structures and functions that impact the plan of care:      Hip, Lumbar spine and Pelvis.   Activity limitations that impact the plan of care are:      Bathing, Bending, Cooking, Driving, Dressing, Lifting, Sitting, Squatting/kneeling, Stairs, Standing, Walking, Sleeping, Laying down, Stress incontinence and Urge incontinence.  3) Clinical presentation characteristics are:   Evolving/Changing.  4) Decision-Making    Moderate complexity using standardized patient assessment instrument and/or measureable assessment of functional outcome.  Cumulative Therapy Evaluation is: Moderate complexity.    Previous and current functional limitations:  (See Goal Flow Sheet for this information)    Short term and Long term goals: (See Goal Flow Sheet for this information)     Communication ability:  Patient appears to be able to clearly communicate and understand verbal and written communication and follow directions correctly.  Treatment Explanation - The following has been discussed with the patient:   RX ordered/plan of care  Anticipated outcomes  Possible risks and side effects  This patient would benefit from PT intervention to resume normal activities.   Rehab potential is good.    Frequency:  2 X a month, once daily  Duration:  for 2-3 months  Discharge Plan:  Achieve all LTG.  Independent in home treatment program.  Reach maximal therapeutic benefit.    Please refer to the daily flowsheet for treatment today, total treatment time and time spent  performing 1:1 timed codes.

## 2020-05-20 ENCOUNTER — MYC MEDICAL ADVICE (OUTPATIENT)
Dept: NEUROSURGERY | Facility: CLINIC | Age: 60
End: 2020-05-20

## 2020-05-20 NOTE — TELEPHONE ENCOUNTER
LOV with Ganesh Connors PA-C on 9/13/2019. Patient had LESI with Dr. Wheatley 10/18/2019. Patient sent [employer] message to Ganesh back in February and he ordered another LESI. Spoke with patient, informed her that Dr. Wheatley is scheduling injections at this time and that I will route message to them to contact her to schedule. Patient in agreement with plan. Patient will call back if she would like us to send order somewhere else.

## 2020-05-22 DIAGNOSIS — Z11.59 ENCOUNTER FOR SCREENING FOR OTHER VIRAL DISEASES: Primary | ICD-10-CM

## 2020-05-22 NOTE — TELEPHONE ENCOUNTER
Pt REscheduled for   Date: 6/12/20   Time: 200pm   Dr. Wheatley    Instructed pt to have H&P and  for procedure.  Patient informed of COVID testing process.

## 2020-05-26 ENCOUNTER — VIRTUAL VISIT (OUTPATIENT)
Dept: PSYCHOLOGY | Facility: CLINIC | Age: 60
End: 2020-05-26
Payer: COMMERCIAL

## 2020-05-26 DIAGNOSIS — F41.9 ANXIETY DISORDER, UNSPECIFIED: ICD-10-CM

## 2020-05-26 DIAGNOSIS — F33.1 MAJOR DEPRESSIVE DISORDER, RECURRENT EPISODE, MODERATE (H): Primary | ICD-10-CM

## 2020-05-26 PROCEDURE — 90834 PSYTX W PT 45 MINUTES: CPT | Mod: TEL | Performed by: SOCIAL WORKER

## 2020-05-26 ASSESSMENT — ANXIETY QUESTIONNAIRES
GAD7 TOTAL SCORE: 16
2. NOT BEING ABLE TO STOP OR CONTROL WORRYING: NEARLY EVERY DAY
4. TROUBLE RELAXING: NEARLY EVERY DAY
IF YOU CHECKED OFF ANY PROBLEMS ON THIS QUESTIONNAIRE, HOW DIFFICULT HAVE THESE PROBLEMS MADE IT FOR YOU TO DO YOUR WORK, TAKE CARE OF THINGS AT HOME, OR GET ALONG WITH OTHER PEOPLE: VERY DIFFICULT
3. WORRYING TOO MUCH ABOUT DIFFERENT THINGS: NEARLY EVERY DAY
1. FEELING NERVOUS, ANXIOUS, OR ON EDGE: NEARLY EVERY DAY
6. BECOMING EASILY ANNOYED OR IRRITABLE: SEVERAL DAYS
5. BEING SO RESTLESS THAT IT IS HARD TO SIT STILL: NOT AT ALL
7. FEELING AFRAID AS IF SOMETHING AWFUL MIGHT HAPPEN: NEARLY EVERY DAY

## 2020-05-26 ASSESSMENT — PATIENT HEALTH QUESTIONNAIRE - PHQ9: SUM OF ALL RESPONSES TO PHQ QUESTIONS 1-9: 18

## 2020-05-26 NOTE — PROGRESS NOTES
Progress Note    Patient Name: Eva Solis  Date: 5/26/2020         Service Type: Phone Visit      Session Start Time: 11:05 am  Session End Time: 11:55 am     Session Length:   50 minutes    Session #: 3    Attendees: Client attended alone     Reason for Telemedicine Visit: Services only offered telehealth    Originating Site (Patient Location): Patient's place of employment    Distant Site (Provider Location): Provider Remote Setting    Consent:  The patient/guardian has verbally consented to: the potential risks and benefits of telemedicine (video visit) versus in person care; bill my insurance or make self-payment for services provided; and responsibility for payment of non-covered services.       Telemedicine Visit: The patient's condition can be safely assessed and treated via synchronous audio and visual telemedicine encounter.        Mode of Communication:  Video Conference via [employer]     As the provider I attest to compliance with applicable laws and regulations related to telemedicine.    Treatment Plan Last Reviewed: Began to discuss goals, delaying treatment plan to next session due to patient's symptoms and needing to discuss family issues.   PHQ-9 / LORETA-7 :   PHQ 4/16/2020 5/5/2020 5/26/2020   PHQ-9 Total Score 21 21 18   Q9: Thoughts of better off dead/self-harm past 2 weeks Not at all Not at all Not at all     LORETA-7 SCORE 4/16/2020 5/5/2020 5/26/2020   Total Score 17 (severe anxiety) - -   Total Score 18 13 16         DATA  Interactive Complexity: No  Crisis: No       Progress Since Last Session (Related to Symptoms / Goals / Homework):   Symptoms: No change  Symptoms similar to previous session    Homework: N/A DA completed at previous session      Episode of Care Goals: Satisfactory progress - CONTEMPLATION (Considering change and yet undecided); Intervened by assessing the negative and positive thinking (ambivalence) about behavior  change     Current / Ongoing Stressors and Concerns:   Strained relationships with extended family.  Chronic health conditions.       Treatment Objective(s) Addressed in This Session:   Identify negative self-talk and behaviors: challenge core beliefs, myths, and actions  Discussed patients negative thoughts related to her relationships with her family.  Patient described frequent thoughts about past interactions with Mom and siblings.  Patient described some past situations that have led to discord in families.      Intervention:   CBT: Helped patient restructured some of her negative thought processes.  Encouraged patient to keep focus on positive events in her life.  Patient was able to identify the positive relationships with her grown children.         ASSESSMENT: Current Emotional / Mental Status (status of significant symptoms):   Risk status (Self / Other harm or suicidal ideation)   Patient denies current fears or concerns for personal safety.   Patient denies current or recent suicidal ideation or behaviors.   Patient denies current or recent homicidal ideation or behaviors.   Patient denies current or recent self injurious behavior or ideation.   Patient denies other safety concerns.   Patient reports there has been no change in risk factors since their last session.     Patient reports there has been no change in protective factors since their last session.     Recommended that patient call 911 or go to the local ED should there be a change in any of these risk factors.     Appearance:   N/A due to phone session    Eye Contact:   N/A due to phone session    Psychomotor Behavior: N/A due to phone session    Attitude:   Cooperative    Orientation:   All   Speech    Rate / Production: Normal     Volume:  Normal    Mood:    Anxious  Depressed  Irritable    Affect:    Appropriate    Thought Content:  Clear  Rumination    Thought Form:  Coherent  Logical  Tangential    Insight:    Good  and Fair       Medication Review:   No changes to current psychiatric medication(s)     Medication Compliance:   Yes     Changes in Health Issues:   None reported  Patient reports currently receiving physical therapy.       Chemical Use Review:   Substance Use: Chemical use reviewed, no active concerns identified      Tobacco Use: No current tobacco use.      Diagnosis:  1. Major depressive disorder, recurrent episode, moderate (H)    2. Anxiety disorder, unspecified        Collateral Reports Completed:   Not Applicable    PLAN: (Patient Tasks / Therapist Tasks / Other)  Plan to complete treatment plan at upcoming session.          Courtney Zuniga, GLADYS                                                         ______________________________________________________________________

## 2020-05-27 ENCOUNTER — VIRTUAL VISIT (OUTPATIENT)
Dept: PHYSICAL THERAPY | Facility: CLINIC | Age: 60
End: 2020-05-27
Payer: COMMERCIAL

## 2020-05-27 DIAGNOSIS — N39.46 MIXED INCONTINENCE URGE AND STRESS (MALE)(FEMALE): ICD-10-CM

## 2020-05-27 DIAGNOSIS — M16.12 PRIMARY OSTEOARTHRITIS OF LEFT HIP: ICD-10-CM

## 2020-05-27 PROCEDURE — 97535 SELF CARE MNGMENT TRAINING: CPT | Mod: TEL | Performed by: PHYSICAL THERAPIST

## 2020-05-27 PROCEDURE — 97110 THERAPEUTIC EXERCISES: CPT | Mod: TEL | Performed by: PHYSICAL THERAPIST

## 2020-05-27 ASSESSMENT — ANXIETY QUESTIONNAIRES: GAD7 TOTAL SCORE: 16

## 2020-05-27 NOTE — PROGRESS NOTES
"Physical Therapy Virtual Follow Up Visit      The patient has been notified of following:     \"This virtual visit will be conducted between you and your provider. We have found that certain health care needs can be provided without the need for physical presence.  This service lets us provide the care you need with a virtual visit.\"    Due to external, as well as internal [facility] management of the COVID-19 Virus, Eva Solis was not seen in our clinic.  As a substitution, we implemented a virtual visit to manage this patient's condition utilizing the [employer] virtual visit platform via the patient s existing code.  The provider, Amanda Hilligoss, reviewed the patient's chart, PTRx prescription, and spoke with the patient to determine the following telemedicine visit is appropriate and effective for the patient's care.    The following type of visit was completed:   Telephone Visit:  A telephone visit was used in conjunction with the online PTRx exercise platform.        S: Eva Solis is a 59 year old female. Connected virtually on the [employer] platform to discuss their condition/progress. They noted improvements in pain in right hip since starting HEP. She can do most transfers and independently lift L LE, except for into passenger side of vehicle. Still has to manually lift left leg during this and has 5/10 pain. Also has some difficulty w/ hip flexor stretch. Pelvic floor exercises have seemed to make significant improvement in incontinence as she is now only having about 1 episode of incontinence per week.     Current pain level: 0/10 current    O: Patient demonstrated  Hip flex AROM: verbal report of ROM being equal B w/o pain increase   -Standing quadriceps stretch x30\" w/ foot on chair  -Bent knee fall out and bent kneel fall in   -Edu re: Supine pelvic tilt prior to other supine strengthening exercises   -Abdominal #4 x 10 B alt (edu to work leg lower as able)  -Sitting marching x 10 B " "alt  -Standing marching x 10 B alt  -Attempted bridging x 5 (edu to work on increasing height of this as able as she notes she can \"barely lift her hips off the bed\")  -[facility] re: use of tennis ball for proximal quad massage  -Reviewed pelvic floor exercises and urge management techniques    PTRx Content from today's visit:      A:   Patient's symptoms are resolving.  Response to therapy has shown an improvement in  pain level and incontinence      P: Patient will continue with the exercise program assigned on their PTRx code and will add the following measures to manage their pain/condition: self massage w/ tennis ball     Current treatment program is being advanced to more complex exercises.      Virtual visit contact time    Time of service began: 8:12 AM  Time of service ended: 8:51 AM  Total Time for set up, visit, and documentation: 45 minutes    Payor: [employer] / Plan: [employer] / Product Type: PPO /   .  Procedure Code/s   Therapeutic Exercise (24277): 30 minutes  Self Care / Home Management Training (91750): 9 minutes    I have reviewed the note as documented above.  This accurately captures the substance of my conversation with the patient.  Provider location: [place], [place] ([facility])  Patient location: home              "

## 2020-06-04 ENCOUNTER — VIRTUAL VISIT (OUTPATIENT)
Dept: PSYCHOLOGY | Facility: CLINIC | Age: 60
End: 2020-06-04
Payer: COMMERCIAL

## 2020-06-04 DIAGNOSIS — F41.9 ANXIETY DISORDER, UNSPECIFIED: ICD-10-CM

## 2020-06-04 DIAGNOSIS — F33.1 MAJOR DEPRESSIVE DISORDER, RECURRENT EPISODE, MODERATE (H): Primary | ICD-10-CM

## 2020-06-04 PROCEDURE — 90834 PSYTX W PT 45 MINUTES: CPT | Mod: TEL | Performed by: SOCIAL WORKER

## 2020-06-04 NOTE — PROGRESS NOTES
"Eva Solis is a 59 year old female who is being evaluated via a billable telephone visit.      The patient has been notified of following:     \"This telephone visit will be conducted via a call between you and your physician/provider. We have found that certain health care needs can be provided without the need for a physical exam.  This service lets us provide the care you need with a short phone conversation.  If a prescription is necessary we can send it directly to your pharmacy.  If lab work is needed we can place an order for that and you can then stop by our lab to have the test done at a later time.    Telephone visits are billed at different rates depending on your insurance coverage. During this emergency period, for some insurers they may be billed the same as an in-person visit.  Please reach out to your insurance provider with any questions.    If during the course of the call the physician/provider feels a telephone visit is not appropriate, you will not be charged for this service.\"    Patient has given verbal consent for Telephone visit?  Yes    What phone number would you like to be contacted at? [phone]    How would you like to obtain your AVS? Cate Mae     Eva Solis is a 59 year old female who presents via phone visit today for the following health issues:    HPI  Depression and Anxiety Follow-Up    How are you doing with your depression since your last visit? Worsened -\"a lot has changed\"     How are you doing with your anxiety since your last visit?  Worsened     Are you having other symptoms that might be associated with depression or anxiety? Yes:  anxious     Have you had a significant life event? No     Do you have any concerns with your use of alcohol or other drugs? No    Patient admits that she is very worried about all the events taking place in the world.  Since our last visit there has been a lot of civil unrest and rides in addition to the pandemic.  She " worries about the world we are leaving for subsequent generations.  She does not want to leave the house.  She really appreciates the counseling visits but is concerned about the cost and because of this is considering stopping the visits.    She is looking forward to getting her epidural injection at the end of this week.  She is hopeful this will help out her back pain which would then allow her to be more active.  She is frustrated that because of this she has a lot of things going on such as getting her Coban test, getting her preop and then getting to the hospital for the procedure.    Social History     Tobacco Use     Smoking status: Never Smoker     Smokeless tobacco: Never Used     Tobacco comment: no smokers in household   Substance Use Topics     Alcohol use: No     Drug use: No     PHQ 2020   PHQ-9 Total Score 21 18 19   Q9: Thoughts of better off dead/self-harm past 2 weeks Not at all Not at all Not at all     LORETA-7 SCORE 2020   Total Score - - -   Total Score 13 16 16       Patient Active Problem List   Diagnosis     Slow transit constipation     HTN, goal below 140/90     Diastolic CHF (H)     Decreased calculated GFR     Morbid obesity, unspecified obesity type (H)     Migraine without aura and without status migrainosus, not intractable     Primary osteoarthritis of left hip     Major depressive disorder, recurrent episode, moderate (H)     Mixed incontinence urge and stress (male)(female)     Past Surgical History:   Procedure Laterality Date     C  DELIVERY ONLY      , Low Cervical     C  DELIVERY ONLY       C VAGINAL HYSTERECTOMY      without oophorectomy     COLONOSCOPY  10/06/10    attempt at colonscopy to 50cm     HC COLONOSCOPY W/WO BRUSH/WASH  2005    Diverticulosis.  Internal hemorrhoids.     INJECT EPIDURAL LUMBAR N/A 10/18/2019    Procedure: INJECTION, SPINE, LUMBAR 4 - LUMBAR 5, EPIDURAL  TRANSLAMINAR;  Surgeon: Trever Wheatley MD;  Location: [facility]     JOINT REPLACEMTN, KNEE RT/LT  5/11/2006    Right total knee arthroplasty.     JOINT REPLACEMTN, KNEE RT/LT  07/19/2006    Left total knee arthroplasty.       Social History     Tobacco Use     Smoking status: Never Smoker     Smokeless tobacco: Never Used     Tobacco comment: no smokers in household   Substance Use Topics     Alcohol use: No     Family History   Problem Relation Age of Onset     Cancer Father         squamous cell ca     Cancer Maternal Grandmother         lung     Cerebrovascular Disease Maternal Grandmother      Cancer Paternal Grandmother         squamous cell ca     Diabetes Sister      Cancer Sister         kidney cancer     Kidney Cancer Sister      Other Cancer Sister         kidney cancer     Connective Tissue Disorder Brother         lupus     Lupus Brother      Kidney Disease Mother         atrophic kidney     Hypertension Mother      Depression Mother         she's being treated for this     Kidney Disease Maternal Uncle         unclear kidney issue     Anxiety Disorder Son      Anxiety Disorder Niece      Hypertension No family hx of      Colon Cancer No family hx of      Anxiety Disorder No family hx of      Asthma No family hx of            Reviewed and updated as needed this visit by Provider  Tobacco  Allergies  Meds  Problems  Med Hx  Surg Hx  Fam Hx         Review of Systems   CONSTITUTIONAL: NEGATIVE for fever, chills, change in weight  RESP: NEGATIVE for significant cough or shortness of breath   CV: NEGATIVE for chest pain, palpitations or peripheral edema       Objective   Reported vitals:  There were no vitals taken for this visit.   Gen: alert and no distress  PSYCH: Alert and oriented times 3; coherent speech, normal   rate and volume, able to articulate logical thoughts, able   to abstract reason, no tangential thoughts, no hallucinations   or delusions  Her affect is flat  RESP: No cough, no audible  wheezing, able to talk in full sentences  Remainder of exam unable to be completed due to telephone visits        Assessment/Plan:  1. Major depressive disorder, recurrent episode, moderate (H)  Patient continues to admit to a lot of depression and anxiety.  She does not want to make any changes to her medication regimen.  I once again offered psychiatry referral which she declines.  We discussed discussing her cost with her insurance but as she finds the counseling is beneficial and she has a lot of fears and could benefit from coping mechanisms I encouraged her to continue her counseling sessions if she can.  Discussed that she was concerned about the cost if she still felt her monthly visits helpful and she does appreciate this and therefore we will follow-up in a month.  She is also hopeful that if her back pain subsides some and she can start being more active that this may improve her mood.      Return in about 4 weeks (around 7/7/2020) for depression follow up.      Phone call duration:  13 minutes    Renetta Benitez MD

## 2020-06-04 NOTE — PROGRESS NOTES
"                                           Progress Note    Patient Name: Eva Solis  Date: 6/4/2020         Service Type: Phone Visit, had planned for video visit however patient declined this.        Session Start Time: 12:35 pm  Session End Time: 1:25 pm     Session Length:   50 minutes    Session #: 4    Attendees: Client attended alone     Reason for Telemedicine Visit: Services only offered telehealth    Originating Site (Patient Location): Patient's place of employment    Distant Site (Provider Location): Provider Remote Setting     The patient has been notified of the following:      \"We have found that certain health care needs can be provided without the need for a face to face visit.  This service lets us provide the care you need with a phone conversation.       I will have full access to your [place] medical record during this entire phone call.   I will be taking notes for your medical record.      Since this is like an office visit, we will bill your insurance company for this service.       There are potential benefits and risks of telephone visits (e.g. limits to patient confidentiality) that differ from in-person visits.?  Confidentiality still applies for telephone services, and nobody will record the visit.  It is important to be in a quiet, private space that is free of distractions (including cell phone or other devices) during the visit.??      If during the course of the call I believe a telephone visit is not appropriate, you will not be charged for this service\"     Consent has been obtained for this service by care team member: Yes   Treatment Plan Last Reviewed: Began to discuss goals, delaying treatment plan to next session due to patient's symptoms and needing to discuss family issues.   PHQ-9 / LORETA-7 :   PHQ 4/16/2020 5/5/2020 5/26/2020   PHQ-9 Total Score 21 21 18   Q9: Thoughts of better off dead/self-harm past 2 weeks Not at all Not at all Not at all     LORETA-7 SCORE 4/16/2020 " "5/5/2020 5/26/2020   Total Score 17 (severe anxiety) - -   Total Score 18 13 16         DATA  Interactive Complexity: No  Crisis: No       Progress Since Last Session (Related to Symptoms / Goals / Homework):   Symptoms: Worsening More anxious.  Reports due to current events.      Homework: Completed in session      Episode of Care Goals: Satisfactory progress - CONTEMPLATION (Considering change and yet undecided); Intervened by assessing the negative and positive thinking (ambivalence) about behavior change     Current / Ongoing Stressors and Concerns:   Strained relationships with extended family.  Chronic health conditions.  Patient reports feeling affected by current events, she states she is wondering about her purpose, denies any thoughts of hurting herself but wonders \"What are we here for?\".     Treatment Objective(s) Addressed in This Session:   Identify negative self-talk and behaviors: challenge core beliefs, myths, and actions  Discussed patients negative thoughts related to her relationships with her family.  Patient described frequent thoughts about past interactions with Mom and siblings.  Patient described some past situations that have led to discord in families.      Intervention:   CBT: Helped patient restructured some of her negative thought processes.  Encouraged patient to keep focus on positive events in her life.  Patient was able to identify the positive relationships with her grown children.         ASSESSMENT: Current Emotional / Mental Status (status of significant symptoms):   Risk status (Self / Other harm or suicidal ideation)   Patient denies current fears or concerns for personal safety.   Patient denies current or recent suicidal ideation or behaviors.   Patient denies current or recent homicidal ideation or behaviors.   Patient denies current or recent self injurious behavior or ideation.   Patient denies other safety concerns.   Patient reports there has been no change in risk " factors since their last session.     Patient reports there has been no change in protective factors since their last session.     Recommended that patient call 911 or go to the local ED should there be a change in any of these risk factors.     Appearance:   N/A due to phone session    Eye Contact:   N/A due to phone session    Psychomotor Behavior: N/A due to phone session    Attitude:   Cooperative    Orientation:   All   Speech    Rate / Production: Normal     Volume:  Normal    Mood:    Anxious  Depressed  Irritable  Sad    Affect:    Appropriate    Thought Content:  Clear  Rumination    Thought Form:  Coherent  Logical  Tangential    Insight:    Good  and Fair      Medication Review:   No changes to current psychiatric medication(s)  Patient has upcoming appointment with PCP to discuss medications.       Medication Compliance:   Yes     Changes in Health Issues:   None reported  Patient reports currently receiving physical therapy.  Reports chronic pain.       Chemical Use Review:   Substance Use: Chemical use reviewed, no active concerns identified      Tobacco Use: No current tobacco use.      Diagnosis:  1. Major depressive disorder, recurrent episode, moderate (H)    2. Anxiety disorder, unspecified        Collateral Reports Completed:   Not Applicable    PLAN: (Patient Tasks / Therapist Tasks / Other)  Patient to focus on self-care and positive aspects of her life.  Patient to focus on aspects of life that she can control.  Mentioned option of collaborative psychiatry to patient.      Courtney Zuniga, [facility]                                                         ___________________________________                                                   Treatment Plan    Client's Name: Eva Solis  YOB: 1960    Date: 6/4/2020    DSM-V Diagnoses: 296.32 (F33.1) Major Depressive Disorder, Recurrent Episode, Moderate _ or 300.00 (F41.9) Unspecified Anxiety Disorder  Psychosocial / Contextual  "Factors:   Strained relationship with extended family, chronic pain and other health conditions, three adult sons, Concern over current events in country / world.    WHODAS:   WHODAS 2.0 Total Score 5/12/2020   Total Score 34         Referral / Collaboration:  Discussed option of psychiatry referral in the future.  .  Plan to coordinate care with PCP.      Anticipated number of session or this episode of care: 10-12      MeasurableTreatment Goal(s) related to diagnosis / functional impairment(s)  Goal 1: Client will reduce depression symptoms.    I will know I've met my goal when \"I would be happier, I wouldn't be so down\".      Objective #A (Client Action)    Client will Decrease frequency and intensity of feeling down, depressed, hopeless.  Reduce rating on PHQ-9, current score 3, goal to be 1 or 0.    Status: New - Date: 6/4/2020     Intervention(s)  Therapist will provide CBT to restructure negative cognitions.  Identify cognitive distortions.  .    Objective #B  Client will establish regular sleep patterns.  Currently sleeping excessively.  .  Status: New - Date: 6/4/2020     Intervention(s)  Therapist will assign homework Patient to notice sleep patterns.  .    Objective #C  Client will Identify negative self-talk and behaviors: challenge core beliefs, myths, and actions.  Status: New - Date: 6/4/2020     Intervention(s)  Therapist will provide CBT during therapy sessions.  .        Patient has reviewed and agreed to the above plan.      Courtney Zuniga, [facility]  June 4, 2020    "

## 2020-06-09 ENCOUNTER — VIRTUAL VISIT (OUTPATIENT)
Dept: FAMILY MEDICINE | Facility: OTHER | Age: 60
End: 2020-06-09
Payer: COMMERCIAL

## 2020-06-09 DIAGNOSIS — Z11.59 ENCOUNTER FOR SCREENING FOR OTHER VIRAL DISEASES: ICD-10-CM

## 2020-06-09 DIAGNOSIS — F33.1 MAJOR DEPRESSIVE DISORDER, RECURRENT EPISODE, MODERATE (H): Primary | ICD-10-CM

## 2020-06-09 PROCEDURE — 99207 ZZC NO CHARGE LOS: CPT

## 2020-06-09 PROCEDURE — 87635 SARS-COV-2 COVID-19 AMP PRB: CPT | Mod: 90 | Performed by: ANESTHESIOLOGY

## 2020-06-09 PROCEDURE — 99213 OFFICE O/P EST LOW 20 MIN: CPT | Mod: TEL | Performed by: FAMILY MEDICINE

## 2020-06-09 PROCEDURE — 99000 SPECIMEN HANDLING OFFICE-LAB: CPT | Performed by: ANESTHESIOLOGY

## 2020-06-09 ASSESSMENT — PATIENT HEALTH QUESTIONNAIRE - PHQ9
SUM OF ALL RESPONSES TO PHQ QUESTIONS 1-9: 19
5. POOR APPETITE OR OVEREATING: MORE THAN HALF THE DAYS

## 2020-06-09 ASSESSMENT — ANXIETY QUESTIONNAIRES
2. NOT BEING ABLE TO STOP OR CONTROL WORRYING: NEARLY EVERY DAY
1. FEELING NERVOUS, ANXIOUS, OR ON EDGE: NEARLY EVERY DAY
GAD7 TOTAL SCORE: 16
5. BEING SO RESTLESS THAT IT IS HARD TO SIT STILL: SEVERAL DAYS
IF YOU CHECKED OFF ANY PROBLEMS ON THIS QUESTIONNAIRE, HOW DIFFICULT HAVE THESE PROBLEMS MADE IT FOR YOU TO DO YOUR WORK, TAKE CARE OF THINGS AT HOME, OR GET ALONG WITH OTHER PEOPLE: VERY DIFFICULT
7. FEELING AFRAID AS IF SOMETHING AWFUL MIGHT HAPPEN: NEARLY EVERY DAY
6. BECOMING EASILY ANNOYED OR IRRITABLE: SEVERAL DAYS
3. WORRYING TOO MUCH ABOUT DIFFERENT THINGS: NEARLY EVERY DAY

## 2020-06-09 ASSESSMENT — PAIN SCALES - GENERAL: PAINLEVEL: NO PAIN (0)

## 2020-06-09 NOTE — PROGRESS NOTES
[address]  [facility] [address]  [phone]  Dept: [phone]    PRE-OP EVALUATION:  Today's date: 6/10/2020    Eva Solis (: 1960) presents for pre-operative evaluation assessment as requested by Dr. Wheatley.  She requires evaluation and anesthesia risk assessment prior to undergoing surgery/procedure for treatment of spine injection.    Proposed Surgery/ Procedure: spine injection  Date of Surgery/ Procedure: 20  Time of Surgery/ Procedure: 2:00pm  Hospital/Surgical Facility: [facility]      Primary Physician: Renetta Benitez  Type of Anesthesia Anticipated: monitor care    Patient has a Health Care Directive or Living Will:  NO    1. NO - Do you have a history of heart attack, stroke, stent, bypass or surgery on an artery in the head, neck, heart or legs?  2. NO - Do you ever have any pain or discomfort in your chest?  3. YES - DO YOU HAVE A HISTORY OF HEART FAILURE diastolic dysfunction denies shortness of breath, PND, orthopnea. new edema, chest pain, syncope or near-syncope.   4. YES - ARE YOUR TROUBLED BY SHORTNESS OF BREATH WHEN WALKING ON THE LEVEL, UP A SLIGHT HILL OR AT NIGHT? normal  5. NO - Do you currently have a cold, bronchitis or other respiratory infection?  6. NO - Do you have a cough, shortness of breath or wheezing?  7. NO - Do you sometimes get pains in the calves of your legs when you walk?  8. NO - Do you or anyone in your family have previous history of blood clots?  9. NO - Do you or does anyone in your family have a serious bleeding problem such as prolonged bleeding following surgeries or cuts?  10. NO - Have you ever had problems with anemia or been told to take iron pills?  11. NO - Have you had any abnormal blood loss such as black, tarry or bloody stools, or abnormal vaginal bleeding?  12. NO - Have you ever had a blood transfusion?  13. NO - Have you or any of your relatives ever had problems with  anesthesia?  14. NO - Do you have sleep apnea, excessive snoring or daytime drowsiness?  15. NO - Do you have any prosthetic heart valves?  16. YES - DO YOU HAVE PROSTHETIC JOINTS? Bilateral knees  17. NO - Is there any chance that you may be pregnant?      HPI:     HPI related to upcoming procedure: low back pain with bilateral sciatica, plan for LESI      CHF - Patient has a history of moderate-severe CHF. Exacerbating conditions include diastolic dysfunction. Currently the patient's condition is same. Current treatment regimen includes Angiotensin 2 receptor blocker, diuretic and spirolactone. The patient denies chest pain, edema, orthopnea, SOB or recent weight gain. Last Echocardiogram 2013, EKG today.     DEPRESSION - Patient has a long history of Depression of moderate severity requiring medication for control with recent symptoms being stable..Current symptoms of depression include none.     HYPERLIPIDEMIA - Patient has a long history of significant Hyperlipidemia requiring medication for treatment with recent fair control. Patient reports no problems or side effects with the medication.     HYPERTENSION - Patient has longstanding history of HTN , currently denies any symptoms referable to elevated blood pressure. Specifically denies chest pain, palpitations, dyspnea, orthopnea, PND or peripheral edema. Blood pressure readings have been in normal range. Current medication regimen is as listed below. Patient denies any side effects of medication.       MEDICAL HISTORY:     Patient Active Problem List    Diagnosis Date Noted     Mixed incontinence urge and stress (male)(female) 05/13/2020     Priority: Medium     Major depressive disorder, recurrent episode, moderate (H) 05/08/2020     Priority: Medium     Primary osteoarthritis of left hip 01/23/2020     Priority: Medium     Migraine without aura and without status migrainosus, not intractable 01/15/2016     Priority: Medium     Morbid obesity, unspecified  obesity type (H) 2015     Priority: Medium     Decreased calculated GFR 2015     Priority: Medium     Diastolic CHF (H) 2014     Priority: Medium     HTN, goal below 140/90 2013     Priority: Medium     Slow transit constipation 2005     Priority: Medium      Past Medical History:   Diagnosis Date     Congestive heart failure (H) 3years     Depressive disorder 1-2 years    dealing with on a daily basis     DEPRESSIVE DISORDER NEC 2004     Diastolic dysfunction      Essential hypertension, benign      Generalized osteoarthrosis, unspecified site     knees     Headache(784.0)      Heart disease 3 years    CHF     Mixed anxiety depressive disorder 1/15/2016     PANIC DISORDER 2004     Past Surgical History:   Procedure Laterality Date     C  DELIVERY ONLY      , Low Cervical     C  DELIVERY ONLY       C VAGINAL HYSTERECTOMY      without oophorectomy     COLONOSCOPY  10/06/10    attempt at colonscopy to 50cm     HC COLONOSCOPY W/WO BRUSH/WASH  2005    Diverticulosis.  Internal hemorrhoids.     INJECT EPIDURAL LUMBAR N/A 10/18/2019    Procedure: INJECTION, SPINE, LUMBAR 4 - LUMBAR 5, EPIDURAL TRANSLAMINAR;  Surgeon: Trever Wheatley MD;  Location: [facility] OR     JOINT REPLACEMTN, KNEE RT/LT  2006    Right total knee arthroplasty.     JOINT REPLACEMTN, KNEE RT/LT  2006    Left total knee arthroplasty.     Current Outpatient Medications   Medication Sig Dispense Refill     busPIRone (BUSPAR) 10 MG tablet Take 1 tablet (10 mg) by mouth 3 times daily 270 tablet 3     cyclobenzaprine (FLEXERIL) 5 MG tablet Take 1 tablet (5 mg) by mouth 3 times daily as needed for muscle spasms 30 tablet 3     escitalopram (LEXAPRO) 20 MG tablet Take 1 tablet (20 mg) by mouth daily 90 tablet 3     furosemide (LASIX) 20 MG tablet TAKE ONE TABLET BY MOUTH ONE TIME DAILY 90 tablet 3     losartan (COZAAR) 100 MG tablet Take 1 tablet (100 mg) by mouth daily 90  "tablet 3     MIRALAX PO POWD 17 GM DAILY 1 Bottle 11     OMEPRAZOLE PO        spironolactone (ALDACTONE) 25 MG tablet Take 1 tablet (25 mg) by mouth daily 90 tablet 3     topiramate (TOPAMAX) 50 MG tablet Take 1 tablet (50 mg) by mouth 2 times daily 180 tablet 3     amoxicillin (AMOXIL) 500 MG capsule Only for dental work due to knee surgeries  3     OTC products: None, except as noted above    Allergies   Allergen Reactions     Lisinopril Cough      Latex Allergy: NO    Social History     Tobacco Use     Smoking status: Never Smoker     Smokeless tobacco: Never Used     Tobacco comment: no smokers in household   Substance Use Topics     Alcohol use: No     History   Drug Use No       REVIEW OF SYSTEMS:   CONSTITUTIONAL: NEGATIVE for fever, chills, change in weight  INTEGUMENTARY/SKIN: NEGATIVE for worrisome rashes, moles or lesions  EYES: NEGATIVE for vision changes or irritation  ENT/MOUTH: NEGATIVE for ear, mouth and throat problems  RESP: NEGATIVE for significant cough or SOB  BREAST: NEGATIVE for masses, tenderness or discharge  CV: NEGATIVE for chest pain, palpitations or peripheral edema  GI: NEGATIVE for nausea, abdominal pain, heartburn, or change in bowel habits  : NEGATIVE for frequency, dysuria, or hematuria  MUSCULOSKELETAL: NEGATIVE for significant arthralgias or myalgia  NEURO: NEGATIVE for weakness, dizziness or paresthesias  ENDOCRINE: NEGATIVE for temperature intolerance, skin/hair changes  HEME: NEGATIVE for bleeding problems  PSYCHIATRIC: NEGATIVE for changes in mood or affect    EXAM:   /70 (BP Location: Right arm, Patient Position: Other (comments))   Pulse 73   Temp 98.2  F (36.8  C) (Temporal)   Resp 20   Ht 1.67 m (5' 5.75\")   Wt (!) 156.5 kg (345 lb)   SpO2 97%   BMI 56.11 kg/m      GENERAL APPEARANCE: healthy, alert and no distress     EYES: EOMI, PERRL     HENT: ear canals and TM's normal and nose and mouth without ulcers or lesions     NECK: no adenopathy, no asymmetry, " masses, or scars and thyroid normal to palpation     RESP: lungs clear to auscultation - no rales, rhonchi or wheezes     CV: regular rates and rhythm, normal S1 S2, no S3 or S4 and no murmur, click or rub     ABDOMEN:  soft, nontender, no HSM or masses and bowel sounds normal     MS: extremities normal- no gross deformities noted, no evidence of inflammation in joints, FROM in all extremities.     SKIN: no suspicious lesions or rashes     NEURO: Normal strength and tone, sensory exam grossly normal, mentation intact and speech normal     PSYCH: mentation appears normal. and affect normal/bright     LYMPHATICS: No cervical adenopathy    DIAGNOSTICS:   EKG: appears normal, NSR, normal axis, normal intervals, no acute ST/T changes c/w ischemia, no LVH by voltage criteria, unchanged from previous tracings    Recent Labs   Lab Test 10/16/19  1043 08/27/19  0958  09/25/13  1948   HGB 13.8 13.8   < > 12.9    241   < > 206   INR  --   --   --  0.98    143   < > 144   POTASSIUM 4.1 4.1   < > 3.4   CR 1.01 1.05*   < > 1.07*    < > = values in this interval not displayed.        IMPRESSION:   Reason for surgery/procedure: LESI  Diagnosis/reason for consult: preop    The proposed surgical procedure is considered LOW risk.    REVISED CARDIAC RISK INDEX  The patient has the following serious cardiovascular risks for perioperative complications such as (MI, PE, VFib and 3  AV Block):  Congestive Heart Failure (pulmonary edema, PND, s3 ilia, CXR with pulmonary congestion, basilar rales)  INTERPRETATION: 1 risks: Class II (low risk - 0.9% complication rate)    The patient has the following additional risks for perioperative complications:  The 10-year ASCVD risk score ([place] HARSHAL Jr., et al., 2013) is: 5.3%    Values used to calculate the score:      Age: 60 years      Sex: Female      Is Non- : No      Diabetic: No      Tobacco smoker: No      Systolic Blood Pressure: 130 mmHg      Is BP  treated: Yes      HDL Cholesterol: 43 mg/dL      Total Cholesterol: 218 mg/dL  Morbid obesity      ICD-10-CM    1. Preop general physical exam  Z01.818    2. Bilateral low back pain with bilateral sciatica, unspecified chronicity  M54.42     M54.41    3. Benign essential hypertension  I10 BASIC METABOLIC PANEL     EKG 12-lead complete w/read - Clinics   4. Morbid obesity, unspecified obesity type (H)  E66.01    5. Chronic diastolic congestive heart failure (H)  I50.32    6. HTN, goal below 140/90  I10        RECOMMENDATIONS:       --Patient is to take all scheduled medications on the day of surgery EXCEPT for modifications listed below.    ACE Inhibitor or Angiotensin Receptor Blocker (ARB) Use  Ace inhibitor or Angiotensin Receptor Blocker (ARB) and should HOLD this medication for the 24 hours prior to surgery.      APPROVAL GIVEN to proceed with proposed procedure, without further diagnostic evaluation       Signed Electronically by: STACEY Conrad CNP    Copy of this evaluation report is provided to requesting physician.    [facility] Preop Guidelines    Revised Cardiac Risk Index

## 2020-06-09 NOTE — PATIENT INSTRUCTIONS
Hold losartan 24 hours prior to procedure.   Hold any medications that you take once daily the morning of the injection and take them once you return home.  TJ Li   Before Your Surgery      Call your surgeon if there is any change in your health. This includes signs of a cold or flu (such as a sore throat, runny nose, cough, rash or fever).    Do not smoke, drink alcohol or take over the counter medicine (unless your surgeon or primary care doctor tells you to) for the 24 hours before and after surgery.    If you take prescribed drugs: Follow your doctor s orders about which medicines to take and which to stop until after surgery.    Eating and drinking prior to surgery: follow the instructions from your surgeon    Take a shower or bath the night before surgery. Use the soap your surgeon gave you to gently clean your skin. If you do not have soap from your surgeon, use your regular soap. Do not shave or scrub the surgery site.  Wear clean pajamas and have clean sheets on your bed.

## 2020-06-10 ENCOUNTER — OFFICE VISIT (OUTPATIENT)
Dept: FAMILY MEDICINE | Facility: OTHER | Age: 60
End: 2020-06-10
Payer: COMMERCIAL

## 2020-06-10 VITALS
HEIGHT: 66 IN | RESPIRATION RATE: 20 BRPM | OXYGEN SATURATION: 97 % | BODY MASS INDEX: 47.09 KG/M2 | TEMPERATURE: 98.2 F | HEART RATE: 73 BPM | WEIGHT: 293 LBS | DIASTOLIC BLOOD PRESSURE: 70 MMHG | SYSTOLIC BLOOD PRESSURE: 130 MMHG

## 2020-06-10 DIAGNOSIS — I10 BENIGN ESSENTIAL HYPERTENSION: ICD-10-CM

## 2020-06-10 DIAGNOSIS — M54.41 BILATERAL LOW BACK PAIN WITH BILATERAL SCIATICA, UNSPECIFIED CHRONICITY: ICD-10-CM

## 2020-06-10 DIAGNOSIS — I10 HTN, GOAL BELOW 140/90: ICD-10-CM

## 2020-06-10 DIAGNOSIS — I50.32 CHRONIC DIASTOLIC CONGESTIVE HEART FAILURE (H): ICD-10-CM

## 2020-06-10 DIAGNOSIS — Z01.818 PREOP GENERAL PHYSICAL EXAM: Primary | ICD-10-CM

## 2020-06-10 DIAGNOSIS — M54.42 BILATERAL LOW BACK PAIN WITH BILATERAL SCIATICA, UNSPECIFIED CHRONICITY: ICD-10-CM

## 2020-06-10 DIAGNOSIS — E66.01 MORBID OBESITY, UNSPECIFIED OBESITY TYPE (H): ICD-10-CM

## 2020-06-10 LAB
ANION GAP SERPL CALCULATED.3IONS-SCNC: 3 MMOL/L (ref 3–14)
BUN SERPL-MCNC: 18 MG/DL (ref 7–30)
CALCIUM SERPL-MCNC: 9.1 MG/DL (ref 8.5–10.1)
CHLORIDE SERPL-SCNC: 108 MMOL/L (ref 94–109)
CO2 SERPL-SCNC: 30 MMOL/L (ref 20–32)
CREAT SERPL-MCNC: 1.04 MG/DL (ref 0.52–1.04)
GFR SERPL CREATININE-BSD FRML MDRD: 59 ML/MIN/{1.73_M2}
GLUCOSE SERPL-MCNC: 95 MG/DL (ref 70–99)
POTASSIUM SERPL-SCNC: 4.2 MMOL/L (ref 3.4–5.3)
SARS-COV-2 RNA SPEC QL NAA+PROBE: NOT DETECTED
SODIUM SERPL-SCNC: 141 MMOL/L (ref 133–144)
SPECIMEN SOURCE: NORMAL

## 2020-06-10 PROCEDURE — 80048 BASIC METABOLIC PNL TOTAL CA: CPT | Performed by: STUDENT IN AN ORGANIZED HEALTH CARE EDUCATION/TRAINING PROGRAM

## 2020-06-10 PROCEDURE — 93000 ELECTROCARDIOGRAM COMPLETE: CPT | Performed by: STUDENT IN AN ORGANIZED HEALTH CARE EDUCATION/TRAINING PROGRAM

## 2020-06-10 PROCEDURE — 99215 OFFICE O/P EST HI 40 MIN: CPT | Performed by: STUDENT IN AN ORGANIZED HEALTH CARE EDUCATION/TRAINING PROGRAM

## 2020-06-10 PROCEDURE — 36415 COLL VENOUS BLD VENIPUNCTURE: CPT | Performed by: STUDENT IN AN ORGANIZED HEALTH CARE EDUCATION/TRAINING PROGRAM

## 2020-06-10 ASSESSMENT — ANXIETY QUESTIONNAIRES: GAD7 TOTAL SCORE: 16

## 2020-06-10 ASSESSMENT — MIFFLIN-ST. JEOR: SCORE: 2152.66

## 2020-06-10 ASSESSMENT — PAIN SCALES - GENERAL: PAINLEVEL: NO PAIN (0)

## 2020-06-11 ENCOUNTER — VIRTUAL VISIT (OUTPATIENT)
Dept: PHYSICAL THERAPY | Facility: CLINIC | Age: 60
End: 2020-06-11
Payer: COMMERCIAL

## 2020-06-11 DIAGNOSIS — M16.12 PRIMARY OSTEOARTHRITIS OF LEFT HIP: ICD-10-CM

## 2020-06-11 DIAGNOSIS — N39.46 MIXED INCONTINENCE URGE AND STRESS (MALE)(FEMALE): ICD-10-CM

## 2020-06-11 PROCEDURE — 97110 THERAPEUTIC EXERCISES: CPT | Mod: TEL | Performed by: PHYSICAL THERAPIST

## 2020-06-11 PROCEDURE — 97112 NEUROMUSCULAR REEDUCATION: CPT | Mod: TEL | Performed by: PHYSICAL THERAPIST

## 2020-06-11 NOTE — PROGRESS NOTES
"Physical Therapy Virtual Follow Up Visit      The patient has been notified of following:     \"This virtual visit will be conducted between you and your provider. We have found that certain health care needs can be provided without the need for physical presence.  This service lets us provide the care you need with a virtual visit.\"    Due to external, as well as internal [facility] management of the COVID-19 Virus, Eva Solis was not seen in our clinic.  As a substitution, we implemented a virtual visit to manage this patient's condition utilizing the PTRx virtual visit platform via the patient s existing code.  The provider, Amanda Hilligoss, reviewed the patient's chart, PTRx prescription, and spoke with the patient to determine the following telemedicine visit is appropriate and effective for the patient's care.    The following type of visit was completed:   Telephone Visit:  A telephone visit was used in conjunction with the online PTRx exercise platform.        S: Eva Solis is a 60 year old female. Connected virtually on the PTRx platform to discuss their condition/progress. They noted improvements in incontinence (more improvement in stress incontinence), especially if doing exercises regulary. Occasionally has to manually lift leg into vehicle (50%).  They noted ongoing pain or limitations when sitting, worst pain is when sitting back and going to sit back forward. Pain is very sharp on L lateral hip.     Current pain level: 5/10    O: Patient demonstrated   -Elevators w/ edu to progress to 3 floors up and down as able. Can attempt to go to 5 floors as she is able to feel the contraction at each level   -Standing hip pendulums w/ edu re: joint lubrication and fluid motion with this exercise  -Standing hip abd and reviewed standing marching and trying to incorporate these activities into daily routine such as when using the microwave or brushing teeth  -Edu re: towel roll for lumbar spine " "to keep neutral pelvis position, and edu re: possibility of bursitis causing \"sharp pinching\" feeling when going from semi-reclined to upright sitting. So stressed importance of keeping angle at hip similar as that make be what's causing the sharp stabbing pain.  -Reviewed pelvic tilts and suggested at least performing these before getting out of bed. Can return to other abdominal exercises as able.  -Reviewed hip IR/ER in supine and hooklying as well as quad and hip flexor stretch  -Discussed using ice pack on lateral hip for 15 minutes/night    PTRx Content from today's visit:  (new exercises added see below, previous exercises still included in program)  Exercise Name: Sitting Posture at Computer  Exercise Name: Posture Correction with Lumbar Roll  Exercise Name: Standing Hip Codman's, Sets: 1 - Reps: 10-20 - Sessions: as needed (can do any time hip feels stiff)  Exercise Name: Standing Hip Abduction, Sets: 1-2 - Reps: 10-20 - Sessions: 1, Notes: Make sure to keep trunk upright  Exercise Name: Pelvic Floor Muscle Strengthening Elevator , Sets: 1 - Reps: 10 - Sessions: 1-3, Notes: try to work to \"3 floors\" of elevator, can do up to 5 if able to feel difference between each \"level\"      A:   Patient's symptoms are resolving, but has not been as consistent w/ exercises since last visit.  Response to therapy has shown an improvement in  function      P: Patient will continue with the exercise program assigned on their PTRx code and will add the following measures to manage their pain/condition: lumbar support, icing     Current treatment program is being advanced to more complex exercises.      Virtual visit contact time    Time of service began: 8:02 AM  Time of service ended: 8:23 AM AM  Total Time for set up, visit, and documentation: 31 minutes    Payor: [employer] / Plan: [employer] / Product Type: PPO /   .  Procedure Code/s   Therapeutic Exercise (95573): 10 minutes  Neuromuscular Re-education (23646): " 9 minutes  Self Care / Home Management Training (57973): 2 minutes    I have reviewed the note as documented above.  This accurately captures the substance of my conversation with the patient.  Provider location: [place], [place] ([facility])  Patient location: home

## 2020-06-12 ENCOUNTER — ANESTHESIA EVENT (OUTPATIENT)
Dept: SURGERY | Facility: CLINIC | Age: 60
End: 2020-06-12
Payer: COMMERCIAL

## 2020-06-12 ENCOUNTER — ANESTHESIA (OUTPATIENT)
Dept: SURGERY | Facility: CLINIC | Age: 60
End: 2020-06-12
Payer: COMMERCIAL

## 2020-06-12 ENCOUNTER — HOSPITAL ENCOUNTER (OUTPATIENT)
Dept: GENERAL RADIOLOGY | Facility: CLINIC | Age: 60
End: 2020-06-12
Attending: ANESTHESIOLOGY | Admitting: ANESTHESIOLOGY
Payer: COMMERCIAL

## 2020-06-12 ENCOUNTER — HOSPITAL ENCOUNTER (OUTPATIENT)
Facility: CLINIC | Age: 60
Discharge: HOME | End: 2020-06-12
Attending: ANESTHESIOLOGY | Admitting: ANESTHESIOLOGY
Payer: COMMERCIAL

## 2020-06-12 VITALS
RESPIRATION RATE: 18 BRPM | HEART RATE: 61 BPM | TEMPERATURE: 97.8 F | SYSTOLIC BLOOD PRESSURE: 126 MMHG | DIASTOLIC BLOOD PRESSURE: 71 MMHG | OXYGEN SATURATION: 97 %

## 2020-06-12 DIAGNOSIS — M54.41 CHRONIC BILATERAL LOW BACK PAIN WITH BILATERAL SCIATICA: ICD-10-CM

## 2020-06-12 DIAGNOSIS — M54.42 CHRONIC BILATERAL LOW BACK PAIN WITH BILATERAL SCIATICA: ICD-10-CM

## 2020-06-12 DIAGNOSIS — G89.29 CHRONIC BILATERAL LOW BACK PAIN WITH BILATERAL SCIATICA: ICD-10-CM

## 2020-06-12 PROCEDURE — 25000128 H RX IP 250 OP 636: Performed by: ANESTHESIOLOGY

## 2020-06-12 PROCEDURE — 25000128 H RX IP 250 OP 636: Performed by: NURSE ANESTHETIST, CERTIFIED REGISTERED

## 2020-06-12 PROCEDURE — 37000008 ZZH ANESTHESIA TECHNICAL FEE, 1ST 30 MIN: Performed by: ANESTHESIOLOGY

## 2020-06-12 PROCEDURE — 62323 NJX INTERLAMINAR LMBR/SAC: CPT | Performed by: ANESTHESIOLOGY

## 2020-06-12 PROCEDURE — 25000125 ZZHC RX 250: Performed by: NURSE ANESTHETIST, CERTIFIED REGISTERED

## 2020-06-12 PROCEDURE — 40000277 XR SURGERY CARM FLUORO LESS THAN 5 MIN W STILLS: Mod: TC

## 2020-06-12 RX ORDER — ONDANSETRON 4 MG/1
4 TABLET, ORALLY DISINTEGRATING ORAL EVERY 30 MIN PRN
Status: DISCONTINUED | OUTPATIENT
Start: 2020-06-12 | End: 2020-06-12 | Stop reason: HOSPADM

## 2020-06-12 RX ORDER — PROPOFOL 10 MG/ML
INJECTION, EMULSION INTRAVENOUS PRN
Status: DISCONTINUED | OUTPATIENT
Start: 2020-06-12 | End: 2020-06-12

## 2020-06-12 RX ORDER — LIDOCAINE HYDROCHLORIDE 20 MG/ML
INJECTION, SOLUTION INFILTRATION; PERINEURAL PRN
Status: DISCONTINUED | OUTPATIENT
Start: 2020-06-12 | End: 2020-06-12

## 2020-06-12 RX ORDER — ONDANSETRON 2 MG/ML
4 INJECTION INTRAMUSCULAR; INTRAVENOUS EVERY 30 MIN PRN
Status: DISCONTINUED | OUTPATIENT
Start: 2020-06-12 | End: 2020-06-12 | Stop reason: HOSPADM

## 2020-06-12 RX ORDER — IOPAMIDOL 612 MG/ML
INJECTION, SOLUTION INTRATHECAL PRN
Status: DISCONTINUED | OUTPATIENT
Start: 2020-06-12 | End: 2020-06-12 | Stop reason: HOSPADM

## 2020-06-12 RX ORDER — TRIAMCINOLONE ACETONIDE 40 MG/ML
INJECTION, SUSPENSION INTRA-ARTICULAR; INTRAMUSCULAR PRN
Status: DISCONTINUED | OUTPATIENT
Start: 2020-06-12 | End: 2020-06-12 | Stop reason: HOSPADM

## 2020-06-12 RX ADMIN — LIDOCAINE HYDROCHLORIDE 1 ML: 10 INJECTION, SOLUTION EPIDURAL; INFILTRATION; INTRACAUDAL; PERINEURAL at 13:33

## 2020-06-12 RX ADMIN — PROPOFOL 50 MG: 10 INJECTION, EMULSION INTRAVENOUS at 13:44

## 2020-06-12 RX ADMIN — PROPOFOL 40 MG: 10 INJECTION, EMULSION INTRAVENOUS at 13:47

## 2020-06-12 RX ADMIN — LIDOCAINE HYDROCHLORIDE 50 MG: 20 INJECTION, SOLUTION INFILTRATION; PERINEURAL at 13:44

## 2020-06-12 SDOH — HEALTH STABILITY: MENTAL HEALTH: CURRENT SMOKER: 0

## 2020-06-12 ASSESSMENT — LIFESTYLE VARIABLES: TOBACCO_USE: 0

## 2020-06-12 NOTE — ANESTHESIA POSTPROCEDURE EVALUATION
Patient: Eva Solis    Procedure(s):  INJECTION, SPINE, LUMBAR 4-5, EPIDURAL TRANSLAMINAR    Diagnosis:Chronic bilateral low back pain with bilateral sciatica [M54.42, M54.41, G89.29]  Diagnosis Additional Information: No value filed.    Anesthesia Type:  MAC    Note:  Anesthesia Post Evaluation    Patient location during evaluation: Phase 2  Patient participation: Able to fully participate in evaluation  Level of consciousness: awake and alert  Pain management: adequate  Airway patency: patent  Cardiovascular status: acceptable and stable  Respiratory status: acceptable and room air  Hydration status: acceptable  PONV: none     Anesthetic complications: None    Comments:  Patient was happy with the anesthesia care received and no anesthesia related complications were noted.  I will follow up with the patient again if it is needed.        Last vitals:  Vitals:    06/12/20 1330 06/12/20 1355 06/12/20 1400   BP: (!) 180/80 122/65 113/65   Pulse:  64 64   Resp: 20 18    Temp: 97.8  F (36.6  C)     SpO2:  96% 95%         Electronically Signed By: STACEY Thomas CRNA  June 12, 2020  2:04 PM

## 2020-06-12 NOTE — DISCHARGE INSTRUCTIONS
Home Care Instructions                Procedure: Epidural injection or joint injection    Activity:    Rest today    Do not work today    Resume normal activity tomorrow    Pain:    You may experience soreness at the injection site for 1 to 3 days.    You may use an ice pack for 20 minutes every 2 hours for the first 24 hours    You may use a heating pad after the first 24 hours    You may use Tylenol  (acetaminophen) every 4 hours or other pain medicines as directed by your physician    Safety  Sedation medicine, if given may remain active for many hours.    It is important for the next 24 hours that you do not:    Drive a car    Operate machines or power tools    Consume alcohol, including beer    Sign any important papers or legal documents    You may experience numbness radiating into your legs or arms, (depending on the procedure location)  This numbness may last several hours.  Until the numb sensation returns to normal please use caution in walking, climbing stairs, stepping out of your vehicle, etc.    Common side effects of steroids:  Not everyone will experience corticosteroid side effects. If side effects are experienced they will gradually subside in the 7-10 day period following an injection.    Most common side effects include:    Flushed face and/or chest    Feeling of warmth, particularly in face but could be overall feeling of warmth    Increased blood sugar in diabetic patients    Menstrual irregularities may occur.  If taking hormone based birth control an alternate method of birth control is recommended    Sleep disturbances and/or mood swings are possible    Leg cramps    Please contact us if you have:  Severe pain   Fever more than 101.5 degrees Fahrenheit  Signs of infection (redness, swelling or drainage)      If you have questions during normal business hours (8am-5pm Monday-Friday) contact the [facility] at [phone]. If you need help after hours, we recommend that you go to a  hospital emergency room or dial 911.

## 2020-06-12 NOTE — ANESTHESIA PREPROCEDURE EVALUATION
Anesthesia Pre-Procedure Evaluation    Patient: Eva Solis   MRN: [id number] : 1960          Preoperative Diagnosis: Chronic bilateral low back pain with bilateral sciatica [M54.42, M54.41, G89.29]    Procedure(s):  INJECTION, SPINE, LUMBAR 4-5, EPIDURAL TRANSLAMINAR    Past Medical History:   Diagnosis Date     Congestive heart failure (H) 3years     Depressive disorder 1-2 years    dealing with on a daily basis     DEPRESSIVE DISORDER NEC 2004     Diastolic dysfunction      Essential hypertension, benign      Generalized osteoarthrosis, unspecified site     knees     Headache(784.0)      Heart disease 3 years    CHF     Mixed anxiety depressive disorder 1/15/2016     PANIC DISORDER 2004     Past Surgical History:   Procedure Laterality Date     C  DELIVERY ONLY      , Low Cervical     C  DELIVERY ONLY       C VAGINAL HYSTERECTOMY      without oophorectomy     COLONOSCOPY  10/06/10    attempt at colonscopy to 50cm     HC COLONOSCOPY W/WO BRUSH/WASH  2005    Diverticulosis.  Internal hemorrhoids.     INJECT EPIDURAL LUMBAR N/A 10/18/2019    Procedure: INJECTION, SPINE, LUMBAR 4 - LUMBAR 5, EPIDURAL TRANSLAMINAR;  Surgeon: Trever Wheatley MD;  Location: [facility]     JOINT REPLACEMTN, KNEE RT/LT  2006    Right total knee arthroplasty.     JOINT REPLACEMTN, KNEE RT/LT  2006    Left total knee arthroplasty.       Anesthesia Evaluation     . Pt has had prior anesthetic. Type: Regional, General and MAC           ROS/MED HX    ENT/Pulmonary:     (+)VALERIE risk factors hypertension, obese, daytime somnolence, , . .   (-) tobacco use   Neurologic:     (+)migraines,     Cardiovascular:     (+) Dyslipidemia, hypertension----. : . CHF etiology: diastolic dysfunction Last EF: 60-65 date: 10/9/13 . . :. . Previous cardiac testing Echodate:10/9/13results:  1.Left ventricular systolic function is normal. There is mild concentric left   ventricular hypertrophy. The  visual ejection fraction is estimated at 60-65%.   Grade I left ventricular diastolic dysfunction is noted. 2. The right   ventricle is normal size. The right ventricular systolic function is normal.   3. No significant valvular abnormalities 4. Normal CVP. PA pressure cannot be   estimated.  No prior echo for comparision  PatientHeight: 67 in  PatientWeight: 309 lbs  SystolicPressure: 108 mmHg  DiastolicPressure: 86 mmHg  HeartRate: 67 bpm  BSA 2.4 m^2date: results:ECG reviewed date:6/10/20 results:SR date: results:          METS/Exercise Tolerance:  4 - Raking leaves, gardening   Hematologic:         Musculoskeletal:   (+) arthritis,  -       GI/Hepatic: Comment: constipation        Renal/Genitourinary: Comment: Decreased calculated GFR    (+) Pt has no history of transplant,       Endo:     (+) Obesity, .      Psychiatric:     (+) psychiatric history anxiety and depression      Infectious Disease:  - neg infectious disease ROS       Malignancy:      - no malignancy   Other:    (+) No chance of pregnancy C-spine cleared: N/A, H/O Chronic Pain,                        Physical Exam  Normal systems: cardiovascular, pulmonary and dental    Airway   Mallampati: II  TM distance: >3 FB  Neck ROM: full    Dental     Cardiovascular   Rhythm and rate: regular and normal      Pulmonary    breath sounds clear to auscultation            Lab Results   Component Value Date    WBC 6.5 10/16/2019    HGB 13.8 10/16/2019    HCT 41.0 10/16/2019     10/16/2019     06/10/2020    POTASSIUM 4.2 06/10/2020    CHLORIDE 108 06/10/2020    CO2 30 06/10/2020    BUN 18 06/10/2020    CR 1.04 06/10/2020    GLC 95 06/10/2020    DEVANTE 9.1 06/10/2020    PHOS 2.9 05/18/2015    MAG 1.7 02/06/2015    ALBUMIN 3.6 08/27/2019    PROTTOTAL 7.5 08/27/2019    ALT 23 08/27/2019    AST 19 08/27/2019    ALKPHOS 75 08/27/2019    BILITOTAL 0.8 08/27/2019    INR 0.98 09/25/2013    TSH 1.25 08/27/2019       Preop Vitals  BP Readings from Last 3  "Encounters:   06/10/20 130/70   03/10/20 138/88   02/20/20 (!) 120/90    Pulse Readings from Last 3 Encounters:   06/10/20 73   03/10/20 67   01/21/20 71      Resp Readings from Last 3 Encounters:   06/10/20 20   03/10/20 18   02/20/20 16    SpO2 Readings from Last 3 Encounters:   06/10/20 97%   03/10/20 97%   01/21/20 98%      Temp Readings from Last 1 Encounters:   06/10/20 98.2  F (36.8  C) (Temporal)    Ht Readings from Last 1 Encounters:   06/10/20 1.67 m (5' 5.75\")      Wt Readings from Last 1 Encounters:   06/10/20 (!) 156.5 kg (345 lb)    Estimated body mass index is 56.11 kg/m  as calculated from the following:    Height as of 6/10/20: 1.67 m (5' 5.75\").    Weight as of 6/10/20: 156.5 kg (345 lb).       Anesthesia Plan      History & Physical Review  History and physical reviewed and following examination; no interval change.    ASA Status:  3 .    NPO Status:  > 8 hours    Plan for MAC with Intravenous and Propofol induction. Maintenance will be TIVA.  Reason for MAC:  Deep or markedly invasive procedure (G8)  PONV prophylaxis:  Other (See comment)    The patient is not a current smoker      Postoperative Care  Postoperative pain management:  Oral pain medications.      Consents  Anesthetic plan, risks, benefits and alternatives discussed with:  Patient.  Use of blood products discussed: No .   .                 STACEY Thomas CRNA  "

## 2020-06-12 NOTE — ANESTHESIA CARE TRANSFER NOTE
Patient: Eva Solis    Procedure(s):  INJECTION, SPINE, LUMBAR 4-5, EPIDURAL TRANSLAMINAR    Diagnosis: Chronic bilateral low back pain with bilateral sciatica [M54.42, M54.41, G89.29]  Diagnosis Additional Information: No value filed.    Anesthesia Type:   MAC     Note:  Airway :Room Air  Patient transferred to:Phase II  Handoff Report: Identifed the Patient, Identified the Reponsible Provider, Reviewed the pertinent medical history, Discussed the surgical course, Reviewed Intra-OP anesthesia mangement and issues during anesthesia, Set expectations for post-procedure period and Allowed opportunity for questions and acknowledgement of understanding      Vitals: (Last set prior to Anesthesia Care Transfer)    CRNA VITALS  6/12/2020 1323 - 6/12/2020 1402      6/12/2020             SpO2:  98 %    Resp Rate (observed):  20                Electronically Signed By: STACEY Thomas CRNA  June 12, 2020  2:02 PM

## 2020-06-18 ENCOUNTER — VIRTUAL VISIT (OUTPATIENT)
Dept: PSYCHOLOGY | Facility: CLINIC | Age: 60
End: 2020-06-18
Payer: COMMERCIAL

## 2020-06-18 DIAGNOSIS — F41.9 ANXIETY DISORDER, UNSPECIFIED: ICD-10-CM

## 2020-06-18 DIAGNOSIS — F33.1 MAJOR DEPRESSIVE DISORDER, RECURRENT EPISODE, MODERATE (H): Primary | ICD-10-CM

## 2020-06-18 PROCEDURE — 90834 PSYTX W PT 45 MINUTES: CPT | Mod: TEL | Performed by: SOCIAL WORKER

## 2020-06-18 NOTE — PROGRESS NOTES
"                                           Progress Note    Patient Name: Eva Solis  Date: 6/18/2020         Service Type: Phone Visit, had planned for video visit however patient declined this.        Session Start Time: 12:35 pm  Session End Time: 1:25 pm     Session Length:   50 minutes    Session #: 5    Attendees: Client attended alone     Reason for Telemedicine Visit: Services only offered telehealth    Originating Site (Patient Location): Patient's place of employment    Distant Site (Provider Location): Provider Remote Setting     The patient has been notified of the following:      \"We have found that certain health care needs can be provided without the need for a face to face visit.  This service lets us provide the care you need with a phone conversation.       I will have full access to your [facility] medical record during this entire phone call.   I will be taking notes for your medical record.      Since this is like an office visit, we will bill your insurance company for this service.       There are potential benefits and risks of telephone visits (e.g. limits to patient confidentiality) that differ from in-person visits.?  Confidentiality still applies for telephone services, and nobody will record the visit.  It is important to be in a quiet, private space that is free of distractions (including cell phone or other devices) during the visit.??      If during the course of the call I believe a telephone visit is not appropriate, you will not be charged for this service\"     Consent has been obtained for this service by care team member: Yes   Treatment Plan Last Reviewed: Began to discuss goals, delaying treatment plan to next session due to patient's symptoms and needing to discuss family issues.   PHQ-9 / LORETA-7 :   PHQ 5/5/2020 5/26/2020 6/9/2020   PHQ-9 Total Score 21 18 19   Q9: Thoughts of better off dead/self-harm past 2 weeks Not at all Not at all Not at all     LORETA-7 SCORE 5/5/2020 " "5/26/2020 6/9/2020   Total Score - - -   Total Score 13 16 16         DATA  Interactive Complexity: No  Crisis: No       Progress Since Last Session (Related to Symptoms / Goals / Homework):   Symptoms: No change Reports symptoms similar to previous session.     Homework: Partially completed      Episode of Care Goals: Satisfactory progress - CONTEMPLATION (Considering change and yet undecided); Intervened by assessing the negative and positive thinking (ambivalence) about behavior change     Current / Ongoing Stressors and Concerns:   Strained relationships with extended family.  Chronic health conditions.  Patient reports feeling affected by current events, she states she is wondering about her purpose, denies any thoughts of hurting herself but wonders \"What are we here for?\".   Patient reports significant anxiety going out to stores and other places, she reported this past week that she went to [employer] but stayed in the parking lot.       Treatment Objective(s) Addressed in This Session:   Identify negative self-talk and behaviors: challenge core beliefs, myths, and actions  Discussed patients negative thoughts related to her relationships with her family.  Patient processed some past interactions with her family.    Patient continues to report putting blame on herself, helped patient to realize that the difficulties in her family relationships are not entirely her fault.    Discussed triggers for anxiety when at [employer] store.  Patient reported that she felt anxious about COVID-19 with crowds, also reports feeling unable to trust others in public after unrest in [place] and other Cities.       Intervention:   CBT: Helped patient restructured some of her negative thought processes.  Encouraged patient to keep focus on positive events in her life.  Patient was able to identify the positive relationships with her grown children.         ASSESSMENT: Current Emotional / Mental Status (status of significant " symptoms):   Risk status (Self / Other harm or suicidal ideation)   Patient denies current fears or concerns for personal safety.   Patient denies current or recent suicidal ideation or behaviors.   Patient denies current or recent homicidal ideation or behaviors.   Patient denies current or recent self injurious behavior or ideation.   Patient denies other safety concerns.   Patient reports there has been no change in risk factors since their last session.     Patient reports there has been no change in protective factors since their last session.     Recommended that patient call 911 or go to the local ED should there be a change in any of these risk factors.     Appearance:   N/A due to phone session    Eye Contact:   N/A due to phone session    Psychomotor Behavior: N/A due to phone session    Attitude:   Cooperative    Orientation:   All   Speech    Rate / Production: Normal     Volume:  Normal    Mood:    Anxious  Depressed  Sad  Fearful   Affect:    Appropriate    Thought Content:  Clear  Perservative    Thought Form:  Coherent  Logical  Tangential    Insight:    Good , Fair  and External locus     Medication Review:   No changes to current psychiatric medication(s)  Patient has upcoming appointment with PCP to discuss medications.       Medication Compliance:   Yes     Changes in Health Issues:   None reported  Patient reports currently receiving physical therapy.  Reports chronic pain.  Reports she recently had an injection in her back and her pain has been reduced.       Chemical Use Review:   Substance Use: Chemical use reviewed, no active concerns identified      Tobacco Use: No current tobacco use.      Diagnosis:  1. Major depressive disorder, recurrent episode, moderate (H)    2. Anxiety disorder, unspecified        Collateral Reports Completed:   Not Applicable    PLAN: (Patient Tasks / Therapist Tasks / Other)  Patient to focus on self-care and positive aspects of her life.  Patient plans to focus  "on enjoying Father's Day weekend.  Mentioned option of collaborative psychiatry to patient, she reported her PCP also mentioned this, she may consider it at a later time.  Patient plans to continue physical therapy.      Courtney Zuniga, [facility]                                                         ___________________________________                                                   Treatment Plan    Client's Name: Eva Solis  YOB: 1960    Date: 6/4/2020    DSM-V Diagnoses: 296.32 (F33.1) Major Depressive Disorder, Recurrent Episode, Moderate _ or 300.00 (F41.9) Unspecified Anxiety Disorder  Psychosocial / Contextual Factors:   Strained relationship with extended family, chronic pain and other health conditions, three adult sons, Concern over current events in country / world.    WHODAS:   WHODAS 2.0 Total Score 5/12/2020   Total Score 34         Referral / Collaboration:  Discussed option of psychiatry referral in the future.  .  Plan to coordinate care with PCP.      Anticipated number of session or this episode of care: 10-12      MeasurableTreatment Goal(s) related to diagnosis / functional impairment(s)  Goal 1: Client will reduce depression symptoms.    I will know I've met my goal when \"I would be happier, I wouldn't be so down\".      Objective #A (Client Action)    Client will Decrease frequency and intensity of feeling down, depressed, hopeless.  Reduce rating on PHQ-9, current score 3, goal to be 1 or 0.    Status: New - Date: 6/4/2020     Intervention(s)  Therapist will provide CBT to restructure negative cognitions.  Identify cognitive distortions.  .    Objective #B  Client will establish regular sleep patterns.  Currently sleeping excessively.  .  Status: New - Date: 6/4/2020     Intervention(s)  Therapist will assign homework Patient to notice sleep patterns.  .    Objective #C  Client will Identify negative self-talk and behaviors: challenge core beliefs, myths, and " actions.  Status: New - Date: 6/4/2020     Intervention(s)  Therapist will provide CBT during therapy sessions.  .        Patient has reviewed and agreed to the above plan.      Courtney Zuniga, [facility]  June 4, 2020

## 2020-06-25 ENCOUNTER — VIRTUAL VISIT (OUTPATIENT)
Dept: PHYSICAL THERAPY | Facility: CLINIC | Age: 60
End: 2020-06-25
Payer: COMMERCIAL

## 2020-06-25 ENCOUNTER — VIRTUAL VISIT (OUTPATIENT)
Dept: PSYCHOLOGY | Facility: CLINIC | Age: 60
End: 2020-06-25
Payer: COMMERCIAL

## 2020-06-25 DIAGNOSIS — F41.9 ANXIETY DISORDER, UNSPECIFIED: ICD-10-CM

## 2020-06-25 DIAGNOSIS — F33.1 MAJOR DEPRESSIVE DISORDER, RECURRENT EPISODE, MODERATE (H): Primary | ICD-10-CM

## 2020-06-25 DIAGNOSIS — M16.12 PRIMARY OSTEOARTHRITIS OF LEFT HIP: ICD-10-CM

## 2020-06-25 DIAGNOSIS — N39.46 MIXED INCONTINENCE URGE AND STRESS (MALE)(FEMALE): ICD-10-CM

## 2020-06-25 PROCEDURE — 90834 PSYTX W PT 45 MINUTES: CPT | Mod: TEL | Performed by: SOCIAL WORKER

## 2020-06-25 PROCEDURE — 97112 NEUROMUSCULAR REEDUCATION: CPT | Mod: TEL | Performed by: PHYSICAL THERAPIST

## 2020-06-25 PROCEDURE — 97535 SELF CARE MNGMENT TRAINING: CPT | Mod: TEL | Performed by: PHYSICAL THERAPIST

## 2020-06-25 NOTE — PROGRESS NOTES
"Physical Therapy Virtual Follow Up Visit      The patient has been notified of following:     \"This virtual visit will be conducted between you and your provider. We have found that certain health care needs can be provided without the need for physical presence.  This service lets us provide the care you need with a virtual visit.\"    Due to external, as well as internal [facility] management of the COVID-19 Virus, Eva Solis was not seen in our clinic.  As a substitution, we implemented a virtual visit to manage this patient's condition utilizing the PTRx virtual visit platform via the patient s existing code.  The provider, Amanda Hilligoss, reviewed the patient's chart, PTRx prescription, and spoke with the patient to determine the following telemedicine visit is appropriate and effective for the patient's care.    The following type of visit was completed:   Telephone Visit:  A telephone visit was used in conjunction with the online PTRx exercise platform.        S: Eva Solis is a 60 year old female. Connected virtually on the PTRx platform to discuss their condition/progress. They noted improvements in hip pain. She had lumbar injection 6/12/20 for low back and feels this significantly helped low back and hip as well. Has not needed to use stool to get into vehicle. They noted ongoing pain or limitations with ambulation as she can not walk quickly still. Had a couple episodes of sharp stabbing pain when going from semi-reclined to sitting upright.      Current pain level: 3/10    O: Patient demonstrated   -Unable to lift hips from bed when attempting bridge   -Prone hip ext w/ knee flex x 5B alt (not added to HEP)  -Hip ext of edge pf bed x 7-8 B before fatigue  -Reviewed and updated current PTRx    -Edu re: use of knack to decrease leaking w/ cough or sneeze    PTRx Content from today's visit:  Removed bridge  Added:   Exercise Name: Hip Extension on Table, Sets: 1 - Reps: 10-20 - Sessions: " Every other day  Exercise Name: Pelvic Floor Muscle Strengthening Reji    A:   Patient's symptoms are resolving.  Patient is becoming more independent in home exercise program  Response to therapy has shown an improvement in  pain level, incontinence and function      P: Patient will continue with the exercise program assigned on their PTRx code     Current treatment program is being advanced to more complex exercises.  Frequency and/or duration have been changed, pt will trial independent progression of HEP for now. Will call if any concerns arise.      Virtual visit contact time    Time of service began: 8:02 AM  Time of service ended: 8:26 AM  Total Time for set up, visit, and documentation: 32 minutes    Payor: [employer] / Plan: [employer] / Product Type: PPO /   .  Procedure Code/s   Therapeutic Exercise (48019): 6 minutes  Neuromuscular Re-education (58975): 9 minutes  Self Care / Home Management Training (70297): 9 minutes    I have reviewed the note as documented above.  This accurately captures the substance of my conversation with the patient.  Provider location: [place], [place] ([facility])  Patient location: home

## 2020-06-25 NOTE — PROGRESS NOTES
"                                           Progress Note    Patient Name: Eva Solis  Date: 6/25/2020         Service Type: Phone Visit, had planned for video visit however patient declined this.        Session Start Time: 3:35 pm  Session End Time: 4:25 pm     Session Length:   50 minutes    Session #: 6    Attendees: Client attended alone     Reason for Telemedicine Visit: Services only offered telehealth    Originating Site (Patient Location): Patient's place of employment    Distant Site (Provider Location): Provider Remote Setting     The patient has been notified of the following:      \"We have found that certain health care needs can be provided without the need for a face to face visit.  This service lets us provide the care you need with a phone conversation.       I will have full access to your [place] medical record during this entire phone call.   I will be taking notes for your medical record.      Since this is like an office visit, we will bill your insurance company for this service.       There are potential benefits and risks of telephone visits (e.g. limits to patient confidentiality) that differ from in-person visits.?  Confidentiality still applies for telephone services, and nobody will record the visit.  It is important to be in a quiet, private space that is free of distractions (including cell phone or other devices) during the visit.??      If during the course of the call I believe a telephone visit is not appropriate, you will not be charged for this service\"     Consent has been obtained for this service by care team member: Yes   Treatment Plan Last Reviewed: Began to discuss goals, delaying treatment plan to next session due to patient's symptoms and needing to discuss family issues.   PHQ-9 / LORETA-7 :   PHQ 5/5/2020 5/26/2020 6/9/2020   PHQ-9 Total Score 21 18 19   Q9: Thoughts of better off dead/self-harm past 2 weeks Not at all Not at all Not at all     LORETA-7 SCORE 5/5/2020 " "5/26/2020 6/9/2020   Total Score - - -   Total Score 13 16 16         DATA  Interactive Complexity: No  Crisis: No       Progress Since Last Session (Related to Symptoms / Goals / Homework):   Symptoms: No change Reports symptoms similar to previous session.     Homework: Partially completed      Episode of Care Goals: Satisfactory progress - CONTEMPLATION (Considering change and yet undecided); Intervened by assessing the negative and positive thinking (ambivalence) about behavior change     Current / Ongoing Stressors and Concerns:   Strained relationships with extended family.  Chronic health conditions.  Patient reports feeling affected by current events, she states she is wondering about her purpose, denies any thoughts of hurting herself but wonders \"What are we here for?\".   Patient reports significant anxiety going out to stores and other places, she reported this past week that she went to [employer] but stayed in the parking lot.       Treatment Objective(s) Addressed in This Session:   Identify negative self-talk and behaviors: challenge core beliefs, myths, and actions  Discussed patients negative thoughts related to her relationships with her family.  Patient processed some past interactions with her family.    Patient continues to report putting blame on herself, helped patient to realize that the difficulties in her family relationships are not entirely her fault.  Discussed dynamics in her relationship with her mother and how her mother presents with narcicisstic traits.        Intervention:   CBT: Helped patient restructured some of her negative thought processes.  Encouraged patient to keep focus on positive events in her life.  Patient was able to identify the positive relationships with her grown children.   Also focus on positive relationship with her sister.          ASSESSMENT: Current Emotional / Mental Status (status of significant symptoms):   Risk status (Self / Other harm or suicidal " ideation)   Patient denies current fears or concerns for personal safety.   Patient denies current or recent suicidal ideation or behaviors.   Patient denies current or recent homicidal ideation or behaviors.   Patient denies current or recent self injurious behavior or ideation.   Patient denies other safety concerns.   Patient reports there has been no change in risk factors since their last session.     Patient reports there has been no change in protective factors since their last session.     Recommended that patient call 911 or go to the local ED should there be a change in any of these risk factors.     Appearance:   N/A due to phone session    Eye Contact:   N/A due to phone session    Psychomotor Behavior: N/A due to phone session    Attitude:   Cooperative    Orientation:   All   Speech    Rate / Production: Normal     Volume:  Normal    Mood:    Anxious  Depressed  Sad    Affect:    Appropriate    Thought Content:  Clear  Perservative    Thought Form:  Coherent  Logical  Tangential    Insight:    Good , Fair  and External locus     Medication Review:   No changes to current psychiatric medication(s)  Patient has upcoming appointment with PCP to discuss medications.       Medication Compliance:   Yes     Changes in Health Issues:   None reported  Patient reports currently receiving physical therapy.  Reports chronic pain.  Reports she recently had an injection in her back and her pain has been reduced.       Chemical Use Review:   Substance Use: Chemical use reviewed, no active concerns identified      Tobacco Use: No current tobacco use.      Diagnosis:  1. Major depressive disorder, recurrent episode, moderate (H)    2. Anxiety disorder, unspecified        Collateral Reports Completed:   Not Applicable    PLAN: (Patient Tasks / Therapist Tasks / Other)  Patient to focus on self-care and positive aspects of her life.  Patient to consider reading book on daughters of mothers with narcissism and / or  "research narcissistic personality disorder.      Courtney Zuniga, LICSW                                                         ___________________________________                                                   Treatment Plan    Client's Name: Eva Solis  YOB: 1960    Date: 6/4/2020    DSM-V Diagnoses: 296.32 (F33.1) Major Depressive Disorder, Recurrent Episode, Moderate _ or 300.00 (F41.9) Unspecified Anxiety Disorder  Psychosocial / Contextual Factors:   Strained relationship with extended family, chronic pain and other health conditions, three adult sons, Concern over current events in country / world.    WHODAS:   WHODAS 2.0 Total Score 5/12/2020   Total Score 34         Referral / Collaboration:  Discussed option of psychiatry referral in the future.  .  Plan to coordinate care with PCP.      Anticipated number of session or this episode of care: 10-12      MeasurableTreatment Goal(s) related to diagnosis / functional impairment(s)  Goal 1: Client will reduce depression symptoms.    I will know I've met my goal when \"I would be happier, I wouldn't be so down\".      Objective #A (Client Action)    Client will Decrease frequency and intensity of feeling down, depressed, hopeless.  Reduce rating on PHQ-9, current score 3, goal to be 1 or 0.    Status: New - Date: 6/4/2020     Intervention(s)  Therapist will provide CBT to restructure negative cognitions.  Identify cognitive distortions.  .    Objective #B  Client will establish regular sleep patterns.  Currently sleeping excessively.  .  Status: New - Date: 6/4/2020     Intervention(s)  Therapist will assign homework Patient to notice sleep patterns.  .    Objective #C  Client will Identify negative self-talk and behaviors: challenge core beliefs, myths, and actions.  Status: New - Date: 6/4/2020     Intervention(s)  Therapist will provide CBT during therapy sessions.  .        Patient has reviewed and agreed to the above plan.      Courtney DUPREE" Efrain, GLADYS  June 4, 2020

## 2020-07-01 ENCOUNTER — VIRTUAL VISIT (OUTPATIENT)
Dept: PSYCHOLOGY | Facility: CLINIC | Age: 60
End: 2020-07-01
Payer: COMMERCIAL

## 2020-07-01 DIAGNOSIS — F41.9 ANXIETY DISORDER, UNSPECIFIED: ICD-10-CM

## 2020-07-01 DIAGNOSIS — F33.1 MAJOR DEPRESSIVE DISORDER, RECURRENT EPISODE, MODERATE (H): Primary | ICD-10-CM

## 2020-07-01 PROCEDURE — 90834 PSYTX W PT 45 MINUTES: CPT | Mod: TEL | Performed by: SOCIAL WORKER

## 2020-07-01 ASSESSMENT — ANXIETY QUESTIONNAIRES
1. FEELING NERVOUS, ANXIOUS, OR ON EDGE: MORE THAN HALF THE DAYS
5. BEING SO RESTLESS THAT IT IS HARD TO SIT STILL: SEVERAL DAYS
6. BECOMING EASILY ANNOYED OR IRRITABLE: SEVERAL DAYS
4. TROUBLE RELAXING: MORE THAN HALF THE DAYS
2. NOT BEING ABLE TO STOP OR CONTROL WORRYING: NEARLY EVERY DAY
7. FEELING AFRAID AS IF SOMETHING AWFUL MIGHT HAPPEN: MORE THAN HALF THE DAYS
IF YOU CHECKED OFF ANY PROBLEMS ON THIS QUESTIONNAIRE, HOW DIFFICULT HAVE THESE PROBLEMS MADE IT FOR YOU TO DO YOUR WORK, TAKE CARE OF THINGS AT HOME, OR GET ALONG WITH OTHER PEOPLE: EXTREMELY DIFFICULT
GAD7 TOTAL SCORE: 14
3. WORRYING TOO MUCH ABOUT DIFFERENT THINGS: NEARLY EVERY DAY

## 2020-07-01 ASSESSMENT — PATIENT HEALTH QUESTIONNAIRE - PHQ9: SUM OF ALL RESPONSES TO PHQ QUESTIONS 1-9: 14

## 2020-07-02 ASSESSMENT — ANXIETY QUESTIONNAIRES: GAD7 TOTAL SCORE: 14

## 2020-07-08 NOTE — PROGRESS NOTES
"                                           Progress Note    Patient Name: Eva Solis  Date: 7/1/2020         Service Type: Phone Visit,       Session Start Time: 3:35 pm  Session End Time: 4:25 pm     Session Length:   50 minutes    Session #: 7    Attendees: Client attended alone     Reason for Telemedicine Visit: Services only offered telehealth    Originating Site (Patient Location): Patient's place of employment    Distant Site (Provider Location): Provider Remote Setting     The patient has been notified of the following:      \"We have found that certain health care needs can be provided without the need for a face to face visit.  This service lets us provide the care you need with a phone conversation.       I will have full access to your [place] medical record during this entire phone call.   I will be taking notes for your medical record.      Since this is like an office visit, we will bill your insurance company for this service.       There are potential benefits and risks of telephone visits (e.g. limits to patient confidentiality) that differ from in-person visits.?  Confidentiality still applies for telephone services, and nobody will record the visit.  It is important to be in a quiet, private space that is free of distractions (including cell phone or other devices) during the visit.??      If during the course of the call I believe a telephone visit is not appropriate, you will not be charged for this service\"     Consent has been obtained for this service by care team member: Yes   Treatment Plan Last Reviewed: Began to discuss goals, delaying treatment plan to next session due to patient's symptoms and needing to discuss family issues.   PHQ-9 / LORETA-7 :   PHQ 5/26/2020 6/9/2020 7/1/2020   PHQ-9 Total Score 18 19 14   Q9: Thoughts of better off dead/self-harm past 2 weeks Not at all Not at all Not at all     LORETA-7 SCORE 5/26/2020 6/9/2020 7/1/2020   Total Score - - -   Total Score 16 16 14 " "        DATA  Interactive Complexity: No  Crisis: No       Progress Since Last Session (Related to Symptoms / Goals / Homework):   Symptoms: No change Reports symptoms similar to previous session.   Notes some improvement in depression and anxiety over the past couple of weeks.      Homework: Partially completed      Episode of Care Goals: Satisfactory progress - CONTEMPLATION (Considering change and yet undecided); Intervened by assessing the negative and positive thinking (ambivalence) about behavior change     Current / Ongoing Stressors and Concerns:   Strained relationships with extended family.  Chronic health conditions.  Patient reports feeling affected by current events, she states she is wondering about her purpose, denies any thoughts of hurting herself but wonders \"What are we here for?\".   Patient reports feeling stress about upcoming holiday weekend and the pressure to entertain family.       Treatment Objective(s) Addressed in This Session:   Increase interest, engagement, and pleasure in doing things  Discussed patients upcoming weekend with family.  Identified ways she can focus on her own self-care and ask for help as needed.       Intervention:   CBT: Helped patient restructured some of her negative thought processes.  Encouraged patient to keep focus on positive events in her life.  Patient was able to identify the positive relationships with her grown children.   Also focus on positive relationship with her sister.          ASSESSMENT: Current Emotional / Mental Status (status of significant symptoms):   Risk status (Self / Other harm or suicidal ideation)   Patient denies current fears or concerns for personal safety.   Patient denies current or recent suicidal ideation or behaviors.   Patient denies current or recent homicidal ideation or behaviors.   Patient denies current or recent self injurious behavior or ideation.   Patient denies other safety concerns.   Patient reports there has been " no change in risk factors since their last session.     Patient reports there has been no change in protective factors since their last session.     Recommended that patient call 911 or go to the local ED should there be a change in any of these risk factors.     Appearance:   N/A due to phone session    Eye Contact:   N/A due to phone session    Psychomotor Behavior: N/A due to phone session    Attitude:   Cooperative    Orientation:   All   Speech    Rate / Production: Normal     Volume:  Normal    Mood:    Anxious  Depressed  Sad    Affect:    Appropriate    Thought Content:  Clear  Perservative    Thought Form:  Coherent  Logical  Tangential    Insight:    Good , Fair  and External locus     Medication Review:   No changes to current psychiatric medication(s)       Medication Compliance:   Yes     Changes in Health Issues:   None reported       Chemical Use Review:   Substance Use: Chemical use reviewed, no active concerns identified      Tobacco Use: No current tobacco use.      Diagnosis:  1. Major depressive disorder, recurrent episode, moderate (H)    2. Anxiety disorder, unspecified        Collateral Reports Completed:   Not Applicable    PLAN: (Patient Tasks / Therapist Tasks / Other)  Patient to focus on self-care and positive aspects of her life.  Patient to focus on self-care during her busy upcoming weekend with family.      Courtney Zuniga, [facility]                                                         ___________________________________                                                   Treatment Plan    Client's Name: Eva Solis  YOB: 1960    Date: 6/4/2020    DSM-V Diagnoses: 296.32 (F33.1) Major Depressive Disorder, Recurrent Episode, Moderate _ or 300.00 (F41.9) Unspecified Anxiety Disorder  Psychosocial / Contextual Factors:   Strained relationship with extended family, chronic pain and other health conditions, three adult sons, Concern over current events in country / world.   "  WHODAS:   WHODAS 2.0 Total Score 5/12/2020   Total Score 34         Referral / Collaboration:  Discussed option of psychiatry referral in the future.  .  Plan to coordinate care with PCP.      Anticipated number of session or this episode of care: 10-12      MeasurableTreatment Goal(s) related to diagnosis / functional impairment(s)  Goal 1: Client will reduce depression symptoms.    I will know I've met my goal when \"I would be happier, I wouldn't be so down\".      Objective #A (Client Action)    Client will Decrease frequency and intensity of feeling down, depressed, hopeless.  Reduce rating on PHQ-9, current score 3, goal to be 1 or 0.    Status: New - Date: 6/4/2020     Intervention(s)  Therapist will provide CBT to restructure negative cognitions.  Identify cognitive distortions.  .    Objective #B  Client will establish regular sleep patterns.  Currently sleeping excessively.  .  Status: New - Date: 6/4/2020     Intervention(s)  Therapist will assign homework Patient to notice sleep patterns.  .    Objective #C  Client will Identify negative self-talk and behaviors: challenge core beliefs, myths, and actions.  Status: New - Date: 6/4/2020     Intervention(s)  Therapist will provide CBT during therapy sessions.  .        Patient has reviewed and agreed to the above plan.      Courtney Zuniga, [facility]  June 4, 2020  "

## 2020-07-09 ENCOUNTER — VIRTUAL VISIT (OUTPATIENT)
Dept: PSYCHOLOGY | Facility: CLINIC | Age: 60
End: 2020-07-09
Payer: COMMERCIAL

## 2020-07-09 DIAGNOSIS — F33.1 MAJOR DEPRESSIVE DISORDER, RECURRENT EPISODE, MODERATE (H): Primary | ICD-10-CM

## 2020-07-09 DIAGNOSIS — F41.9 ANXIETY DISORDER, UNSPECIFIED: ICD-10-CM

## 2020-07-09 PROCEDURE — 90834 PSYTX W PT 45 MINUTES: CPT | Mod: TEL | Performed by: SOCIAL WORKER

## 2020-07-14 NOTE — PROGRESS NOTES
"                                           Progress Note    Patient Name: Eva Solis  Date: 7/9/2020         Service Type: Phone Visit,       Session Start Time: 3:35 pm  Session End Time: 4:25 pm     Session Length:   50 minutes    Session #: 8    Attendees: Client attended alone     Reason for Telemedicine Visit: Services only offered telehealth    Originating Site (Patient Location): Patient's place of employment    Distant Site (Provider Location): Provider Remote Setting     The patient has been notified of the following:      \"We have found that certain health care needs can be provided without the need for a face to face visit.  This service lets us provide the care you need with a phone conversation.       I will have full access to your [place] medical record during this entire phone call.   I will be taking notes for your medical record.      Since this is like an office visit, we will bill your insurance company for this service.       There are potential benefits and risks of telephone visits (e.g. limits to patient confidentiality) that differ from in-person visits.?  Confidentiality still applies for telephone services, and nobody will record the visit.  It is important to be in a quiet, private space that is free of distractions (including cell phone or other devices) during the visit.??      If during the course of the call I believe a telephone visit is not appropriate, you will not be charged for this service\"     Consent has been obtained for this service by care team member: Yes   Treatment Plan Last Reviewed: Began to discuss goals, delaying treatment plan to next session due to patient's symptoms and needing to discuss family issues.   PHQ-9 / LORETA-7 :   PHQ 5/26/2020 6/9/2020 7/1/2020   PHQ-9 Total Score 18 19 14   Q9: Thoughts of better off dead/self-harm past 2 weeks Not at all Not at all Not at all     LORETA-7 SCORE 5/26/2020 6/9/2020 7/1/2020   Total Score - - -   Total Score 16 16 14 " "        DATA  Interactive Complexity: No  Crisis: No       Progress Since Last Session (Related to Symptoms / Goals / Homework):   Symptoms: No change Reports symptoms similar to previous session.   Notes some improvement in depression and anxiety over the past couple of weeks.      Homework: Achieved / completed to satisfaction, Patient reported she enjoyed the 4th of July weekend.        Episode of Care Goals: Satisfactory progress - PREPARATION (Decided to change - considering how); Intervened by negotiating a change plan and determining options / strategies for behavior change, identifying triggers, exploring social supports, and working towards setting a date to begin behavior change     Current / Ongoing Stressors and Concerns:   Strained relationships with extended family.  Chronic health conditions including midgraine headaches.  Patient reports feeling affected by current events, she states she is wondering about her purpose, denies any thoughts of hurting herself but wonders \"What are we here for?\".      Treatment Objective(s) Addressed in This Session:   Increase interest, engagement, and pleasure in doing things  Discussed patients work and at times having difficulty with concentration.  Patient reports she sometimes struggles with migraine headaches at work.  Patient reported [place]      Intervention:   CBT: Helped patient restructured some of her negative thought processes.  Encouraged patient to keep focus on positive events in her life.  Patient was able to identify the positive relationships with her grown children.         ASSESSMENT: Current Emotional / Mental Status (status of significant symptoms):   Risk status (Self / Other harm or suicidal ideation)   Patient denies current fears or concerns for personal safety.   Patient denies current or recent suicidal ideation or behaviors.   Patient denies current or recent homicidal ideation or " behaviors.   Patient denies current or recent self injurious behavior or ideation.   Patient denies other safety concerns.   Patient reports there has been no change in risk factors since their last session.     Patient reports there has been no change in protective factors since their last session.     Recommended that patient call 911 or go to the local ED should there be a change in any of these risk factors.     Appearance:   N/A due to phone session    Eye Contact:   N/A due to phone session    Psychomotor Behavior: N/A due to phone session    Attitude:   Cooperative    Orientation:   All   Speech    Rate / Production: Normal     Volume:  Normal    Mood:    Anxious  Depressed    Affect:    Appropriate    Thought Content:  Clear  Perservative    Thought Form:  Coherent  Logical  Tangential    Insight:    Good , Fair  and External locus     Medication Review:   No changes to current psychiatric medication(s)       Medication Compliance:   Yes     Changes in Health Issues:   None reported       Chemical Use Review:   Substance Use: Chemical use reviewed, no active concerns identified      Tobacco Use: No current tobacco use.      Diagnosis:  1. Major depressive disorder, recurrent episode, moderate (H)    2. Anxiety disorder, unspecified        Collateral Reports Completed:   Not Applicable    PLAN: (Patient Tasks / Therapist Tasks / Other)  Patient to focus on self-care and positive aspects of her life.  Patient to focus on self-care and relaxation at the cabin this upcoming weekend.     Courtney Zuniga, [facility]                                                         ___________________________________                                                   Treatment Plan    Client's Name: Eva Solis  YOB: 1960    Date: 6/4/2020    DSM-V Diagnoses: 296.32 (F33.1) Major Depressive Disorder, Recurrent Episode, Moderate _ or 300.00 (F41.9) Unspecified Anxiety Disorder  Psychosocial / Contextual Factors:    "Strained relationship with extended family, chronic pain and other health conditions, three adult sons, Concern over current events in country / world.    WHODAS:   WHODAS 2.0 Total Score 5/12/2020   Total Score 34         Referral / Collaboration:  Discussed option of psychiatry referral in the future.  .  Plan to coordinate care with PCP.      Anticipated number of session or this episode of care: 10-12      MeasurableTreatment Goal(s) related to diagnosis / functional impairment(s)  Goal 1: Client will reduce depression symptoms.    I will know I've met my goal when \"I would be happier, I wouldn't be so down\".      Objective #A (Client Action)    Client will Decrease frequency and intensity of feeling down, depressed, hopeless.  Reduce rating on PHQ-9, current score 3, goal to be 1 or 0.    Status: New - Date: 6/4/2020     Intervention(s)  Therapist will provide CBT to restructure negative cognitions.  Identify cognitive distortions.  .    Objective #B  Client will establish regular sleep patterns.  Currently sleeping excessively.  .  Status: New - Date: 6/4/2020     Intervention(s)  Therapist will assign homework Patient to notice sleep patterns.  .    Objective #C  Client will Identify negative self-talk and behaviors: challenge core beliefs, myths, and actions.  Status: New - Date: 6/4/2020     Intervention(s)  Therapist will provide CBT during therapy sessions.  .        Patient has reviewed and agreed to the above plan.      Courtney Zuniga, [facility]  June 4, 2020  "

## 2020-07-15 ENCOUNTER — VIRTUAL VISIT (OUTPATIENT)
Dept: PSYCHOLOGY | Facility: CLINIC | Age: 60
End: 2020-07-15
Payer: COMMERCIAL

## 2020-07-15 DIAGNOSIS — F41.9 ANXIETY DISORDER, UNSPECIFIED: ICD-10-CM

## 2020-07-15 DIAGNOSIS — F33.1 MAJOR DEPRESSIVE DISORDER, RECURRENT EPISODE, MODERATE (H): Primary | ICD-10-CM

## 2020-07-15 PROCEDURE — 90834 PSYTX W PT 45 MINUTES: CPT | Mod: TEL | Performed by: SOCIAL WORKER

## 2020-07-15 ASSESSMENT — ANXIETY QUESTIONNAIRES
5. BEING SO RESTLESS THAT IT IS HARD TO SIT STILL: NOT AT ALL
1. FEELING NERVOUS, ANXIOUS, OR ON EDGE: NEARLY EVERY DAY
7. FEELING AFRAID AS IF SOMETHING AWFUL MIGHT HAPPEN: NEARLY EVERY DAY
2. NOT BEING ABLE TO STOP OR CONTROL WORRYING: NEARLY EVERY DAY
6. BECOMING EASILY ANNOYED OR IRRITABLE: NOT AT ALL
4. TROUBLE RELAXING: MORE THAN HALF THE DAYS
GAD7 TOTAL SCORE: 14
3. WORRYING TOO MUCH ABOUT DIFFERENT THINGS: NEARLY EVERY DAY
IF YOU CHECKED OFF ANY PROBLEMS ON THIS QUESTIONNAIRE, HOW DIFFICULT HAVE THESE PROBLEMS MADE IT FOR YOU TO DO YOUR WORK, TAKE CARE OF THINGS AT HOME, OR GET ALONG WITH OTHER PEOPLE: VERY DIFFICULT

## 2020-07-15 ASSESSMENT — PATIENT HEALTH QUESTIONNAIRE - PHQ9: SUM OF ALL RESPONSES TO PHQ QUESTIONS 1-9: 15

## 2020-07-16 ASSESSMENT — ANXIETY QUESTIONNAIRES: GAD7 TOTAL SCORE: 14

## 2020-07-24 NOTE — PROGRESS NOTES
"                                           Progress Note    Patient Name: Eva Solis  Date: 7/15/2020         Service Type: Phone Visit,       Session Start Time: 11:05 am  Session End Time: 11:55 am     Session Length:   50 minutes    Session #: 9    Attendees: Client attended alone     Reason for Telemedicine Visit: Services only offered telehealth    Originating Site (Patient Location): Patient's place of employment    Distant Site (Provider Location): Provider Remote Setting     The patient has been notified of the following:      \"We have found that certain health care needs can be provided without the need for a face to face visit.  This service lets us provide the care you need with a phone conversation.       I will have full access to your [facility] medical record during this entire phone call.   I will be taking notes for your medical record.      Since this is like an office visit, we will bill your insurance company for this service.       There are potential benefits and risks of telephone visits (e.g. limits to patient confidentiality) that differ from in-person visits.?  Confidentiality still applies for telephone services, and nobody will record the visit.  It is important to be in a quiet, private space that is free of distractions (including cell phone or other devices) during the visit.??      If during the course of the call I believe a telephone visit is not appropriate, you will not be charged for this service\"     Consent has been obtained for this service by care team member: Yes   Treatment Plan Last Reviewed: 6/4/2020    PHQ-9 / LORETA-7 :   PHQ 6/9/2020 7/1/2020 7/15/2020   PHQ-9 Total Score 19 14 15   Q9: Thoughts of better off dead/self-harm past 2 weeks Not at all Not at all Not at all     LORETA-7 SCORE 6/9/2020 7/1/2020 7/15/2020   Total Score - - -   Total Score 16 14 14         DATA  Interactive Complexity: No  Crisis: No       Progress Since Last Session (Related to Symptoms / Goals " "/ Homework):   Symptoms: Improving reports depression symptoms are improving, notes anxiety is still high.        Homework: Achieved / completed to satisfaction,       Episode of Care Goals: Satisfactory progress - PREPARATION (Decided to change - considering how); Intervened by negotiating a change plan and determining options / strategies for behavior change, identifying triggers, exploring social supports, and working towards setting a date to begin behavior change     Current / Ongoing Stressors and Concerns:   Strained relationships with extended family.  Chronic health conditions including midgraine headaches.  Patient reports feeling affected by current events, she states she is wondering about her purpose, denies any thoughts of hurting herself but wonders \"What are we here for?\".      Treatment Objective(s) Addressed in This Session:   Decrease frequency and intensity of feeling down, depressed, hopeless  Discussed patients family history and how this has contributed to her depression.  She reported at her wedding because her step father walked her down the aisle, her father didn't attend and it was years before she saw him again.     Intervention:   CBT: Helped patient restructured some of her negative thought processes.  Encouraged patient to keep focus on positive events in her life.  Patient was able to identify the positive relationships with her grown children.         ASSESSMENT: Current Emotional / Mental Status (status of significant symptoms):   Risk status (Self / Other harm or suicidal ideation)   Patient denies current fears or concerns for personal safety.   Patient denies current or recent suicidal ideation or behaviors.   Patient denies current or recent homicidal ideation or behaviors.   Patient denies current or recent self injurious behavior or ideation.   Patient denies other safety concerns.   Patient reports there has been no change in risk factors since their last session.     Patient " reports there has been no change in protective factors since their last session.     Recommended that patient call 911 or go to the local ED should there be a change in any of these risk factors.     Appearance:   N/A due to phone session    Eye Contact:   N/A due to phone session    Psychomotor Behavior: N/A due to phone session    Attitude:   Cooperative    Orientation:   All   Speech    Rate / Production: Normal     Volume:  Normal    Mood:    Anxious  Depressed    Affect:    Appropriate    Thought Content:  Clear  Perservative    Thought Form:  Coherent  Logical  Tangential    Insight:    Good , Fair  and External locus     Medication Review:   No changes to current psychiatric medication(s)       Medication Compliance:   Yes     Changes in Health Issues:   None reported       Chemical Use Review:   Substance Use: Chemical use reviewed, no active concerns identified      Tobacco Use: No current tobacco use.      Diagnosis:  1. Major depressive disorder, recurrent episode, moderate (H)    2. Anxiety disorder, unspecified        Collateral Reports Completed:   Not Applicable    PLAN: (Patient Tasks / Therapist Tasks / Other)  Patient to focus on self-care and positive aspects of her life.  Patient looking forward to upcoming anniversary celebration.      Courtney Zuniga, [facility]                                                         ___________________________________                                                   Treatment Plan    Client's Name: Eva Solis  YOB: 1960    Date: 6/4/2020    DSM-V Diagnoses: 296.32 (F33.1) Major Depressive Disorder, Recurrent Episode, Moderate _ or 300.00 (F41.9) Unspecified Anxiety Disorder  Psychosocial / Contextual Factors:   Strained relationship with extended family, chronic pain and other health conditions, three adult sons, Concern over current events in country / world.    WHODAS:   WHODAS 2.0 Total Score 5/12/2020   Total Score 34         Referral /  "Collaboration:  Discussed option of psychiatry referral in the future.  .  Plan to coordinate care with PCP.      Anticipated number of session or this episode of care: 10-12      MeasurableTreatment Goal(s) related to diagnosis / functional impairment(s)  Goal 1: Client will reduce depression symptoms.    I will know I've met my goal when \"I would be happier, I wouldn't be so down\".      Objective #A (Client Action)    Client will Decrease frequency and intensity of feeling down, depressed, hopeless.  Reduce rating on PHQ-9, current score 3, goal to be 1 or 0.    Status: New - Date: 6/4/2020     Intervention(s)  Therapist will provide CBT to restructure negative cognitions.  Identify cognitive distortions.  .    Objective #B  Client will establish regular sleep patterns.  Currently sleeping excessively.  .  Status: New - Date: 6/4/2020     Intervention(s)  Therapist will assign homework Patient to notice sleep patterns.  .    Objective #C  Client will Identify negative self-talk and behaviors: challenge core beliefs, myths, and actions.  Status: New - Date: 6/4/2020     Intervention(s)  Therapist will provide CBT during therapy sessions.  .        Patient has reviewed and agreed to the above plan.      Courtney Zuniga, [facility]  June 4, 2020  "

## 2020-07-31 ENCOUNTER — TELEPHONE (OUTPATIENT)
Dept: PSYCHOLOGY | Facility: CLINIC | Age: 60
End: 2020-07-31

## 2020-07-31 NOTE — TELEPHONE ENCOUNTER
Patient was scheduled at 12:30 pm.  Patient did not respond to video invite.  Attempted to call patient 2x by phone, left vmail regarding appointment and provided writer / scheduling contact information.

## 2020-08-05 ENCOUNTER — VIRTUAL VISIT (OUTPATIENT)
Dept: PSYCHOLOGY | Facility: CLINIC | Age: 60
End: 2020-08-05
Payer: COMMERCIAL

## 2020-08-05 DIAGNOSIS — F33.1 MAJOR DEPRESSIVE DISORDER, RECURRENT EPISODE, MODERATE (H): Primary | ICD-10-CM

## 2020-08-05 DIAGNOSIS — F41.9 ANXIETY DISORDER, UNSPECIFIED: ICD-10-CM

## 2020-08-05 PROCEDURE — 90834 PSYTX W PT 45 MINUTES: CPT | Mod: TEL | Performed by: SOCIAL WORKER

## 2020-08-05 ASSESSMENT — ANXIETY QUESTIONNAIRES
4. TROUBLE RELAXING: NEARLY EVERY DAY
GAD7 TOTAL SCORE: 19
IF YOU CHECKED OFF ANY PROBLEMS ON THIS QUESTIONNAIRE, HOW DIFFICULT HAVE THESE PROBLEMS MADE IT FOR YOU TO DO YOUR WORK, TAKE CARE OF THINGS AT HOME, OR GET ALONG WITH OTHER PEOPLE: EXTREMELY DIFFICULT
5. BEING SO RESTLESS THAT IT IS HARD TO SIT STILL: NEARLY EVERY DAY
6. BECOMING EASILY ANNOYED OR IRRITABLE: SEVERAL DAYS
2. NOT BEING ABLE TO STOP OR CONTROL WORRYING: NEARLY EVERY DAY
7. FEELING AFRAID AS IF SOMETHING AWFUL MIGHT HAPPEN: NEARLY EVERY DAY
3. WORRYING TOO MUCH ABOUT DIFFERENT THINGS: NEARLY EVERY DAY
1. FEELING NERVOUS, ANXIOUS, OR ON EDGE: NEARLY EVERY DAY

## 2020-08-05 ASSESSMENT — PATIENT HEALTH QUESTIONNAIRE - PHQ9: SUM OF ALL RESPONSES TO PHQ QUESTIONS 1-9: 22

## 2020-08-05 NOTE — PROGRESS NOTES
"                                           Progress Note    Patient Name: Eva Solis  Date: 8/5/2020         Service Type: Phone Visit,       Session Start Time: 11:05 am  Session End Time: 11:55 am     Session Length:   50 minutes    Session #: 9    Attendees: Client attended alone     Reason for Telemedicine Visit: Services only offered telehealth    Originating Site (Patient Location): Patient's place of employment    Distant Site (Provider Location): Provider Remote Setting     The patient has been notified of the following:      \"We have found that certain health care needs can be provided without the need for a face to face visit.  This service lets us provide the care you need with a phone conversation.       I will have full access to your [facility] medical record during this entire phone call.   I will be taking notes for your medical record.      Since this is like an office visit, we will bill your insurance company for this service.       There are potential benefits and risks of telephone visits (e.g. limits to patient confidentiality) that differ from in-person visits.?  Confidentiality still applies for telephone services, and nobody will record the visit.  It is important to be in a quiet, private space that is free of distractions (including cell phone or other devices) during the visit.??      If during the course of the call I believe a telephone visit is not appropriate, you will not be charged for this service\"     Consent has been obtained for this service by care team member: Yes   Treatment Plan Last Reviewed: 6/4/2020    PHQ-9 / LORETA-7 :   PHQ 7/1/2020 7/15/2020 8/5/2020   PHQ-9 Total Score 14 15 22   Q9: Thoughts of better off dead/self-harm past 2 weeks Not at all Not at all Not at all     LORETA-7 SCORE 7/1/2020 7/15/2020 8/5/2020   Total Score - - -   Total Score 14 14 19         DATA  Interactive Complexity: No  Crisis: No       Progress Since Last Session (Related to Symptoms / Goals / " "Homework):   Symptoms: Worsening Reports worsening symptoms due to sister's recent cancer Dx.        Homework: Partially completed, Patient has been trying to focus on her self-care by [place] time [place] cabin.        Episode of Care Goals: No improvement - CONTEMPLATION (Considering change and yet undecided); Intervened by assessing the negative and positive thinking (ambivalence) about behavior change     Current / Ongoing Stressors and Concerns:   Strained relationships with extended family.  Chronic health conditions including midgraine headaches.  Patient reports feeling affected by current events, she states she is wondering about her purpose, denies any thoughts of hurting herself but wonders \"What are we here for?\".   Patient reported since last session her sister has been diagnosed with Stage 4 cancer.       Treatment Objective(s) Addressed in This Session:   Decrease frequency and intensity of feeling down, depressed, hopeless  Discuss motivation / ambivalence about taking anti-depressant / mood stabilizer medication(s)  Discussed patients increased depression symptoms and some ways to help decrease those symptoms.  Discussed patient's sleep difficulties, patient admits she was prescribed medication by her PCP at a previous visit she has not been taking.  Discussed patient reaching out to her PCP for medication.       Intervention:   CBT: Helped patient restructured some of her negative thought processes.  Encouraged patient to keep focus on positive events in her life.  Patient was able to identify the positive relationships with her grown children.       Solution Focused:  Discussed option of reaching out to PCP regarding medications, patient also was offered again the option of a referral to collaborative psychiatry.       ASSESSMENT: Current Emotional / Mental Status (status of significant symptoms):   Risk status (Self / Other harm or suicidal ideation)   Patient denies current fears or concerns for " personal safety.   Patient denies current or recent suicidal ideation or behaviors.   Patient denies current or recent homicidal ideation or behaviors.   Patient denies current or recent self injurious behavior or ideation.   Patient denies other safety concerns.   Patient reports there has been no change in risk factors since their last session.     Patient reports there has been no change in protective factors since their last session.     Recommended that patient call 911 or go to the local ED should there be a change in any of these risk factors.     Appearance:   N/A due to phone session    Eye Contact:   N/A due to phone session    Psychomotor Behavior: N/A due to phone session    Attitude:   Cooperative    Orientation:   All   Speech    Rate / Production: Normal     Volume:  Normal    Mood:    Anxious  Depressed  Sad  Grieving   Affect:    Tearful Worrisome    Thought Content:  Perservative  Rumination    Thought Form:  Coherent  Tangential    Insight:    Fair  and External locus     Medication Review:   No changes to current psychiatric medication(s)       Medication Compliance:   Yes     Changes in Health Issues:   None reported       Chemical Use Review:   Substance Use: Chemical use reviewed, no active concerns identified      Tobacco Use: No current tobacco use.      Diagnosis:  1. Major depressive disorder, recurrent episode, moderate (H)    2. Anxiety disorder, unspecified        Collateral Reports Completed:   Not Applicable    PLAN: (Patient Tasks / Therapist Tasks / Other)  Patient to contact PCP regarding increased symptoms and possible medication change.  Patient to focus on self-care while at the same time supporting her sister.      Courtney Zuniga, [facility]                                                         ___________________________________                                                   Treatment Plan    Client's Name: Eva Solis  YOB: 1960    Date: 6/4/2020    DSM-V  "Diagnoses: 296.32 (F33.1) Major Depressive Disorder, Recurrent Episode, Moderate _ or 300.00 (F41.9) Unspecified Anxiety Disorder  Psychosocial / Contextual Factors:   Strained relationship with extended family, chronic pain and other health conditions, three adult sons, Concern over current events in country / world.    WHODAS:   WHODAS 2.0 Total Score 5/12/2020   Total Score 34         Referral / Collaboration:  Discussed option of psychiatry referral in the future.  .  Plan to coordinate care with PCP.      Anticipated number of session or this episode of care: 10-12      MeasurableTreatment Goal(s) related to diagnosis / functional impairment(s)  Goal 1: Client will reduce depression symptoms.    I will know I've met my goal when \"I would be happier, I wouldn't be so down\".      Objective #A (Client Action)    Client will Decrease frequency and intensity of feeling down, depressed, hopeless.  Reduce rating on PHQ-9, current score 3, goal to be 1 or 0.    Status: New - Date: 6/4/2020     Intervention(s)  Therapist will provide CBT to restructure negative cognitions.  Identify cognitive distortions.  .    Objective #B  Client will establish regular sleep patterns.  Currently sleeping excessively.  .  Status: New - Date: 6/4/2020     Intervention(s)  Therapist will assign homework Patient to notice sleep patterns.  .    Objective #C  Client will Identify negative self-talk and behaviors: challenge core beliefs, myths, and actions.  Status: New - Date: 6/4/2020     Intervention(s)  Therapist will provide CBT during therapy sessions.  .        Patient has reviewed and agreed to the above plan.      Courtney Zuniga, [facility]  June 4, 2020  "

## 2020-08-06 ENCOUNTER — MYC MEDICAL ADVICE (OUTPATIENT)
Dept: FAMILY MEDICINE | Facility: OTHER | Age: 60
End: 2020-08-06

## 2020-08-06 ASSESSMENT — ANXIETY QUESTIONNAIRES: GAD7 TOTAL SCORE: 19

## 2020-08-06 NOTE — PROGRESS NOTES
"Eva Solis is a 60 year old female who is being evaluated via a billable telephone visit.      The patient has been notified of following:     \"This telephone visit will be conducted via a call between you and your physician/provider. We have found that certain health care needs can be provided without the need for a physical exam.  This service lets us provide the care you need with a short phone conversation.  If a prescription is necessary we can send it directly to your pharmacy.  If lab work is needed we can place an order for that and you can then stop by our lab to have the test done at a later time.    Telephone visits are billed at different rates depending on your insurance coverage. During this emergency period, for some insurers they may be billed the same as an in-person visit.  Please reach out to your insurance provider with any questions.    If during the course of the call the physician/provider feels a telephone visit is not appropriate, you will not be charged for this service.\"    Patient has given verbal consent for Telephone visit?  Yes        Subjective     Eva Solis is a 60 year old female who presents via phone visit today for the following health issues:    HPI    Sister found out she had Stage 4 Cancer (mets to lungs and spine) this week.  Patient has a lot of support with her sister and is devastated by her symptoms.  Patient states that she and her sister cut off from the rest of the family and that her sister does not want the rest of the family to know.  Eva is not sleeping well.  She is crying often and does not feel she is handling the news very well.  She does enjoy [place] to the cabin and she is sleeping this afternoon to go to her cabin with her  1 of her kids and their family.    She currently is tending counseling weekly.  She has been resistant to medication changes for months but she feels she needs to have some type of medication management at this " time    She also complains of a headache for the last 4 days since she found out the news.  She states that her whole head feels tight.  She has tried Excedrin, Tylenol and ibuprofen.    Patient Active Problem List   Diagnosis     Slow transit constipation     HTN, goal below 140/90     Diastolic CHF (H)     Decreased calculated GFR     Morbid obesity, unspecified obesity type (H)     Migraine without aura and without status migrainosus, not intractable     Primary osteoarthritis of left hip     Major depressive disorder, recurrent episode, moderate (H)     Past Surgical History:   Procedure Laterality Date     C  DELIVERY ONLY      , Low Cervical     C  DELIVERY ONLY       C VAGINAL HYSTERECTOMY      without oophorectomy     COLONOSCOPY  10/06/10    attempt at colonscopy to 50cm     HC COLONOSCOPY W/WO BRUSH/WASH  2005    Diverticulosis.  Internal hemorrhoids.     INJECT EPIDURAL LUMBAR N/A 10/18/2019    Procedure: INJECTION, SPINE, LUMBAR 4 - LUMBAR 5, EPIDURAL TRANSLAMINAR;  Surgeon: Trever Wheatley MD;  Location: [facility] OR     INJECT EPIDURAL LUMBAR N/A 2020    Procedure: INJECTION, SPINE, LUMBAR 4-5, EPIDURAL TRANSLAMINAR;  Surgeon: Trever Wheatley MD;  Location: [facility] OR     JOINT REPLACEMTN, KNEE RT/LT  2006    Right total knee arthroplasty.     JOINT REPLACEMTN, KNEE RT/LT  2006    Left total knee arthroplasty.       Social History     Tobacco Use     Smoking status: Never Smoker     Smokeless tobacco: Never Used     Tobacco comment: no smokers in household   Substance Use Topics     Alcohol use: No     Family History   Problem Relation Age of Onset     Cancer Father         squamous cell ca     Cancer Maternal Grandmother         lung     Cerebrovascular Disease Maternal Grandmother      Cancer Paternal Grandmother         squamous cell ca     Diabetes Sister      Cancer Sister         kidney cancer     Kidney Cancer Sister      Other Cancer Sister          kidney cancer     Connective Tissue Disorder Brother         lupus     Lupus Brother      Kidney Disease Mother         atrophic kidney     Hypertension Mother      Depression Mother         she's being treated for this     Kidney Disease Maternal Uncle         unclear kidney issue     Anxiety Disorder Son      Anxiety Disorder Niece      Hypertension No family hx of      Colon Cancer No family hx of      Anxiety Disorder No family hx of      Asthma No family hx of            Reviewed and updated as needed this visit by Provider  Tobacco  Allergies  Meds  Problems  Med Hx  Surg Hx  Fam Hx         Review of Systems   CONSTITUTIONAL: NEGATIVE for fever, chills, change in weight  RESP: NEGATIVE for significant cough or shortness of breath   CV: NEGATIVE for chest pain, palpitations or peripheral edema       Objective   Reported vitals:  There were no vitals taken for this visit.   General: alert and no distress  PSYCH: Alert and oriented times 3; coherent speech, normal   rate and volume, able to articulate logical thoughts, able   to abstract reason, no tangential thoughts, no hallucinations   or delusions  Her affect is tearful  RESP: No cough, no audible wheezing, able to talk in full sentences  Remainder of exam unable to be completed due to telephone visits            Assessment/Plan:    1. Major depressive disorder, recurrent episode, moderate (H)  Discussed options including alternative SSRI or SNRI.  Also discussed adding Abilify.  At this time will add Abilify 2 mg daily.  We will have her follow-up with 1 month.  She will continue with counseling.  Could always consider psychiatry if no improvement.    - ARIPiprazole (ABILIFY) 2 MG tablet; Take 1 tablet (2 mg) by mouth daily  Dispense: 30 tablet; Refill: 1    2. Migraine without aura and without status migrainosus, not intractable  Patient has not tried her cyclobenzaprine that she has at home.  Her to take this as she is tight feeling in the muscles  in her neck and head      Return in about 4 weeks (around 9/4/2020) for depression follow up.      Phone call duration:  13 minutes    Renetta Benitez MD

## 2020-08-06 NOTE — TELEPHONE ENCOUNTER
Routing to PCP. I would schedule patient but you are pretty booked for days you are here so unsure how you would like to handle this. Please advise. Thank you!    Fauzia Willams MA

## 2020-08-07 ENCOUNTER — VIRTUAL VISIT (OUTPATIENT)
Dept: FAMILY MEDICINE | Facility: OTHER | Age: 60
End: 2020-08-07
Payer: COMMERCIAL

## 2020-08-07 DIAGNOSIS — F33.1 MAJOR DEPRESSIVE DISORDER, RECURRENT EPISODE, MODERATE (H): Primary | ICD-10-CM

## 2020-08-07 DIAGNOSIS — G43.009 MIGRAINE WITHOUT AURA AND WITHOUT STATUS MIGRAINOSUS, NOT INTRACTABLE: ICD-10-CM

## 2020-08-07 PROCEDURE — 99214 OFFICE O/P EST MOD 30 MIN: CPT | Mod: TEL | Performed by: FAMILY MEDICINE

## 2020-08-07 RX ORDER — ARIPIPRAZOLE 2 MG/1
2 TABLET ORAL DAILY
Qty: 30 TABLET | Refills: 1 | Status: SHIPPED | OUTPATIENT
Start: 2020-08-07 | End: 2020-09-04 | Stop reason: SINTOL

## 2020-08-24 ENCOUNTER — VIRTUAL VISIT (OUTPATIENT)
Dept: PSYCHOLOGY | Facility: CLINIC | Age: 60
End: 2020-08-24
Payer: COMMERCIAL

## 2020-08-24 DIAGNOSIS — F41.9 ANXIETY DISORDER, UNSPECIFIED: ICD-10-CM

## 2020-08-24 DIAGNOSIS — F33.1 MAJOR DEPRESSIVE DISORDER, RECURRENT EPISODE, MODERATE (H): Primary | ICD-10-CM

## 2020-08-24 PROCEDURE — 90834 PSYTX W PT 45 MINUTES: CPT | Mod: TEL | Performed by: SOCIAL WORKER

## 2020-08-24 ASSESSMENT — ANXIETY QUESTIONNAIRES
2. NOT BEING ABLE TO STOP OR CONTROL WORRYING: NEARLY EVERY DAY
5. BEING SO RESTLESS THAT IT IS HARD TO SIT STILL: NOT AT ALL
GAD7 TOTAL SCORE: 16
6. BECOMING EASILY ANNOYED OR IRRITABLE: SEVERAL DAYS
IF YOU CHECKED OFF ANY PROBLEMS ON THIS QUESTIONNAIRE, HOW DIFFICULT HAVE THESE PROBLEMS MADE IT FOR YOU TO DO YOUR WORK, TAKE CARE OF THINGS AT HOME, OR GET ALONG WITH OTHER PEOPLE: EXTREMELY DIFFICULT
7. FEELING AFRAID AS IF SOMETHING AWFUL MIGHT HAPPEN: NEARLY EVERY DAY
3. WORRYING TOO MUCH ABOUT DIFFERENT THINGS: NEARLY EVERY DAY
4. TROUBLE RELAXING: NEARLY EVERY DAY
1. FEELING NERVOUS, ANXIOUS, OR ON EDGE: NEARLY EVERY DAY

## 2020-08-24 ASSESSMENT — PATIENT HEALTH QUESTIONNAIRE - PHQ9: SUM OF ALL RESPONSES TO PHQ QUESTIONS 1-9: 22

## 2020-08-25 DIAGNOSIS — G43.009 MIGRAINE WITHOUT AURA AND WITHOUT STATUS MIGRAINOSUS, NOT INTRACTABLE: ICD-10-CM

## 2020-08-25 DIAGNOSIS — I50.32 CHRONIC DIASTOLIC CONGESTIVE HEART FAILURE (H): ICD-10-CM

## 2020-08-25 DIAGNOSIS — F41.8 MIXED ANXIETY DEPRESSIVE DISORDER: ICD-10-CM

## 2020-08-25 DIAGNOSIS — I10 HTN, GOAL BELOW 140/90: ICD-10-CM

## 2020-08-25 ASSESSMENT — ANXIETY QUESTIONNAIRES: GAD7 TOTAL SCORE: 16

## 2020-08-27 RX ORDER — LOSARTAN POTASSIUM 100 MG/1
TABLET ORAL
Qty: 90 TABLET | Refills: 2 | Status: SHIPPED | OUTPATIENT
Start: 2020-08-27 | End: 2021-04-16

## 2020-08-27 RX ORDER — SPIRONOLACTONE 25 MG/1
TABLET ORAL
Qty: 90 TABLET | Refills: 2 | Status: SHIPPED | OUTPATIENT
Start: 2020-08-27 | End: 2021-04-16

## 2020-08-27 RX ORDER — FUROSEMIDE 20 MG
TABLET ORAL
Qty: 90 TABLET | Refills: 2 | Status: SHIPPED | OUTPATIENT
Start: 2020-08-27 | End: 2020-09-15

## 2020-08-27 NOTE — TELEPHONE ENCOUNTER
"Requested Prescriptions   Pending Prescriptions Disp Refills     busPIRone (BUSPAR) 10 MG tablet [Pharmacy Med Name: busPIRone HCl Oral Tablet 10 MG] 270 tablet 0     Sig: TAKE ONE TABLET BY MOUTH THREE TIMES DAILY       Atypical Antidepressants Protocol Failed - 8/25/2020  2:01 AM        Failed - Patient has PHQ-9 score less than 5 in past 6 months.     Please review last PHQ-9 score.           Passed - Medication active on med list        Passed - Patient is age 18 or older        Passed - No active pregnancy on record        Passed - No positive pregnancy test in past 12 mos        Passed - Recent (6 mo) or future (30 days) visit within the authorizing provider's specialty     Patient had office visit in the last 6 months or has a visit in the next 30 days with authorizing provider or within the authorizing provider's specialty.  See \"Patient Info\" tab in inbasket, or \"Choose Columns\" in Meds & Orders section of the refill encounter.               escitalopram (LEXAPRO) 20 MG tablet [Pharmacy Med Name: Escitalopram Oxalate Oral Tablet 20 MG] 90 tablet 0     Sig: TAKE ONE TABLET BY MOUTH ONE TIME DAILY       SSRIs Protocol Failed - 8/25/2020  2:01 AM        Failed - PHQ-9 score less than 5 in past 6 months     Please review last PHQ-9 score.           Passed - Medication is active on med list        Passed - Patient is age 18 or older        Passed - No active pregnancy on record        Passed - No positive pregnancy test in last 12 months        Passed - Recent (6 mo) or future (30 days) visit within the authorizing provider's specialty     Patient had office visit in the last 6 months or has a visit in the next 30 days with authorizing provider or within the authorizing provider's specialty.  See \"Patient Info\" tab in inbasket, or \"Choose Columns\" in Meds & Orders section of the refill encounter.               furosemide (LASIX) 20 MG tablet [Pharmacy Med Name: Furosemide Oral Tablet 20 MG] 90 tablet 0     Sig: " "TAKE ONE TABLET BY MOUTH ONE TIME DAILY       Diuretics (Including Combos) Protocol Passed - 8/25/2020  2:01 AM        Passed - Blood pressure under 140/90 in past 12 months     BP Readings from Last 3 Encounters:   06/12/20 126/71   06/10/20 130/70   03/10/20 138/88                 Passed - Recent (12 mo) or future (30 days) visit within the authorizing provider's specialty     Patient has had an office visit with the authorizing provider or a provider within the authorizing providers department within the previous 12 mos or has a future within next 30 days. See \"Patient Info\" tab in inbasket, or \"Choose Columns\" in Meds & Orders section of the refill encounter.              Passed - Medication is active on med list        Passed - Patient is age 18 or older        Passed - No active pregancy on record        Passed - Normal serum creatinine on file in past 12 months     Recent Labs   Lab Test 06/10/20  1547   CR 1.04              Passed - Normal serum potassium on file in past 12 months     Recent Labs   Lab Test 06/10/20  1547   POTASSIUM 4.2                    Passed - Normal serum sodium on file in past 12 months     Recent Labs   Lab Test 06/10/20  1547                 Passed - No positive pregnancy test in past 12 months           topiramate (TOPAMAX) 50 MG tablet [Pharmacy Med Name: Topiramate Oral Tablet 50 MG] 180 tablet 0     Sig: TAKE ONE TABLET BY MOUTH TWICE DAILY       Anti-Seizure Meds Protocol  Failed - 8/25/2020  2:01 AM        Failed - Review Authorizing provider's last note.      Refer to last progress notes: confirm request is for original authorizing provider (cannot be through other providers).          Passed - Recent (12 mo) or future (30 days) visit within the authorizing provider's specialty     Patient has had an office visit with the authorizing provider or a provider within the authorizing providers department within the previous 12 mos or has a future within next 30 days. See " "\"Patient Info\" tab in inbasket, or \"Choose Columns\" in Meds & Orders section of the refill encounter.              Passed - Normal CBC on file in past 26 months     Recent Labs   Lab Test 10/16/19  1043   WBC 6.5   RBC 4.65   HGB 13.8   HCT 41.0                    Passed - Normal ALT or AST on file in past 26 months     Recent Labs   Lab Test 08/27/19  0958   ALT 23     Recent Labs   Lab Test 08/27/19  0958   AST 19             Passed - Normal platelet count on file in past 26 months     Recent Labs   Lab Test 10/16/19  1043                  Passed - Medication is active on med list        Passed - No active pregnancy on record        Passed - No positive pregnancy test in last 12 months           spironolactone (ALDACTONE) 25 MG tablet [Pharmacy Med Name: Spironolactone Oral Tablet 25 MG] 90 tablet 0     Sig: TAKE ONE TABLET BY MOUTH ONE TIME DAILY       Diuretics (Including Combos) Protocol Passed - 8/25/2020  2:01 AM        Passed - Blood pressure under 140/90 in past 12 months     BP Readings from Last 3 Encounters:   06/12/20 126/71   06/10/20 130/70   03/10/20 138/88                 Passed - Recent (12 mo) or future (30 days) visit within the authorizing provider's specialty     Patient has had an office visit with the authorizing provider or a provider within the authorizing providers department within the previous 12 mos or has a future within next 30 days. See \"Patient Info\" tab in inbasket, or \"Choose Columns\" in Meds & Orders section of the refill encounter.              Passed - Medication is active on med list        Passed - Patient is age 18 or older        Passed - No active pregancy on record        Passed - Normal serum creatinine on file in past 12 months     Recent Labs   Lab Test 06/10/20  1547   CR 1.04              Passed - Normal serum potassium on file in past 12 months     Recent Labs   Lab Test 06/10/20  1547   POTASSIUM 4.2                    Passed - Normal serum sodium on " "file in past 12 months     Recent Labs   Lab Test 06/10/20  1547                 Passed - No positive pregnancy test in past 12 months           losartan (COZAAR) 100 MG tablet [Pharmacy Med Name: Losartan Potassium Oral Tablet 100 MG] 90 tablet 0     Sig: TAKE ONE TABLET BY MOUTH ONE TIME DAILY       Angiotensin-II Receptors Passed - 8/25/2020  2:01 AM        Passed - Last blood pressure under 140/90 in past 12 months     BP Readings from Last 3 Encounters:   06/12/20 126/71   06/10/20 130/70   03/10/20 138/88                 Passed - Recent (12 mo) or future (30 days) visit within the authorizing provider's specialty     Patient has had an office visit with the authorizing provider or a provider within the authorizing providers department within the previous 12 mos or has a future within next 30 days. See \"Patient Info\" tab in inbasket, or \"Choose Columns\" in Meds & Orders section of the refill encounter.              Passed - Medication is active on med list        Passed - Patient is age 18 or older        Passed - No active pregnancy on record        Passed - Normal serum creatinine on file in past 12 months     Recent Labs   Lab Test 06/10/20  1547   CR 1.04       Ok to refill medication if creatinine is low          Passed - Normal serum potassium on file in past 12 months     Recent Labs   Lab Test 06/10/20  1547   POTASSIUM 4.2                    Passed - No positive pregnancy test in past 12 months           Prescription approved per [facility] Refill Protocol.  Routing refill request to provider for review/approval because:  PHQ 7/15/2020 8/5/2020 8/24/2020   PHQ-9 Total Score 15 22 22   Q9: Thoughts of better off dead/self-harm past 2 weeks Not at all Not at all Not at all             "

## 2020-08-28 RX ORDER — TOPIRAMATE 50 MG/1
TABLET, FILM COATED ORAL
Qty: 180 TABLET | Refills: 0 | Status: SHIPPED | OUTPATIENT
Start: 2020-08-28 | End: 2020-11-23

## 2020-08-28 RX ORDER — ESCITALOPRAM OXALATE 20 MG/1
TABLET ORAL
Qty: 90 TABLET | Refills: 0 | Status: SHIPPED | OUTPATIENT
Start: 2020-08-28 | End: 2020-11-23

## 2020-08-28 RX ORDER — BUSPIRONE HYDROCHLORIDE 10 MG/1
TABLET ORAL
Qty: 270 TABLET | Refills: 0 | Status: SHIPPED | OUTPATIENT
Start: 2020-08-28 | End: 2020-11-23

## 2020-08-31 ENCOUNTER — MYC MEDICAL ADVICE (OUTPATIENT)
Dept: FAMILY MEDICINE | Facility: OTHER | Age: 60
End: 2020-08-31

## 2020-08-31 NOTE — PROGRESS NOTES
"Eva Solis is a 60 year old female who is being evaluated via a billable video visit.      The patient has been notified of following:     \"This video visit will be conducted via a call between you and your physician/provider. We have found that certain health care needs can be provided without the need for an in-person physical exam.  This service lets us provide the care you need with a video conversation.  If a prescription is necessary we can send it directly to your pharmacy.  If lab work is needed we can place an order for that and you can then stop by our lab to have the test done at a later time.    Video visits are billed at different rates depending on your insurance coverage.  Please reach out to your insurance provider with any questions.    If during the course of the call the physician/provider feels a video visit is not appropriate, you will not be charged for this service.\"    Patient has given verbal consent for Video visit? Yes  How would you like to obtain your AVS? MyChart  If you are dropped from the video visit, the video invite should be resent to: Text to cell phone: [phone]  Will anyone else be joining your video visit? No      Subjective     Eva Solis is a 60 year old female who presents today via video visit for the following health issues:    HPI    Concern - Swelling in Ankles  Onset: Always- worse  Description: Feet feel tight within the last week  Intensity: moderate  Progression of Symptoms:  worsening  Accompanying Signs & Symptoms: pain during night and day  Previous history of similar problem: None  Precipitating factors:        Worsened by: NA  Alleviating factors:        Improved by: NA  Therapies tried and outcome: None     Feels swelling in her ankles has really worsened and her weight has gone up a lot in the last couple weeks.  Weight has gone up to 351#.  Feels she can't inhale all the way, feels she only breathe about half of a normal breath.  Sometimes she " gets chest twinge when she tries to take a deep breath.  Doesn't sleep well, starts in bed and tosses a lot, doesn't feel she breathes as well when she sleeps on her right side and doesn't sleep on her left because of hip pain. , then sleeps in a chair the rest of the night.    Sits at a desk all day long.  Wears Crocs.  Denies diet changes, denies increased salt in her diet.  Doesn't walk much because it hurts her back.    Video Start Time: 5:07        Review of Systems   CONSTITUTIONAL: NEGATIVE for fever, chills      Objective           Vitals:  No vitals were obtained today due to virtual visit.    Physical Exam     GENERAL: Healthy, alert and no distress  EYES: Eyes grossly normal to inspection.  No discharge or erythema, or obvious scleral/conjunctival abnormalities.  RESP: No audible wheeze, cough, or visible cyanosis.  No visible retractions or increased work of breathing.    SKIN: Visible skin clear. No significant rash, abnormal pigmentation or lesions.  Obvious pedal edema, no skin changes or weeping  NEURO: Cranial nerves grossly intact.  Mentation and speech appropriate for age.  PSYCH: Mentation appears normal, affect normal/bright, judgement and insight intact, normal speech and appearance well-groomed.          Assessment & Plan     Acute on chronic diastolic congestive heart failure (H)  Patient symptoms are concerning for congestive heart failure.  She had declined going to the emergency department.  Her chest pain is pleuritic.  We will increase her furosemide from 20 mg daily to 40 mg daily.  We will ask her to come in for labs tomorrow.  Discussed that if the d-dimer is positive I would ask for chest CT for possible pulmonary embolism.  If her symptoms worsen she should go to the emergency department.  We will follow-up on Friday.    - Lipid panel reflex to direct LDL Non-fasting; Future  - **Comprehensive metabolic panel FUTURE anytime; Future  - **CBC with platelets FUTURE anytime; Future  -  Troponin I; Future  - BNP-N terminal pro; Future  - D dimer, quantitative; Future  - XR Chest 2 Views; Future         No follow-ups on file.    Renetta Benitez MD  [facility]      Video-Visit Details    Type of service:  Video Visit    Video End Time:5:20 PM    Originating Location (pt. Location): Other work    Distant Location (provider location):  [facility]     Platform used for Video Visit: Carlie

## 2020-08-31 NOTE — TELEPHONE ENCOUNTER
Called patient to discuss symptoms below.   For the last couple of  weeks patient stated that she is unable to get a full breath and has slight chest pressure/pain. The swelling in her both of her ankles has been ongoing for a long time (over a two weeks) , but they are more swollen than usual and tight today. Patient is coherent and able to speak with me. Denies sweating, weight gain, and URI.     PLAN:   Patient was instructed to be seen right away, but didn't feel she needed to go to UC or ED. Patient was schedule for virtual visit tomorrow with .     [facility]:   If symptoms worsen she needs to go be seen in ED.   Weigh daily. Follow low salt dies.   Pt agrees with plan.     Isa Plunkett RN  August 31, 2020

## 2020-09-01 ENCOUNTER — VIRTUAL VISIT (OUTPATIENT)
Dept: FAMILY MEDICINE | Facility: OTHER | Age: 60
End: 2020-09-01
Payer: COMMERCIAL

## 2020-09-01 DIAGNOSIS — I50.33 ACUTE ON CHRONIC DIASTOLIC CONGESTIVE HEART FAILURE (H): Primary | ICD-10-CM

## 2020-09-01 PROCEDURE — 99214 OFFICE O/P EST MOD 30 MIN: CPT | Mod: 95 | Performed by: FAMILY MEDICINE

## 2020-09-02 ENCOUNTER — ANCILLARY PROCEDURE (OUTPATIENT)
Dept: GENERAL RADIOLOGY | Facility: OTHER | Age: 60
End: 2020-09-02
Attending: FAMILY MEDICINE
Payer: COMMERCIAL

## 2020-09-02 DIAGNOSIS — I50.33 ACUTE ON CHRONIC DIASTOLIC CONGESTIVE HEART FAILURE (H): ICD-10-CM

## 2020-09-02 LAB
ALBUMIN SERPL-MCNC: 3.3 G/DL (ref 3.4–5)
ALP SERPL-CCNC: 79 U/L (ref 40–150)
ALT SERPL W P-5'-P-CCNC: 24 U/L (ref 0–50)
ANION GAP SERPL CALCULATED.3IONS-SCNC: 7 MMOL/L (ref 3–14)
AST SERPL W P-5'-P-CCNC: 15 U/L (ref 0–45)
BILIRUB SERPL-MCNC: 0.6 MG/DL (ref 0.2–1.3)
BUN SERPL-MCNC: 20 MG/DL (ref 7–30)
CALCIUM SERPL-MCNC: 8.9 MG/DL (ref 8.5–10.1)
CHLORIDE SERPL-SCNC: 110 MMOL/L (ref 94–109)
CHOLEST SERPL-MCNC: 228 MG/DL
CO2 SERPL-SCNC: 25 MMOL/L (ref 20–32)
CREAT SERPL-MCNC: 1.15 MG/DL (ref 0.52–1.04)
D DIMER PPP FEU-MCNC: 0.5 UG/ML FEU (ref 0–0.5)
ERYTHROCYTE [DISTWIDTH] IN BLOOD BY AUTOMATED COUNT: 14 % (ref 10–15)
GFR SERPL CREATININE-BSD FRML MDRD: 52 ML/MIN/{1.73_M2}
GLUCOSE SERPL-MCNC: 104 MG/DL (ref 70–99)
HCT VFR BLD AUTO: 42.4 % (ref 35–47)
HDLC SERPL-MCNC: 42 MG/DL
HGB BLD-MCNC: 14.1 G/DL (ref 11.7–15.7)
LDLC SERPL CALC-MCNC: 150 MG/DL
MCH RBC QN AUTO: 30.1 PG (ref 26.5–33)
MCHC RBC AUTO-ENTMCNC: 33.3 G/DL (ref 31.5–36.5)
MCV RBC AUTO: 90 FL (ref 78–100)
NONHDLC SERPL-MCNC: 186 MG/DL
NT-PROBNP SERPL-MCNC: 25 PG/ML (ref 0–125)
PLATELET # BLD AUTO: 243 10E9/L (ref 150–450)
POTASSIUM SERPL-SCNC: 4 MMOL/L (ref 3.4–5.3)
PROT SERPL-MCNC: 7.5 G/DL (ref 6.8–8.8)
RBC # BLD AUTO: 4.69 10E12/L (ref 3.8–5.2)
SODIUM SERPL-SCNC: 142 MMOL/L (ref 133–144)
TRIGL SERPL-MCNC: 181 MG/DL
TROPONIN I SERPL-MCNC: <0.015 UG/L (ref 0–0.04)
WBC # BLD AUTO: 6.6 10E9/L (ref 4–11)

## 2020-09-02 PROCEDURE — 83880 ASSAY OF NATRIURETIC PEPTIDE: CPT | Performed by: FAMILY MEDICINE

## 2020-09-02 PROCEDURE — 71046 X-RAY EXAM CHEST 2 VIEWS: CPT

## 2020-09-02 PROCEDURE — 80053 COMPREHEN METABOLIC PANEL: CPT | Performed by: FAMILY MEDICINE

## 2020-09-02 PROCEDURE — 85379 FIBRIN DEGRADATION QUANT: CPT | Performed by: FAMILY MEDICINE

## 2020-09-02 PROCEDURE — 80061 LIPID PANEL: CPT | Performed by: FAMILY MEDICINE

## 2020-09-02 PROCEDURE — 36415 COLL VENOUS BLD VENIPUNCTURE: CPT | Performed by: FAMILY MEDICINE

## 2020-09-02 PROCEDURE — 85027 COMPLETE CBC AUTOMATED: CPT | Performed by: FAMILY MEDICINE

## 2020-09-02 PROCEDURE — 84484 ASSAY OF TROPONIN QUANT: CPT | Performed by: FAMILY MEDICINE

## 2020-09-03 NOTE — PROGRESS NOTES
"Eva Solis is a 60 year old female who is being evaluated via a billable video visit.      The patient has been notified of following:     \"This video visit will be conducted via a call between you and your physician/provider. We have found that certain health care needs can be provided without the need for an in-person physical exam.  This service lets us provide the care you need with a video conversation.  If a prescription is necessary we can send it directly to your pharmacy.  If lab work is needed we can place an order for that and you can then stop by our lab to have the test done at a later time.    Video visits are billed at different rates depending on your insurance coverage.  Please reach out to your insurance provider with any questions.    If during the course of the call the physician/provider feels a video visit is not appropriate, you will not be charged for this service.\"    Patient has given verbal consent for Video visit? Yes  How would you like to obtain your AVS? MyChart  If you are dropped from the video visit, the video invite should be resent to: Text to cell phone: [phone]   Will anyone else be joining your video visit? No      Subjective     Eva Solis is a 60 year old female who presents today via video visit for the following health issues:    HPI    Lab and Xray results     Patient does not feel any different after increasing her Lasix.  She still feels short of breath.  She still has increased leg swelling.  She feels her weight may have decreased by 1 pound.  In thinking about her symptoms she feels that this all started after starting the Abilify.  She does not feel like the Abilify has helped much with her depressive symptoms although she does admit that she is had increased stress with her sister's declining health and may not be able to accurately state whether it has helped with her mood or not.  She also complains of sharp pain under her left rib cage that worsens " with a deep breath or any kind of movement.  She does state that it hurts to touch this area.     Video Start Time: 12:58 PM      Review of Systems   CONSTITUTIONAL: NEGATIVE for fever, chills, change in weight  ENT/MOUTH: NEGATIVE for ear, mouth and throat problems  CV: NEGATIVE for chest pain, palpitations      Objective           Vitals:  No vitals were obtained today due to virtual visit.    Physical Exam     GENERAL: Healthy, alert and no distress  EYES: Eyes grossly normal to inspection.  No discharge or erythema, or obvious scleral/conjunctival abnormalities.  RESP: No audible wheeze, cough, or visible cyanosis.  No visible retractions or increased work of breathing.    SKIN: Visible skin clear. No significant rash, abnormal pigmentation or lesions.  NEURO: Cranial nerves grossly intact.  Mentation and speech appropriate for age.  PSYCH: Mentation appears normal, affect normal/bright, judgement and insight intact, normal speech and appearance well-groomed.    Labs were reviewed and she had a normal BNP, d-dimer and troponin.  Chest x-ray did not have any findings of CHF but did have a vague density in the left upper chest which was thought to be related to overlapping clavicle and first rib.        Assessment & Plan     SOB (shortness of breath)  Unclear etiology.  No improvement with increase Lasix.  However as she does have increased lower extremity edema and will be traveling this weekend we will continue with the increased dose.  Because of the abnormal chest x-ray and not being certain that this density is related to bone will obtain a chest CT.  As this also seem to happen after starting Abilify will stop the Abilify.    - CT Chest w Contrast; Future    Abnormal CXR  Will obtain chest CT.    - CT Chest w Contrast; Future    Costochondritis  At this time suspect she has a costochondritis as it is tender when she touches the area.  Recommended NSAIDs and lidocaine patch.    Major depressive disorder,  recurrent episode, moderate (H)  She has not seen any benefit with the Abilify and some of the symptoms have started after starting dose at this point will stop the Abilify.  She will continue with counseling.      Renetta Benitez MD  [facility]      Video-Visit Details    Type of service:  Video Visit    Video End Time:1:10 PM    Originating Location (pt. Location): Home    Distant Location (provider location):  [facility]     Platform used for Video Visit: Carlie

## 2020-09-04 ENCOUNTER — TELEPHONE (OUTPATIENT)
Dept: FAMILY MEDICINE | Facility: OTHER | Age: 60
End: 2020-09-04

## 2020-09-04 ENCOUNTER — VIRTUAL VISIT (OUTPATIENT)
Dept: FAMILY MEDICINE | Facility: OTHER | Age: 60
End: 2020-09-04
Payer: COMMERCIAL

## 2020-09-04 DIAGNOSIS — M94.0 COSTOCHONDRITIS: ICD-10-CM

## 2020-09-04 DIAGNOSIS — R06.02 SOB (SHORTNESS OF BREATH): Primary | ICD-10-CM

## 2020-09-04 DIAGNOSIS — F33.1 MAJOR DEPRESSIVE DISORDER, RECURRENT EPISODE, MODERATE (H): ICD-10-CM

## 2020-09-04 DIAGNOSIS — R93.89 ABNORMAL CXR: ICD-10-CM

## 2020-09-04 PROCEDURE — 99213 OFFICE O/P EST LOW 20 MIN: CPT | Mod: 95 | Performed by: FAMILY MEDICINE

## 2020-09-04 NOTE — TELEPHONE ENCOUNTER
Patient scheduled for CT for 9/9/20 at 10:15am PMC.  NPO 8:15 am (2 hours prior).     She is already scheduled for a virtual visit with TC 9/15/20 do added f/u CT as well.      Waiting on call back from patient to let her know.

## 2020-09-04 NOTE — TELEPHONE ENCOUNTER
TC's   Please assist patient with the  following per phone visit with TC on 9/4/2020.    0  Schedule CT next week, schedule follow up virtual appointment with me the following week       LMTC 9/4/2020  Thank you  Lacie Roldan MA on 9/4/2020 at 2:17 PM

## 2020-09-04 NOTE — TELEPHONE ENCOUNTER
Patient called back relayed message and she knows she is NPO two hours prior and shes ok with time and date

## 2020-09-09 ENCOUNTER — HOSPITAL ENCOUNTER (OUTPATIENT)
Dept: CT IMAGING | Facility: CLINIC | Age: 60
Discharge: HOME | End: 2020-09-09
Attending: FAMILY MEDICINE | Admitting: FAMILY MEDICINE
Payer: COMMERCIAL

## 2020-09-09 DIAGNOSIS — R93.89 ABNORMAL CXR: ICD-10-CM

## 2020-09-09 DIAGNOSIS — R06.02 SOB (SHORTNESS OF BREATH): ICD-10-CM

## 2020-09-09 PROCEDURE — 71260 CT THORAX DX C+: CPT

## 2020-09-09 PROCEDURE — 25000125 ZZHC RX 250: Performed by: FAMILY MEDICINE

## 2020-09-09 PROCEDURE — 25000128 H RX IP 250 OP 636: Performed by: FAMILY MEDICINE

## 2020-09-09 RX ORDER — IOPAMIDOL 755 MG/ML
100 INJECTION, SOLUTION INTRAVASCULAR ONCE
Status: COMPLETED | OUTPATIENT
Start: 2020-09-09 | End: 2020-09-09

## 2020-09-09 RX ADMIN — IOPAMIDOL 75 ML: 755 INJECTION, SOLUTION INTRAVENOUS at 11:00

## 2020-09-09 RX ADMIN — SODIUM CHLORIDE 75 ML: 9 INJECTION, SOLUTION INTRAVENOUS at 10:59

## 2020-09-11 NOTE — PROGRESS NOTES
"Eva Solis is a 60 year old female who is being evaluated via a billable telephone visit.      The patient has been notified of following:     \"This telephone visit will be conducted via a call between you and your physician/provider. We have found that certain health care needs can be provided without the need for a physical exam.  This service lets us provide the care you need with a short phone conversation.  If a prescription is necessary we can send it directly to your pharmacy.  If lab work is needed we can place an order for that and you can then stop by our lab to have the test done at a later time.    Telephone visits are billed at different rates depending on your insurance coverage. During this emergency period, for some insurers they may be billed the same as an in-person visit.  Please reach out to your insurance provider with any questions.    If during the course of the call the physician/provider feels a telephone visit is not appropriate, you will not be charged for this service.\"    Patient has given verbal consent for Telephone visit?  Yes    What phone number would you like to be contacted at? [phone]    How would you like to obtain your AVS? Cate Mae     Eva Solis is a 60 year old female who presents via phone visit today for the following health issues:  Answers for HPI/ROS submitted by the patient on 9/14/2020   Chronic problems general questions HPI Form  If you checked off any problems, how difficult have these problems made it for you to do your work, take care of things at home, or get along with other people?: Very difficult  PHQ9 TOTAL SCORE: 19  LORETA 7 TOTAL SCORE: 18    History of Present Illness        Mental Health Follow-up:  Patient presents to follow-up on Depression & Anxiety.Patient's depression since last visit has been:  No change  The patient is not having other symptoms associated with depression.  Patient's anxiety since last visit has been:  No " change  The patient is not having other symptoms associated with anxiety.  Any significant life events: No  Patient is feeling anxious or having panic attacks.  Patient has no concerns about alcohol or drug use.     Social History  Tobacco Use    Smoking status: Never Smoker    Smokeless tobacco: Never Used    Tobacco comment: no smokers in household  Alcohol use: No  Drug use: No      Today's PHQ-9         PHQ-9 Total Score:     (P) 19   PHQ-9 Q9 Thoughts of better off dead/self-harm past 2 weeks :   (P) Not at all   Thoughts of suicide or self harm:      Self-harm Plan:        Self-harm Action:          Safety concerns for self or others:           She eats 0-1 servings of fruits and vegetables daily.She consumes 2 sweetened beverage(s) daily.She exercises with enough effort to increase her heart rate 9 or less minutes per day.  She exercises with enough effort to increase her heart rate 3 or less days per week.   She is taking medications regularly.    Feels her breathing might be a little better, but not back to normal.  Weight hasn't changed.  Ankles still the same.  BP runs 130s/70-80s.  Patient does not think she is urinating anymore with the increase in her diuretic.    She feels her mood is about the same.  She is still very concerned about her sisters diagnosis.  She feels her sister is stable now but she has been putting her time and energy into starting [id number] pages and other benefits for her sister.  She still has a contentious relationship with the rest of her extended family.  She does continue to see a therapist.  She does not feel that stopping the Abilify has changed her mood.    Review of Systems   CONSTITUTIONAL: NEGATIVE for fever, chills, change in weight  ENT/MOUTH: NEGATIVE for ear, mouth and throat problems  CV: NEGATIVE for chest pain, palpitations       Objective          Vitals:  No vitals were obtained today due to virtual visit.    General: alert and no distress  PSYCH: Alert and  oriented times 3; coherent speech, normal   rate and volume, able to articulate logical thoughts, able   to abstract reason, no tangential thoughts, no hallucinations   or delusions  Her affect is flat  RESP: No cough, no audible wheezing, able to talk in full sentences  Remainder of exam unable to be completed due to telephone visits          Assessment/Plan:    Assessment & Plan     Major depressive disorder, recurrent episode, moderate (H)  Mood is still poor and a lot of this is situational however her mood was poor prior to her sister's illness.  She continues to follow with counseling.  Once again discussed that psychiatry may be helpful in medication adjustment although patient is not interested.  We discussed that we will follow-up again in 1 month.    Chronic diastolic congestive heart failure (H)  Discussed that her lab tests and imaging did not support the diagnosis of acute heart failure.  Therefore it is not surprising that she has not noticed much difference with the increase of her furosemide.  Because she feels her edema is bothersome we will continue her at the furosemide twice a day.  Discussed that her change in her breathing may be a side effect of the Abilify and she has not even been off this for 2 weeks yet.  We will give this more time.  If she is not improved by next month would then consider further evaluation with PFTs and pulmonology evaluation.    - furosemide (LASIX) 20 MG tablet; Take 1 tablet (20 mg) by mouth 2 times daily    HTN, goal below 140/90  Well-controlled.    - furosemide (LASIX) 20 MG tablet; Take 1 tablet (20 mg) by mouth 2 times daily     Renteta Benitez MD  [facility]    Phone call duration:  12 minutes

## 2020-09-14 ASSESSMENT — ANXIETY QUESTIONNAIRES
7. FEELING AFRAID AS IF SOMETHING AWFUL MIGHT HAPPEN: NEARLY EVERY DAY
1. FEELING NERVOUS, ANXIOUS, OR ON EDGE: NEARLY EVERY DAY
6. BECOMING EASILY ANNOYED OR IRRITABLE: MORE THAN HALF THE DAYS
GAD7 TOTAL SCORE: 18
5. BEING SO RESTLESS THAT IT IS HARD TO SIT STILL: MORE THAN HALF THE DAYS
GAD7 TOTAL SCORE: 18
GAD7 TOTAL SCORE: 18
4. TROUBLE RELAXING: MORE THAN HALF THE DAYS
2. NOT BEING ABLE TO STOP OR CONTROL WORRYING: NEARLY EVERY DAY
7. FEELING AFRAID AS IF SOMETHING AWFUL MIGHT HAPPEN: NEARLY EVERY DAY
3. WORRYING TOO MUCH ABOUT DIFFERENT THINGS: NEARLY EVERY DAY

## 2020-09-14 ASSESSMENT — PATIENT HEALTH QUESTIONNAIRE - PHQ9
SUM OF ALL RESPONSES TO PHQ QUESTIONS 1-9: 19
SUM OF ALL RESPONSES TO PHQ QUESTIONS 1-9: 19
10. IF YOU CHECKED OFF ANY PROBLEMS, HOW DIFFICULT HAVE THESE PROBLEMS MADE IT FOR YOU TO DO YOUR WORK, TAKE CARE OF THINGS AT HOME, OR GET ALONG WITH OTHER PEOPLE: VERY DIFFICULT

## 2020-09-15 ENCOUNTER — VIRTUAL VISIT (OUTPATIENT)
Dept: FAMILY MEDICINE | Facility: OTHER | Age: 60
End: 2020-09-15
Payer: COMMERCIAL

## 2020-09-15 DIAGNOSIS — F33.1 MAJOR DEPRESSIVE DISORDER, RECURRENT EPISODE, MODERATE (H): Primary | ICD-10-CM

## 2020-09-15 DIAGNOSIS — I50.32 CHRONIC DIASTOLIC CONGESTIVE HEART FAILURE (H): ICD-10-CM

## 2020-09-15 DIAGNOSIS — I10 HTN, GOAL BELOW 140/90: ICD-10-CM

## 2020-09-15 PROCEDURE — 99214 OFFICE O/P EST MOD 30 MIN: CPT | Mod: 95 | Performed by: FAMILY MEDICINE

## 2020-09-15 RX ORDER — FUROSEMIDE 20 MG
20 TABLET ORAL 2 TIMES DAILY
Qty: 180 TABLET | Refills: 0
Start: 2020-09-15 | End: 2020-09-15

## 2020-09-15 RX ORDER — FUROSEMIDE 20 MG
20 TABLET ORAL 2 TIMES DAILY
Qty: 180 TABLET | Refills: 0 | Status: SHIPPED | OUTPATIENT
Start: 2020-09-15 | End: 2020-12-08

## 2020-09-15 ASSESSMENT — PATIENT HEALTH QUESTIONNAIRE - PHQ9: SUM OF ALL RESPONSES TO PHQ QUESTIONS 1-9: 19

## 2020-09-15 ASSESSMENT — ANXIETY QUESTIONNAIRES: GAD7 TOTAL SCORE: 18

## 2020-09-16 ENCOUNTER — VIRTUAL VISIT (OUTPATIENT)
Dept: PSYCHOLOGY | Facility: CLINIC | Age: 60
End: 2020-09-16
Payer: COMMERCIAL

## 2020-09-16 DIAGNOSIS — F33.1 MAJOR DEPRESSIVE DISORDER, RECURRENT EPISODE, MODERATE (H): Primary | ICD-10-CM

## 2020-09-16 DIAGNOSIS — F41.9 ANXIETY DISORDER, UNSPECIFIED: ICD-10-CM

## 2020-09-16 PROCEDURE — 90834 PSYTX W PT 45 MINUTES: CPT | Mod: TEL | Performed by: SOCIAL WORKER

## 2020-09-16 NOTE — PROGRESS NOTES
"                                           Progress Note    Patient Name: Eva Solis  Date: 9/16/2020         Service Type: Phone Visit,       Session Start Time: 2:35 pm  Session End Time: 3:25 am     Session Length:   50 minutes    Session #: 11    Attendees: Client attended alone     Reason for Telemedicine Visit: Services only offered telehealth    Originating Site (Patient Location): Patient's place of employment    Distant Site (Provider Location): Provider Remote Setting     The patient has been notified of the following:      \"We have found that certain health care needs can be provided without the need for a face to face visit.  This service lets us provide the care you need with a phone conversation.       I will have full access to your [facility] medical record during this entire phone call.   I will be taking notes for your medical record.      Since this is like an office visit, we will bill your insurance company for this service.       There are potential benefits and risks of telephone visits (e.g. limits to patient confidentiality) that differ from in-person visits.?  Confidentiality still applies for telephone services, and nobody will record the visit.  It is important to be in a quiet, private space that is free of distractions (including cell phone or other devices) during the visit.??      If during the course of the call I believe a telephone visit is not appropriate, you will not be charged for this service\"     Consent has been obtained for this service by care team member: Yes   Treatment Plan Last Reviewed: 6/4/2020    PHQ-9 / LORETA-7 :   PHQ 8/5/2020 8/24/2020 9/14/2020   PHQ-9 Total Score 22 22 19   Q9: Thoughts of better off dead/self-harm past 2 weeks Not at all Not at all Not at all     LORETA-7 SCORE 8/5/2020 8/24/2020 9/14/2020   Total Score - - 18 (severe anxiety)   Total Score 19 16 18         DATA  Interactive Complexity: No  Crisis: No       Progress Since Last Session (Related " "to Symptoms / Goals / Homework):   Symptoms: Worsening Reports worsening symptoms due to sister's recent cancer Dx.        Homework: Partially completed, Patient has been trying to focus on her self-care [place]        Episode of Care Goals: No improvement - CONTEMPLATION (Considering change and yet undecided); Intervened by assessing the negative and positive thinking (ambivalence) about behavior change     Current / Ongoing Stressors and Concerns:   Strained relationships with extended family.  Chronic health conditions including midgraine headaches.  Patient reports feeling affected by current events, she states she is wondering about her purpose, denies any thoughts of hurting herself but wonders \"What are we here for?\".   Patient reported late summer 2020 her sister has been diagnosed with Stage 4 cancer and the prognosis is not good.  Patient reports she is working with others her sister knows to help during this difficult time.        Treatment Objective(s) Addressed in This Session:   Decrease frequency and intensity of feeling down, depressed, hopeless  Discuss motivation / ambivalence about taking anti-depressant / mood stabilizer medication(s)  Discussed patients increased depression symptoms and some ways to help decrease those symptoms.  Patient said she was recently prescribed a new medication by her PCP and she is taking it.  Patient today said she was agreeable to collaborative psychiatry.    Normalized increased depression related to her sister's cancer Dx.  Patient reports looking forward to having her to the [employer] over Labor Day weekend.       Intervention:   CBT: Helped patient restructured some of her negative thought processes.  Encouraged patient to keep focus on positive events in her life.  Patient was able to identify the positive relationships with her grown children.   Solution Focused: Discussed options for addressing continued depression symptoms such as " collaborative psychaitry.             ASSESSMENT: Current Emotional / Mental Status (status of significant symptoms):   Risk status (Self / Other harm or suicidal ideation)   Patient denies current fears or concerns for personal safety.   Patient denies current or recent suicidal ideation or behaviors.   Patient denies current or recent homicidal ideation or behaviors.   Patient denies current or recent self injurious behavior or ideation.   Patient denies other safety concerns.   Patient reports there has been no change in risk factors since their last session.     Patient reports there has been no change in protective factors since their last session.     Recommended that patient call 911 or go to the local ED should there be a change in any of these risk factors.     Appearance:   N/A due to phone session    Eye Contact:   N/A due to phone session    Psychomotor Behavior: N/A due to phone session    Attitude:   Cooperative    Orientation:   All   Speech    Rate / Production: Normal     Volume:  Normal    Mood:    Anxious  Depressed  Sad  Grieving   Affect:    Tearful Worrisome    Thought Content:  Perservative  Rumination    Thought Form:  Coherent  Tangential    Insight:    Fair  and External locus     Medication Review:   No changes to current psychiatric medication(s)       Medication Compliance:   Yes     Changes in Health Issues:   None reported       Chemical Use Review:   Substance Use: Chemical use reviewed, no active concerns identified      Tobacco Use: No current tobacco use.      Diagnosis:  1. Major depressive disorder, recurrent episode, moderate (H)    2. Anxiety disorder, unspecified        Collateral Reports Completed:   Not Applicable    PLAN: (Patient Tasks / Therapist Tasks / Other)  Writer to contact PCP regarding psychiatry referral.  Patient also given contact number to schedule appointment.   Patient to focus on areas of her life she is able to control while coping with her sister's  "terminal illness.      Courtney DUPREE Efrain, LICSW                                                         ___________________________________                                                   Treatment Plan    Client's Name: Eva Solis  YOB: 1960    Date: 6/4/2020    DSM-V Diagnoses: 296.32 (F33.1) Major Depressive Disorder, Recurrent Episode, Moderate _ or 300.00 (F41.9) Unspecified Anxiety Disorder  Psychosocial / Contextual Factors:   Strained relationship with extended family, chronic pain and other health conditions, three adult sons, Concern over current events in country / world.    WHODAS:   WHODAS 2.0 Total Score 5/12/2020   Total Score 34         Referral / Collaboration:  Discussed option of psychiatry referral in the future.  .  Plan to coordinate care with PCP.      Anticipated number of session or this episode of care: 10-12      MeasurableTreatment Goal(s) related to diagnosis / functional impairment(s)  Goal 1: Client will reduce depression symptoms.    I will know I've met my goal when \"I would be happier, I wouldn't be so down\".      Objective #A (Client Action)    Client will Decrease frequency and intensity of feeling down, depressed, hopeless.  Reduce rating on PHQ-9, current score 3, goal to be 1 or 0.    Status: Continued - Date(s):  9/16/2020     Intervention(s)  Therapist will provide CBT to restructure negative cognitions.  Identify cognitive distortions.  .    Objective #B  Client will establish regular sleep patterns.  Currently sleeping excessively.  .  Status: Continued - Date(s):  9/16/2020     Intervention(s)  Therapist will assign homework Patient to notice sleep patterns.  .    Objective #C  Client will Identify negative self-talk and behaviors: challenge core beliefs, myths, and actions.  Status: Continued - Date(s):  9/16/2020     Intervention(s)  Therapist will provide CBT during therapy sessions.  .        Patient has reviewed and agreed to the above " plan.      Courtney Zuniga, [facility]  June 4, 2020

## 2020-09-18 ENCOUNTER — TELEPHONE (OUTPATIENT)
Dept: FAMILY MEDICINE | Facility: OTHER | Age: 60
End: 2020-09-18

## 2020-09-18 DIAGNOSIS — F33.1 MAJOR DEPRESSIVE DISORDER, RECURRENT EPISODE, MODERATE (H): Primary | ICD-10-CM

## 2020-09-18 NOTE — TELEPHONE ENCOUNTER
----- Message from Courtney Zuniga, [facility] sent at 9/16/2020  3:34 PM CDT -----  Regarding: Collaborative Psychiatry referral  Hello -     I just had a session with this patient.  We talked about her medication again, she decided she wants to give collaborative psychiatry a try.  Would you be able to put a referral in for her please?    I would suggest she have therapy a little more often but likes to only schedule one session at a time which usually lately has been about 3 weeks out with my schedule.      Thanks,  Courtney Zuniga

## 2020-09-28 DIAGNOSIS — F33.1 MAJOR DEPRESSIVE DISORDER, RECURRENT EPISODE, MODERATE (H): ICD-10-CM

## 2020-09-28 RX ORDER — ARIPIPRAZOLE 2 MG/1
2 TABLET ORAL DAILY
Qty: 30 TABLET | Refills: 0 | OUTPATIENT
Start: 2020-09-28

## 2020-10-08 ENCOUNTER — VIRTUAL VISIT (OUTPATIENT)
Dept: PSYCHOLOGY | Facility: OTHER | Age: 60
End: 2020-10-08
Payer: COMMERCIAL

## 2020-10-08 DIAGNOSIS — F33.1 MAJOR DEPRESSIVE DISORDER, RECURRENT EPISODE, MODERATE (H): Primary | ICD-10-CM

## 2020-10-08 DIAGNOSIS — F41.9 ANXIETY DISORDER, UNSPECIFIED: ICD-10-CM

## 2020-10-08 PROCEDURE — 90834 PSYTX W PT 45 MINUTES: CPT | Mod: TEL | Performed by: SOCIAL WORKER

## 2020-10-08 ASSESSMENT — ANXIETY QUESTIONNAIRES
1. FEELING NERVOUS, ANXIOUS, OR ON EDGE: NEARLY EVERY DAY
2. NOT BEING ABLE TO STOP OR CONTROL WORRYING: NEARLY EVERY DAY
4. TROUBLE RELAXING: NEARLY EVERY DAY
GAD7 TOTAL SCORE: 15
IF YOU CHECKED OFF ANY PROBLEMS ON THIS QUESTIONNAIRE, HOW DIFFICULT HAVE THESE PROBLEMS MADE IT FOR YOU TO DO YOUR WORK, TAKE CARE OF THINGS AT HOME, OR GET ALONG WITH OTHER PEOPLE: EXTREMELY DIFFICULT
3. WORRYING TOO MUCH ABOUT DIFFERENT THINGS: NEARLY EVERY DAY
5. BEING SO RESTLESS THAT IT IS HARD TO SIT STILL: NOT AT ALL
6. BECOMING EASILY ANNOYED OR IRRITABLE: NOT AT ALL
7. FEELING AFRAID AS IF SOMETHING AWFUL MIGHT HAPPEN: NEARLY EVERY DAY

## 2020-10-08 ASSESSMENT — PATIENT HEALTH QUESTIONNAIRE - PHQ9: SUM OF ALL RESPONSES TO PHQ QUESTIONS 1-9: 24

## 2020-10-08 NOTE — PROGRESS NOTES
"                                           Progress Note    Patient Name: Eva Solis  Date: 10/8/2020         Service Type: Phone Visit,       Session Start Time: 1:35 pm  Session End Time: 2:20 pm     Session Length:   45 minutes    Session #: 12    Attendees: Client attended alone     Reason for Telemedicine Visit: Services only offered telehealth    Originating Site (Patient Location): Patient's place of employment    Distant Site (Provider Location): Provider Remote Setting     The patient has been notified of the following:      \"We have found that certain health care needs can be provided without the need for a face to face visit.  This service lets us provide the care you need with a phone conversation.       I will have full access to your [facility] medical record during this entire phone call.   I will be taking notes for your medical record.      Since this is like an office visit, we will bill your insurance company for this service.       There are potential benefits and risks of telephone visits (e.g. limits to patient confidentiality) that differ from in-person visits.?  Confidentiality still applies for telephone services, and nobody will record the visit.  It is important to be in a quiet, private space that is free of distractions (including cell phone or other devices) during the visit.??      If during the course of the call I believe a telephone visit is not appropriate, you will not be charged for this service\"     Consent has been obtained for this service by care team member: Yes   Treatment Plan Last Reviewed: 6/4/2020, 9/2020    PHQ-9 / LORETA-7 :   PHQ 8/24/2020 9/14/2020 10/8/2020   PHQ-9 Total Score 22 19 24   Q9: Thoughts of better off dead/self-harm past 2 weeks Not at all Not at all Not at all     LORETA-7 SCORE 8/24/2020 9/14/2020 10/8/2020   Total Score - 18 (severe anxiety) -   Total Score 16 18 15         DATA  Interactive Complexity: No  Crisis: No       Progress Since Last Session " "(Related to Symptoms / Goals / Homework):   Symptoms: Reports worsening depression symptoms but slightly improved anxiety symptoms.        Homework: Partially completed, Patient has been trying to focus on her self-care and enjoying activities.       Episode of Care Goals: Minimal progress - CONTEMPLATION (Considering change and yet undecided); Intervened by assessing the negative and positive thinking (ambivalence) about behavior change     Current / Ongoing Stressors and Concerns:   Strained relationships with extended family.  Chronic health conditions including midgraine headaches.  Patient reports feeling affected by current events, she states she is wondering about her purpose, denies any thoughts of hurting herself but wonders \"What are we here for?\".   Patient reported late summer 2020 her sister has been diagnosed with Stage 4 cancer and the prognosis is not good.  Patient reports she is working with others her sister knows to help during this difficult time.   At session on 10/8/2020 patient reported she was diagnosed with COVID-19 within the past week.       Treatment Objective(s) Addressed in This Session:   Decrease frequency and intensity of feeling down, depressed, hopeless  Discussed patients increased depression symptoms and how that is connected to COVID-19 and current stressors.  Patient reported she believes she contracted COVID-19 at a wedding.  Reports she is out of work but that a couple of her coworkers have also tested positive.  Patient reports her  is experiencing symptoms and is up at their cabin awaiting test results.  She reports her sister's cancer continues to worsen and expressed concern sister may have COVID-19 as patient saw her shortly before experiencing symptoms.       Intervention:   CBT: Helped patient restructured some of her negative thought processes.  Encouraged patient to keep focus on positive events in her life.  Patient was able to identify the positive " relationships with her grown children.   Solution Focused: Discussed options to reduce stress while she recovers from COVID-19, ex. ask someone else to take care of her puppy.              ASSESSMENT: Current Emotional / Mental Status (status of significant symptoms):   Risk status (Self / Other harm or suicidal ideation)   Patient denies current fears or concerns for personal safety.   Patient denies current or recent suicidal ideation or behaviors.   Patient denies current or recent homicidal ideation or behaviors.   Patient denies current or recent self injurious behavior or ideation.   Patient denies other safety concerns.   Patient reports there has been no change in risk factors since their last session.     Patient reports there has been no change in protective factors since their last session.     Recommended that patient call 911 or go to the local ED should there be a change in any of these risk factors.     Appearance:   N/A due to phone session    Eye Contact:   N/A due to phone session    Psychomotor Behavior: N/A due to phone session    Attitude:   Cooperative    Orientation:   All   Speech    Rate / Production: Normal     Volume:  Normal    Mood:    Depressed    Affect:    Tearful Worrisome    Thought Content:  Perservative  Rumination    Thought Form:  Coherent  Tangential    Insight:    Fair  and External locus     Medication Review:   No changes to current psychiatric medication(s)       Medication Compliance:   Yes     Changes in Health Issues:   Yes: Patient reports she has been diagnosed with COVID-19 and currently experiencing symptoms.        Chemical Use Review:   Substance Use: Chemical use reviewed, no active concerns identified      Tobacco Use: No current tobacco use.      Diagnosis:  1. Major depressive disorder, recurrent episode, moderate (H)    2. Anxiety disorder, unspecified        Collateral Reports Completed:   Not Applicable    PLAN: (Patient Tasks / Therapist Tasks /  "Other)  Patient to schedule psychiatry appointment.  Patient encouraged to contact PCP as needed to manage COVID-19 symptoms.     Courtney Zuniga, LICSW                                                         ___________________________________                                                   Treatment Plan    Client's Name: Eva Solis  YOB: 1960    Date: 6/4/2020    DSM-V Diagnoses: 296.32 (F33.1) Major Depressive Disorder, Recurrent Episode, Moderate _ or 300.00 (F41.9) Unspecified Anxiety Disorder  Psychosocial / Contextual Factors:   Strained relationship with extended family, chronic pain and other health conditions, three adult sons, Concern over current events in country / world.    WHODAS:   WHODAS 2.0 Total Score 5/12/2020   Total Score 34         Referral / Collaboration:  Discussed option of psychiatry referral in the future.  .  Plan to coordinate care with PCP.      Anticipated number of session or this episode of care: 10-12      MeasurableTreatment Goal(s) related to diagnosis / functional impairment(s)  Goal 1: Client will reduce depression symptoms.    I will know I've met my goal when \"I would be happier, I wouldn't be so down\".      Objective #A (Client Action)    Client will Decrease frequency and intensity of feeling down, depressed, hopeless.  Reduce rating on PHQ-9, current score 3, goal to be 1 or 0.    Status: Continued - Date(s):  9/16/2020     Intervention(s)  Therapist will provide CBT to restructure negative cognitions.  Identify cognitive distortions.  .    Objective #B  Client will establish regular sleep patterns.  Currently sleeping excessively.  .  Status: Continued - Date(s):  9/16/2020     Intervention(s)  Therapist will assign homework Patient to notice sleep patterns.  .    Objective #C  Client will Identify negative self-talk and behaviors: challenge core beliefs, myths, and actions.  Status: Continued - Date(s):  9/16/2020     Intervention(s)  Therapist " will provide CBT during therapy sessions.  .        Patient has reviewed and agreed to the above plan.      Courtney Zuniga, [facility]  June 4, 2020

## 2020-10-09 ASSESSMENT — ANXIETY QUESTIONNAIRES: GAD7 TOTAL SCORE: 15

## 2020-10-15 NOTE — PROGRESS NOTES
"Eva Solis is a 60 year old female who is being evaluated via a billable telephone visit.      The patient has been notified of following:     \"This telephone visit will be conducted via a call between you and your physician/provider. We have found that certain health care needs can be provided without the need for a physical exam.  This service lets us provide the care you need with a short phone conversation.  If a prescription is necessary we can send it directly to your pharmacy.  If lab work is needed we can place an order for that and you can then stop by our lab to have the test done at a later time.    Telephone visits are billed at different rates depending on your insurance coverage. During this emergency period, for some insurers they may be billed the same as an in-person visit.  Please reach out to your insurance provider with any questions.    If during the course of the call the physician/provider feels a telephone visit is not appropriate, you will not be charged for this service.\"    Patient has given verbal consent for Telephone visit?  Yes    What phone number would you like to be contacted at? [phone]    How would you like to obtain your AVS? Cate Mae     Eva Solis is a 60 year old female who presents via phone visit today for the following health issues:    HPI     Depression and Anxiety Follow-Up    How are you doing with your depression since your last visit? No change    How are you doing with your anxiety since your last visit?  No change    Are you having other symptoms that might be associated with depression or anxiety? No    Have you had a significant life event? No     Do you have any concerns with your use of alcohol or other drugs? No    Social History     Tobacco Use     Smoking status: Never Smoker     Smokeless tobacco: Never Used     Tobacco comment: no smokers in household   Substance Use Topics     Alcohol use: No     Drug use: No     PHQ 9/14/2020 " 10/8/2020 10/20/2020   PHQ-9 Total Score 19 24 17   Q9: Thoughts of better off dead/self-harm past 2 weeks Not at all Not at all Not at all     LORETA-7 SCORE 9/14/2020 10/8/2020 10/20/2020   Total Score 18 (severe anxiety) - -   Total Score 18 15 17       Suicide Assessment Five-step Evaluation and Treatment (SAFE-T)    Had Covid last month, other family members got it as well.  No fever.  Had sore throat, loss of smell and taste, severe headache, body aches, some shortness of breath but not bad enough to get to the hospital.  Still has poor energy.  Her sister with cancer had Covid as well, was hospitalized for one night and now doing well.     Review of Systems          Objective          Vitals:  No vitals were obtained today due to virtual visit.    General:  alert and no distress  PSYCH: Alert and oriented times 3; coherent speech, normal   rate and volume, able to articulate logical thoughts, able   to abstract reason, no tangential thoughts, no hallucinations   or delusions  Her affect is normal  RESP: No cough, no audible wheezing, able to talk in full sentences  Remainder of exam unable to be completed due to telephone visits          Assessment/Plan:    Assessment & Plan     Major depressive disorder, recurrent episode, moderate (H)  Patient feels her mood is about the same.  Her PHQ-9 is actually a little better but she is also comparing how she is feeling to when she had Covid.  She is does still admit to having poor energy.  Had placed a referral for collaborative psychiatry last month however she lost the number.  Will place another referral.  Her  is on board with her seen collaborative psychiatry as well.  She will continue psychology at this time but she is concerned about finances and continued sessions.  - MENTAL HEALTH REFERRAL  - Adult; Psychiatry; Psychiatry; [facility]: Collaborative Care Psychiatry Service/Bridge to Long-Term Psychiatry as indicated ([phone]); Yes; Chronic Mental Health  without improvement; Yes; We will contact you to schedule ...       Renetta Benitez MD  [facility]    Phone call duration:  10 minutes

## 2020-10-20 ENCOUNTER — VIRTUAL VISIT (OUTPATIENT)
Dept: FAMILY MEDICINE | Facility: OTHER | Age: 60
End: 2020-10-20
Payer: COMMERCIAL

## 2020-10-20 DIAGNOSIS — F33.1 MAJOR DEPRESSIVE DISORDER, RECURRENT EPISODE, MODERATE (H): Primary | ICD-10-CM

## 2020-10-20 PROCEDURE — 99213 OFFICE O/P EST LOW 20 MIN: CPT | Mod: 95 | Performed by: FAMILY MEDICINE

## 2020-10-20 ASSESSMENT — ANXIETY QUESTIONNAIRES
7. FEELING AFRAID AS IF SOMETHING AWFUL MIGHT HAPPEN: NEARLY EVERY DAY
3. WORRYING TOO MUCH ABOUT DIFFERENT THINGS: NEARLY EVERY DAY
GAD7 TOTAL SCORE: 17
IF YOU CHECKED OFF ANY PROBLEMS ON THIS QUESTIONNAIRE, HOW DIFFICULT HAVE THESE PROBLEMS MADE IT FOR YOU TO DO YOUR WORK, TAKE CARE OF THINGS AT HOME, OR GET ALONG WITH OTHER PEOPLE: EXTREMELY DIFFICULT
5. BEING SO RESTLESS THAT IT IS HARD TO SIT STILL: NOT AT ALL
6. BECOMING EASILY ANNOYED OR IRRITABLE: MORE THAN HALF THE DAYS
1. FEELING NERVOUS, ANXIOUS, OR ON EDGE: NEARLY EVERY DAY
2. NOT BEING ABLE TO STOP OR CONTROL WORRYING: NEARLY EVERY DAY

## 2020-10-20 ASSESSMENT — PATIENT HEALTH QUESTIONNAIRE - PHQ9
SUM OF ALL RESPONSES TO PHQ QUESTIONS 1-9: 17
5. POOR APPETITE OR OVEREATING: NEARLY EVERY DAY

## 2020-10-21 ASSESSMENT — ANXIETY QUESTIONNAIRES: GAD7 TOTAL SCORE: 17

## 2020-11-22 DIAGNOSIS — F41.8 MIXED ANXIETY DEPRESSIVE DISORDER: ICD-10-CM

## 2020-11-22 DIAGNOSIS — G43.009 MIGRAINE WITHOUT AURA AND WITHOUT STATUS MIGRAINOSUS, NOT INTRACTABLE: ICD-10-CM

## 2020-11-23 RX ORDER — BUSPIRONE HYDROCHLORIDE 10 MG/1
TABLET ORAL
Qty: 270 TABLET | Refills: 0 | Status: SHIPPED | OUTPATIENT
Start: 2020-11-23 | End: 2021-02-26

## 2020-11-23 RX ORDER — TOPIRAMATE 50 MG/1
TABLET, FILM COATED ORAL
Qty: 180 TABLET | Refills: 0 | Status: SHIPPED | OUTPATIENT
Start: 2020-11-23 | End: 2021-02-09

## 2020-11-23 RX ORDER — ESCITALOPRAM OXALATE 20 MG/1
TABLET ORAL
Qty: 90 TABLET | Refills: 0 | Status: SHIPPED | OUTPATIENT
Start: 2020-11-23 | End: 2021-02-09

## 2020-11-23 NOTE — TELEPHONE ENCOUNTER
Pending Prescriptions:                       Disp   Refills    busPIRone (BUSPAR) 10 MG tablet [Pharmacy *270 ta*0        Sig: TAKE ONE TABLET BY MOUTH THREE TIMES DAILY     escitalopram (LEXAPRO) 20 MG tablet [Pharm*90 tab*0        Sig: TAKE ONE TABLET BY MOUTH ONE TIME DAILY     Signed Prescriptions:                        Disp   Refills    topiramate (TOPAMAX) 50 MG tablet          180 ta*0        Sig: TAKE ONE TABLET BY MOUTH TWICE DAILY   Authorizing Provider: ELIESER AMRIN  Ordering User: BRAYAN NNUN    Routing refill request to provider for review/approval because:  Labs out of range:    PHQ-9 score:    PHQ 10/20/2020   PHQ-9 Total Score 17   Q9: Thoughts of better off dead/self-harm past 2 weeks Not at all

## 2020-12-04 DIAGNOSIS — I10 HTN, GOAL BELOW 140/90: ICD-10-CM

## 2020-12-04 DIAGNOSIS — I50.32 CHRONIC DIASTOLIC CONGESTIVE HEART FAILURE (H): ICD-10-CM

## 2020-12-07 NOTE — TELEPHONE ENCOUNTER
Pending Prescriptions:                       Disp   Refills    furosemide (LASIX) 20 MG tablet [Pharmacy *180 ta*0        Sig: Take 1 tablet (20 mg) by mouth 2 times daily    Routing refill request to provider for review/approval because:  Labs out of range:    Creatinine   Date Value Ref Range Status   09/02/2020 1.15 (H) 0.52 - 1.04 mg/dL Final

## 2020-12-08 RX ORDER — FUROSEMIDE 20 MG
20 TABLET ORAL 2 TIMES DAILY
Qty: 180 TABLET | Refills: 2 | Status: SHIPPED | OUTPATIENT
Start: 2020-12-08 | End: 2022-01-04

## 2021-01-06 ENCOUNTER — VIRTUAL VISIT (OUTPATIENT)
Dept: PSYCHOLOGY | Facility: CLINIC | Age: 61
End: 2021-01-06
Attending: FAMILY MEDICINE
Payer: COMMERCIAL

## 2021-01-06 ENCOUNTER — VIRTUAL VISIT (OUTPATIENT)
Dept: PSYCHOLOGY | Facility: CLINIC | Age: 61
End: 2021-01-06
Payer: COMMERCIAL

## 2021-01-06 DIAGNOSIS — F41.1 GAD (GENERALIZED ANXIETY DISORDER): ICD-10-CM

## 2021-01-06 DIAGNOSIS — F33.1 MAJOR DEPRESSIVE DISORDER, RECURRENT EPISODE, MODERATE (H): Primary | ICD-10-CM

## 2021-01-06 PROCEDURE — 90832 PSYTX W PT 30 MINUTES: CPT | Mod: 52 | Performed by: PSYCHOLOGIST

## 2021-01-06 PROCEDURE — 99204 OFFICE O/P NEW MOD 45 MIN: CPT | Mod: TEL | Performed by: PSYCHIATRY & NEUROLOGY

## 2021-01-06 RX ORDER — VENLAFAXINE HYDROCHLORIDE 75 MG/1
CAPSULE, EXTENDED RELEASE ORAL
Qty: 30 CAPSULE | Refills: 0 | Status: SHIPPED | OUTPATIENT
Start: 2021-01-06 | End: 2021-02-09

## 2021-01-06 RX ORDER — VENLAFAXINE HYDROCHLORIDE 150 MG/1
150 CAPSULE, EXTENDED RELEASE ORAL DAILY
Qty: 30 CAPSULE | Refills: 1 | Status: SHIPPED | OUTPATIENT
Start: 2021-01-27 | End: 2021-03-24

## 2021-01-06 SDOH — HEALTH STABILITY: PHYSICAL HEALTH: ON AVERAGE, HOW MANY MINUTES DO YOU ENGAGE IN EXERCISE AT THIS LEVEL?: NOT ASKED

## 2021-01-06 SDOH — HEALTH STABILITY: MENTAL HEALTH
STRESS IS WHEN SOMEONE FEELS TENSE, NERVOUS, ANXIOUS, OR CAN'T SLEEP AT NIGHT BECAUSE THEIR MIND IS TROUBLED. HOW STRESSED ARE YOU?: RATHER MUCH

## 2021-01-06 SDOH — SOCIAL STABILITY: SOCIAL NETWORK: HOW OFTEN DO YOU ATTENT MEETINGS OF THE CLUB OR ORGANIZATION YOU BELONG TO?: NOT ASKED

## 2021-01-06 SDOH — SOCIAL STABILITY: SOCIAL NETWORK
DO YOU BELONG TO ANY CLUBS OR ORGANIZATIONS SUCH AS CHURCH GROUPS UNIONS, FRATERNAL OR ATHLETIC GROUPS, OR SCHOOL GROUPS?: NOT ASKED

## 2021-01-06 SDOH — SOCIAL STABILITY: SOCIAL NETWORK: IN A TYPICAL WEEK, HOW MANY TIMES DO YOU TALK ON THE PHONE WITH FAMILY, FRIENDS, OR NEIGHBORS?: NOT ASKED

## 2021-01-06 SDOH — ECONOMIC STABILITY: FOOD INSECURITY: WITHIN THE PAST 12 MONTHS, THE FOOD YOU BOUGHT JUST DIDN'T LAST AND YOU DIDN'T HAVE MONEY TO GET MORE.: NEVER TRUE

## 2021-01-06 SDOH — SOCIAL STABILITY: SOCIAL NETWORK: HOW OFTEN DO YOU ATTEND CHURCH OR RELIGIOUS SERVICES?: NOT ASKED

## 2021-01-06 SDOH — SOCIAL STABILITY: SOCIAL INSECURITY
WITHIN THE LAST YEAR, HAVE TO BEEN RAPED OR FORCED TO HAVE ANY KIND OF SEXUAL ACTIVITY BY YOUR PARTNER OR EX-PARTNER?: NO

## 2021-01-06 SDOH — SOCIAL STABILITY: SOCIAL INSECURITY
WITHIN THE LAST YEAR, HAVE YOU BEEN KICKED, HIT, SLAPPED, OR OTHERWISE PHYSICALLY HURT BY YOUR PARTNER OR EX-PARTNER?: NO

## 2021-01-06 SDOH — ECONOMIC STABILITY: TRANSPORTATION INSECURITY
IN THE PAST 12 MONTHS, HAS LACK OF TRANSPORTATION KEPT YOU FROM MEETINGS, WORK, OR FROM GETTING THINGS NEEDED FOR DAILY LIVING?: NO

## 2021-01-06 SDOH — SOCIAL STABILITY: SOCIAL INSECURITY: WITHIN THE LAST YEAR, HAVE YOU BEEN AFRAID OF YOUR PARTNER OR EX-PARTNER?: NO

## 2021-01-06 SDOH — ECONOMIC STABILITY: FOOD INSECURITY: WITHIN THE PAST 12 MONTHS, YOU WORRIED THAT YOUR FOOD WOULD RUN OUT BEFORE YOU GOT MONEY TO BUY MORE.: NEVER TRUE

## 2021-01-06 SDOH — SOCIAL STABILITY: SOCIAL INSECURITY: WITHIN THE LAST YEAR, HAVE YOU BEEN HUMILIATED OR EMOTIONALLY ABUSED IN OTHER WAYS BY YOUR PARTNER OR EX-PARTNER?: NO

## 2021-01-06 SDOH — HEALTH STABILITY: PHYSICAL HEALTH: ON AVERAGE, HOW MANY DAYS PER WEEK DO YOU ENGAGE IN MODERATE TO STRENUOUS EXERCISE (LIKE A BRISK WALK)?: NOT ASKED

## 2021-01-06 SDOH — SOCIAL STABILITY: SOCIAL NETWORK: ARE YOU MARRIED, WIDOWED, DIVORCED, SEPARATED, NEVER MARRIED, OR LIVING WITH A PARTNER?: MARRIED

## 2021-01-06 SDOH — SOCIAL STABILITY: SOCIAL NETWORK: HOW OFTEN DO YOU GET TOGETHER WITH FRIENDS OR RELATIVES?: NOT ASKED

## 2021-01-06 SDOH — ECONOMIC STABILITY: INCOME INSECURITY: HOW HARD IS IT FOR YOU TO PAY FOR THE VERY BASICS LIKE FOOD, HOUSING, MEDICAL CARE, AND HEATING?: NOT HARD AT ALL

## 2021-01-06 SDOH — ECONOMIC STABILITY: TRANSPORTATION INSECURITY
IN THE PAST 12 MONTHS, HAS THE LACK OF TRANSPORTATION KEPT YOU FROM MEDICAL APPOINTMENTS OR FROM GETTING MEDICATIONS?: NO

## 2021-01-06 NOTE — PROGRESS NOTES
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 and hopefully some pain relief. See above.     Continue therapy as planned.    Continue all other medications as reviewed per electronic medical record today.     Safety plan reviewed. To the Emergency Department as needed or call after hours crisis line at [phone] or [phone]. [place] Crisis Text Line: Text [place] to 533437  or  Suicide LifeLine Chat: suicidepreventionlifeline.org/chat    Schedule an appointment with me in 6 weeks or sooner as needed.  Call [facility] at [phone] to schedule.    Follow up with primary care provider as planned or sooner if needed for acute medical concerns.    Call the psychiatric nurse line with medication questions or concerns at [phone].    [employer] may be used to communicate with your provider, but this is not intended to be used for emergencies.    Community Resources:    National Suicide Prevention Lifeline: 621.408.5083 (TTY: 712.924.5789). Call anytime for help.  (www.suicidepreventionlifeline.org)  National [place] on Mental Illness  (www.alivia.org): 118-793-7614 or 588-632-7714.   Mental Health Association (www.mentalhealth.org): 973.407.7694 or 438-992-0993.  [place] Crisis [place]: Text [place] to 154972  Suicide LifeLine Chat: suicidepreCaviarline.org/chat    Administrative Billing:   Phone Call/Video Duration: 20 Minutes  Start: 3:29p  Stop: 3:49p    Complexity of Care: Patient with multiple psychiatric diagnoses and multiple medication changes adding to complexity of care.    Patient Status:  Patient will continue to be seen for ongoing consultation and stabilization.    Signed:   Carri Fuller DO  [facility]
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
## 2021-01-06 NOTE — Clinical Note
Please call this patient to get them scheduled for a follow-up visit in 6 weeks. Please schedule with me and the [facility]. Thanks!

## 2021-01-09 ENCOUNTER — HEALTH MAINTENANCE LETTER (OUTPATIENT)
Age: 61
End: 2021-01-09

## 2021-01-10 NOTE — PATIENT INSTRUCTIONS
Treatment Plan:    Continue BusPar 10 mg three tines daily for anxiety    Discontinue Lexapro. Take 10 mg of Lexapro for about two weeks WITH Effexor-XR 75 mg daily. Then STOP Lexapro and continue with just Effexor- mg daily.     Start Effexor-XR for mood, anxiety, and hopefully some pain relief. See above.     Continue therapy as planned.    Continue all other medications as reviewed per electronic medical record today.     Safety plan reviewed. To the Emergency Department as needed or call after hours crisis line at [phone] or [phone]. [place] Crisis Text Line: Text [place] to 354040  or  Suicide LifeLine Chat: suicideRed Advertising.org/chat    Schedule an appointment with me in 6 weeks or sooner as needed.  Call [facility] at [phone] to schedule.    Follow up with primary care provider as planned or sooner if needed for acute medical concerns.    Call the psychiatric nurse line with medication questions or concerns at [phone].    "[employer] may be used to communicate with your provider, but this is not intended to be used for emergencies.    Community Resources:    National Suicide Prevention Lifeline: 490.427.9963 (TTY: 467.533.2033). Call anytime for help.  (www.suicidepreventionlifeline.org)  National [place] on Mental Illness (www.alivia.org): 278.211.5758 or 189-499-0888.   Mental Health Association (www.mentalhealth.org): 848.964.3519 or 954-591-5844.  [place] Crisis Text Line: Text [place] to 441249  Suicide LifeLine Chat: suicideRed Advertising.org/chat

## 2021-02-08 NOTE — PROGRESS NOTES
"  Assessment & Plan     Hip pain, right  I suspect pain is related to her hip, will refer to Orthopedics.  If they disagree, would then consider MRI of her back.  Discussed using ibuprofen 800 mg three times daily and Norco prn severe pain.    - XR Pelvis and Hip Right 1 View; Future  - Orthopedic & Spine  Referral; Future  - HYDROcodone-acetaminophen (NORCO) 5-325 MG tablet; Take 1 tablet by mouth every 6 hours as needed for severe pain    Migraine without aura and without status migrainosus, not intractable  Feels her migraines are increasing in frequency.  Will increase Topamax from 50 mg twice daily to 75 mg twice daily.  Follow up in 2 months.    - topiramate (TOPAMAX) 50 MG tablet; Take 1.5 tablets (75 mg) by mouth 2 times daily      35 minutes spent on the date of the encounter doing interpretation of tests, patient visit and documentation        BMI:   Estimated body mass index is 57.41 kg/m  as calculated from the following:    Height as of this encounter: 1.67 m (5' 5.75\").    Weight as of this encounter: 160.1 kg (353 lb).   Weight management plan: Discussed healthy diet and exercise guidelines      Return in about 2 months (around 4/9/2021) for migraines.    Renetta Benitez MD  [facility] JAN Velasquez is a 60 year old who presents to clinic today for the following health issues     History of Present Illness       She eats 0-1 servings of fruits and vegetables daily.She consumes 2 sweetened beverage(s) daily.She exercises with enough effort to increase her heart rate 9 or less minutes per day.  She exercises with enough effort to increase her heart rate 3 or less days per week. She is missing 1 dose(s) of medications per week.         Musculoskeletal problem/pain  Onset/Duration: 3 weeks ago  Description  Location: knee - right  Joint Swelling: no  Redness: no  Pain: YES  Warmth: no  Intensity:  severe  Progression of Symptoms:  worsening  Accompanying " "signs and symptoms:   Fevers: no  Numbness/tingling/weakness: no  History  Trauma to the area: no  Recent illness:  no  Previous similar problem: had both knees replaced about 15 years ago  Previous evaluation:  Not recently   Precipitating or alleviating factors:  Aggravating factors include: lifting the leg at all  Therapies tried and outcome: nothing    Patient states she has pain of her right leg between her hip and knee.  She states the pain is so bad it hurts to lift her leg.  She has trouble getting in and out of the car.  She denies numbness or tingling.  She denies any injury.  She is very tearful because of the pain.  She has a couple of trips coming up and she wants to be able to walk.    Takes ibuprofen for pain, takes 4 tabs once a day, not much help.    Feels her migraines are more frequent, occurs 1-2 a week, lasts 3-4 hours, takes Excedrin with some help.  The patient denies any symptoms of neurological impairment or TIA's; no amaurosis, diplopia, dysphasia, or unilateral disturbance of motor or sensory function.          Objective    /82   Pulse 79   Temp 97.9  F (36.6  C) (Temporal)   Ht 1.67 m (5' 5.75\")   Wt (!) 160.1 kg (353 lb)   SpO2 99%   BMI 57.41 kg/m    Body mass index is 57.41 kg/m .  Physical Exam     Gen: Tearful  Right leg: Pain with external and internal rotation of the hip.  She is full range of motion of her knee.  No tenderness over the trochanteric area.  No obvious numbness when compared to the right side.  Strength 5 out of 5 bilaterally except for giveaway with hip flexion on the right.  Negative straight leg raise.  Right leg exam is normal.  Patient has difficulty arising out of the chair but afterwards is able to walk.    X-ray:  Osteophytes and joint space narrowing, left worse than right, Radiology review pending        "

## 2021-02-09 ENCOUNTER — OFFICE VISIT (OUTPATIENT)
Dept: FAMILY MEDICINE | Facility: OTHER | Age: 61
End: 2021-02-09
Payer: COMMERCIAL

## 2021-02-09 ENCOUNTER — ANCILLARY PROCEDURE (OUTPATIENT)
Dept: GENERAL RADIOLOGY | Facility: OTHER | Age: 61
End: 2021-02-09
Attending: FAMILY MEDICINE
Payer: COMMERCIAL

## 2021-02-09 VITALS
OXYGEN SATURATION: 99 % | BODY MASS INDEX: 47.09 KG/M2 | WEIGHT: 293 LBS | SYSTOLIC BLOOD PRESSURE: 136 MMHG | HEIGHT: 66 IN | TEMPERATURE: 97.9 F | DIASTOLIC BLOOD PRESSURE: 82 MMHG | HEART RATE: 79 BPM

## 2021-02-09 DIAGNOSIS — M25.551 HIP PAIN, RIGHT: ICD-10-CM

## 2021-02-09 DIAGNOSIS — G43.009 MIGRAINE WITHOUT AURA AND WITHOUT STATUS MIGRAINOSUS, NOT INTRACTABLE: ICD-10-CM

## 2021-02-09 DIAGNOSIS — M25.551 HIP PAIN, RIGHT: Primary | ICD-10-CM

## 2021-02-09 PROCEDURE — 73502 X-RAY EXAM HIP UNI 2-3 VIEWS: CPT | Mod: RT | Performed by: RADIOLOGY

## 2021-02-09 PROCEDURE — 99214 OFFICE O/P EST MOD 30 MIN: CPT | Performed by: FAMILY MEDICINE

## 2021-02-09 RX ORDER — TOPIRAMATE 50 MG/1
75 TABLET, FILM COATED ORAL 2 TIMES DAILY
Qty: 270 TABLET | Refills: 0 | Status: SHIPPED | OUTPATIENT
Start: 2021-02-09 | End: 2021-04-16

## 2021-02-09 RX ORDER — HYDROCODONE BITARTRATE AND ACETAMINOPHEN 5; 325 MG/1; MG/1
1 TABLET ORAL EVERY 6 HOURS PRN
Qty: 10 TABLET | Refills: 0 | Status: SHIPPED | OUTPATIENT
Start: 2021-02-09 | End: 2021-02-12

## 2021-02-09 ASSESSMENT — ANXIETY QUESTIONNAIRES
7. FEELING AFRAID AS IF SOMETHING AWFUL MIGHT HAPPEN: NEARLY EVERY DAY
4. TROUBLE RELAXING: MORE THAN HALF THE DAYS
1. FEELING NERVOUS, ANXIOUS, OR ON EDGE: NEARLY EVERY DAY
6. BECOMING EASILY ANNOYED OR IRRITABLE: SEVERAL DAYS
GAD7 TOTAL SCORE: 17
3. WORRYING TOO MUCH ABOUT DIFFERENT THINGS: NEARLY EVERY DAY
GAD7 TOTAL SCORE: 17
5. BEING SO RESTLESS THAT IT IS HARD TO SIT STILL: MORE THAN HALF THE DAYS
2. NOT BEING ABLE TO STOP OR CONTROL WORRYING: NEARLY EVERY DAY
GAD7 TOTAL SCORE: 17
7. FEELING AFRAID AS IF SOMETHING AWFUL MIGHT HAPPEN: NEARLY EVERY DAY

## 2021-02-09 ASSESSMENT — PATIENT HEALTH QUESTIONNAIRE - PHQ9
10. IF YOU CHECKED OFF ANY PROBLEMS, HOW DIFFICULT HAVE THESE PROBLEMS MADE IT FOR YOU TO DO YOUR WORK, TAKE CARE OF THINGS AT HOME, OR GET ALONG WITH OTHER PEOPLE: EXTREMELY DIFFICULT
SUM OF ALL RESPONSES TO PHQ QUESTIONS 1-9: 21
SUM OF ALL RESPONSES TO PHQ QUESTIONS 1-9: 21

## 2021-02-09 ASSESSMENT — PAIN SCALES - GENERAL: PAINLEVEL: WORST PAIN (10)

## 2021-02-09 ASSESSMENT — MIFFLIN-ST. JEOR: SCORE: 2188.98

## 2021-02-10 ASSESSMENT — ANXIETY QUESTIONNAIRES: GAD7 TOTAL SCORE: 17

## 2021-02-11 ENCOUNTER — OFFICE VISIT (OUTPATIENT)
Dept: ORTHOPEDICS | Facility: CLINIC | Age: 61
End: 2021-02-11
Attending: FAMILY MEDICINE
Payer: COMMERCIAL

## 2021-02-11 ENCOUNTER — ANCILLARY PROCEDURE (OUTPATIENT)
Dept: GENERAL RADIOLOGY | Facility: CLINIC | Age: 61
End: 2021-02-11
Attending: ORTHOPAEDIC SURGERY
Payer: COMMERCIAL

## 2021-02-11 VITALS
SYSTOLIC BLOOD PRESSURE: 124 MMHG | DIASTOLIC BLOOD PRESSURE: 82 MMHG | BODY MASS INDEX: 47.09 KG/M2 | HEIGHT: 66 IN | WEIGHT: 293 LBS

## 2021-02-11 DIAGNOSIS — M25.551 RIGHT HIP PAIN: ICD-10-CM

## 2021-02-11 DIAGNOSIS — S76.011A STRAIN OF FLEXOR MUSCLE OF RIGHT HIP, INITIAL ENCOUNTER: ICD-10-CM

## 2021-02-11 DIAGNOSIS — M16.11 PRIMARY OSTEOARTHRITIS OF RIGHT HIP: Primary | ICD-10-CM

## 2021-02-11 PROCEDURE — 72170 X-RAY EXAM OF PELVIS: CPT | Mod: TC | Performed by: RADIOLOGY

## 2021-02-11 PROCEDURE — 99214 OFFICE O/P EST MOD 30 MIN: CPT | Performed by: ORTHOPAEDIC SURGERY

## 2021-02-11 RX ORDER — DICLOFENAC SODIUM 75 MG/1
75 TABLET, DELAYED RELEASE ORAL 2 TIMES DAILY
Qty: 30 TABLET | Refills: 0 | Status: SHIPPED | OUTPATIENT
Start: 2021-02-11 | End: 2021-04-06

## 2021-02-11 ASSESSMENT — PAIN SCALES - GENERAL: PAINLEVEL: SEVERE PAIN (6)

## 2021-02-11 ASSESSMENT — MIFFLIN-ST. JEOR: SCORE: 2188.98

## 2021-02-11 NOTE — PROGRESS NOTES
ORTHOPEDIC CONSULT      Chief Complaint: Eva Solis is a 60 year old female who is being seen for   Chief Complaint   Patient presents with     Musculoskeletal Problem     right hip pain       History of Present Illness:   Presents today with her .  She reports 3 weeks worth of progressive anterior lateral thigh pain.  No specific injuries or traumas.  She is never had this before.  She feels that the worse when she goes from a seated to a standing position.  It actually feels better when she is walking.  She had no previous hip surgeries on the right side.  She did have some previous issues the left hip but this feels different.  She has been taking ibuprofen intermittently which has been some help.  She is also been utilizing Flexeril and Norco.      Patient's past medical, surgical, social and family histories reviewed.     Past Medical History:   Diagnosis Date     Congestive heart failure (H) 3years     Depressive disorder 1-2 years    dealing with on a daily basis     DEPRESSIVE DISORDER NEC 2004     Diastolic dysfunction      Essential hypertension, benign      Generalized osteoarthrosis, unspecified site     knees     Headache(784.0)      Heart disease 3 years    CHF     Mixed anxiety depressive disorder 1/15/2016     PANIC DISORDER 2004       Past Surgical History:   Procedure Laterality Date     C  DELIVERY ONLY      , Low Cervical     C  DELIVERY ONLY       C VAGINAL HYSTERECTOMY      without oophorectomy     COLONOSCOPY  10/06/10    attempt at colonscopy to 50cm     HC COLONOSCOPY W/WO BRUSH/WASH  2005    Diverticulosis.  Internal hemorrhoids.     INJECT EPIDURAL LUMBAR N/A 10/18/2019    Procedure: INJECTION, SPINE, LUMBAR 4 - LUMBAR 5, EPIDURAL TRANSLAMINAR;  Surgeon: Trever Wheatley MD;  Location: [facility]     INJECT EPIDURAL LUMBAR N/A 2020    Procedure: INJECTION, SPINE, LUMBAR 4-5, EPIDURAL TRANSLAMINAR;  Surgeon: Trever Wheatley MD;   Location: [facility]     JOINT REPLACEMTN, KNEE RT/LT  5/11/2006    Right total knee arthroplasty.     JOINT REPLACEMTN, KNEE RT/LT  07/19/2006    Left total knee arthroplasty.       Medications:       busPIRone (BUSPAR) 10 MG tablet, TAKE ONE TABLET BY MOUTH THREE TIMES DAILY        furosemide (LASIX) 20 MG tablet, Take 1 tablet (20 mg) by mouth 2 times daily       HYDROcodone-acetaminophen (NORCO) 5-325 MG tablet, Take 1 tablet by mouth every 6 hours as needed for severe pain       losartan (COZAAR) 100 MG tablet, TAKE ONE TABLET BY MOUTH ONE TIME DAILY        MIRALAX PO POWD, 17 GM DAILY       OMEPRAZOLE PO,        spironolactone (ALDACTONE) 25 MG tablet, TAKE ONE TABLET BY MOUTH ONE TIME DAILY        topiramate (TOPAMAX) 50 MG tablet, Take 1.5 tablets (75 mg) by mouth 2 times daily       venlafaxine (EFFEXOR-XR) 150 MG 24 hr capsule, Take 1 capsule (150 mg) by mouth daily       cyclobenzaprine (FLEXERIL) 5 MG tablet, Take 1 tablet (5 mg) by mouth 3 times daily as needed for muscle spasms (Patient not taking: Reported on 2/11/2021)    No current facility-administered medications on file prior to visit.       Allergies   Allergen Reactions     Lisinopril Cough       Social History     Occupational History     Occupation: Processor   Tobacco Use     Smoking status: Never Smoker     Smokeless tobacco: Never Used     Tobacco comment: no smokers in household   Substance and Sexual Activity     Alcohol use: No     Drug use: No     Sexual activity: Yes     Partners: Male     Birth control/protection: None     Comment: hysterectomy/bilateral ovaries remain       Family History   Problem Relation Age of Onset     Cancer Father         squamous cell ca     Cancer Maternal Grandmother         lung     Cerebrovascular Disease Maternal Grandmother      Cancer Paternal Grandmother         squamous cell ca     Diabetes Sister      Cancer Sister         kidney cancer     Kidney Cancer Sister      Other Cancer Sister         kidney  "cancer     Bipolar Disorder Sister      Connective Tissue Disorder Brother         lupus     Lupus Brother      Kidney Disease Mother         atrophic kidney     Hypertension Mother      Depression Mother         she's being treated for this     Bipolar Disorder Mother      Depression Sister         dont know much     Kidney Disease Maternal Uncle         unclear kidney issue     Anxiety Disorder Son      Anxiety Disorder Niece      Colon Cancer No family hx of      Asthma No family hx of        REVIEW OF SYSTEMS  10 point review systems performed otherwise negative as noted as per history of present illness.    Physical Exam:  Vitals: /82   Ht 1.67 m (5' 5.75\")   Wt (!) 160.1 kg (353 lb)   BMI 57.41 kg/m    BMI= Body mass index is 57.41 kg/m .  Constitutional: healthy, alert and no acute distress   Psychiatric: mentation appears normal and affect normal/bright  NEURO: no focal deficits  RESP: Normal with easy respirations and no use of accessory muscles to breathe, no audible wheezing or retractions  CV: RLE: no edema           SKIN: No erythema, rashes, excoriation, or breakdown. No evidence of infection.   JOINT/EXTREMITIES:right hip: No focal areas of tenderness.  Active hip flexion to approximate 95 degrees.  Passively she is able to tolerate-100 limited secondary to body habitus.  Negative straight leg.  No gross atrophy.  No focal weakness distally.  Internal rotation causes some anterior thigh discomfort.  She has difficulty going from a seated to standing position.  But once standing is very minimal discomfort with walking.     GAIT: non-antalgic    Diagnostic Modalities:  right hip X-ray: No acute fractures or dislocations.  Moderate medial joint wear with joint space narrowing.  SI joints show some irregularity and narrowing towards anterior aspect as well.  Independent visualization of the images was performed.      Impression: right hip primary osteoarthritis in a 60-year-old female with a BMI " of 57  Right hip flexor strain    Plan:  All of the above pertinent physical exam and imaging modalities findings was reviewed with Eva and her .    She presents with 3 weeks worth of thigh pain with no history of trauma.  Slightly atypical course as far as the pain distribution.  She has no peripheral numbness and tingling on the right side.  She reports the pain actually feels better when she is walking but has majority of her symptoms when she goes from a seated to stand position.  Included in differential is muscular versus her osteoarthritis of the hip joint.  Intermittent anti-inflammatories have been helpful.  With that in mind I recommended consistent anti-inflammatory and provided her prescription.  Risks reviewed.  I also recommended some formal physical therapy.    We also reviewed weight loss.    She does have a walker at home I encouraged her to use that to help take some pressure off the hip as well.    Return to clinic 4-6 , week(s) as needed, or sooner as needed for changes.  Re-x-ray on return: No    Jose Luis Perez D.O.

## 2021-02-11 NOTE — LETTER
2021         RE: Eva Solis  [address]  [facility] [address]        Dear Colleague,    Thank you for referring your patient, Eva Solis, to the [facility]. Please see a copy of my visit note below.    ORTHOPEDIC CONSULT      Chief Complaint: Eva Solis is a 60 year old female who is being seen for   Chief Complaint   Patient presents with     Musculoskeletal Problem     right hip pain       History of Present Illness:   Presents today with her .  She reports 3 weeks worth of progressive anterior lateral thigh pain.  No specific injuries or traumas.  She is never had this before.  She feels that the worse when she goes from a seated to a standing position.  It actually feels better when she is walking.  She had no previous hip surgeries on the right side.  She did have some previous issues the left hip but this feels different.  She has been taking ibuprofen intermittently which has been some help.  She is also been utilizing Flexeril and Norco.      Patient's past medical, surgical, social and family histories reviewed.     Past Medical History:   Diagnosis Date     Congestive heart failure (H) 3years     Depressive disorder 1-2 years    dealing with on a daily basis     DEPRESSIVE DISORDER NEC 2004     Diastolic dysfunction      Essential hypertension, benign      Generalized osteoarthrosis, unspecified site     knees     Headache(784.0)      Heart disease 3 years    CHF     Mixed anxiety depressive disorder 1/15/2016     PANIC DISORDER 2004       Past Surgical History:   Procedure Laterality Date     C  DELIVERY ONLY      , Low Cervical     C  DELIVERY ONLY       C VAGINAL HYSTERECTOMY      without oophorectomy     COLONOSCOPY  10/06/10    attempt at colonscopy to 50cm     HC COLONOSCOPY W/WO BRUSH/WASH  2005    Diverticulosis.  Internal hemorrhoids.     INJECT EPIDURAL LUMBAR N/A 10/18/2019     Procedure: INJECTION, SPINE, LUMBAR 4 - LUMBAR 5, EPIDURAL TRANSLAMINAR;  Surgeon: Trever Wheatley MD;  Location[facility]     INJECT EPIDURAL LUMBAR N/A 6/12/2020    Procedure: INJECTION, SPINE, LUMBAR 4-5, EPIDURAL TRANSLAMINAR;  Surgeon: Trever Wheatley MD;  Location: [facility]     JOINT REPLACEMTN, KNEE RT/LT  5/11/2006    Right total knee arthroplasty.     JOINT REPLACEMTN, KNEE RT/LT  07/19/2006    Left total knee arthroplasty.       Medications:       busPIRone (BUSPAR) 10 MG tablet, TAKE ONE TABLET BY MOUTH THREE TIMES DAILY        furosemide (LASIX) 20 MG tablet, Take 1 tablet (20 mg) by mouth 2 times daily       HYDROcodone-acetaminophen (NORCO) 5-325 MG tablet, Take 1 tablet by mouth every 6 hours as needed for severe pain       losartan (COZAAR) 100 MG tablet, TAKE ONE TABLET BY MOUTH ONE TIME DAILY        MIRALAX PO POWD, 17 GM DAILY       OMEPRAZOLE PO,        spironolactone (ALDACTONE) 25 MG tablet, TAKE ONE TABLET BY MOUTH ONE TIME DAILY        topiramate (TOPAMAX) 50 MG tablet, Take 1.5 tablets (75 mg) by mouth 2 times daily       venlafaxine (EFFEXOR-XR) 150 MG 24 hr capsule, Take 1 capsule (150 mg) by mouth daily       cyclobenzaprine (FLEXERIL) 5 MG tablet, Take 1 tablet (5 mg) by mouth 3 times daily as needed for muscle spasms (Patient not taking: Reported on 2/11/2021)    No current facility-administered medications on file prior to visit.       Allergies   Allergen Reactions     Lisinopril Cough       Social History     Occupational History     Occupation: Processor   Tobacco Use     Smoking status: Never Smoker     Smokeless tobacco: Never Used     Tobacco comment: no smokers in household   Substance and Sexual Activity     Alcohol use: No     Drug use: No     Sexual activity: Yes     Partners: Male     Birth control/protection: None     Comment: hysterectomy/bilateral ovaries remain       Family History   Problem Relation Age of Onset     Cancer Father         squamous cell ca     Cancer Maternal  "Grandmother         lung     Cerebrovascular Disease Maternal Grandmother      Cancer Paternal Grandmother         squamous cell ca     Diabetes Sister      Cancer Sister         kidney cancer     Kidney Cancer Sister      Other Cancer Sister         kidney cancer     Bipolar Disorder Sister      Connective Tissue Disorder Brother         lupus     Lupus Brother      Kidney Disease Mother         atrophic kidney     Hypertension Mother      Depression Mother         she's being treated for this     Bipolar Disorder Mother      Depression Sister         dont know much     Kidney Disease Maternal Uncle         unclear kidney issue     Anxiety Disorder Son      Anxiety Disorder Niece      Colon Cancer No family hx of      Asthma No family hx of        REVIEW OF SYSTEMS  10 point review systems performed otherwise negative as noted as per history of present illness.    Physical Exam:  Vitals: /82   Ht 1.67 m (5' 5.75\")   Wt (!) 160.1 kg (353 lb)   BMI 57.41 kg/m    BMI= Body mass index is 57.41 kg/m .  Constitutional: healthy, alert and no acute distress   Psychiatric: mentation appears normal and affect normal/bright  NEURO: no focal deficits  RESP: Normal with easy respirations and no use of accessory muscles to breathe, no audible wheezing or retractions  CV: RLE: no edema           SKIN: No erythema, rashes, excoriation, or breakdown. No evidence of infection.   JOINT/EXTREMITIES:right hip: No focal areas of tenderness.  Active hip flexion to approximate 95 degrees.  Passively she is able to tolerate-100 limited secondary to body habitus.  Negative straight leg.  No gross atrophy.  No focal weakness distally.  Internal rotation causes some anterior thigh discomfort.  She has difficulty going from a seated to standing position.  But once standing is very minimal discomfort with walking.     GAIT: non-antalgic    Diagnostic Modalities:  right hip X-ray: No acute fractures or dislocations.  Moderate medial " joint wear with joint space narrowing.  SI joints show some irregularity and narrowing towards anterior aspect as well.  Independent visualization of the images was performed.      Impression: right hip primary osteoarthritis in a 60-year-old female with a BMI of 57  Right hip flexor strain    Plan:  All of the above pertinent physical exam and imaging modalities findings was reviewed with Eva and her .    She presents with 3 weeks worth of thigh pain with no history of trauma.  Slightly atypical course as far as the pain distribution.  She has no peripheral numbness and tingling on the right side.  She reports the pain actually feels better when she is walking but has majority of her symptoms when she goes from a seated to stand position.  Included in differential is muscular versus her osteoarthritis of the hip joint.  Intermittent anti-inflammatories have been helpful.  With that in mind I recommended consistent anti-inflammatory and provided her prescription.  Risks reviewed.  I also recommended some formal physical therapy.    We also reviewed weight loss.    She does have a walker at home I encouraged her to use that to help take some pressure off the hip as well.    Return to clinic 4-6 , week(s) as needed, or sooner as needed for changes.  Re-x-ray on return: No    Jose Luis Perez D.O.      Again, thank you for allowing me to participate in the care of your patient.        Sincerely,        Jefferson Perez, DO

## 2021-02-17 NOTE — TELEPHONE ENCOUNTER
Message sent.  Emerald Frankel, JO     Prescription for O2 at 10 lpm written by Dr Eleanor Soto   RT printed the script and faxed to [facility] with chart notes indicating the need for Austin's need during episodes of cluster headaches  Awaiting response to determine insurance coverage

## 2021-02-26 DIAGNOSIS — F41.8 MIXED ANXIETY DEPRESSIVE DISORDER: ICD-10-CM

## 2021-02-26 RX ORDER — BUSPIRONE HYDROCHLORIDE 10 MG/1
TABLET ORAL
Qty: 270 TABLET | Refills: 0 | Status: SHIPPED | OUTPATIENT
Start: 2021-02-26 | End: 2021-05-19

## 2021-02-26 NOTE — TELEPHONE ENCOUNTER
Pending Prescriptions:                       Disp   Refills    busPIRone (BUSPAR) 10 MG tablet [Pharmacy *270 ta*0        Sig: TAKE ONE TABLET BY MOUTH THREE TIMES DAILY       Routing refill request to provider for review/approval because:  Labs out of range:  [id number]    Kenya Marcelo RN on 2/26/2021 at 3:39 PM

## 2021-03-01 ENCOUNTER — THERAPY VISIT (OUTPATIENT)
Dept: PHYSICAL THERAPY | Facility: CLINIC | Age: 61
End: 2021-03-01
Attending: ORTHOPAEDIC SURGERY
Payer: COMMERCIAL

## 2021-03-01 DIAGNOSIS — S76.011A STRAIN OF FLEXOR MUSCLE OF RIGHT HIP, INITIAL ENCOUNTER: ICD-10-CM

## 2021-03-01 DIAGNOSIS — M16.11 PRIMARY OSTEOARTHRITIS OF RIGHT HIP: ICD-10-CM

## 2021-03-01 PROCEDURE — 97162 PT EVAL MOD COMPLEX 30 MIN: CPT | Mod: GP | Performed by: PHYSICAL THERAPIST

## 2021-03-01 PROCEDURE — 97110 THERAPEUTIC EXERCISES: CPT | Mod: GP | Performed by: PHYSICAL THERAPIST

## 2021-03-01 PROCEDURE — 97140 MANUAL THERAPY 1/> REGIONS: CPT | Mod: GP | Performed by: PHYSICAL THERAPIST

## 2021-03-01 ASSESSMENT — ACTIVITIES OF DAILY LIVING (ADL)
HOS_ADL_ITEM_SCORE_TOTAL: 21
SITTING_FOR_15_MINUTES: NO DIFFICULTY AT ALL
WALKING_DOWN_STEEP_HILLS: EXTREME DIFFICULTY
GETTING_INTO_AND_OUT_OF_AN_AVERAGE_CAR: EXTREME DIFFICULTY
ROLLING_OVER_IN_BED: EXTREME DIFFICULTY
HOS_ADL_HIGHEST_POTENTIAL_SCORE: 68
WALKING_APPROXIMATELY_10_MINUTES: MODERATE DIFFICULTY
HEAVY_WORK: EXTREME DIFFICULTY
HOS_ADL_SCORE(%): 30.88
GOING_DOWN_1_FLIGHT_OF_STAIRS: EXTREME DIFFICULTY
GOING_UP_1_FLIGHT_OF_STAIRS: EXTREME DIFFICULTY
WALKING_15_MINUTES_OR_GREATER: EXTREME DIFFICULTY
RECREATIONAL_ACTIVITIES: EXTREME DIFFICULTY
STANDING_FOR_15_MINUTES: EXTREME DIFFICULTY
WALKING_INITIALLY: SLIGHT DIFFICULTY
PUTTING_ON_SOCKS_AND_SHOES: EXTREME DIFFICULTY
STEPPING_UP_AND_DOWN_CURBS: MODERATE DIFFICULTY
LIGHT_TO_MODERATE_WORK: MODERATE DIFFICULTY
DEEP_SQUATTING: UNABLE TO DO
GETTING_INTO_AND_OUT_OF_A_BATHTUB: MODERATE DIFFICULTY
HOW_WOULD_YOU_RATE_YOUR_CURRENT_LEVEL_OF_FUNCTION_DURING_YOUR_USUAL_ACTIVITIES_OF_DAILY_LIVING_FROM_0_TO_100_WITH_100_BEING_YOUR_LEVEL_OF_FUNCTION_PRIOR_TO_YOUR_HIP_PROBLEM_AND_0_BEING_THE_INABILITY_TO_PERFORM_ANY_OF_YOUR_USUAL_DAILY_ACTIVITIES?: 25
WALKING_UP_STEEP_HILLS: EXTREME DIFFICULTY
TWISTING/PIVOTING_ON_INVOLVED_LEG: UNABLE TO DO
HOS_ADL_COUNT: 17

## 2021-03-01 NOTE — PROGRESS NOTES
[facility] Initial Evaluation  Subjective:  The history is provided by the patient. No  was used.   Patient Health History  Eva Solis being seen for major pain in my right leg, hip to knee.     Problem began: 1/15/2021.   Problem occurred: just started one day   Pain is reported as 8/10 on pain scale.  General health as reported by patient is fair.  Pertinent medical history includes: depression, high blood pressure, migraines/headaches, numbness/tingling, overweight and osteoarthritis.        Surgeries include:  Orthopedic surgery.    Current medications:  Anti-depressants and high blood pressure medication.    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   Primary job tasks include:  Computer work, prolonged sitting and repetitive tasks.                  Therapist Generated HPI Evaluation         Type of problem:  Right hip.    This is a new condition.  Condition occurred with:  Insidious onset.  Where condition occurred: for unknown reasons.  Patient reports pain:  Anterior and joint.  Pain is described as stabbing, aching and shooting and is constant.  Pain radiates to:  Thigh and knee. Pain is worse during the night.  Since onset symptoms are gradually worsening.  Associated symptoms:  Loss of motion/stiffness. Symptoms are exacerbated by certain positions, transfers, walking, lying on extremity, ascending stairs, descending stairs and activity  and relieved by NSAID's.  Special tests included:  X-ray.  Past treatment: PT for left hip. There was significant improvement following previous treatment.  Restrictions due to condition include:  Working in normal job without restrictions.  Barriers include:  Stairs.                        Objective:    Gait:    Gait Type:  Antalgic     Deviations:  Hip:  Trendelenberg RGeneral Deviations:  Stance  time decr, glen decr and stride length decr               Lumbar/SI Evaluation  ROM:    AROM Lumbar:   Flexion:            Fingers to below knee  Ext:                    25% ++pain R anterior thigh   Side Bend:        Left:  To mid thigh +pull R    Right:  To mid thigh ++pain R  Rotation:           Left:  Mod limitation ++pain R hip    Right:  Mod limitation ++pain R hip  Side Glide:        Left:     Right:                                                               Hip Evaluation  HIP AROM:    Flexion: Left: 90 no pain    Right:  AAROM: 40 (during heelslide w/ belt assistance) ++pain                    Hip Strength:    Flexion:   Left: 3/5    Pain: weak/pain free  Right:  1/5   +++  Pain: weak/painful                                          Hip Palpation:  Palpations normal left hip: quadriceps.    Right hip tenderness present at:  IT Band and hip flexors  Functional Testing:  Functional test hip: Decreased bed mobility sit<>supine (has to manually lift R LE)                        General     ROS    Assessment/Plan:    Patient is a 60 year old female with right side hip complaints.    Patient has the following significant findings with corresponding treatment plan.                Diagnosis 1:  R Hip and thigh pain consistent w/ Hip OA and Hip flexor strain  Pain -  hot/cold therapy, electric stimulation, manual therapy, self management, education, directional preference exercise and home program  Decreased ROM/flexibility - manual therapy, therapeutic exercise, therapeutic activity and home program  Decreased strength - therapeutic exercise, therapeutic activities and home program  Impaired balance - neuro re-education, therapeutic activities and home program  Impaired gait - gait training  Impaired muscle performance - neuro re-education and home program  Decreased function - therapeutic activities and home program  Impaired posture - neuro re-education, therapeutic activities and home program    Therapy  Evaluation Codes:   1) History comprised of:   Personal factors that impact the plan of care:      Age, Past/current experiences and Profession.    Comorbidity factors that impact the plan of care are:      Depression, High blood pressure, Migraines/headaches, Numbness/tingling, Osteoarthritis and Overweight.     Medications impacting care: Anti-depressant and High blood pressure.  2) Examination of Body Systems comprised of:   Body structures and functions that impact the plan of care:      Hip.   Activity limitations that impact the plan of care are:      Bathing, Bending, Cooking, Driving, Dressing, Squatting/kneeling, Stairs, Standing, Walking, Sleeping and Laying down.  3) Clinical presentation characteristics are:   Evolving/Changing.  4) Decision-Making    Moderate complexity using standardized patient assessment instrument and/or measureable assessment of functional outcome.  Cumulative Therapy Evaluation is: Moderate complexity.    Previous and current functional limitations:  (See Goal Flow Sheet for this information)    Short term and Long term goals: (See Goal Flow Sheet for this information)     Communication ability:  Patient appears to be able to clearly communicate and understand verbal and written communication and follow directions correctly.  Treatment Explanation - The following has been discussed with the patient:   RX ordered/plan of care  Anticipated outcomes  Possible risks and side effects  This patient would benefit from PT intervention to resume normal activities.   Rehab potential is good.    Frequency:  1 X week, once daily  Duration:  for 8 weeks  Discharge Plan:  Achieve all LTG.  Independent in home treatment program.  Reach maximal therapeutic benefit.    Please refer to the daily flowsheet for treatment today, total treatment time and time spent performing 1:1 timed codes.

## 2021-03-08 ENCOUNTER — THERAPY VISIT (OUTPATIENT)
Dept: PHYSICAL THERAPY | Facility: CLINIC | Age: 61
End: 2021-03-08
Attending: ORTHOPAEDIC SURGERY
Payer: COMMERCIAL

## 2021-03-08 DIAGNOSIS — M16.11 PRIMARY OSTEOARTHRITIS OF RIGHT HIP: ICD-10-CM

## 2021-03-08 DIAGNOSIS — S76.011A STRAIN OF FLEXOR MUSCLE OF RIGHT HIP, INITIAL ENCOUNTER: ICD-10-CM

## 2021-03-08 PROCEDURE — 97140 MANUAL THERAPY 1/> REGIONS: CPT | Mod: GP | Performed by: PHYSICAL THERAPIST

## 2021-03-08 PROCEDURE — 97110 THERAPEUTIC EXERCISES: CPT | Mod: GP | Performed by: PHYSICAL THERAPIST

## 2021-03-11 ENCOUNTER — MYC MEDICAL ADVICE (OUTPATIENT)
Dept: PSYCHIATRY | Facility: CLINIC | Age: 61
End: 2021-03-11

## 2021-03-15 ENCOUNTER — THERAPY VISIT (OUTPATIENT)
Dept: PHYSICAL THERAPY | Facility: CLINIC | Age: 61
End: 2021-03-15
Attending: ORTHOPAEDIC SURGERY
Payer: COMMERCIAL

## 2021-03-15 DIAGNOSIS — M16.11 PRIMARY OSTEOARTHRITIS OF RIGHT HIP: ICD-10-CM

## 2021-03-15 DIAGNOSIS — S76.011A STRAIN OF FLEXOR MUSCLE OF RIGHT HIP, INITIAL ENCOUNTER: ICD-10-CM

## 2021-03-15 PROCEDURE — 97110 THERAPEUTIC EXERCISES: CPT | Mod: GP | Performed by: PHYSICAL THERAPIST

## 2021-03-15 PROCEDURE — 97140 MANUAL THERAPY 1/> REGIONS: CPT | Mod: GP | Performed by: PHYSICAL THERAPIST

## 2021-03-22 ENCOUNTER — THERAPY VISIT (OUTPATIENT)
Dept: PHYSICAL THERAPY | Facility: CLINIC | Age: 61
End: 2021-03-22
Payer: COMMERCIAL

## 2021-03-22 DIAGNOSIS — M16.11 PRIMARY OSTEOARTHRITIS OF RIGHT HIP: ICD-10-CM

## 2021-03-22 DIAGNOSIS — S76.011A STRAIN OF FLEXOR MUSCLE OF RIGHT HIP, INITIAL ENCOUNTER: ICD-10-CM

## 2021-03-22 PROCEDURE — 97140 MANUAL THERAPY 1/> REGIONS: CPT | Mod: GP | Performed by: PHYSICAL THERAPIST

## 2021-03-22 PROCEDURE — 97110 THERAPEUTIC EXERCISES: CPT | Mod: GP | Performed by: PHYSICAL THERAPIST
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
## 2021-03-24 ENCOUNTER — VIRTUAL VISIT (OUTPATIENT)
Dept: BEHAVIORAL HEALTH | Facility: CLINIC | Age: 61
End: 2021-03-24
Payer: COMMERCIAL

## 2021-03-24 ENCOUNTER — VIRTUAL VISIT (OUTPATIENT)
Dept: PSYCHIATRY | Facility: CLINIC | Age: 61
End: 2021-03-24
Payer: COMMERCIAL

## 2021-03-24 DIAGNOSIS — F33.0 DEPRESSION, MAJOR, RECURRENT, MILD (H): Primary | ICD-10-CM

## 2021-03-24 DIAGNOSIS — F41.1 GAD (GENERALIZED ANXIETY DISORDER): ICD-10-CM

## 2021-03-24 DIAGNOSIS — F33.1 MAJOR DEPRESSIVE DISORDER, RECURRENT EPISODE, MODERATE (H): ICD-10-CM

## 2021-03-24 PROCEDURE — 90832 PSYTX W PT 30 MINUTES: CPT | Mod: TEL | Performed by: SOCIAL WORKER

## 2021-03-24 PROCEDURE — 99214 OFFICE O/P EST MOD 30 MIN: CPT | Mod: TEL | Performed by: PSYCHIATRY & NEUROLOGY

## 2021-03-24 RX ORDER — VENLAFAXINE HYDROCHLORIDE 75 MG/1
225 CAPSULE, EXTENDED RELEASE ORAL DAILY
Qty: 90 CAPSULE | Refills: 2 | Status: SHIPPED | OUTPATIENT
Start: 2021-03-24 | End: 2021-06-28

## 2021-03-24 ASSESSMENT — PATIENT HEALTH QUESTIONNAIRE - PHQ9
5. POOR APPETITE OR OVEREATING: SEVERAL DAYS
SUM OF ALL RESPONSES TO PHQ QUESTIONS 1-9: 19

## 2021-03-24 ASSESSMENT — ANXIETY QUESTIONNAIRES
2. NOT BEING ABLE TO STOP OR CONTROL WORRYING: NEARLY EVERY DAY
1. FEELING NERVOUS, ANXIOUS, OR ON EDGE: NEARLY EVERY DAY
3. WORRYING TOO MUCH ABOUT DIFFERENT THINGS: NEARLY EVERY DAY
GAD7 TOTAL SCORE: 15
6. BECOMING EASILY ANNOYED OR IRRITABLE: SEVERAL DAYS
5. BEING SO RESTLESS THAT IT IS HARD TO SIT STILL: SEVERAL DAYS
7. FEELING AFRAID AS IF SOMETHING AWFUL MIGHT HAPPEN: NEARLY EVERY DAY
IF YOU CHECKED OFF ANY PROBLEMS ON THIS QUESTIONNAIRE, HOW DIFFICULT HAVE THESE PROBLEMS MADE IT FOR YOU TO DO YOUR WORK, TAKE CARE OF THINGS AT HOME, OR GET ALONG WITH OTHER PEOPLE: EXTREMELY DIFFICULT

## 2021-03-24 NOTE — Clinical Note
Effexor-XR slightly more helpful than past trials with other antidepressants.  We will continue to optimize.  I will see her back for follow-up.  Let me know if you have questions or concerns.

## 2021-03-24 NOTE — PROGRESS NOTES
"Eva Solis is a 60 year old who is being evaluated via a billable telephone visit.      What phone number would you like to be contacted at? See Epic     How would you like to obtain your AVS? Cate        Outpatient Psychiatric Progress Note    Name: Eva Solis   : 1960                    Primary Care Provider: Renetta Benitez MD   Therapist: Not seeing anyone currently     PHQ-9 scores:  PHQ-9 SCORE 10/20/2020 2021 3/24/2021   PHQ-9 Total Score - - -   PHQ-9 Total Score MyChart - 21 (Severe depression) -   PHQ-9 Total Score 17 21 19       LORETA-7 scores:  LORETA-7 SCORE 10/20/2020 2021 3/24/2021   Total Score - 17 (severe anxiety) -   Total Score 17 17 15       Patient Identification:  Patient is a 60 year old,   White American female  who presents for return visit with me.  Patient is currently employed. Patient attended the phone/video session alone. Patient prefers to be called: \"Eva\".    Interim History:  I last saw Eva Solis for outpatient psychiatry Consultation on 2021. During that appointment, we:     Continue BusPar 10 mg three tines daily for anxiety    Discontinue Lexapro. Take 10 mg of Lexapro for about two weeks WITH Effexor-XR 75 mg daily. Then STOP Lexapro and continue with just Effexor- mg daily.     Start Effexor-XR for mood, anxiety, and hopefully some pain relief. See above.     Continue therapy as planned.    3/24: Pt feels like she is doing ok. Sister with terminal kidney cancer and so pt will be going to [place] at the end of April to visit her. Her sister is excited for pt to come and visit. Pt also with leg injury. Had a lot of pain in her right leg and finally went in to see the doctor. Likely muscle issues. Doing some PT and does find it helpful. Slowly getting better. She does think the new medication has helped improve her mood some. \"I don't think I am as bad.\" Also feels like she has been doing a better job at not letting family " "things bother her as much. Anxiety a little more manageable. Smiling a little more. Enjoying things a little more. \"I could stare holes in the things that need to be done.\" Has not returned to therapy. Dry mouth daily from venlafaxine-ER but dizziness passed. No SI. No problematic drug our alcohol use.     Per [facility], Alexa Schultz, [facility], during today's team-based visit:  Is having issues with pain since she injured her leg (she's unsure how), doing pt. Mood: fair. Learning to let of of things that she doesn't have control over. Those things bother her less. Sister is dying from CA. Feeling powerless. Meds are working, notices that she laughs a little now, increased enjoyment, mood more stable, feels \"lighter\". Side effect:dry mouth, dizzy spells but those have ended. She's done counseling in the past. Asked if she would like to return but she thinks she's doing \"ok\".     Psychiatric ROS:  Eva Solis reports mood has been: slightly improved  Anxiety has been: slightly improved  Sleep has been: ok, disrupted by pain issues  Laney sxs: None  Psychosis sxs: None  ADHD/ADD sxs: None  PTSD sxs: No sxs but does have hx of trauma  PHQ9 and GAD7 scores were reviewed today if completed.   Medication side effects: Yes: dry mouth and initially some dizziness that has passed  Current stressors include: Symptoms, see HPI above  Coping mechanisms and supports include: Family, Hobbies and Friends    Current medications include:   Current Outpatient Medications   Medication Sig     busPIRone (BUSPAR) 10 MG tablet TAKE ONE TABLET BY MOUTH THREE TIMES DAILY      cyclobenzaprine (FLEXERIL) 5 MG tablet Take 1 tablet (5 mg) by mouth 3 times daily as needed for muscle spasms (Patient not taking: Reported on 2/11/2021)     diclofenac (VOLTAREN) 75 MG EC tablet Take 1 tablet (75 mg) by mouth 2 times daily     furosemide (LASIX) 20 MG tablet Take 1 tablet (20 mg) by mouth 2 times daily     losartan (COZAAR) 100 MG tablet TAKE ONE " TABLET BY MOUTH ONE TIME DAILY      MIRALAX PO POWD 17 GM DAILY     OMEPRAZOLE PO      spironolactone (ALDACTONE) 25 MG tablet TAKE ONE TABLET BY MOUTH ONE TIME DAILY      topiramate (TOPAMAX) 50 MG tablet Take 1.5 tablets (75 mg) by mouth 2 times daily     venlafaxine (EFFEXOR-XR) 150 MG 24 hr capsule Take 1 capsule (150 mg) by mouth daily     No current facility-administered medications for this visit.        Past Medical/Surgical History:  Past Medical History:   Diagnosis Date     Congestive heart failure (H) 3years     Depressive disorder 1-2 years    dealing with on a daily basis     DEPRESSIVE DISORDER NEC 5/4/2004     Diastolic dysfunction      Essential hypertension, benign      Generalized osteoarthrosis, unspecified site     knees     Headache(784.0)      Heart disease 3 years    CHF     Mixed anxiety depressive disorder 1/15/2016     PANIC DISORDER 5/4/2004      has a past medical history of Congestive heart failure (H) (3years), Depressive disorder (1-2 years), DEPRESSIVE DISORDER NEC (5/4/2004), Diastolic dysfunction, Essential hypertension, benign, Generalized osteoarthrosis, unspecified site, Headache(784.0), Heart disease (3 years), Mixed anxiety depressive disorder (1/15/2016), and PANIC DISORDER (5/4/2004). She also has no past medical history of Cancer (H), Cerebral infarction (H), COPD (chronic obstructive pulmonary disease) (H), Diabetes (H), History of blood transfusion, Thyroid disease, or Uncomplicated asthma.    Social History:  Reviewed. No changes to social history except as noted above.    Vital Signs:   None. This is phone/video visit.     Labs:  Most recent laboratory results reviewed and no new pertinent labs.     Review of Systems:  10 systems (general, cardiovascular, respiratory, eyes, ENT, endocrine, GI, , M/S, neurological) were reviewed. Most pertinent finding(s) is/are: Chronic pain. The remaining systems are all unremarkable.    Mental Status Examination (limited as this is  by phone/video):  Attitude:  cooperative   Oriented to:  person, place, time, and situation  Attention Span and Concentration:  normal  Speech:  clear, coherent, regular rate, rhythm, and soft volume  Language: intact  Mood:  A little better  Affect:  Subdued  Associations:  no loose associations  Thought Process:  logical, linear and goal oriented  Thought Content:  no evidence of suicidal ideation or homicidal ideation, no evidence of psychotic thought, no auditory hallucinations present and no visual hallucinations present  Recent and Remote Memory:  Intact to interview. Not formally assessed. No amnesia.  Fund of Knowledge: appropriate  Insight:  good  Judgment:  intact, adequate for safety  Impulse Control:  intact    Suicide Risk Assessment:  Today Eva Solis reports no suicidal ideation.Based on all available evidence including the factors cited above, Eva Solis does not appear to be at imminent risk for self-harm, does not meet criteria for a 72-hr hold, and therefore remains appropriate for ongoing outpatient level of care.  A thorough assessment of risk factors related to suicide and self-harm have been reviewed and are noted above. The patient convincingly denies suicidality on several occasions. Local community safety resources printed and reviewed for patient to use if needed. There was no deceit detected, and the patient presented in a manner that was believable.     DSM5 Diagnosis:  296.32 (F33.1) Major Depressive Disorder, Recurrent Episode, Moderate _  300.02 (F41.1) Generalized Anxiety Disorder   R/O treatment resistant depression versus persistent depressive disorder    Medical comorbidities include:   Patient Active Problem List    Diagnosis Date Noted     Strain of flexor muscle of right hip, initial encounter 03/01/2021     Priority: Medium     Primary osteoarthritis of right hip 02/11/2021     Priority: Medium     Major depressive disorder, recurrent episode, moderate (H) 05/08/2020      Priority: Medium     Primary osteoarthritis of left hip 01/23/2020     Priority: Medium     Migraine without aura and without status migrainosus, not intractable 01/15/2016     Priority: Medium     Morbid obesity, unspecified obesity type (H) 12/02/2015     Priority: Medium     Decreased calculated GFR 03/12/2015     Priority: Medium     Diastolic CHF (H) 11/07/2014     Priority: Medium     HTN, goal below 140/90 03/26/2013     Priority: Medium     Slow transit constipation 12/14/2005     Priority: Medium       Psychosocial & Contextual Factors: See HPI above    Assessment:  Eva Solis reports overall some slight improvement in mood and anxiety.  Continues to have increased psychosocial stressors affecting her mental health.  She is working hard to enforce good boundaries and utilize her cognitive behavioral therapy skills.  She is not currently in therapy but will reengage if she feels it is necessary.  With slight improvement in her symptoms switching to Effexor-XR, I recommend a dose increase at this time.  She will monitor for any negative side effect worsening.    Medication side effects and alternatives were reviewed. Health promotion activities recommended and reviewed today. All questions addressed. Education and counseling completed regarding risks and benefits of medications and psychotherapy options. Recommend therapy for additional support.     Treatment Plan:    Continue BuSpar 10 mg 3 times daily for anxiety    Increase Effexor-XR 2 225 mg daily for mood, anxiety, pain    Continue all other cares per primary care provider.     Continue all other medications as reviewed per electronic medical record today.     Safety plan reviewed. To the Emergency Department as needed or call after hours crisis line at [phone] or [phone]. [place] Crisis Text Line. Text [place] to 305733 or Suicide LifeLine Chat: suicidepreventionA [employer] Worldline.org/chat    Consider reengaging in therapy if  necessary.    Schedule an appointment with me in 8 weeks or sooner as needed. Call [facility] at [phone] to schedule.    Follow up with primary care provider as planned or for acute medical concerns.    Call the psychiatric nurse line with medication questions or concerns at [phone].    [employer] may be used to communicate with your provider, but this is not intended to be used for emergencies.    Administrative Billing:   Phone Call/Video Duration: 13 Minutes  Start: 1:20p  Stop: 1:33p    Time spent with patient was 13 minutes and greater than 50% of time or 7 minutes was spent in counseling and coordination of care regarding above diagnoses and treatment plan. Patient with multiple psychiatric diagnoses and multiple medication changes adding to complexity of care.    Patient Status:  Patient will continue to be seen for ongoing consultation and stabilization.    Signed:   Carri Fuller DO  [facility]    Disclaimer: This note consists of symbols derived from keyboarding, dictation and/or voice recognition software. As a result, there may be errors in the script that have gone undetected. Please consider this when interpreting information found in this chart.

## 2021-03-24 NOTE — Clinical Note
Please call this patient to get them scheduled for a follow-up visit in 8 weeks. Please schedule with me and the [facility]. Thanks!

## 2021-03-24 NOTE — PATIENT INSTRUCTIONS
Treatment Plan:    Continue BuSpar 10 mg 3 times daily for anxiety    Increase Effexor-XR 2 225 mg daily for mood, anxiety, pain    Continue all other cares per primary care provider.     Continue all other medications as reviewed per electronic medical record today.     Safety plan reviewed. To the Emergency Department as needed or call after hours crisis line at [phone] or [phone]. [place] Crisis Text Line. Text [place] to 599589 or Suicide LifeLine Chat: suicidepreventionRADLIVEline.org/chat    Consider reengaging in therapy if necessary.    Schedule an appointment with me in 8 weeks or sooner as needed. Call [facility] at [phone] to schedule.    Follow up with primary care provider as planned or for acute medical concerns.    Call the psychiatric nurse line with medication questions or concerns at [phone].    TrekkSofthart may be used to communicate with your provider, but this is not intended to be used for emergencies.

## 2021-03-25 ASSESSMENT — ANXIETY QUESTIONNAIRES: GAD7 TOTAL SCORE: 15

## 2021-03-29 DIAGNOSIS — M16.11 PRIMARY OSTEOARTHRITIS OF RIGHT HIP: ICD-10-CM

## 2021-03-29 DIAGNOSIS — I10 HTN, GOAL BELOW 140/90: Primary | ICD-10-CM

## 2021-03-29 DIAGNOSIS — S76.011A STRAIN OF FLEXOR MUSCLE OF RIGHT HIP, INITIAL ENCOUNTER: ICD-10-CM

## 2021-03-29 RX ORDER — DICLOFENAC SODIUM 75 MG/1
75 TABLET, DELAYED RELEASE ORAL 2 TIMES DAILY
Qty: 30 TABLET | Refills: 0 | Status: CANCELLED | OUTPATIENT
Start: 2021-03-29

## 2021-03-29 NOTE — TELEPHONE ENCOUNTER
Chief Complaint   Patient presents with     Refill Request     Diclofenac Sodium Oral Tablet Delayed Release 75 MG       Refill request received via fax by pharmacy     Pending Prescriptions:                       Disp   Refills    diclofenac (VOLTAREN) 75 MG EC tablet     30 tab*0            Sig: Take 1 tablet (75 mg) by mouth 2 times daily         Last Written Prescription Date:  02/20/2021  Last Fill Quantity: 30 ,   # refills: 0  Last Office Visit with prescribing provider: 02/11/2021  Future Office visit:

## 2021-03-29 NOTE — TELEPHONE ENCOUNTER
"2/11/2021:     \"Plan:  All of the above pertinent physical exam and imaging modalities findings was reviewed with Eva and her .     She presents with 3 weeks worth of thigh pain with no history of trauma.  Slightly atypical course as far as the pain distribution.  She has no peripheral numbness and tingling on the right side.  She reports the pain actually feels better when she is walking but has majority of her symptoms when she goes from a seated to stand position.  Included in differential is muscular versus her osteoarthritis of the hip joint.  Intermittent anti-inflammatories have been helpful.  With that in mind I recommended consistent anti-inflammatory and provided her prescription.  Risks reviewed.  I also recommended some formal physical therapy.     We also reviewed weight loss.     She does have a walker at home I encouraged her to use that to help take some pressure off the hip as well.     Return to clinic 4-6 , week(s) as needed, or sooner as needed for changes.  Re-x-ray on return: No     Jose Luis Perez D.O.\"      Patient is completing PT currently.     Requested Prescriptions   Pending Prescriptions Disp Refills     diclofenac (VOLTAREN) 75 MG EC tablet 30 tablet 0     Sig: Take 1 tablet (75 mg) by mouth 2 times daily       NSAID Medications Failed - 3/29/2021  3:34 PM        Failed - Normal serum creatinine on file in past 12 months     Recent Labs   Lab Test 09/02/20 0824   CR 1.15*       Ok to refill medication if creatinine is low          Passed - Blood pressure under 140/90 in past 12 months     BP Readings from Last 3 Encounters:   02/11/21 124/82   02/09/21 136/82   06/12/20 126/71                 Passed - Normal ALT on file in past 12 months     Recent Labs   Lab Test 09/02/20 0824   ALT 24             Passed - Normal AST on file in past 12 months     Recent Labs   Lab Test 09/02/20 0824   AST 15             Passed - Recent (12 mo) or future (30 days) visit within the authorizing " "provider's specialty     Patient has had an office visit with the authorizing provider or a provider within the authorizing providers department within the previous 12 mos or has a future within next 30 days. See \"Patient Info\" tab in inbasket, or \"Choose Columns\" in Meds & Orders section of the refill encounter.              Passed - Patient is age 6-64 years        Passed - Normal CBC on file in past 12 months     Recent Labs   Lab Test 09/02/20  0824   WBC 6.6   RBC 4.69   HGB 14.1   HCT 42.4                    Passed - Medication is active on med list        Passed - No active pregnancy on record        Passed - No positive pregnancy test in past 12 months             Unable to fill this medication using the protocols.  Routing to prescribing provider to address.       Lauryn CHACKO Lead RN, BSN. . .  3/29/2021, 4:54 PM    "

## 2021-03-30 NOTE — TELEPHONE ENCOUNTER
Please have patient speak with her PCP (Dr. Benitez) regarding further voltaren Rxs/NSAIDs.  Her recent CMP showed an increased creatinine and a decreased GFR.        STACEY Bone, CNP  Orthopedic Surgery

## 2021-03-30 NOTE — TELEPHONE ENCOUNTER
Orthopedics is declining her diclofenac secondary to her kidney function.  I would recommend patient stop the diclofenac, use Tylenol--1000 mg three times daily and follow up with me, she also needs repeat labs

## 2021-03-31 ENCOUNTER — THERAPY VISIT (OUTPATIENT)
Dept: PHYSICAL THERAPY | Facility: CLINIC | Age: 61
End: 2021-03-31
Payer: COMMERCIAL

## 2021-03-31 ENCOUNTER — MYC MEDICAL ADVICE (OUTPATIENT)
Dept: NEUROSURGERY | Facility: CLINIC | Age: 61
End: 2021-03-31

## 2021-03-31 DIAGNOSIS — M54.42 CHRONIC BILATERAL LOW BACK PAIN WITH BILATERAL SCIATICA: Primary | ICD-10-CM

## 2021-03-31 DIAGNOSIS — M16.11 PRIMARY OSTEOARTHRITIS OF RIGHT HIP: ICD-10-CM

## 2021-03-31 DIAGNOSIS — M54.41 CHRONIC BILATERAL LOW BACK PAIN WITH BILATERAL SCIATICA: Primary | ICD-10-CM

## 2021-03-31 DIAGNOSIS — G89.29 CHRONIC BILATERAL LOW BACK PAIN WITH BILATERAL SCIATICA: Primary | ICD-10-CM

## 2021-03-31 DIAGNOSIS — S76.011A STRAIN OF FLEXOR MUSCLE OF RIGHT HIP, INITIAL ENCOUNTER: ICD-10-CM

## 2021-03-31 PROCEDURE — 97110 THERAPEUTIC EXERCISES: CPT | Mod: GP | Performed by: PHYSICAL THERAPIST

## 2021-03-31 PROCEDURE — 97140 MANUAL THERAPY 1/> REGIONS: CPT | Mod: GP | Performed by: PHYSICAL THERAPIST

## 2021-03-31 NOTE — TELEPHONE ENCOUNTER
"Patient called clinic requesting to repeat injection.    Type of injection: L4-5 epidural translaminar    Most recent injection date: 6/12/20    Most recent MRI: 10/01/18 MR lumbar spine w/o contrast    How long did injection provide symptomatic relief: 5-6 months    Current symptoms: Pt report from message, \"I have been in alot of pain for a while now.\"  Returned pt's call and she reported that the pain to her low back has been increasing to a 7-8/10 on average prompting her to request another injection. Her last injection lasted for around 5-6 months and the pain currently is the same that she was experiencing prior to her previous injection. Pain is described as an ache and standing and walking exacerbates it. Sitting is tolerable however taking tylenol is not effective. She reports being seen by her PCP and Ortho MD regarding this pain and she was referred to PT in which she has completed 5 sessions and is going weekly. She does not feel like PT is effectively managing her symptoms.     Number of injections in last 12 months: 1    Plan: Will reach out to [facility] to advise.       Patient voiced understanding and agreement with plan.     Advised patient to call back with any questions or concerns.  "

## 2021-04-01 ENCOUNTER — TELEPHONE (OUTPATIENT)
Dept: SURGERY | Facility: CLINIC | Age: 61
End: 2021-04-01

## 2021-04-01 NOTE — TELEPHONE ENCOUNTER
Pt scheduled for LESI  Date:4/22/21  Time:330pm  Dr. Wheatley    Instructed pt to have H&P and  for procedure.  Patient informed of COVID testing process.

## 2021-04-01 NOTE — TELEPHONE ENCOUNTER
"Per Jen Pennington, CNP: \"Okay to repeat injection if last one  provided relief and her symptoms have not changed. She should follow-up in clinic if symptoms persist or worsen. \"    Order placed. Patient notified. Message sent to Dr Wheatley's scheduling team.         "

## 2021-04-05 DIAGNOSIS — S76.011A STRAIN OF FLEXOR MUSCLE OF RIGHT HIP, INITIAL ENCOUNTER: ICD-10-CM

## 2021-04-05 DIAGNOSIS — M16.11 PRIMARY OSTEOARTHRITIS OF RIGHT HIP: ICD-10-CM

## 2021-04-05 NOTE — TELEPHONE ENCOUNTER
Chief Complaint   Patient presents with     Refill Request     diclofenac      Refill request received via fax by pharmacy   [facility] [address] [place] [id number] [facility] [place]       Pending Prescriptions:                       Disp   Refills    diclofenac (VOLTAREN) 75 MG EC tablet     30 tab*0            Sig: Take 1 tablet (75 mg) by mouth 2 times daily         Last Written Prescription Date:  2/11/21  Last Fill Quantity: 30,   # refills: 0  Last Office Visit with prescribing provider: 2/11/21  Future Office visit: none

## 2021-04-06 ENCOUNTER — TELEPHONE (OUTPATIENT)
Dept: FAMILY MEDICINE | Facility: OTHER | Age: 61
End: 2021-04-06

## 2021-04-06 DIAGNOSIS — M16.11 PRIMARY OSTEOARTHRITIS OF RIGHT HIP: ICD-10-CM

## 2021-04-06 DIAGNOSIS — S76.011A STRAIN OF FLEXOR MUSCLE OF RIGHT HIP, INITIAL ENCOUNTER: ICD-10-CM

## 2021-04-06 RX ORDER — DICLOFENAC SODIUM 75 MG/1
75 TABLET, DELAYED RELEASE ORAL 2 TIMES DAILY
Qty: 30 TABLET | Refills: 0 | Status: SHIPPED | OUTPATIENT
Start: 2021-04-06 | End: 2021-04-16

## 2021-04-06 RX ORDER — DICLOFENAC SODIUM 75 MG/1
75 TABLET, DELAYED RELEASE ORAL 2 TIMES DAILY
Qty: 30 TABLET | Refills: 0 | OUTPATIENT
Start: 2021-04-06

## 2021-04-06 NOTE — TELEPHONE ENCOUNTER
[facility] said that they have requested a refill multiple times and haven't received a response.     They asked if we could send them a repsonse ASAP. [phone]

## 2021-04-06 NOTE — TELEPHONE ENCOUNTER
Please see telephone encounter on 3/29/21.    I declined the refill at that time. Patient following up with PCP.    Please inform the pharmacy.    STACEY Bone, CNP  Orthopedic Surgery

## 2021-04-07 ENCOUNTER — THERAPY VISIT (OUTPATIENT)
Dept: PHYSICAL THERAPY | Facility: CLINIC | Age: 61
End: 2021-04-07
Payer: COMMERCIAL

## 2021-04-07 DIAGNOSIS — S76.011A STRAIN OF FLEXOR MUSCLE OF RIGHT HIP, INITIAL ENCOUNTER: ICD-10-CM

## 2021-04-07 DIAGNOSIS — M16.11 PRIMARY OSTEOARTHRITIS OF RIGHT HIP: ICD-10-CM

## 2021-04-07 PROCEDURE — 97110 THERAPEUTIC EXERCISES: CPT | Mod: GP | Performed by: PHYSICAL THERAPIST

## 2021-04-07 PROCEDURE — 97140 MANUAL THERAPY 1/> REGIONS: CPT | Mod: GP | Performed by: PHYSICAL THERAPIST

## 2021-04-07 NOTE — TELEPHONE ENCOUNTER
This was filled by her PCP yesterday.  I called to let Cub know orthopedics denied it    Valentina/JESSICA

## 2021-04-08 DIAGNOSIS — Z11.59 ENCOUNTER FOR SCREENING FOR OTHER VIRAL DISEASES: ICD-10-CM

## 2021-04-12 ENCOUNTER — THERAPY VISIT (OUTPATIENT)
Dept: PHYSICAL THERAPY | Facility: CLINIC | Age: 61
End: 2021-04-12
Payer: COMMERCIAL

## 2021-04-12 DIAGNOSIS — I10 HTN, GOAL BELOW 140/90: ICD-10-CM

## 2021-04-12 DIAGNOSIS — S76.011A STRAIN OF FLEXOR MUSCLE OF RIGHT HIP, INITIAL ENCOUNTER: ICD-10-CM

## 2021-04-12 DIAGNOSIS — M16.11 PRIMARY OSTEOARTHRITIS OF RIGHT HIP: ICD-10-CM

## 2021-04-12 LAB
ANION GAP SERPL CALCULATED.3IONS-SCNC: 9 MMOL/L (ref 3–14)
BUN SERPL-MCNC: 15 MG/DL (ref 7–30)
CALCIUM SERPL-MCNC: 9.2 MG/DL (ref 8.5–10.1)
CHLORIDE SERPL-SCNC: 108 MMOL/L (ref 94–109)
CO2 SERPL-SCNC: 23 MMOL/L (ref 20–32)
CREAT SERPL-MCNC: 1.11 MG/DL (ref 0.52–1.04)
GFR SERPL CREATININE-BSD FRML MDRD: 54 ML/MIN/{1.73_M2}
GLUCOSE SERPL-MCNC: 96 MG/DL (ref 70–99)
POTASSIUM SERPL-SCNC: 3.7 MMOL/L (ref 3.4–5.3)
SODIUM SERPL-SCNC: 140 MMOL/L (ref 133–144)

## 2021-04-12 PROCEDURE — 80048 BASIC METABOLIC PNL TOTAL CA: CPT | Performed by: FAMILY MEDICINE

## 2021-04-12 PROCEDURE — 36415 COLL VENOUS BLD VENIPUNCTURE: CPT | Performed by: FAMILY MEDICINE

## 2021-04-12 PROCEDURE — 97112 NEUROMUSCULAR REEDUCATION: CPT | Mod: GP | Performed by: PHYSICAL THERAPIST

## 2021-04-12 PROCEDURE — 97110 THERAPEUTIC EXERCISES: CPT | Mod: GP | Performed by: PHYSICAL THERAPIST

## 2021-04-12 PROCEDURE — 97140 MANUAL THERAPY 1/> REGIONS: CPT | Mod: GP | Performed by: PHYSICAL THERAPIST

## 2021-04-16 ENCOUNTER — OFFICE VISIT (OUTPATIENT)
Dept: FAMILY MEDICINE | Facility: OTHER | Age: 61
End: 2021-04-16
Payer: COMMERCIAL

## 2021-04-16 VITALS
HEART RATE: 88 BPM | WEIGHT: 293 LBS | DIASTOLIC BLOOD PRESSURE: 80 MMHG | TEMPERATURE: 97.5 F | BODY MASS INDEX: 47.09 KG/M2 | HEIGHT: 66 IN | SYSTOLIC BLOOD PRESSURE: 124 MMHG | OXYGEN SATURATION: 98 %

## 2021-04-16 DIAGNOSIS — F40.243 FEAR OF FLYING: ICD-10-CM

## 2021-04-16 DIAGNOSIS — G43.009 MIGRAINE WITHOUT AURA AND WITHOUT STATUS MIGRAINOSUS, NOT INTRACTABLE: ICD-10-CM

## 2021-04-16 DIAGNOSIS — Z01.818 PREOP GENERAL PHYSICAL EXAM: Primary | ICD-10-CM

## 2021-04-16 DIAGNOSIS — I50.32 CHRONIC DIASTOLIC CONGESTIVE HEART FAILURE (H): ICD-10-CM

## 2021-04-16 DIAGNOSIS — I10 HTN, GOAL BELOW 140/90: ICD-10-CM

## 2021-04-16 DIAGNOSIS — G89.29 CHRONIC BILATERAL LOW BACK PAIN, UNSPECIFIED WHETHER SCIATICA PRESENT: ICD-10-CM

## 2021-04-16 DIAGNOSIS — M54.50 CHRONIC BILATERAL LOW BACK PAIN, UNSPECIFIED WHETHER SCIATICA PRESENT: ICD-10-CM

## 2021-04-16 PROBLEM — M54.9 CHRONIC RIGHT-SIDED BACK PAIN, UNSPECIFIED BACK LOCATION: Status: ACTIVE | Noted: 2021-04-16

## 2021-04-16 PROCEDURE — 99214 OFFICE O/P EST MOD 30 MIN: CPT | Performed by: FAMILY MEDICINE

## 2021-04-16 RX ORDER — LOSARTAN POTASSIUM 100 MG/1
100 TABLET ORAL DAILY
Qty: 90 TABLET | Refills: 3 | Status: SHIPPED | OUTPATIENT
Start: 2021-04-16 | End: 2022-03-28

## 2021-04-16 RX ORDER — ALPRAZOLAM 0.25 MG
TABLET ORAL
Qty: 10 TABLET | Refills: 0 | Status: SHIPPED | OUTPATIENT
Start: 2021-04-16 | End: 2022-08-30

## 2021-04-16 RX ORDER — TOPIRAMATE 50 MG/1
75 TABLET, FILM COATED ORAL 2 TIMES DAILY
Qty: 270 TABLET | Refills: 3 | Status: SHIPPED | OUTPATIENT
Start: 2021-04-16 | End: 2021-09-16

## 2021-04-16 RX ORDER — SPIRONOLACTONE 25 MG/1
25 TABLET ORAL DAILY
Qty: 90 TABLET | Refills: 3 | Status: SHIPPED | OUTPATIENT
Start: 2021-04-16 | End: 2022-03-28

## 2021-04-16 ASSESSMENT — PAIN SCALES - GENERAL: PAINLEVEL: MODERATE PAIN (4)

## 2021-04-16 ASSESSMENT — MIFFLIN-ST. JEOR: SCORE: 2170.8

## 2021-04-16 NOTE — PATIENT INSTRUCTIONS

## 2021-04-16 NOTE — H&P (VIEW-ONLY)
[facility]  [address]  [place] [address]  Phone: [phone]  Primary Provider: Renetta Marin  Pre-op Performing Provider: RENETTA MARIN      PREOPERATIVE EVALUATION:  Today's date: 4/16/2021    Eva Solis is a 60 year old female who presents for a preoperative evaluation.    Surgical Information:  Surgery/Procedure: Lumbar 4-5 Epidural Injection  Surgery Location: [facility]  Surgeon: Trever Wheatley MD  Surgery Date: 4/22/2021  Time of Surgery: 3:30pm  Where patient plans to recover: At home with family  Fax number for surgical facility: Note does not need to be faxed, will be available electronically in Epic.    Type of Anesthesia Anticipated: Monitor Anesthesia Care    Assessment & Plan     The proposed surgical procedure is considered LOW risk.    Preop general physical exam      Chronic bilateral low back pain, unspecified whether sciatica present      HTN, goal below 140/90  Controlled, basic metabolic panel done a few days ago.    - spironolactone (ALDACTONE) 25 MG tablet; Take 1 tablet (25 mg) by mouth daily  - losartan (COZAAR) 100 MG tablet; Take 1 tablet (100 mg) by mouth daily    Migraine without aura and without status migrainosus, not intractable  Stable, due for refill.    - topiramate (TOPAMAX) 50 MG tablet; Take 1.5 tablets (75 mg) by mouth 2 times daily    Chronic diastolic congestive heart failure (H)  Stable  - spironolactone (ALDACTONE) 25 MG tablet; Take 1 tablet (25 mg) by mouth daily  - losartan (COZAAR) 100 MG tablet; Take 1 tablet (100 mg) by mouth daily    Fear of flying  Has upcoming flight and is fearful of flying.  Has used Xanax in the past with good results.    - ALPRAZolam (XANAX) 0.25 MG tablet; Take 1-2 tablets 30 minutes before flight       Risks and Recommendations:  The patient has the following additional risks and recommendations for perioperative complications:   - Morbid  obesity (BMI >40)    Medication Instructions:  Patient is to take all scheduled medications on the day of surgery    RECOMMENDATION:  APPROVAL GIVEN to proceed with proposed procedure, without further diagnostic evaluation.        Subjective     HPI related to upcoming procedure: chronic back pain      Preop Questions 4/16/2021   1. Have you ever had a heart attack or stroke? No   2. Have you ever had surgery on your heart or blood vessels, such as a stent placement, a coronary artery bypass, or surgery on an artery in your head, neck, heart, or legs? No   3. Do you have chest pain with activity? No   4. Do you have a history of  heart failure? YES - stable   5. Do you currently have a cold, bronchitis or symptoms of other infection? No   6. Do you have a cough, shortness of breath, or wheezing? No   7. Do you or anyone in your family have previous history of blood clots? No   8. Do you or does anyone in your family have a serious bleeding problem such as prolonged bleeding following surgeries or cuts? No   9. Have you ever had problems with anemia or been told to take iron pills? No   10.Have you had any abnormal blood loss such as black, tarry or bloody stools, or abnormal vaginal bleeding? No   11. Have you ever had a blood transfusion? No   12. Are you willing to have a blood transfusion if it is medically needed before, during, or after your surgery? Yes   13. Have you or any of your relatives ever had problems with anesthesia? No   14. Do you have sleep apnea, excessive snoring or daytime drowsiness? YES - snoring and daytime drowsiness   14a. Do you have a CPAP machine? No   15. Do you have any artifical heart valves or other implanted medical devices like a pacemaker, defibrillator, or continuous glucose monitor? No   16. Do you have artificial joints? YES - bilateral knees   17. Are you allergic to latex? No   18. Is there any chance that you may be pregnant? No     Health Care Directive:  Patient does not  have a Health Care Directive or Living Will: Discussed advance care planning with patient; information given to patient to review.    Preoperative Review of :   reviewed - no record of controlled substances prescribed.      Status of Chronic Conditions:  CHF - Patient has a longstanding history of moderate-severe CHF. Exacerbating conditions include hypertension and dystolic dysfunction. Currently the patient's condition is same. Current treatment regimen includes Angiotensin 2 receptor blocker, diuretic and spirolactone. The patient denies chest pain, edema, orthopnea, shortness of breath  or recent weight gain.     DEPRESSION - Patient has a long history of Depression of moderate severity requiring medication for control with recent symptoms being unchanged.    HYPERTENSION - Patient has longstanding history of HTN , currently denies any symptoms referable to elevated blood pressure. Specifically denies chest pain, palpitations, dyspnea, orthopnea, PND or peripheral edema. Blood pressure readings have been in normal range. Current medication regimen is as listed below. Patient denies any side effects of medication.       Review of Systems  CONSTITUTIONAL: NEGATIVE for fever, chills, change in weight  INTEGUMENTARY/SKIN: NEGATIVE for worrisome rashes, moles or lesions  EYES: NEGATIVE for vision changes or irritation  ENT/MOUTH: NEGATIVE for ear, mouth and throat problems  RESP: NEGATIVE for significant cough or SOB  CV: NEGATIVE for chest pain, palpitations or peripheral edema  GI: NEGATIVE for nausea, abdominal pain, heartburn, or change in bowel habits  : NEGATIVE for frequency, dysuria, or hematuria  MUSCULOSKELETAL: chronic back and hip pain  NEURO: NEGATIVE for weakness, dizziness or paresthesias  ENDOCRINE: NEGATIVE for temperature intolerance, skin/hair changes  HEME: NEGATIVE for bleeding problems  PSYCHIATRIC: chronic depression, looking forward to upcoming trip    Patient Active Problem List     Diagnosis Date Noted     Strain of flexor muscle of right hip, initial encounter 2021     Priority: Medium     Primary osteoarthritis of right hip 2021     Priority: Medium     Major depressive disorder, recurrent episode, moderate (H) 2020     Priority: Medium     Primary osteoarthritis of left hip 2020     Priority: Medium     Migraine without aura and without status migrainosus, not intractable 01/15/2016     Priority: Medium     Morbid obesity, unspecified obesity type (H) 2015     Priority: Medium     Decreased calculated GFR 2015     Priority: Medium     Diastolic CHF (H) 2014     Priority: Medium     HTN, goal below 140/90 2013     Priority: Medium     Slow transit constipation 2005     Priority: Medium      Past Medical History:   Diagnosis Date     Congestive heart failure (H) 3years     Depressive disorder 1-2 years    dealing with on a daily basis     DEPRESSIVE DISORDER NEC 2004     Diastolic dysfunction      Essential hypertension, benign      Generalized osteoarthrosis, unspecified site     knees     Headache(784.0)      Heart disease 3 years    CHF     Mixed anxiety depressive disorder 1/15/2016     PANIC DISORDER 2004     Past Surgical History:   Procedure Laterality Date     C  DELIVERY ONLY      , Low Cervical     C  DELIVERY ONLY       C VAGINAL HYSTERECTOMY      without oophorectomy     COLONOSCOPY  10/06/10    attempt at colonscopy to 50cm     HC COLONOSCOPY W/WO BRUSH/WASH  2005    Diverticulosis.  Internal hemorrhoids.     INJECT EPIDURAL LUMBAR N/A 10/18/2019    Procedure: INJECTION, SPINE, LUMBAR 4 - LUMBAR 5, EPIDURAL TRANSLAMINAR;  Surgeon: Trever Wheatley MD;  Location: [facility] OR     INJECT EPIDURAL LUMBAR N/A 2020    Procedure: INJECTION, SPINE, LUMBAR 4-5, EPIDURAL TRANSLAMINAR;  Surgeon: Trever Wheatley MD;  Location: [facility] OR     JOINT REPLACEMTN, KNEE RT/LT  2006    Right total  "knee arthroplasty.     JOINT REPLACEMTN, KNEE RT/LT  07/19/2006    Left total knee arthroplasty.     Current Outpatient Medications   Medication Sig Dispense Refill     busPIRone (BUSPAR) 10 MG tablet TAKE ONE TABLET BY MOUTH THREE TIMES DAILY  270 tablet 0     furosemide (LASIX) 20 MG tablet Take 1 tablet (20 mg) by mouth 2 times daily 180 tablet 2     losartan (COZAAR) 100 MG tablet TAKE ONE TABLET BY MOUTH ONE TIME DAILY  90 tablet 2     MIRALAX PO POWD 17 GM DAILY 1 Bottle 11     OMEPRAZOLE PO        spironolactone (ALDACTONE) 25 MG tablet TAKE ONE TABLET BY MOUTH ONE TIME DAILY  90 tablet 2     topiramate (TOPAMAX) 50 MG tablet Take 1.5 tablets (75 mg) by mouth 2 times daily 270 tablet 0     venlafaxine (EFFEXOR-XR) 75 MG 24 hr capsule Take 3 capsules (225 mg) by mouth daily 90 capsule 2     diclofenac (VOLTAREN) 75 MG EC tablet Take 1 tablet (75 mg) by mouth 2 times daily (Patient not taking: Reported on 4/16/2021) 30 tablet 0       Allergies   Allergen Reactions     Lisinopril Cough        Social History     Tobacco Use     Smoking status: Never Smoker     Smokeless tobacco: Never Used     Tobacco comment: no smokers in household   Substance Use Topics     Alcohol use: No       History   Drug Use No         Objective     /80   Pulse 88   Temp 97.5  F (36.4  C) (Temporal)   Ht 1.67 m (5' 5.75\")   Wt (!) 158.3 kg (349 lb)   SpO2 98%   BMI 56.76 kg/m      Physical Exam    GENERAL APPEARANCE: healthy, alert and no distress     EYES: EOMI, PERRL     HENT: ear canals and TM's normal and nose and mouth without ulcers or lesions     NECK: no adenopathy, no asymmetry, masses, or scars and thyroid normal to palpation     RESP: lungs clear to auscultation - no rales, rhonchi or wheezes     CV: regular rates and rhythm, normal S1 S2, no S3 or S4 and no murmur, click or rub     ABDOMEN:  soft, nontender, no HSM or masses and bowel sounds normal     MS: extremities normal- no gross deformities noted     SKIN: " no suspicious lesions or rashes     NEURO: Normal strength and tone, sensory exam grossly normal, mentation intact and speech normal     PSYCH: mentation appears normal. and affect flat     LYMPHATICS: No cervical adenopathy    Recent Labs   Lab Test 04/12/21  0914 09/02/20  0824 10/16/19  1043 10/16/19  1043   HGB  --  14.1  --  13.8   PLT  --  243  --  254    142   < > 141   POTASSIUM 3.7 4.0   < > 4.1   CR 1.11* 1.15*   < > 1.01    < > = values in this interval not displayed.        Diagnostics:  No labs were ordered during this visit.   No EKG required for low risk surgery (cataract, skin procedure, breast biopsy, etc).    Revised Cardiac Risk Index (RCRI):  The patient has the following serious cardiovascular risks for perioperative complications:   - No serious cardiac risks = 0 points     RCRI Interpretation: 0 points: Class I (very low risk - 0.4% complication rate)           Signed Electronically by: Renetta Benitez MD  Copy of this evaluation report is provided to requesting physician.

## 2021-04-16 NOTE — PROGRESS NOTES
[facility]  [address]  [facility] [address]  Phone: [phone]  Primary Provider: Renetta Marin  Pre-op Performing Provider: RENETTA MARIN      PREOPERATIVE EVALUATION:  Today's date: 4/16/2021    Eva Solis is a 60 year old female who presents for a preoperative evaluation.    Surgical Information:  Surgery/Procedure: Lumbar 4-5 Epidural Injection  Surgery Location: [facility]  Surgeon: Trever Wheatley MD  Surgery Date: 4/22/2021  Time of Surgery: 3:30pm  Where patient plans to recover: At home with family  Fax number for surgical facility: Note does not need to be faxed, will be available electronically in Epic.    Type of Anesthesia Anticipated: Monitor Anesthesia Care    Assessment & Plan     The proposed surgical procedure is considered LOW risk.    Preop general physical exam      Chronic bilateral low back pain, unspecified whether sciatica present      HTN, goal below 140/90  Controlled, basic metabolic panel done a few days ago.    - spironolactone (ALDACTONE) 25 MG tablet; Take 1 tablet (25 mg) by mouth daily  - losartan (COZAAR) 100 MG tablet; Take 1 tablet (100 mg) by mouth daily    Migraine without aura and without status migrainosus, not intractable  Stable, due for refill.    - topiramate (TOPAMAX) 50 MG tablet; Take 1.5 tablets (75 mg) by mouth 2 times daily    Chronic diastolic congestive heart failure (H)  Stable  - spironolactone (ALDACTONE) 25 MG tablet; Take 1 tablet (25 mg) by mouth daily  - losartan (COZAAR) 100 MG tablet; Take 1 tablet (100 mg) by mouth daily    Fear of flying  Has upcoming flight and is fearful of flying.  Has used Xanax in the past with good results.    - ALPRAZolam (XANAX) 0.25 MG tablet; Take 1-2 tablets 30 minutes before flight       Risks and Recommendations:  The patient has the following additional risks and recommendations for perioperative complications:   - Morbid  obesity (BMI >40)    Medication Instructions:  Patient is to take all scheduled medications on the day of surgery    RECOMMENDATION:  APPROVAL GIVEN to proceed with proposed procedure, without further diagnostic evaluation.        Subjective     HPI related to upcoming procedure: chronic back pain      Preop Questions 4/16/2021   1. Have you ever had a heart attack or stroke? No   2. Have you ever had surgery on your heart or blood vessels, such as a stent placement, a coronary artery bypass, or surgery on an artery in your head, neck, heart, or legs? No   3. Do you have chest pain with activity? No   4. Do you have a history of  heart failure? YES - stable   5. Do you currently have a cold, bronchitis or symptoms of other infection? No   6. Do you have a cough, shortness of breath, or wheezing? No   7. Do you or anyone in your family have previous history of blood clots? No   8. Do you or does anyone in your family have a serious bleeding problem such as prolonged bleeding following surgeries or cuts? No   9. Have you ever had problems with anemia or been told to take iron pills? No   10.Have you had any abnormal blood loss such as black, tarry or bloody stools, or abnormal vaginal bleeding? No   11. Have you ever had a blood transfusion? No   12. Are you willing to have a blood transfusion if it is medically needed before, during, or after your surgery? Yes   13. Have you or any of your relatives ever had problems with anesthesia? No   14. Do you have sleep apnea, excessive snoring or daytime drowsiness? YES - snoring and daytime drowsiness   14a. Do you have a CPAP machine? No   15. Do you have any artifical heart valves or other implanted medical devices like a pacemaker, defibrillator, or continuous glucose monitor? No   16. Do you have artificial joints? YES - bilateral knees   17. Are you allergic to latex? No   18. Is there any chance that you may be pregnant? No     Health Care Directive:  Patient does not  have a Health Care Directive or Living Will: Discussed advance care planning with patient; information given to patient to review.    Preoperative Review of :   reviewed - no record of controlled substances prescribed.      Status of Chronic Conditions:  CHF - Patient has a longstanding history of moderate-severe CHF. Exacerbating conditions include hypertension and dystolic dysfunction. Currently the patient's condition is same. Current treatment regimen includes Angiotensin 2 receptor blocker, diuretic and spirolactone. The patient denies chest pain, edema, orthopnea, shortness of breath  or recent weight gain.     DEPRESSION - Patient has a long history of Depression of moderate severity requiring medication for control with recent symptoms being unchanged.    HYPERTENSION - Patient has longstanding history of HTN , currently denies any symptoms referable to elevated blood pressure. Specifically denies chest pain, palpitations, dyspnea, orthopnea, PND or peripheral edema. Blood pressure readings have been in normal range. Current medication regimen is as listed below. Patient denies any side effects of medication.       Review of Systems  CONSTITUTIONAL: NEGATIVE for fever, chills, change in weight  INTEGUMENTARY/SKIN: NEGATIVE for worrisome rashes, moles or lesions  EYES: NEGATIVE for vision changes or irritation  ENT/MOUTH: NEGATIVE for ear, mouth and throat problems  RESP: NEGATIVE for significant cough or SOB  CV: NEGATIVE for chest pain, palpitations or peripheral edema  GI: NEGATIVE for nausea, abdominal pain, heartburn, or change in bowel habits  : NEGATIVE for frequency, dysuria, or hematuria  MUSCULOSKELETAL: chronic back and hip pain  NEURO: NEGATIVE for weakness, dizziness or paresthesias  ENDOCRINE: NEGATIVE for temperature intolerance, skin/hair changes  HEME: NEGATIVE for bleeding problems  PSYCHIATRIC: chronic depression, looking forward to upcoming trip    Patient Active Problem List     Diagnosis Date Noted     Strain of flexor muscle of right hip, initial encounter 2021     Priority: Medium     Primary osteoarthritis of right hip 2021     Priority: Medium     Major depressive disorder, recurrent episode, moderate (H) 2020     Priority: Medium     Primary osteoarthritis of left hip 2020     Priority: Medium     Migraine without aura and without status migrainosus, not intractable 01/15/2016     Priority: Medium     Morbid obesity, unspecified obesity type (H) 2015     Priority: Medium     Decreased calculated GFR 2015     Priority: Medium     Diastolic CHF (H) 2014     Priority: Medium     HTN, goal below 140/90 2013     Priority: Medium     Slow transit constipation 2005     Priority: Medium      Past Medical History:   Diagnosis Date     Congestive heart failure (H) 3years     Depressive disorder 1-2 years    dealing with on a daily basis     DEPRESSIVE DISORDER NEC 2004     Diastolic dysfunction      Essential hypertension, benign      Generalized osteoarthrosis, unspecified site     knees     Headache(784.0)      Heart disease 3 years    CHF     Mixed anxiety depressive disorder 1/15/2016     PANIC DISORDER 2004     Past Surgical History:   Procedure Laterality Date     C  DELIVERY ONLY      , Low Cervical     C  DELIVERY ONLY       C VAGINAL HYSTERECTOMY      without oophorectomy     COLONOSCOPY  10/06/10    attempt at colonscopy to 50cm     HC COLONOSCOPY W/WO BRUSH/WASH  2005    Diverticulosis.  Internal hemorrhoids.     INJECT EPIDURAL LUMBAR N/A 10/18/2019    Procedure: INJECTION, SPINE, LUMBAR 4 - LUMBAR 5, EPIDURAL TRANSLAMINAR;  Surgeon: Trever Wheatley MD;  Location: [facility]     INJECT EPIDURAL LUMBAR N/A 2020    Procedure: INJECTION, SPINE, LUMBAR 4-5, EPIDURAL TRANSLAMINAR;  Surgeon: Trever Wheatley MD;  Location: [facility]     JOINT REPLACEMTN, KNEE RT/LT  2006    Right total  "knee arthroplasty.     JOINT REPLACEMTN, KNEE RT/LT  07/19/2006    Left total knee arthroplasty.     Current Outpatient Medications   Medication Sig Dispense Refill     busPIRone (BUSPAR) 10 MG tablet TAKE ONE TABLET BY MOUTH THREE TIMES DAILY  270 tablet 0     furosemide (LASIX) 20 MG tablet Take 1 tablet (20 mg) by mouth 2 times daily 180 tablet 2     losartan (COZAAR) 100 MG tablet TAKE ONE TABLET BY MOUTH ONE TIME DAILY  90 tablet 2     MIRALAX PO POWD 17 GM DAILY 1 Bottle 11     OMEPRAZOLE PO        spironolactone (ALDACTONE) 25 MG tablet TAKE ONE TABLET BY MOUTH ONE TIME DAILY  90 tablet 2     topiramate (TOPAMAX) 50 MG tablet Take 1.5 tablets (75 mg) by mouth 2 times daily 270 tablet 0     venlafaxine (EFFEXOR-XR) 75 MG 24 hr capsule Take 3 capsules (225 mg) by mouth daily 90 capsule 2     diclofenac (VOLTAREN) 75 MG EC tablet Take 1 tablet (75 mg) by mouth 2 times daily (Patient not taking: Reported on 4/16/2021) 30 tablet 0       Allergies   Allergen Reactions     Lisinopril Cough        Social History     Tobacco Use     Smoking status: Never Smoker     Smokeless tobacco: Never Used     Tobacco comment: no smokers in household   Substance Use Topics     Alcohol use: No       History   Drug Use No         Objective     /80   Pulse 88   Temp 97.5  F (36.4  C) (Temporal)   Ht 1.67 m (5' 5.75\")   Wt (!) 158.3 kg (349 lb)   SpO2 98%   BMI 56.76 kg/m      Physical Exam    GENERAL APPEARANCE: healthy, alert and no distress     EYES: EOMI, PERRL     HENT: ear canals and TM's normal and nose and mouth without ulcers or lesions     NECK: no adenopathy, no asymmetry, masses, or scars and thyroid normal to palpation     RESP: lungs clear to auscultation - no rales, rhonchi or wheezes     CV: regular rates and rhythm, normal S1 S2, no S3 or S4 and no murmur, click or rub     ABDOMEN:  soft, nontender, no HSM or masses and bowel sounds normal     MS: extremities normal- no gross deformities noted     SKIN: " no suspicious lesions or rashes     NEURO: Normal strength and tone, sensory exam grossly normal, mentation intact and speech normal     PSYCH: mentation appears normal. and affect flat     LYMPHATICS: No cervical adenopathy    Recent Labs   Lab Test 04/12/21  0914 09/02/20  0824 10/16/19  1043 10/16/19  1043   HGB  --  14.1  --  13.8   PLT  --  243  --  254    142   < > 141   POTASSIUM 3.7 4.0   < > 4.1   CR 1.11* 1.15*   < > 1.01    < > = values in this interval not displayed.        Diagnostics:  No labs were ordered during this visit.   No EKG required for low risk surgery (cataract, skin procedure, breast biopsy, etc).    Revised Cardiac Risk Index (RCRI):  The patient has the following serious cardiovascular risks for perioperative complications:   - No serious cardiac risks = 0 points     RCRI Interpretation: 0 points: Class I (very low risk - 0.4% complication rate)           Signed Electronically by: Renetta Benitez MD  Copy of this evaluation report is provided to requesting physician.

## 2021-04-19 DIAGNOSIS — Z11.59 ENCOUNTER FOR SCREENING FOR OTHER VIRAL DISEASES: ICD-10-CM

## 2021-04-19 LAB
SARS-COV-2 RNA RESP QL NAA+PROBE: NORMAL
SPECIMEN SOURCE: NORMAL

## 2021-04-19 PROCEDURE — U0005 INFEC AGEN DETEC AMPLI PROBE: HCPCS | Performed by: ANESTHESIOLOGY

## 2021-04-19 PROCEDURE — U0003 INFECTIOUS AGENT DETECTION BY NUCLEIC ACID (DNA OR RNA); SEVERE ACUTE RESPIRATORY SYNDROME CORONAVIRUS 2 (SARS-COV-2) (CORONAVIRUS DISEASE [COVID-19]), AMPLIFIED PROBE TECHNIQUE, MAKING USE OF HIGH THROUGHPUT TECHNOLOGIES AS DESCRIBED BY CMS-2020-01-R: HCPCS | Performed by: ANESTHESIOLOGY

## 2021-04-20 LAB
LABORATORY COMMENT REPORT: NORMAL
SARS-COV-2 RNA RESP QL NAA+PROBE: NEGATIVE
SPECIMEN SOURCE: NORMAL

## 2021-04-21 ENCOUNTER — THERAPY VISIT (OUTPATIENT)
Dept: PHYSICAL THERAPY | Facility: CLINIC | Age: 61
End: 2021-04-21
Payer: COMMERCIAL

## 2021-04-21 DIAGNOSIS — S76.011A STRAIN OF FLEXOR MUSCLE OF RIGHT HIP, INITIAL ENCOUNTER: ICD-10-CM

## 2021-04-21 DIAGNOSIS — M16.11 PRIMARY OSTEOARTHRITIS OF RIGHT HIP: ICD-10-CM

## 2021-04-21 PROCEDURE — 97110 THERAPEUTIC EXERCISES: CPT | Mod: GP | Performed by: PHYSICAL THERAPIST

## 2021-04-21 PROCEDURE — 97112 NEUROMUSCULAR REEDUCATION: CPT | Mod: GP | Performed by: PHYSICAL THERAPIST

## 2021-04-21 PROCEDURE — 97140 MANUAL THERAPY 1/> REGIONS: CPT | Mod: GP | Performed by: PHYSICAL THERAPIST

## 2021-04-21 ASSESSMENT — ACTIVITIES OF DAILY LIVING (ADL)
GETTING_INTO_AND_OUT_OF_A_BATHTUB: MODERATE DIFFICULTY
STANDING_FOR_15_MINUTES: MODERATE DIFFICULTY
WALKING_DOWN_STEEP_HILLS: MODERATE DIFFICULTY
HOS_ADL_SCORE(%): 44.12
LIGHT_TO_MODERATE_WORK: MODERATE DIFFICULTY
DEEP_SQUATTING: MODERATE DIFFICULTY
WALKING_UP_STEEP_HILLS: MODERATE DIFFICULTY
GOING_UP_1_FLIGHT_OF_STAIRS: MODERATE DIFFICULTY
PUTTING_ON_SOCKS_AND_SHOES: MODERATE DIFFICULTY
HOS_ADL_ITEM_SCORE_TOTAL: 30
GOING_DOWN_1_FLIGHT_OF_STAIRS: MODERATE DIFFICULTY
WALKING_APPROXIMATELY_10_MINUTES: EXTREME DIFFICULTY
HOS_ADL_COUNT: 17
STEPPING_UP_AND_DOWN_CURBS: MODERATE DIFFICULTY
WALKING_15_MINUTES_OR_GREATER: UNABLE TO DO
TWISTING/PIVOTING_ON_INVOLVED_LEG: EXTREME DIFFICULTY
RECREATIONAL_ACTIVITIES: MODERATE DIFFICULTY
GETTING_INTO_AND_OUT_OF_AN_AVERAGE_CAR: MODERATE DIFFICULTY
HOS_ADL_HIGHEST_POTENTIAL_SCORE: 68
ROLLING_OVER_IN_BED: SLIGHT DIFFICULTY
WALKING_INITIALLY: EXTREME DIFFICULTY
HEAVY_WORK: MODERATE DIFFICULTY
HOW_WOULD_YOU_RATE_YOUR_CURRENT_LEVEL_OF_FUNCTION_DURING_YOUR_USUAL_ACTIVITIES_OF_DAILY_LIVING_FROM_0_TO_100_WITH_100_BEING_YOUR_LEVEL_OF_FUNCTION_PRIOR_TO_YOUR_HIP_PROBLEM_AND_0_BEING_THE_INABILITY_TO_PERFORM_ANY_OF_YOUR_USUAL_DAILY_ACTIVITIES?: 50
SITTING_FOR_15_MINUTES: NO DIFFICULTY AT ALL

## 2021-04-21 NOTE — PROGRESS NOTES
"  PROGRESS  REPORT    Progress reporting period is from 3/3/75374 to 4/21/20201.       SUBJECTIVE  Subjective changes noted by patient:  Pt has lumbar injection scheduled for tomorrow, continues to report increased LBP where she is \"hunched over in pain\". Pt is going on her trip to [place] on Saturday, reported she will not be walking full terminal at airport.    Current pain level is: 2/10 (hip), 8/10 (low back).     Previous pain level was: 8/10 (hip)  Changes in function:  Yes (See Goal flowsheet attached for changes in current functional level)  Adverse reaction to treatment or activity: None    OBJECTIVE  Changes noted in objective findings:  Yes,   Objective:   Lumbar AROM: flex to ankles, ext 25%, SB above knee B (+ pinch R w/ R SB).   Hip flex AROM: R 95, L 120. Trunk lean R during ambulation.   Hip MMT: R 3-/5, L 3+/5.   Extensor lag noted w/ SLR from hip ext, not present with hip flexed to 45deg.   SLS: able to maintain standing x1 min w/ UE support, no UE support R 3sec, L 6sec. Improvements in independent mobility supine <> sit as noted by minimal UE support required to move R LE.   Hip Outcome Score: improved from 30.88% at eval to 44.12% at discharge.      ASSESSMENT/PLAN  Updated problem list and treatment plan: Diagnosis 1:  R Hip and thigh pain consistent w/ Hip OA and Hip flexor strain    Pain -  hot/cold therapy, electric stimulation, manual therapy, self management, education, directional preference exercise and home program  Decreased ROM/flexibility - manual therapy, therapeutic exercise, therapeutic activity and home program  Decreased strength - therapeutic exercise, therapeutic activities and home program  Impaired balance - neuro re-education, therapeutic activities and home program  Impaired gait - gait training  Impaired muscle performance - neuro re-education and home program  Decreased function - therapeutic activities and home program  Impaired posture - neuro re-education, therapeutic " activities and home program     STG/LTGs have been met or progress has been made towards goals:  Yes (See Goal flow sheet completed today.)  Assessment of Progress: The patient's condition is improving.  Self Management Plans:  Patient has been instructed in a home treatment program.  Patient  has been instructed in self management of symptoms.  I have re-evaluated this patient and find that the nature, scope, duration and intensity of the therapy is appropriate for the medical condition of the patient.  Eva continues to require the following intervention to meet STG and LTG's:  PT    Recommendations:  This patient would benefit from continued therapy.     Frequency:  2 X a month, once daily  Duration:  for 2 months      Please refer to the daily flowsheet for treatment today, total treatment time and time spent performing 1:1 timed codes.    Paige Stokes, SPT Amanda Hilligoss, TAAMRT

## 2021-04-22 ENCOUNTER — ANESTHESIA (OUTPATIENT)
Dept: SURGERY | Facility: CLINIC | Age: 61
End: 2021-04-22
Payer: COMMERCIAL

## 2021-04-22 ENCOUNTER — HOSPITAL ENCOUNTER (OUTPATIENT)
Facility: CLINIC | Age: 61
Discharge: HOME | End: 2021-04-22
Attending: ANESTHESIOLOGY | Admitting: ANESTHESIOLOGY
Payer: COMMERCIAL

## 2021-04-22 ENCOUNTER — HOSPITAL ENCOUNTER (OUTPATIENT)
Dept: GENERAL RADIOLOGY | Facility: CLINIC | Age: 61
End: 2021-04-22
Attending: ANESTHESIOLOGY | Admitting: ANESTHESIOLOGY
Payer: COMMERCIAL

## 2021-04-22 ENCOUNTER — ANESTHESIA EVENT (OUTPATIENT)
Dept: SURGERY | Facility: CLINIC | Age: 61
End: 2021-04-22
Payer: COMMERCIAL

## 2021-04-22 VITALS
RESPIRATION RATE: 16 BRPM | DIASTOLIC BLOOD PRESSURE: 82 MMHG | BODY MASS INDEX: 56.76 KG/M2 | OXYGEN SATURATION: 97 % | HEART RATE: 69 BPM | TEMPERATURE: 99.2 F | WEIGHT: 293 LBS | SYSTOLIC BLOOD PRESSURE: 152 MMHG

## 2021-04-22 DIAGNOSIS — M54.42 CHRONIC BILATERAL LOW BACK PAIN WITH BILATERAL SCIATICA: ICD-10-CM

## 2021-04-22 DIAGNOSIS — M54.41 CHRONIC BILATERAL LOW BACK PAIN WITH BILATERAL SCIATICA: ICD-10-CM

## 2021-04-22 DIAGNOSIS — G89.29 CHRONIC BILATERAL LOW BACK PAIN WITH BILATERAL SCIATICA: ICD-10-CM

## 2021-04-22 PROCEDURE — 62323 NJX INTERLAMINAR LMBR/SAC: CPT | Performed by: ANESTHESIOLOGY

## 2021-04-22 PROCEDURE — 250N000011 HC RX IP 250 OP 636: Performed by: ANESTHESIOLOGY

## 2021-04-22 PROCEDURE — 250N000011 HC RX IP 250 OP 636: Performed by: NURSE ANESTHETIST, CERTIFIED REGISTERED

## 2021-04-22 PROCEDURE — 370N000017 HC ANESTHESIA TECHNICAL FEE, PER MIN: Performed by: ANESTHESIOLOGY

## 2021-04-22 PROCEDURE — 250N000009 HC RX 250: Performed by: NURSE ANESTHETIST, CERTIFIED REGISTERED

## 2021-04-22 PROCEDURE — 999N000179 XR SURGERY CARM FLUORO LESS THAN 5 MIN W STILLS: Mod: TC

## 2021-04-22 RX ORDER — IOPAMIDOL 612 MG/ML
INJECTION, SOLUTION INTRATHECAL PRN
Status: DISCONTINUED | OUTPATIENT
Start: 2021-04-22 | End: 2021-04-22 | Stop reason: HOSPADM

## 2021-04-22 RX ORDER — LIDOCAINE 40 MG/G
CREAM TOPICAL
Status: DISCONTINUED | OUTPATIENT
Start: 2021-04-22 | End: 2021-04-22 | Stop reason: HOSPADM

## 2021-04-22 RX ORDER — LIDOCAINE HYDROCHLORIDE 20 MG/ML
INJECTION, SOLUTION INFILTRATION; PERINEURAL PRN
Status: DISCONTINUED | OUTPATIENT
Start: 2021-04-22 | End: 2021-04-22

## 2021-04-22 RX ORDER — METHYLPREDNISOLONE ACETATE 40 MG/ML
INJECTION, SUSPENSION INTRA-ARTICULAR; INTRALESIONAL; INTRAMUSCULAR; SOFT TISSUE PRN
Status: DISCONTINUED | OUTPATIENT
Start: 2021-04-22 | End: 2021-04-22 | Stop reason: HOSPADM

## 2021-04-22 RX ORDER — PROPOFOL 10 MG/ML
INJECTION, EMULSION INTRAVENOUS PRN
Status: DISCONTINUED | OUTPATIENT
Start: 2021-04-22 | End: 2021-04-22

## 2021-04-22 RX ADMIN — PROPOFOL 40 MG: 10 INJECTION, EMULSION INTRAVENOUS at 08:08

## 2021-04-22 RX ADMIN — PROPOFOL 30 MG: 10 INJECTION, EMULSION INTRAVENOUS at 08:17

## 2021-04-22 RX ADMIN — PROPOFOL 40 MG: 10 INJECTION, EMULSION INTRAVENOUS at 08:15

## 2021-04-22 RX ADMIN — PROPOFOL 20 MG: 10 INJECTION, EMULSION INTRAVENOUS at 08:10

## 2021-04-22 RX ADMIN — LIDOCAINE HYDROCHLORIDE 40 MG: 20 INJECTION, SOLUTION INFILTRATION; PERINEURAL at 08:09

## 2021-04-22 RX ADMIN — PROPOFOL 30 MG: 10 INJECTION, EMULSION INTRAVENOUS at 08:12

## 2021-04-22 NOTE — ANESTHESIA PREPROCEDURE EVALUATION
Anesthesia Pre-Procedure Evaluation    Patient: Eva Solis   MRN: [id number] : 1960        Preoperative Diagnosis: Chronic bilateral low back pain with bilateral sciatica [M54.42, M54.41, G89.29]   Procedure : Procedure(s):  Lumbar 4-5 Epidural Injection     Past Medical History:   Diagnosis Date     Congestive heart failure (H) 3years     Depressive disorder 1-2 years    dealing with on a daily basis     DEPRESSIVE DISORDER NEC 2004     Diastolic dysfunction      Essential hypertension, benign      Generalized osteoarthrosis, unspecified site     knees     Headache(784.0)      Heart disease 3 years    CHF     Mixed anxiety depressive disorder 1/15/2016     PANIC DISORDER 2004      Past Surgical History:   Procedure Laterality Date     C  DELIVERY ONLY      , Low Cervical     C  DELIVERY ONLY       C VAGINAL HYSTERECTOMY      without oophorectomy     COLONOSCOPY  10/06/10    attempt at colonscopy to 50cm     HC COLONOSCOPY W/WO BRUSH/WASH  2005    Diverticulosis.  Internal hemorrhoids.     INJECT EPIDURAL LUMBAR N/A 10/18/2019    Procedure: INJECTION, SPINE, LUMBAR 4 - LUMBAR 5, EPIDURAL TRANSLAMINAR;  Surgeon: Trever Wheatley MD;  [facility]     INJECT EPIDURAL LUMBAR N/A 2020    Procedure: INJECTION, SPINE, LUMBAR 4-5, EPIDURAL TRANSLAMINAR;  Surgeon: Trever Wheatley MD;  Location: [facility]     JOINT REPLACEMTN, KNEE RT/LT  2006    Right total knee arthroplasty.     JOINT REPLACEMTN, KNEE RT/LT  2006    Left total knee arthroplasty.      Allergies   Allergen Reactions     Lisinopril Cough      Social History     Tobacco Use     Smoking status: Never Smoker     Smokeless tobacco: Never Used     Tobacco comment: no smokers in household   Substance Use Topics     Alcohol use: No      Wt Readings from Last 1 Encounters:   21 (!) 158.3 kg (349 lb)        Anesthesia Evaluation            ROS/MED HX  ENT/Pulmonary:     (+) VALERIE risk  factors, snores loudly, hypertension, obese, daytime somnolence,     Neurologic:     (+) migraines,     Cardiovascular:     (+) hypertension-----CHF     METS/Exercise Tolerance:     Hematologic:  - neg hematologic  ROS     Musculoskeletal: Comment: Back pain  (+) arthritis,     GI/Hepatic: Comment: constipation      Renal/Genitourinary:     (+) renal disease, type: CRI,     Endo:     (+) Obesity,     Psychiatric/Substance Use:     (+) psychiatric history anxiety and depression     Infectious Disease:  - neg infectious disease ROS     Malignancy:  - neg malignancy ROS     Other:  - neg other ROS    (+) , H/O Chronic Pain,        Physical Exam    Airway        Mallampati: III   TM distance: > 3 FB   Neck ROM: full   Mouth opening: > 3 cm    Respiratory Devices and Support         Dental           Cardiovascular   cardiovascular exam normal          Pulmonary   pulmonary exam normal                OUTSIDE LABS:  CBC:   Lab Results   Component Value Date    WBC 6.6 09/02/2020    WBC 6.5 10/16/2019    HGB 14.1 09/02/2020    HGB 13.8 10/16/2019    HCT 42.4 09/02/2020    HCT 41.0 10/16/2019     09/02/2020     10/16/2019     BMP:   Lab Results   Component Value Date     04/12/2021     09/02/2020    POTASSIUM 3.7 04/12/2021    POTASSIUM 4.0 09/02/2020    CHLORIDE 108 04/12/2021    CHLORIDE 110 (H) 09/02/2020    CO2 23 04/12/2021    CO2 25 09/02/2020    BUN 15 04/12/2021    BUN 20 09/02/2020    CR 1.11 (H) 04/12/2021    CR 1.15 (H) 09/02/2020    GLC 96 04/12/2021     (H) 09/02/2020     COAGS:   Lab Results   Component Value Date    INR 0.98 09/25/2013     POC: No results found for: BGM, HCG, HCGS  HEPATIC:   Lab Results   Component Value Date    ALBUMIN 3.3 (L) 09/02/2020    PROTTOTAL 7.5 09/02/2020    ALT 24 09/02/2020    AST 15 09/02/2020    ALKPHOS 79 09/02/2020    BILITOTAL 0.6 09/02/2020     OTHER:   Lab Results   Component Value Date    DEVANTE 9.2 04/12/2021    PHOS 2.9 05/18/2015    MAG  1.7 02/06/2015    TSH 1.25 08/27/2019       Anesthesia Plan    ASA Status:  3   NPO Status:  NPO Appropriate    Anesthesia Type: MAC.     - Reason for MAC: chronic cardiopulmonary disease, straight local not clinically adequate   Induction: Propofol.   Maintenance: TIVA.        Consents    Anesthesia Plan(s) and associated risks, benefits, and realistic alternatives discussed. Questions answered and patient/representative(s) expressed understanding.     - Discussed with:  Patient      - Extended Intubation/Ventilatory Support Discussed: No.      - Patient is DNR/DNI Status: No    Use of blood products discussed: No .     Postoperative Care            Comments:    The risks and benefits of anesthesia, and the alternatives where applicable, have been discussed with the patient, and they wish to proceed.            STACEY Billingsley CRNA

## 2021-04-22 NOTE — ANESTHESIA POSTPROCEDURE EVALUATION
Patient: Eva Solis    Procedure(s):  Lumbar 4-5 Epidural Injection    Diagnosis:Chronic bilateral low back pain with bilateral sciatica [M54.42, M54.41, G89.29]  Diagnosis Additional Information: No value filed.    Anesthesia Type:  MAC    Note:  Disposition: Outpatient   Postop Pain Control: Uneventful            Sign Out: Well controlled pain   PONV: No   Neuro/Psych: Uneventful            Sign Out: Acceptable/Baseline neuro status   Airway/Respiratory: Uneventful            Sign Out: Acceptable/Baseline resp. status   CV/Hemodynamics: Uneventful            Sign Out: Acceptable CV status   Other NRE: NONE   DID A NON-ROUTINE EVENT OCCUR? No    Event details/Postop Comments:  Pt was happy with anesthesia care.  No complications.  I will follow up with the pt if needed.           Last vitals:  Vitals:    04/22/21 0820 04/22/21 0830 04/22/21 0840   BP: (!) 154/82 (!) 154/86 (!) 152/82   Pulse:  69 69   Resp:      Temp:      SpO2:  98% 97%       Last vitals prior to Anesthesia Care Transfer:  CRNA VITALS  4/22/2021 0750 - 4/22/2021 0850      4/22/2021             Pulse:  75    SpO2:  97 %    Resp Rate (observed):  (!) 5          Electronically Signed By: STACEY Billingsley CRNA  April 22, 2021  9:22 AM

## 2021-04-22 NOTE — OP NOTE
CHIEF COMPLAINT: Low back and buttock pain and bilateral leg pains  PROCEDURE: Repeat L4-5 interlaminar epidural steroid injection using fluoroscopic guidance with contrast dye.   PROCEDURE DETAILS: After written informed consent was obtained from the patient, the patient was escorted to the procedure room.  The patient was placed in the prone position.  A  time out  was conducted to verify patient identity, procedure to be performed, side, site, allergies and any special requirements.  The skin over the neck and upper back region were prepped and draped in normal sterile fashion. Fluoroscopy was used to identify the interspace in an AP view and the skin was anesthetized with 2 mL of 1% lidocaine with bicarbonate buffer.  A 20-gauge 3-1/2 inch Tuohy needle was advanced using the loss of resistance technique with preservative free normal saline with fluoroscopic guidance. After negative aspiration for CSF and blood, 1.5 cc of Omnipaque contrast dye was injected revealing the appropriate epidurogram without evidence of intrathecal or intravascular spread. Following this, a 3-mL solution of 40 mg of triamcinolone with 2 cc preservative-free normal saline was slowly injected.  After injection of the medication, as the needle tip was withdrawn, it was flushed with local anesthetic.  The patient was monitored with blood pressure and pulse oximetry machines with the assistance of an RN throughout the procedure.  The patient was alert and responsive to questions throughout the procedure.   The patient tolerated the procedure well and was observed in the post-procedural area.  The patient was dismissed without apparent complications.      DIAGNOSIS:  1.  Severe spinal stenosis at L3-4 and L4-5  PLAN:  1. Performed a repeat L4-5 interlaminar epidural steroid injection.   2. The patient was instructed to follow-up at the [facility] if today's procedure is not helpful.  I think she clearly has neurogenic claudication  and if today's injection is not helpful she could consider decompressive surgery although that not under my purview.  She would have to see Dr. Wallis in consultation.  Trever Wheatley MD  Diplomate of the American Board of Anesthesiology, Pain Medicine

## 2021-04-22 NOTE — DISCHARGE INSTRUCTIONS
Home Care Instructions                Procedure: Epidural injection or joint injection    Activity:    Rest today    Do not work today    Resume normal activity tomorrow    Pain:    You may experience soreness at the injection site for 1 to 3 days.    You may use an ice pack for 20 minutes every 2 hours for the first 24 hours    You may use a heating pad after the first 24 hours    You may use Tylenol  (acetaminophen) every 4 hours or other pain medicines as directed by your physician    Safety  Sedation medicine, if given may remain active for many hours.    It is important for the next 24 hours that you do not:    Drive a car    Operate machines or power tools    Consume alcohol, including beer    Sign any important papers or legal documents    You may experience numbness radiating into your legs or arms, (depending on the procedure location)  This numbness may last several hours.  Until the numb sensation returns to normal please use caution in walking, climbing stairs, stepping out of your vehicle, etc.    Common side effects of steroids:  Not everyone will experience corticosteroid side effects. If side effects are experienced they will gradually subside in the 7-10 day period following an injection.    Most common side effects include:    Flushed face and/or chest    Feeling of warmth, particularly in face but could be overall feeling of warmth    Increased blood sugar in diabetic patients    Menstrual irregularities may occur.  If taking hormone based birth control an alternate method of birth control is recommended    Sleep disturbances and/or mood swings are possible    Leg cramps    Please contact us if you have:  Severe pain   Fever more than 101.5 degrees Fahrenheit  Signs of infection (redness, swelling or drainage)      If you have questions during normal business hours (8am-5pm Monday-Friday) contact the [facility] at [phone]. If you need help after hours, we recommend that you go to a  hospital emergency room or dial 911.

## 2021-04-22 NOTE — ANESTHESIA CARE TRANSFER NOTE
Patient: Eva Solis    Procedure(s):  Lumbar 4-5 Epidural Injection    Diagnosis: Chronic bilateral low back pain with bilateral sciatica [M54.42, M54.41, G89.29]  Diagnosis Additional Information: No value filed.    Anesthesia Type:   MAC     Note:    Oropharynx: oropharynx clear of all foreign objects and spontaneously breathing  Level of Consciousness: drowsy  Oxygen Supplementation: face mask    Independent Airway: airway patency satisfactory and stable  Dentition: dentition unchanged  Vital Signs Stable: post-procedure vital signs reviewed and stable  Report to RN Given: handoff report given  Patient transferred to: Phase II    Handoff Report: Identifed the Patient, Identified the Reponsible Provider, Reviewed the pertinent medical history, Discussed the surgical course, Reviewed Intra-OP anesthesia mangement and issues during anesthesia, Set expectations for post-procedure period and Allowed opportunity for questions and acknowledgement of understanding      Vitals: (Last set prior to Anesthesia Care Transfer)  CRNA VITALS  4/22/2021 0750 - 4/22/2021 0820      4/22/2021             Pulse:  75    SpO2:  97 %    Resp Rate (observed):  (!) 5        Electronically Signed By: STACEY Billingsley CRNA  April 22, 2021  8:20 AM

## 2021-05-08 ENCOUNTER — HEALTH MAINTENANCE LETTER (OUTPATIENT)
Age: 61
End: 2021-05-08

## 2021-05-18 DIAGNOSIS — F41.8 MIXED ANXIETY DEPRESSIVE DISORDER: ICD-10-CM

## 2021-05-19 RX ORDER — BUSPIRONE HYDROCHLORIDE 10 MG/1
TABLET ORAL
Qty: 270 TABLET | Refills: 0 | Status: SHIPPED | OUTPATIENT
Start: 2021-05-19 | End: 2021-08-25

## 2021-06-10 ENCOUNTER — HOSPITAL ENCOUNTER (OUTPATIENT)
Dept: MRI IMAGING | Facility: CLINIC | Age: 61
Discharge: HOME | End: 2021-06-10
Attending: PHYSICIAN ASSISTANT | Admitting: PHYSICIAN ASSISTANT
Payer: COMMERCIAL

## 2021-06-10 DIAGNOSIS — M54.42 CHRONIC BILATERAL LOW BACK PAIN WITH BILATERAL SCIATICA: ICD-10-CM

## 2021-06-10 DIAGNOSIS — G89.29 CHRONIC BILATERAL LOW BACK PAIN WITH BILATERAL SCIATICA: ICD-10-CM

## 2021-06-10 DIAGNOSIS — M54.41 CHRONIC BILATERAL LOW BACK PAIN WITH BILATERAL SCIATICA: ICD-10-CM

## 2021-06-10 PROCEDURE — 72148 MRI LUMBAR SPINE W/O DYE: CPT

## 2021-06-28 DIAGNOSIS — F41.1 GAD (GENERALIZED ANXIETY DISORDER): ICD-10-CM

## 2021-06-28 DIAGNOSIS — F33.1 MAJOR DEPRESSIVE DISORDER, RECURRENT EPISODE, MODERATE (H): ICD-10-CM

## 2021-06-28 RX ORDER — VENLAFAXINE HYDROCHLORIDE 75 MG/1
225 CAPSULE, EXTENDED RELEASE ORAL DAILY
Qty: 90 CAPSULE | Refills: 0 | Status: SHIPPED | OUTPATIENT
Start: 2021-06-28 | End: 2021-07-23

## 2021-06-28 NOTE — TELEPHONE ENCOUNTER
Date of Last Office Visit: 3/24/21  Date of Next Office Visit: None scheduled, will route to intake to reach out to pt  No shows since last visit: 0  Cancellations since last visit: 0    Medication requested: venlafaxine 75 mg Date last ordered: 3/24/21 Qty: 90 Refills: 2     Review of MN ?: N/A    Lapse in medication adherence greater than 5 days?: No  If yes, call patient and gather details: N/A  Medication refill request verified as identical to current order?: Yes  Result of Last DAM, VPA, Li+ Level, CBC, or Carbamazepine Level (at or since last visit): N/A    []Medication refilled per  Medication Refill in Ambulatory Care  policy.  [x]Medication unable to be refilled by RN due to criteria not met as indicated below:    []Eligibility - not seen in the last year   [x]Supervision - no future appointment   []Compliance - no shows, cancellations or lapse in therapy   []Verification - order discrepancy   []Controlled medication   []Medication not included in policy   []90-day supply request   []Other

## 2021-06-29 ENCOUNTER — E-VISIT (OUTPATIENT)
Dept: FAMILY MEDICINE | Facility: OTHER | Age: 61
End: 2021-06-29
Payer: COMMERCIAL

## 2021-06-29 DIAGNOSIS — G43.009 MIGRAINE WITHOUT AURA AND WITHOUT STATUS MIGRAINOSUS, NOT INTRACTABLE: Primary | ICD-10-CM

## 2021-06-29 PROCEDURE — 99207 PR NO BILLABLE SERVICE THIS VISIT: CPT | Performed by: FAMILY MEDICINE

## 2021-07-01 ENCOUNTER — HOSPITAL ENCOUNTER (OUTPATIENT)
Facility: CLINIC | Age: 61
End: 2021-07-01
Attending: NEUROLOGICAL SURGERY | Admitting: NEUROLOGICAL SURGERY
Payer: COMMERCIAL

## 2021-07-01 ENCOUNTER — OFFICE VISIT (OUTPATIENT)
Dept: NEUROSURGERY | Facility: CLINIC | Age: 61
End: 2021-07-01
Payer: COMMERCIAL

## 2021-07-01 VITALS
HEIGHT: 66 IN | BODY MASS INDEX: 47.09 KG/M2 | SYSTOLIC BLOOD PRESSURE: 166 MMHG | DIASTOLIC BLOOD PRESSURE: 89 MMHG | WEIGHT: 293 LBS | TEMPERATURE: 97.9 F

## 2021-07-01 DIAGNOSIS — M48.062 LUMBAR STENOSIS WITH NEUROGENIC CLAUDICATION: ICD-10-CM

## 2021-07-01 DIAGNOSIS — M48.062 LUMBAR STENOSIS WITH NEUROGENIC CLAUDICATION: Primary | ICD-10-CM

## 2021-07-01 DIAGNOSIS — Z01.818 PRE-OP TESTING: Primary | ICD-10-CM

## 2021-07-01 PROCEDURE — 99214 OFFICE O/P EST MOD 30 MIN: CPT | Performed by: NEUROLOGICAL SURGERY

## 2021-07-01 ASSESSMENT — PAIN SCALES - GENERAL: PAINLEVEL: EXTREME PAIN (8)

## 2021-07-01 ASSESSMENT — MIFFLIN-ST. JEOR: SCORE: 2165.83

## 2021-07-01 NOTE — PROGRESS NOTES
"Eva Solis is a 61 year old female who presents for:  Chief Complaint   Patient presents with     Neurologic Problem     Follow up MRI         Initial Vitals:  BP (!) 166/89   Temp 97.9  F (36.6  C) (Temporal)   Wt (!) 349 lb (158.3 kg)   BMI 56.76 kg/m   Estimated body mass index is 56.76 kg/m  as calculated from the following:    Height as of 4/16/21: 5' 5.75\" (1.67 m).    Weight as of this encounter: 349 lb (158.3 kg).. Body surface area is 2.71 meters squared. BP completed using cuff size: regular  Extreme Pain (8)    Nursing Comments: Eva to follow up with Primary Care provider regarding elevated blood pressure.     Lyn Blevins CMA    "

## 2021-07-01 NOTE — PATIENT INSTRUCTIONS
Patient Instructions    Surgery scheduled at [facility] for Lumbar 3-5 laminectomy with Dr. Wallis    Pre-Operative    Surgical risks: blood clots in the leg or lung, problems urinating, nerve damage, drainage from the incision, infection,stiffness    Pre-operative physical with primary care physician within 30 days of surgical date.     Likely same day procedure with discharge home day of surgery, may stay for 23 hour observation hospitalization for monitoring.       Shower procedure  o Please shower with antimicrobial soap the night before surgery and morning of surgery. Please refer to showering instruction sheet in folder.    Eating/Drinking  o Stop all solid foods 8 hours before surgery.  o Keep drinking clear liquids until 4 hours before surgery  - Clear liquids include water, clear juice, black coffee, or clear tea without milk, Gatorade, clear soda.     Medications  o Hold Aspirin, NSAIDs (Advil/Ibuprofen, Indocin, Naproxen,Nuprin,Relafen/Nabumetone, Diclofenac,Meloxicam, Aleve, Celebrex) x 7 days prior to surgical date  o You can take Tylenol (Acetaminophen) for pain, 1000 mg  - Do not exceed 3,000 mg per day   o Any other medications prescribed, please discuss with your primary care provider at your pre-operative physical     As part of preparation for your upcoming procedure you are required to have a test for the novel Coronavirus, COVID-19  o Please call the drive-up testing number to schedule your appointment. The test needs to be completed within 4 days (96) hours of surgery.   o Scheduling Number: [phone]     Post-Operative    Pain Management    Dealing with pain  o As your body heals, you might feel a stabbing, burning, or aching pain. You may also have some numbness.  o Everyone feels pain differently, we may ask you to rate your pain using a pain scale. This will let us know how much pain you feel.   o Keep in mind that medicine won't take away all of your pain. It helps to try other  ways to relax and ease pain.     Things to help with pain  o After surgery, we will give you medicine for your pain. These medications work well, but they can make you drowsy, itchy, or sick to your stomach. If we give you narcotics for pain, try to take the pills with food.   o For mild to moderate pain, you can take medication such as Tylenol. These can be used with narcotics, but make sure that your narcotic does not contain Tylenol.   - Do NOT drive while taking narcotic pain medication  - Do NOT drink alcohol while using any pain medication  o You can utilize ice as needed (no longer than 20 minutes at one time)    You may resume taking NSAIDs (ex. Ibuprofen, aleve, naproxen) 2 weeks after surgery as it may cause bleeding and interfere with bone healing     Incision Care  o No submerging incision in water such as pools, hot tubs, baths for at least 8 weeks or until incision is healed  o It is okay to shower, just pat the incision dry   o Remove dressing as instructed upon discharge  o Watch for signs of infection  - Redness, swelling, warmth, drainage, and fever of 101 degrees or higher  - [facility] [phone]    Bowel Care  o Many people have constipation (hard stools) after surgery.  To help prevent constipation: Drink plenty of fluid (8-10 glasses/day); Eat more fiber, such as whole grain bread, bran cereal, and fruits and vegetables; Stay active by walking; Over the counter stool softener may also help.      Activity Restrictions  o For the first 4-6 weeks, no lifting > 10 pounds, limited bending, twisting, or overhead reaching.  o Take stairs in moderation     Walking: Walking is the best way to start exercise after surgery. Take short frequent walks. You may gradually increase the distance as tolerated. If you feel pain, decrease your activity, but DO NOT stop walking. Walking will help you gain strength and prevent muscle soreness and spasms.   o Avoid bed rest and prolonged sitting for longer than  30 minutes (change positions frequently while awake)  o No contact sports until after follow up visit  o No high impact activities such as; running/jogging, snowmobile or 4 holland riding or any other recreational vehicles  o Please call the clinic if you develop any of the following symptoms:  - Swelling and/or warmth in one or both legs  - Pain or tenderness in your leg, ankle, foot, or arm   - Red or discolored skin     Post-Op Follow Up Appointments    2 week incision check with RN    6 week post op visit with Physical Assistant or Nurse Practitioner    3 months post op with Physical Assistant or Nurse Practitioner    Please call to schedule follow up appointment at [phone]    Resources    If you are currently employed, you will need to be off work for 2-4 weeks for post op recovery and healing.    Please fax any FMLA/short term disability paperwork to [phone]    You may call our clinic when you'd like to return to work and we can provide a work letter    To allow staff adequate time to complete paperwork, please send as soon as possible

## 2021-07-01 NOTE — NURSING NOTE
Reviewed pre- and post-operative instructions as outlined in the After Visit Summary/Patient Instructions with patient.   Surgery folder was given to patient    Patient Education Topic: Procedure with Dr. Wallis     Learner(s): Patient    Knowledge Level: Basic    Readiness to Learn: Ready    Method:  Verbal explanation    Outcome: Able to verbalize instructions    Barriers to Learning: No barrier    Covid Testing: patient educated they will need to have a test for the novel Coronavirus, COVID-19.The test needs to be completed within 4 days (96) hours of surgery. Order Placed.    Scheduling Number: [phone]    Patient had the opportunity for questions to be answered. Advised Patient to call clinic with any questions/concerns. Gave patient antibacterial soap for pre-surgery skin preparation.

## 2021-07-01 NOTE — LETTER
"    2021         RE: Eva Solis  [address]  [place] [address]        Dear Colleague,    Thank you for referring your patient, Eva Solis, to the [facility]. Please see a copy of my visit note below.    Eva Solis is a 61 year old female who presents for:  Chief Complaint   Patient presents with     Neurologic Problem     Follow up MRI         Initial Vitals:  BP (!) 166/89   Temp 97.9  F (36.6  C) (Temporal)   Wt (!) 349 lb (158.3 kg)   BMI 56.76 kg/m   Estimated body mass index is 56.76 kg/m  as calculated from the following:    Height as of 21: 5' 5.75\" (1.67 m).    Weight as of this encounter: 349 lb (158.3 kg).. Body surface area is 2.71 meters squared. BP completed using cuff size: regular  Extreme Pain (8)    Nursing Comments: Eva to follow up with Primary Care provider regarding elevated blood pressure.     Lyn KASHIFDayana Blevins CMA      61F w/ L3-5 severe stenosis, back pain and neurogenic claudications.  Several months of throbbing mid back pain, with radiation to the bilateral legs, paresthesias, and heaviness in the legs, worst when she stands straight or walks, improved with sitting or bending forward.  Has done PT and multiple SILVIA without improvement.  MR with multi-level spondylotic change and disc degeneration, L3-5 severe stenosis.    Past Medical History:   Diagnosis Date     Congestive heart failure (H) 3years     Depressive disorder 1-2 years    dealing with on a daily basis     DEPRESSIVE DISORDER NEC 2004     Diastolic dysfunction      Essential hypertension, benign      Generalized osteoarthrosis, unspecified site     knees     Headache(784.0)      Heart disease 3 years    CHF     Mixed anxiety depressive disorder 1/15/2016     PANIC DISORDER 2004     Past Surgical History:   Procedure Laterality Date     C  DELIVERY ONLY      , Low Cervical     C  DELIVERY ONLY       C VAGINAL " HYSTERECTOMY  1994    without oophorectomy     COLONOSCOPY  10/06/10    attempt at colonscopy to 50cm     HC COLONOSCOPY W/WO BRUSH/WASH  11/21/2005    Diverticulosis.  Internal hemorrhoids.     INJECT EPIDURAL LUMBAR N/A 10/18/2019    Procedure: INJECTION, SPINE, LUMBAR 4 - LUMBAR 5, EPIDURAL TRANSLAMINAR;  Surgeon: Trever Wheatley MD;  Location: [facility]     INJECT EPIDURAL LUMBAR N/A 6/12/2020    Procedure: INJECTION, SPINE, LUMBAR 4-5, EPIDURAL TRANSLAMINAR;  Surgeon: Trever Wheatley MD;  Location: [facility]     INJECT EPIDURAL LUMBAR N/A 4/22/2021    Procedure: Lumbar 4-5 Epidural Injection;  Surgeon: Trever Wheatley MD;  Location: [facility]     JOINT REPLACEMTN, KNEE RT/LT  5/11/2006    Right total knee arthroplasty.     JOINT REPLACEMTN, KNEE RT/LT  07/19/2006    Left total knee arthroplasty.     Social History     Socioeconomic History     Marital status:      Spouse name: Shun     Number of children: 3     Years of education: Not on file     Highest education level: Some college, no degree   Occupational History     Occupation: Processor   Social Needs     Financial resource strain: Not hard at all     Food insecurity     Worry: Never true     Inability: Never true     Transportation needs     Medical: No     Non-medical: No   Tobacco Use     Smoking status: Never Smoker     Smokeless tobacco: Never Used     Tobacco comment: no smokers in household   Substance and Sexual Activity     Alcohol use: No     Drug use: No     Sexual activity: Yes     Partners: Male     Birth control/protection: None     Comment: hysterectomy/bilateral ovaries remain   Lifestyle     Physical activity     Days per week: Not on file     Minutes per session: Not on file     Stress: Rather much   Relationships     Social connections     Talks on phone: Not on file     Gets together: Not on file     Attends Shinto service: Not on file     Active member of club or organization: Not on file     Attends meetings of clubs or organizations:  "Not on file     Relationship status:      Intimate partner violence     Fear of current or ex partner: No     Emotionally abused: No     Physically abused: No     Forced sexual activity: No   Other Topics Concern      Service No     Blood Transfusions No     Caffeine Concern No     Occupational Exposure No     Hobby Hazards No     Sleep Concern No     Stress Concern Yes     Weight Concern Yes     Special Diet Not Asked     Back Care Not Asked     Exercise No     Bike Helmet Not Asked     Seat Belt Yes     Self-Exams No     Parent/sibling w/ CABG, MI or angioplasty before 65F 55M? Yes     Comment: brother about 2 months ago   Social History Narrative     Not on file     Family History   Problem Relation Age of Onset     Cancer Father         squamous cell ca     Cancer Maternal Grandmother         lung     Cerebrovascular Disease Maternal Grandmother      Cancer Paternal Grandmother         squamous cell ca     Diabetes Sister      Cancer Sister         kidney cancer     Kidney Cancer Sister      Other Cancer Sister         kidney cancer     Bipolar Disorder Sister      Connective Tissue Disorder Brother         lupus     Lupus Brother      Kidney Disease Mother         atrophic kidney     Hypertension Mother      Depression Mother         she's being treated for this     Bipolar Disorder Mother      Depression Sister         dont know much     Kidney Disease Maternal Uncle         unclear kidney issue     Anxiety Disorder Son      Anxiety Disorder Niece      Colon Cancer No family hx of      Asthma No family hx of         ROS: 10 point ROS neg other than the symptoms noted above in the HPI.    Physical Exam  BP (!) 166/89   Temp 97.9  F (36.6  C) (Temporal)   Ht 1.67 m (5' 5.75\")   Wt (!) 158.3 kg (349 lb)   BMI 56.76 kg/m    HEENT:  Normocephalic, atraumatic.  PERRLA.  EOM s intact.  Visual fields full to gross exam  Neck:  Supple, non-tender, without lymphadenopathy.  Heart:  No peripheral " edema  Lungs:  No SOB  Abdomen:  Non-distended.   Skin:  Warm and dry.  Extremities:  No edema, cyanosis or clubbing.  Psychiatric:  No apparent distress  Musculoskeletal:  Normal bulk and tone    NEUROLOGICAL EXAMINATION:     Mental status:  Alert and Oriented x 3, speech is fluent.  Cranial nerves:  II-XII intact.   Motor:    Shoulder Abduction:  Right:  5/5   Left:  5/5  Biceps:                      Right:  5/5   Left:  5/5  Triceps:                     Right:  5/5   Left:  5/5  Wrist Extensors:       Right:  5/5   Left:  5/5  Wrist Flexors:           Right:  5/5   Left:  5/5  interosseus :            Right:  5/5   Left:  5/5  Hip Flexion:                Right: 5/5  Left:  5/5  Quadriceps:             Right:  5/5  Left:  5/5  Hamstrings:             Right:  5/5  Left:  5/5  Gastroc Soleus:        Right:  5/5  Left:  5/5  Tib/Ant:                      Right:  5/5  Left:  5/5  EHL:                     Right:  5/5  Left:  5/5  Sensation:  Intact  Reflexes:  Negative Babinski.  Negative Clonus.  Negative Saenz's.  Coordination:  Smooth finger to nose testing.   Negative pronator drift.      A/P:  61F w/ L3-5 severe stenosis, back pain and neurogenic claudications    I had a discussion with the patient, reviewing the history, symptoms, and imaging  Discussed role of her elevated BMI in symptoms and importance of weight loss  Extensive non-surgical management without improvement  Will plan for L3-5 laminectomies  Risks and benefits discussed         Again, thank you for allowing me to participate in the care of your patient.        Sincerely,        Leonard Wallis MD

## 2021-07-01 NOTE — PROGRESS NOTES
61F w/ L3-5 severe stenosis, back pain and neurogenic claudications.  Several months of throbbing mid back pain, with radiation to the bilateral legs, paresthesias, and heaviness in the legs, worst when she stands straight or walks, improved with sitting or bending forward.  Has done PT and multiple SILVIA without improvement.  MR with multi-level spondylotic change and disc degeneration, L3-5 severe stenosis.    Past Medical History:   Diagnosis Date     Congestive heart failure (H) 3years     Depressive disorder 1-2 years    dealing with on a daily basis     DEPRESSIVE DISORDER NEC 2004     Diastolic dysfunction      Essential hypertension, benign      Generalized osteoarthrosis, unspecified site     knees     Headache(784.0)      Heart disease 3 years    CHF     Mixed anxiety depressive disorder 1/15/2016     PANIC DISORDER 2004     Past Surgical History:   Procedure Laterality Date     C  DELIVERY ONLY      , Low Cervical     C  DELIVERY ONLY       C VAGINAL HYSTERECTOMY      without oophorectomy     COLONOSCOPY  10/06/10    attempt at colonscopy to 50cm     HC COLONOSCOPY W/WO BRUSH/WASH  2005    Diverticulosis.  Internal hemorrhoids.     INJECT EPIDURAL LUMBAR N/A 10/18/2019    Procedure: INJECTION, SPINE, LUMBAR 4 - LUMBAR 5, EPIDURAL TRANSLAMINAR;  Surgeon: Trever Wheatley MD;  [facility]     INJECT EPIDURAL LUMBAR N/A 2020    Procedure: INJECTION, SPINE, LUMBAR 4-5, EPIDURAL TRANSLAMINAR;  Surgeon: Trever Wheatley MD;  [facility]     INJECT EPIDURAL LUMBAR N/A 2021    Procedure: Lumbar 4-5 Epidural Injection;  Surgeon: Trever Wheatley MD;  [facility]     JOINT REPLACEMTN, KNEE RT/LT  2006    Right total knee arthroplasty.     JOINT REPLACEMTN, KNEE RT/LT  2006    Left total knee arthroplasty.     Social History     Socioeconomic History     Marital status:      Spouse name: Shun     Number of children: 3     Years of  education: Not on file     Highest education level: Some college, no degree   Occupational History     Occupation: Processor   Social Needs     Financial resource strain: Not hard at all     Food insecurity     Worry: Never true     Inability: Never true     Transportation needs     Medical: No     Non-medical: No   Tobacco Use     Smoking status: Never Smoker     Smokeless tobacco: Never Used     Tobacco comment: no smokers in household   Substance and Sexual Activity     Alcohol use: No     Drug use: No     Sexual activity: Yes     Partners: Male     Birth control/protection: None     Comment: hysterectomy/bilateral ovaries remain   Lifestyle     Physical activity     Days per week: Not on file     Minutes per session: Not on file     Stress: Rather much   Relationships     Social connections     Talks on phone: Not on file     Gets together: Not on file     Attends Jewish service: Not on file     Active member of club or organization: Not on file     Attends meetings of clubs or organizations: Not on file     Relationship status:      Intimate partner violence     Fear of current or ex partner: No     Emotionally abused: No     Physically abused: No     Forced sexual activity: No   Other Topics Concern      Service No     Blood Transfusions No     Caffeine Concern No     Occupational Exposure No     Hobby Hazards No     Sleep Concern No     Stress Concern Yes     Weight Concern Yes     Special Diet Not Asked     Back Care Not Asked     Exercise No     Bike Helmet Not Asked     Seat Belt Yes     Self-Exams No     Parent/sibling w/ CABG, MI or angioplasty before 65F 55M? Yes     Comment: brother about 2 months ago   Social History Narrative     Not on file     Family History   Problem Relation Age of Onset     Cancer Father         squamous cell ca     Cancer Maternal Grandmother         lung     Cerebrovascular Disease Maternal Grandmother      Cancer Paternal Grandmother         squamous cell ca  "    Diabetes Sister      Cancer Sister         kidney cancer     Kidney Cancer Sister      Other Cancer Sister         kidney cancer     Bipolar Disorder Sister      Connective Tissue Disorder Brother         lupus     Lupus Brother      Kidney Disease Mother         atrophic kidney     Hypertension Mother      Depression Mother         she's being treated for this     Bipolar Disorder Mother      Depression Sister         dont know much     Kidney Disease Maternal Uncle         unclear kidney issue     Anxiety Disorder Son      Anxiety Disorder Niece      Colon Cancer No family hx of      Asthma No family hx of         ROS: 10 point ROS neg other than the symptoms noted above in the HPI.    Physical Exam  BP (!) 166/89   Temp 97.9  F (36.6  C) (Temporal)   Ht 1.67 m (5' 5.75\")   Wt (!) 158.3 kg (349 lb)   BMI 56.76 kg/m    HEENT:  Normocephalic, atraumatic.  PERRLA.  EOM s intact.  Visual fields full to gross exam  Neck:  Supple, non-tender, without lymphadenopathy.  Heart:  No peripheral edema  Lungs:  No SOB  Abdomen:  Non-distended.   Skin:  Warm and dry.  Extremities:  No edema, cyanosis or clubbing.  Psychiatric:  No apparent distress  Musculoskeletal:  Normal bulk and tone    NEUROLOGICAL EXAMINATION:     Mental status:  Alert and Oriented x 3, speech is fluent.  Cranial nerves:  II-XII intact.   Motor:    Shoulder Abduction:  Right:  5/5   Left:  5/5  Biceps:                      Right:  5/5   Left:  5/5  Triceps:                     Right:  5/5   Left:  5/5  Wrist Extensors:       Right:  5/5   Left:  5/5  Wrist Flexors:           Right:  5/5   Left:  5/5  interosseus :            Right:  5/5   Left:  5/5  Hip Flexion:                Right: 5/5  Left:  5/5  Quadriceps:             Right:  5/5  Left:  5/5  Hamstrings:             Right:  5/5  Left:  5/5  Gastroc Soleus:        Right:  5/5  Left:  5/5  Tib/Ant:                      Right:  5/5  Left:  5/5  EHL:                     Right:  5/5  Left:  " 5/5  Sensation:  Intact  Reflexes:  Negative Babinski.  Negative Clonus.  Negative Saenz's.  Coordination:  Smooth finger to nose testing.   Negative pronator drift.      A/P:  61F w/ L3-5 severe stenosis, back pain and neurogenic claudications    I had a discussion with the patient, reviewing the history, symptoms, and imaging  Discussed role of her elevated BMI in symptoms and importance of weight loss  Extensive non-surgical management without improvement  Will plan for L3-5 laminectomies  Risks and benefits discussed

## 2021-07-01 NOTE — TELEPHONE ENCOUNTER
Let's do a telephone visit tomorrow and talk about this further.  I have an 11 o'clock opening and I'll get someone to put you on the schedule.    Electronically signed by Renetta Benitez MD on 7/1/2021

## 2021-07-04 DIAGNOSIS — Z11.59 ENCOUNTER FOR SCREENING FOR OTHER VIRAL DISEASES: ICD-10-CM

## 2021-07-12 PROBLEM — S76.011A STRAIN OF FLEXOR MUSCLE OF RIGHT HIP, INITIAL ENCOUNTER: Status: RESOLVED | Noted: 2021-03-01 | Resolved: 2021-07-12

## 2021-07-12 PROBLEM — M16.11 PRIMARY OSTEOARTHRITIS OF RIGHT HIP: Status: RESOLVED | Noted: 2021-02-11 | Resolved: 2021-07-12

## 2021-07-12 NOTE — PROGRESS NOTES
Please refer to the progress note completed on 4/21/21 for discharge information.    Spoke with patient via telephone call 7/12/21 and her R hip is doing well, but has a lot of LBP. Planning to have surgery for this, but will continue independently w/ hip HEP as tolerated. Will discharge from this episode of care.

## 2021-07-19 ENCOUNTER — MYC MEDICAL ADVICE (OUTPATIENT)
Dept: FAMILY MEDICINE | Facility: OTHER | Age: 61
End: 2021-07-19

## 2021-07-19 DIAGNOSIS — Z11.59 ENCOUNTER FOR SCREENING FOR OTHER VIRAL DISEASES: ICD-10-CM

## 2021-07-23 DIAGNOSIS — F33.1 MAJOR DEPRESSIVE DISORDER, RECURRENT EPISODE, MODERATE (H): ICD-10-CM

## 2021-07-23 DIAGNOSIS — F41.1 GAD (GENERALIZED ANXIETY DISORDER): ICD-10-CM

## 2021-07-23 RX ORDER — VENLAFAXINE HYDROCHLORIDE 75 MG/1
225 CAPSULE, EXTENDED RELEASE ORAL DAILY
Qty: 90 CAPSULE | Refills: 0 | Status: SHIPPED | OUTPATIENT
Start: 2021-07-23 | End: 2021-08-25

## 2021-07-23 NOTE — TELEPHONE ENCOUNTER
Date of Last Office Visit: 3/24/2021  Date of Next Office Visit: none (harish, please connect with patient and schedule a follow up appointment with provider)   No shows since last visit: none  Cancellations since last visit: none    Medication requested: Venlafaxine 75 capsule Date last ordered: 6/28/2021 Qty: 90 Refills: 0     Review of MN ?: n/a    Lapse in medication adherence greater than 5 days?: no  If yes, call patient and gather details: no  Medication refill request verified as identical to current order?: yes  Result of Last DAM, VPA, Li+ Level, CBC, or Carbamazepine Level (at or since last visit): N/A    []Medication refilled per  Medication Refill in Ambulatory Care  policy.  [x]Medication unable to be refilled by RN due to criteria not met as indicated below:    []Eligibility - not seen in the last year   []Supervision - no future appointment   []Compliance - no shows, cancellations or lapse in therapy   []Verification - order discrepancy   []Controlled medication   [x]Medication not included in policy   []90-day supply request   []Other

## 2021-08-03 NOTE — PROGRESS NOTES
[facility]  [address]  [place] [address]  Phone: [phone]  Primary Provider: Renetta Marin  Pre-op Performing Provider: RENETTA MARIN      PREOPERATIVE EVALUATION:  Today's date: 8/6/2021    Eva Solis is a 61 year old female who presents for a preoperative evaluation.    Surgical Information:  Surgery/Procedure: Lumbar 3 to lumbar 5 laminectomy    Surgery Location: [facility]  Surgeon: Ganesh Connors PA-C and Leonard Wallis MD  Surgery Date: 8/11/2021  Time of Surgery: 2:25 pm  Where patient plans to recover: At home with family  Fax number for surgical facility: Note does not need to be faxed, will be available electronically in Epic.    Type of Anesthesia Anticipated: General    Assessment & Plan     The proposed surgical procedure is considered INTERMEDIATE risk.    Preop general physical exam    Lumbar stenosis with neurogenic claudication    Morbid obesity, unspecified obesity type (H)    HTN, goal below 140/90  Well controlled     - CBC with Platelets    - Comprehensive metabolic panel (BMP + Alb, Alk Phos, ALT, AST, Total. Bili, TP)  - Lipid panel reflex to direct LDL Non-fasting  - EKG 12-lead complete w/read - Clinics    Chronic diastolic congestive heart failure (H)  Stable.    Major depressive disorder, recurrent episode, moderate (H)  Following with Psychiatry, reminded her that she is due for appointment, she feels her mood is affected by her pain and lack of activity from her back and is hopeful surgery will help.    Risks and Recommendations:  The patient has the following additional risks and recommendations for perioperative complications:   - Morbid obesity (BMI >40)    Medication Instructions:   - ibuprofen (Advil, Motrin): HOLD 1 day before surgery.     RECOMMENDATION:  APPROVAL GIVEN to proceed with proposed procedure, without further diagnostic evaluation.    9/10/2021 Addendum:  Surgery  was cancelled by anesthesia for formal cardiac clearance.  Patient saw Cardiology 9/2/21 after having stress test and cleared patient for surgery, there assessment is copied below.   Therefore, patient is approved for surgery.  Electronically signed by Renetta Benitez MD on 9/10/2021      Chronic diastolic HF, clinically euvolemic and well-perfused  Laminectomy is a non-emergent procedure that is intermediate-risk based on ACC/AHA guidelines. The patient has no active severe cardiac conditions. Functional capacity is <4 METS. RCRI=1, predicting 0.9% risk of perioperative cardiac complications.   While her stress SPECT is technically abnormal with a small fixed anterior defect, the perfusion abnormality is more significant with at rest than with stress and is located in typical site for breast attenuation artifact.  Furthermore, even in the event that this did represent a small fixed perfusion defect, the study shows no lesly-infarct ischemia, suggesting that there would be no benefit for revascularization and hence no role for revascularization and no change to management.  Furthermore, she has no anginal symptoms, and has been stable from the heart failure standpoint continuously since 2016.  Her blood pressure has been well controlled, except in the current setting when she has significant uncontrolled back pain.  Given her stable clinical status and likely artifactual stress testing results which even if abnormal would not indicate a change to management, she is of acceptable risk from a cardiac standpoint to proceed with the planned surgery without any further cardiovascular work-up at this time.  She should continue to follow with Dr. Benitez, who has provided excellent care. Cardiology follow up can be on an as-needed basis.      The patient states understanding and is agreeable with plan.      Kenneth Fields MD  Cardiology    Subjective     HPI related to upcoming procedure:     Preop Questions  8/3/2021   1. Have you ever had a heart attack or stroke? No   2. Have you ever had surgery on your heart or blood vessels, such as a stent placement, a coronary artery bypass, or surgery on an artery in your head, neck, heart, or legs? No   3. Do you have chest pain with activity? No   4. Do you have a history of  heart failure? No   5. Do you currently have a cold, bronchitis or symptoms of other infection? No   6. Do you have a cough, shortness of breath, or wheezing? No   7. Do you or anyone in your family have previous history of blood clots? No   8. Do you or does anyone in your family have a serious bleeding problem such as prolonged bleeding following surgeries or cuts? No   9. Have you ever had problems with anemia or been told to take iron pills? No   10. Have you had any abnormal blood loss such as black, tarry or bloody stools, or abnormal vaginal bleeding? No   11. Have you ever had a blood transfusion? No   12. Are you willing to have a blood transfusion if it is medically needed before, during, or after your surgery? Yes   13. Have you or any of your relatives ever had problems with anesthesia? No   14. Do you have sleep apnea, excessive snoring or daytime drowsiness? YES - snoring and drowsiness   14a. Do you have a CPAP machine? No   15. Do you have any artifical heart valves or other implanted medical devices like a pacemaker, defibrillator, or continuous glucose monitor? No   16. Do you have artificial joints? YES - bilateral kness   17. Are you allergic to latex? No   18. Is there any chance that you may be pregnant? No       Health Care Directive:  Patient does not have a Health Care Directive or Living Will: Discussed advance care planning with patient; information given to patient to review.    Preoperative Review of :   reviewed - no record of controlled substances prescribed.    Status of Chronic Conditions:  CHF - Patient has a longstanding history of moderate-severe CHF. Exacerbating  conditions include hypertension. Currently the patient's condition is same. Current treatment regimen includes Angiotensin 2 receptor blocker, diuretic and spirolactone. The patient denies chest pain, edema, orthopnea, SOB or recent weight gain.    DEPRESSION - Patient has a long history of Depression of moderate severity requiring medication for control with recent symptoms being unchanged.    HYPERTENSION - Patient has longstanding history of HTN , currently denies any symptoms referable to elevated blood pressure. Specifically denies chest pain, palpitations, dyspnea, orthopnea, PND or peripheral edema. Blood pressure readings have been in normal range. Current medication regimen is as listed below. Patient denies any side effects of medication.       Review of Systems  CONSTITUTIONAL: NEGATIVE for fever, chills, change in weight  INTEGUMENTARY/SKIN: NEGATIVE for worrisome rashes, moles or lesions  EYES: NEGATIVE for vision changes or irritation  ENT/MOUTH: NEGATIVE for ear, mouth and throat problems  RESP: NEGATIVE for significant cough or shortness of breath   CV: NEGATIVE for chest pain, palpitations. POSITIVE for chronic peripheral edema  GI: NEGATIVE for nausea, abdominal pain, heartburn, or change in bowel habits  : NEGATIVE for frequency, dysuria, or hematuria  MUSCULOSKELETAL: chronic back pain  NEURO: NEGATIVE for weakness, dizziness or paresthesias  ENDOCRINE: NEGATIVE for temperature intolerance, skin/hair changes  HEME: NEGATIVE for bleeding problems  PSYCHIATRIC: chronic depression    Patient Active Problem List    Diagnosis Date Noted     Lumbar stenosis with neurogenic claudication 07/01/2021     Priority: Medium     Added automatically from request for surgery [id number]       Chronic bilateral low back pain, unspecified whether sciatica present 04/16/2021     Priority: Medium     Major depressive disorder, recurrent episode, moderate (H) 05/08/2020     Priority: Medium     Primary osteoarthritis  of left hip 2020     Priority: Medium     Migraine without aura and without status migrainosus, not intractable 01/15/2016     Priority: Medium     Morbid obesity, unspecified obesity type (H) 2015     Priority: Medium     Decreased calculated GFR 2015     Priority: Medium     Diastolic CHF (H) 2014     Priority: Medium     HTN, goal below 140/90 2013     Priority: Medium     Slow transit constipation 2005     Priority: Medium      Past Medical History:   Diagnosis Date     Congestive heart failure (H) 3years     Depressive disorder 1-2 years    dealing with on a daily basis     DEPRESSIVE DISORDER NEC 2004     Diastolic dysfunction      Essential hypertension, benign      Generalized osteoarthrosis, unspecified site     knees     Headache(784.0)      Heart disease 3 years    CHF     Mixed anxiety depressive disorder 1/15/2016     PANIC DISORDER 2004     Past Surgical History:   Procedure Laterality Date     C  DELIVERY ONLY      , Low Cervical     C  DELIVERY ONLY       C VAGINAL HYSTERECTOMY      without oophorectomy     COLONOSCOPY  10/06/10    attempt at colonscopy to 50cm     INJECT EPIDURAL LUMBAR N/A 10/18/2019    Procedure: INJECTION, SPINE, LUMBAR 4 - LUMBAR 5, EPIDURAL TRANSLAMINAR;  Surgeon: Trever Wheatley MD;  Location: [facility]     INJECT EPIDURAL LUMBAR N/A 2020    Procedure: INJECTION, SPINE, LUMBAR 4-5, EPIDURAL TRANSLAMINAR;  Surgeon: Trever Wheatley MD;  Location: [facility]     INJECT EPIDURAL LUMBAR N/A 2021    Procedure: Lumbar 4-5 Epidural Injection;  Surgeon: Trever Wheatley MD;  Location: [facility]     JOINT REPLACEMTN, KNEE RT/LT  2006    Right total knee arthroplasty.     JOINT REPLACEMTN, KNEE RT/LT  2006    Left total knee arthroplasty.     [facility] COLONOSCOPY W/WO BRUSH/WASH  2005    Diverticulosis.  Internal hemorrhoids.     Current Outpatient Medications   Medication Sig Dispense Refill      "ALPRAZolam (XANAX) 0.25 MG tablet Take 1-2 tablets 30 minutes before flight 10 tablet 0     busPIRone (BUSPAR) 10 MG tablet TAKE ONE TABLET BY MOUTH THREE TIMES DAILY  270 tablet 0     furosemide (LASIX) 20 MG tablet Take 1 tablet (20 mg) by mouth 2 times daily 180 tablet 2     losartan (COZAAR) 100 MG tablet Take 1 tablet (100 mg) by mouth daily 90 tablet 3     MIRALAX PO POWD 17 GM DAILY 1 Bottle 11     OMEPRAZOLE PO Take 20 mg by mouth every morning        spironolactone (ALDACTONE) 25 MG tablet Take 1 tablet (25 mg) by mouth daily 90 tablet 3     topiramate (TOPAMAX) 50 MG tablet Take 1.5 tablets (75 mg) by mouth 2 times daily 270 tablet 3     venlafaxine (EFFEXOR-XR) 75 MG 24 hr capsule Take 3 capsules (225 mg) by mouth daily Need appt for refill 90 capsule 0       Allergies   Allergen Reactions     Lisinopril Cough        Social History     Tobacco Use     Smoking status: Never Smoker     Smokeless tobacco: Never Used     Tobacco comment: no smokers in household   Substance Use Topics     Alcohol use: No       History   Drug Use No         Objective     /78   Pulse 78   Temp 97.8  F (36.6  C) (Temporal)   Resp 22   Ht 1.67 m (5' 5.75\")   Wt (!) 158.8 kg (350 lb)   SpO2 98%   BMI 56.93 kg/m      Physical Exam    GENERAL APPEARANCE: healthy, alert and no distress     EYES: EOMI, PERRL     HENT: ear canals and TM's normal and nose and mouth without ulcers or lesions     NECK: no adenopathy, no asymmetry, masses, or scars and thyroid normal to palpation     RESP: lungs clear to auscultation - no rales, rhonchi or wheezes     CV: regular rates and rhythm, normal S1 S2, and no murmur, 1+ edema bilaterally     ABDOMEN:  soft, nontender, no masses and bowel sounds normal     MS: extremities normal- no gross deformities noted, no evidence of inflammation in joints, FROM in all extremities.     SKIN: no suspicious lesions or rashes     NEURO: Normal strength and tone, sensory exam grossly normal, mentation " intact and speech normal     PSYCH: mentation appears normal. and affect flat     LYMPHATICS: No cervical adenopathy    Recent Labs   Lab Test 04/12/21  0914 09/02/20  0824 10/16/19  1043   HGB  --  14.1 13.8   PLT  --  243 254    142 141   POTASSIUM 3.7 4.0 4.1   CR 1.11* 1.15* 1.01      Diagnostics:  Labs pending at this time.  Results will be reviewed when available.   EKG required for CHF and not completed in the last 90 days.   EKG normal sinus rhythm     Revised Cardiac Risk Index (RCRI):  The patient has the following serious cardiovascular risks for perioperative complications:   - No serious cardiac risks = 0 points     RCRI Interpretation: 0 points: Class I (very low risk - 0.4% complication rate)           Signed Electronically by: Renetta Benitez MD  Copy of this evaluation report is provided to requesting physician.

## 2021-08-03 NOTE — PATIENT INSTRUCTIONS

## 2021-08-06 ENCOUNTER — OFFICE VISIT (OUTPATIENT)
Dept: FAMILY MEDICINE | Facility: OTHER | Age: 61
End: 2021-08-06
Payer: COMMERCIAL

## 2021-08-06 VITALS
WEIGHT: 293 LBS | SYSTOLIC BLOOD PRESSURE: 138 MMHG | DIASTOLIC BLOOD PRESSURE: 78 MMHG | BODY MASS INDEX: 47.09 KG/M2 | RESPIRATION RATE: 22 BRPM | OXYGEN SATURATION: 98 % | TEMPERATURE: 97.8 F | HEIGHT: 66 IN | HEART RATE: 78 BPM

## 2021-08-06 DIAGNOSIS — M48.062 LUMBAR STENOSIS WITH NEUROGENIC CLAUDICATION: ICD-10-CM

## 2021-08-06 DIAGNOSIS — I50.32 CHRONIC DIASTOLIC CONGESTIVE HEART FAILURE (H): ICD-10-CM

## 2021-08-06 DIAGNOSIS — F33.1 MAJOR DEPRESSIVE DISORDER, RECURRENT EPISODE, MODERATE (H): ICD-10-CM

## 2021-08-06 DIAGNOSIS — E66.01 MORBID OBESITY, UNSPECIFIED OBESITY TYPE (H): ICD-10-CM

## 2021-08-06 DIAGNOSIS — Z01.818 PREOP GENERAL PHYSICAL EXAM: Primary | ICD-10-CM

## 2021-08-06 DIAGNOSIS — I10 HTN, GOAL BELOW 140/90: ICD-10-CM

## 2021-08-06 LAB
ALBUMIN SERPL-MCNC: 3.7 G/DL (ref 3.4–5)
ALP SERPL-CCNC: 85 U/L (ref 40–150)
ALT SERPL W P-5'-P-CCNC: 27 U/L (ref 0–50)
ANION GAP SERPL CALCULATED.3IONS-SCNC: 7 MMOL/L (ref 3–14)
AST SERPL W P-5'-P-CCNC: 15 U/L (ref 0–45)
BILIRUB SERPL-MCNC: 0.4 MG/DL (ref 0.2–1.3)
BUN SERPL-MCNC: 13 MG/DL (ref 7–30)
CALCIUM SERPL-MCNC: 8.5 MG/DL (ref 8.5–10.1)
CHLORIDE BLD-SCNC: 111 MMOL/L (ref 94–109)
CHOLEST SERPL-MCNC: 226 MG/DL
CO2 SERPL-SCNC: 23 MMOL/L (ref 20–32)
CREAT SERPL-MCNC: 0.96 MG/DL (ref 0.52–1.04)
FASTING STATUS PATIENT QL REPORTED: NO
GFR SERPL CREATININE-BSD FRML MDRD: 64 ML/MIN/1.73M2
GLUCOSE BLD-MCNC: 98 MG/DL (ref 70–99)
HDLC SERPL-MCNC: 36 MG/DL
LDLC SERPL CALC-MCNC: 144 MG/DL
NONHDLC SERPL-MCNC: 190 MG/DL
POTASSIUM BLD-SCNC: 3.8 MMOL/L (ref 3.4–5.3)
PROT SERPL-MCNC: 7.8 G/DL (ref 6.8–8.8)
SODIUM SERPL-SCNC: 141 MMOL/L (ref 133–144)
TRIGL SERPL-MCNC: 231 MG/DL

## 2021-08-06 PROCEDURE — 85027 COMPLETE CBC AUTOMATED: CPT | Performed by: FAMILY MEDICINE

## 2021-08-06 PROCEDURE — 99214 OFFICE O/P EST MOD 30 MIN: CPT | Performed by: FAMILY MEDICINE

## 2021-08-06 PROCEDURE — 93000 ELECTROCARDIOGRAM COMPLETE: CPT | Performed by: FAMILY MEDICINE

## 2021-08-06 PROCEDURE — 80053 COMPREHEN METABOLIC PANEL: CPT | Performed by: FAMILY MEDICINE

## 2021-08-06 PROCEDURE — 36415 COLL VENOUS BLD VENIPUNCTURE: CPT | Performed by: FAMILY MEDICINE

## 2021-08-06 PROCEDURE — 80061 LIPID PANEL: CPT | Performed by: FAMILY MEDICINE

## 2021-08-06 ASSESSMENT — PAIN SCALES - GENERAL: PAINLEVEL: SEVERE PAIN (7)

## 2021-08-06 ASSESSMENT — MIFFLIN-ST. JEOR: SCORE: 2170.34

## 2021-08-09 ENCOUNTER — LAB (OUTPATIENT)
Dept: LAB | Facility: CLINIC | Age: 61
End: 2021-08-09
Attending: NEUROLOGICAL SURGERY
Payer: COMMERCIAL

## 2021-08-09 DIAGNOSIS — Z11.59 ENCOUNTER FOR SCREENING FOR OTHER VIRAL DISEASES: ICD-10-CM

## 2021-08-09 LAB
ERYTHROCYTE [DISTWIDTH] IN BLOOD BY AUTOMATED COUNT: 13.8 % (ref 10–15)
HCT VFR BLD AUTO: 42 % (ref 35–47)
HGB BLD-MCNC: 14.3 G/DL (ref 11.7–15.7)
MCH RBC QN AUTO: 29.7 PG (ref 26.5–33)
MCHC RBC AUTO-ENTMCNC: 34 G/DL (ref 31.5–36.5)
MCV RBC AUTO: 87 FL (ref 78–100)
PLATELET # BLD AUTO: 254 10E3/UL (ref 150–450)
RBC # BLD AUTO: 4.81 10E6/UL (ref 3.8–5.2)
SARS-COV-2 RNA RESP QL NAA+PROBE: NEGATIVE
WBC # BLD AUTO: 7.4 10E3/UL (ref 4–11)

## 2021-08-09 PROCEDURE — U0003 INFECTIOUS AGENT DETECTION BY NUCLEIC ACID (DNA OR RNA); SEVERE ACUTE RESPIRATORY SYNDROME CORONAVIRUS 2 (SARS-COV-2) (CORONAVIRUS DISEASE [COVID-19]), AMPLIFIED PROBE TECHNIQUE, MAKING USE OF HIGH THROUGHPUT TECHNOLOGIES AS DESCRIBED BY CMS-2020-01-R: HCPCS

## 2021-08-09 PROCEDURE — U0005 INFEC AGEN DETEC AMPLI PROBE: HCPCS

## 2021-08-10 ENCOUNTER — ANESTHESIA EVENT (OUTPATIENT)
Dept: SURGERY | Facility: CLINIC | Age: 61
End: 2021-08-10
Payer: COMMERCIAL

## 2021-08-10 RX ORDER — ONDANSETRON 2 MG/ML
4 INJECTION INTRAMUSCULAR; INTRAVENOUS EVERY 30 MIN PRN
Status: CANCELLED | OUTPATIENT
Start: 2021-08-10

## 2021-08-10 RX ORDER — OXYCODONE HYDROCHLORIDE 5 MG/1
5 TABLET ORAL EVERY 4 HOURS PRN
Status: CANCELLED | OUTPATIENT
Start: 2021-08-10

## 2021-08-10 RX ORDER — ONDANSETRON 4 MG/1
4 TABLET, ORALLY DISINTEGRATING ORAL EVERY 30 MIN PRN
Status: CANCELLED | OUTPATIENT
Start: 2021-08-10

## 2021-08-10 RX ORDER — HYDROXYZINE HYDROCHLORIDE 25 MG/1
25 TABLET, FILM COATED ORAL EVERY 6 HOURS PRN
Status: CANCELLED | OUTPATIENT
Start: 2021-08-10

## 2021-08-10 RX ORDER — LABETALOL HYDROCHLORIDE 5 MG/ML
10 INJECTION, SOLUTION INTRAVENOUS
Status: CANCELLED | OUTPATIENT
Start: 2021-08-10

## 2021-08-10 RX ORDER — MEPERIDINE HYDROCHLORIDE 25 MG/ML
12.5 INJECTION INTRAMUSCULAR; INTRAVENOUS; SUBCUTANEOUS EVERY 5 MIN PRN
Status: CANCELLED | OUTPATIENT
Start: 2021-08-10

## 2021-08-10 RX ORDER — FENTANYL CITRATE 50 UG/ML
50 INJECTION, SOLUTION INTRAMUSCULAR; INTRAVENOUS EVERY 5 MIN PRN
Status: CANCELLED | OUTPATIENT
Start: 2021-08-10

## 2021-08-10 RX ORDER — SODIUM CHLORIDE, SODIUM LACTATE, POTASSIUM CHLORIDE, CALCIUM CHLORIDE 600; 310; 30; 20 MG/100ML; MG/100ML; MG/100ML; MG/100ML
INJECTION, SOLUTION INTRAVENOUS CONTINUOUS
Status: CANCELLED | OUTPATIENT
Start: 2021-08-10

## 2021-08-10 RX ORDER — HYDROMORPHONE HYDROCHLORIDE 1 MG/ML
0.4 INJECTION, SOLUTION INTRAMUSCULAR; INTRAVENOUS; SUBCUTANEOUS EVERY 5 MIN PRN
Status: CANCELLED | OUTPATIENT
Start: 2021-08-10 | End: 2021-08-11

## 2021-08-11 ENCOUNTER — HOSPITAL ENCOUNTER (OUTPATIENT)
Dept: NUCLEAR MEDICINE | Facility: CLINIC | Age: 61
Setting detail: NUCLEAR MEDICINE
End: 2021-08-11
Attending: ANESTHESIOLOGY
Payer: COMMERCIAL

## 2021-08-11 ENCOUNTER — ANESTHESIA (OUTPATIENT)
Dept: SURGERY | Facility: CLINIC | Age: 61
End: 2021-08-11
Payer: COMMERCIAL

## 2021-08-11 ENCOUNTER — HOSPITAL ENCOUNTER (OUTPATIENT)
Facility: CLINIC | Age: 61
Discharge: HOME | End: 2021-08-11
Attending: NEUROLOGICAL SURGERY | Admitting: NEUROLOGICAL SURGERY
Payer: COMMERCIAL

## 2021-08-11 VITALS
TEMPERATURE: 97.8 F | HEIGHT: 65 IN | DIASTOLIC BLOOD PRESSURE: 87 MMHG | WEIGHT: 293 LBS | OXYGEN SATURATION: 95 % | BODY MASS INDEX: 48.82 KG/M2 | HEART RATE: 71 BPM | SYSTOLIC BLOOD PRESSURE: 152 MMHG

## 2021-08-11 PROCEDURE — 93018 CV STRESS TEST I&R ONLY: CPT | Performed by: INTERNAL MEDICINE

## 2021-08-11 PROCEDURE — A9502 TC99M TETROFOSMIN: HCPCS | Performed by: ANESTHESIOLOGY

## 2021-08-11 PROCEDURE — 78452 HT MUSCLE IMAGE SPECT MULT: CPT | Mod: 26 | Performed by: INTERNAL MEDICINE

## 2021-08-11 PROCEDURE — 343N000001 HC RX 343: Performed by: ANESTHESIOLOGY

## 2021-08-11 PROCEDURE — 78452 HT MUSCLE IMAGE SPECT MULT: CPT

## 2021-08-11 RX ORDER — CEFAZOLIN SODIUM IN 0.9 % NACL 3 G/100 ML
3 INTRAVENOUS SOLUTION, PIGGYBACK (ML) INTRAVENOUS
Status: DISCONTINUED | OUTPATIENT
Start: 2021-08-11 | End: 2021-08-11 | Stop reason: HOSPADM

## 2021-08-11 RX ORDER — CEFAZOLIN SODIUM IN 0.9 % NACL 3 G/100 ML
3 INTRAVENOUS SOLUTION, PIGGYBACK (ML) INTRAVENOUS SEE ADMIN INSTRUCTIONS
Status: DISCONTINUED | OUTPATIENT
Start: 2021-08-11 | End: 2021-08-11 | Stop reason: HOSPADM

## 2021-08-11 RX ORDER — GABAPENTIN 300 MG/1
300 CAPSULE ORAL
Status: DISCONTINUED | OUTPATIENT
Start: 2021-08-11 | End: 2021-08-11 | Stop reason: HOSPADM

## 2021-08-11 RX ADMIN — Medication 31.4 MILLICURIE: at 12:25

## 2021-08-11 ASSESSMENT — MIFFLIN-ST. JEOR: SCORE: 2148.94

## 2021-08-11 NOTE — ANESTHESIA PREPROCEDURE EVALUATION
Anesthesia Pre-Procedure Evaluation    Patient: Eva Solis   MRN: [id number] : 1960        Preoperative Diagnosis: Lumbar stenosis with neurogenic claudication [M48.062]   Procedure : Procedure(s):  Lumbar 3 to lumbar 5 laminectomy       Past Medical History:   Diagnosis Date     Congestive heart failure (H) 3years     Depressive disorder 1-2 years    dealing with on a daily basis     DEPRESSIVE DISORDER NEC 2004     Diastolic dysfunction      Essential hypertension, benign      Generalized osteoarthrosis, unspecified site     knees     Headache(784.0)      Heart disease 3 years    CHF     Mixed anxiety depressive disorder 1/15/2016     PANIC DISORDER 2004      Past Surgical History:   Procedure Laterality Date     C  DELIVERY ONLY      , Low Cervical     C  DELIVERY ONLY       C VAGINAL HYSTERECTOMY      without oophorectomy     COLONOSCOPY  10/06/10    attempt at colonscopy to 50cm     INJECT EPIDURAL LUMBAR N/A 10/18/2019    Procedure: INJECTION, SPINE, LUMBAR 4 - LUMBAR 5, EPIDURAL TRANSLAMINAR;  Surgeon: Trever Wheatley MD;  [facility]     INJECT EPIDURAL LUMBAR N/A 2020    Procedure: INJECTION, SPINE, LUMBAR 4-5, EPIDURAL TRANSLAMINAR;  Surgeon: Trever Wheatley MD;  [facility]     INJECT EPIDURAL LUMBAR N/A 2021    Procedure: Lumbar 4-5 Epidural Injection;  Surgeon: Trever Wheatley MD;  [facility]     JOINT REPLACEMTN, KNEE RT/LT  2006    Right total knee arthroplasty.     JOINT REPLACEMTN, KNEE RT/LT  2006    Left total knee arthroplasty.     [facility] COLONOSCOPY W/WO BRUSH/WASH  2005    Diverticulosis.  Internal hemorrhoids.      Allergies   Allergen Reactions     Lisinopril Cough      Social History     Tobacco Use     Smoking status: Never Smoker     Smokeless tobacco: Never Used     Tobacco comment: no smokers in household   Substance Use Topics     Alcohol use: No      Wt Readings from Last 1 Encounters:    08/06/21 (!) 158.8 kg (350 lb)      Echo 2017  7 yr ago    XCELERA     Interpretation Summary   1.Left ventricular systolic function is normal. There is mild concentric left    ventricular hypertrophy. The visual ejection fraction is estimated at 60-65%.    Grade I left ventricular diastolic dysfunction is noted. 2. The right    ventricle is normal size. The right ventricular systolic function is normal.    3. No significant valvular abnormalities 4. Normal CVP. PA pressure cannot be    estimated.  No prior echo for comparision     EKG - SR, nl axis   Anesthesia Evaluation            ROS/MED HX  ENT/Pulmonary:       Neurologic:     (+) migraines,     Cardiovascular: Comment: Peripheral edema    (+) hypertension-----CHF etiology: diastolic dysfuntion     METS/Exercise Tolerance:     Hematologic:       Musculoskeletal: Comment: Lumbar spinal stenosis with neurogenic claudication  (+) arthritis,     GI/Hepatic: Comment: Small bowel obstruction      Renal/Genitourinary:       Endo:     (+) Obesity,     Psychiatric/Substance Use: Comment: Panic disorder    (+) psychiatric history depression     Infectious Disease:       Malignancy:       Other:               OUTSIDE LABS:  CBC:   Lab Results   Component Value Date    WBC 7.4 08/06/2021    WBC 6.6 09/02/2020    HGB 14.3 08/06/2021    HGB 14.1 09/02/2020    HCT 42.0 08/06/2021    HCT 42.4 09/02/2020     08/06/2021     09/02/2020     BMP:   Lab Results   Component Value Date     08/06/2021     04/12/2021    POTASSIUM 3.8 08/06/2021    POTASSIUM 3.7 04/12/2021    CHLORIDE 111 (H) 08/06/2021    CHLORIDE 108 04/12/2021    CO2 23 08/06/2021    CO2 23 04/12/2021    BUN 13 08/06/2021    BUN 15 04/12/2021    CR 0.96 08/06/2021    CR 1.11 (H) 04/12/2021    GLC 98 08/06/2021    GLC 96 04/12/2021     COAGS:   Lab Results   Component Value Date    INR 0.98 09/25/2013     POC: No results found for: BGM, HCG, HCGS  HEPATIC:   Lab Results   Component Value  Date    ALBUMIN 3.7 08/06/2021    PROTTOTAL 7.8 08/06/2021    ALT 27 08/06/2021    AST 15 08/06/2021    ALKPHOS 85 08/06/2021    BILITOTAL 0.4 08/06/2021     OTHER:   Lab Results   Component Value Date    DEVANTE 8.5 08/06/2021    PHOS 2.9 05/18/2015    MAG 1.7 02/06/2015    TSH 1.25 08/27/2019               Reed Corbin MD

## 2021-08-11 NOTE — OR NURSING
Surgery cancelled by [facility] due to patient history of CHF and need to have a stress test prior to surgery for cardiac clearence.  Surgeon Bimal was informed as well as control desk.  Nuclear medicine was called and was able to accommodate patient this afternoon.

## 2021-08-12 ENCOUNTER — HOSPITAL ENCOUNTER (OUTPATIENT)
Facility: CLINIC | Age: 61
Discharge: HOME | End: 2021-08-12
Attending: ANESTHESIOLOGY | Admitting: ANESTHESIOLOGY
Payer: COMMERCIAL

## 2021-08-12 ENCOUNTER — APPOINTMENT (OUTPATIENT)
Dept: NUCLEAR MEDICINE | Facility: CLINIC | Age: 61
End: 2021-08-12
Attending: ANESTHESIOLOGY
Payer: COMMERCIAL

## 2021-08-12 ENCOUNTER — APPOINTMENT (OUTPATIENT)
Dept: CARDIOLOGY | Facility: CLINIC | Age: 61
End: 2021-08-12
Attending: ANESTHESIOLOGY
Payer: COMMERCIAL

## 2021-08-12 ENCOUNTER — MYC MEDICAL ADVICE (OUTPATIENT)
Dept: NEUROSURGERY | Facility: CLINIC | Age: 61
End: 2021-08-12

## 2021-08-12 VITALS — DIASTOLIC BLOOD PRESSURE: 92 MMHG | OXYGEN SATURATION: 94 % | HEART RATE: 61 BPM | SYSTOLIC BLOOD PRESSURE: 162 MMHG

## 2021-08-12 LAB
CV STRESS MAX HR HE: 73
NUC STRESS EJECTION FRACTION: 79 %
RATE PRESSURE PRODUCT: NORMAL
STRESS ECHO BASELINE DIASTOLIC HE: 92
STRESS ECHO BASELINE HR: 63
STRESS ECHO BASELINE SYSTOLIC BP: 168
STRESS ECHO CALCULATED PERCENT HR: 46 %
STRESS ECHO LAST STRESS DIASTOLIC BP: 92
STRESS ECHO LAST STRESS SYSTOLIC BP: 152
STRESS ECHO TARGET HR: 160

## 2021-08-12 PROCEDURE — 250N000011 HC RX IP 250 OP 636: Performed by: ANESTHESIOLOGY

## 2021-08-12 PROCEDURE — 93017 CV STRESS TEST TRACING ONLY: CPT | Performed by: REHABILITATION PRACTITIONER

## 2021-08-12 PROCEDURE — 93016 CV STRESS TEST SUPVJ ONLY: CPT | Performed by: INTERNAL MEDICINE

## 2021-08-12 PROCEDURE — 999N000049 HC STATISTIC ECHO STRESS OR NM NPI

## 2021-08-12 PROCEDURE — A9502 TC99M TETROFOSMIN: HCPCS | Performed by: ANESTHESIOLOGY

## 2021-08-12 PROCEDURE — 343N000001 HC RX 343: Performed by: ANESTHESIOLOGY

## 2021-08-12 RX ORDER — ACYCLOVIR 200 MG/1
0-1 CAPSULE ORAL
Status: DISCONTINUED | OUTPATIENT
Start: 2021-08-12 | End: 2021-08-12 | Stop reason: HOSPADM

## 2021-08-12 RX ORDER — CAFFEINE CITRATE 20 MG/ML
60 SOLUTION INTRAVENOUS
Status: DISCONTINUED | OUTPATIENT
Start: 2021-08-12 | End: 2021-08-12 | Stop reason: HOSPADM

## 2021-08-12 RX ORDER — AMINOPHYLLINE 25 MG/ML
50-100 INJECTION, SOLUTION INTRAVENOUS
Status: DISCONTINUED | OUTPATIENT
Start: 2021-08-12 | End: 2021-08-12 | Stop reason: HOSPADM

## 2021-08-12 RX ORDER — REGADENOSON 0.08 MG/ML
0.4 INJECTION, SOLUTION INTRAVENOUS ONCE
Status: COMPLETED | OUTPATIENT
Start: 2021-08-12 | End: 2021-08-12

## 2021-08-12 RX ORDER — ALBUTEROL SULFATE 90 UG/1
2 AEROSOL, METERED RESPIRATORY (INHALATION) EVERY 5 MIN PRN
Status: DISCONTINUED | OUTPATIENT
Start: 2021-08-12 | End: 2021-08-12 | Stop reason: HOSPADM

## 2021-08-12 RX ADMIN — REGADENOSON 0.4 MG: 0.08 INJECTION, SOLUTION INTRAVENOUS at 10:44

## 2021-08-12 RX ADMIN — Medication 30 MILLICURIE: at 11:16

## 2021-08-12 NOTE — PROGRESS NOTES
PATIENT/VISITOR WELLNESS SCREENING    Step 1 Patient Screening    1. In the last month, have you been in contact with someone who was confirmed or suspected to have Coronavirus/COVID-19? No    2. Do you have the following symptoms?  Fever/Chills? No   Cough? No   Shortness of breath? No   New loss of taste or smell? No  Sore throat? No  Muscle or body aches? No  Headaches? No  Fatigue? No  Vomiting or diarrhea? No      NO SYMPTOM(S)    ACTIONS:  1. Standard rooming process  2. Provider to assess per normal protocol  3. Implement precautions as needed and per guidelines     POSITIVE SYMPTOM(S)  If positive for ANY of the following symptoms: fever, cough, shortness of breath, rash    ACTION:  1. Continue to have the patient wear a mask   2. Room patient as soon as possible  3. Don appropriate PPE when entering room  4. Provider evaluation  Care Suites Admission Nursing Note    Patient Information  Name: Eva Solis  Age: 61 year old  Reason for admission: Cardiac stress test  Care Suites arrival time: NA    Patient Admission/Assessment   Pre-procedure assessment complete: Yes  If abnormal assessment/labs, provider notified: N/A  NPO: Yes  Medications held per instructions/orders: Yes  Consent: deferred  If applicable, pregnancy test status: deferred  Patient oriented to room: N/A  Education/questions answered: Yes  Plan/other: Discussed test and potential side effects, pt understands    Discharge Planning  Discharge name/phone number: NA  Overnight post sedation caregiver: Shun ()  Discharge location: home    Eva Calixto RN   Care Suites Post Procedure Note    Patient Information  Name: Eva Solis  Age: 61 year old    Post Procedure    Concerns/abnormal assessment: nne  If abnormal assessment, provider notified: N/A  Plan/Other: Pt tolerated Lexiscan with minimal SOB, and mild HA that resolved within the test time frame. VS returned to pre test levels. Denies any CP.    Eva Calixto RN      Care Suites Discharge Nursing Note    Patient Information  Name: Eva Solis  Age: 61 year old    Discharge Education:  Discharge instructions reviewed: N/A  Additional education/resources provided: NA  Patient/patient representative verbalizes understanding: N/A  Patient discharging on new medications: No  Medication education completed: N/A    Discharge Plans:   Discharge location: Pt instructed to return to [facility] within 1 hour of completion of lexiscan for repeat pictures  Discharge ride contacted: No, explain Spouse is here with pt.  Approximate discharge time: 1055    Discharge Criteria:  Discharge criteria met and vital signs stable: Yes    Patient Belongs:  Patient belongings returned to patient: Yes    Eva Calixto RN

## 2021-08-13 ENCOUNTER — MYC MEDICAL ADVICE (OUTPATIENT)
Dept: FAMILY MEDICINE | Facility: OTHER | Age: 61
End: 2021-08-13

## 2021-08-13 DIAGNOSIS — I50.32 CHRONIC DIASTOLIC CONGESTIVE HEART FAILURE (H): Primary | ICD-10-CM

## 2021-08-13 NOTE — TELEPHONE ENCOUNTER
Patient surgery was declined as they wanted her to have cardiac clearance, please help her get scheduled with Cardiology ASAP so she can re-schedule her surgery.

## 2021-08-13 NOTE — TELEPHONE ENCOUNTER
Talked to patient about recent cancellation of surgery and the need for cardiac clearance prior to surgery. She verbalized understanding and states she will reach out to her PCP per 's recommendation to determine if further work-up is needed.

## 2021-08-16 ENCOUNTER — MYC MEDICAL ADVICE (OUTPATIENT)
Dept: NEUROSURGERY | Facility: CLINIC | Age: 61
End: 2021-08-16

## 2021-08-16 ENCOUNTER — MYC MEDICAL ADVICE (OUTPATIENT)
Dept: FAMILY MEDICINE | Facility: OTHER | Age: 61
End: 2021-08-16

## 2021-08-16 ENCOUNTER — TELEPHONE (OUTPATIENT)
Dept: CARDIOLOGY | Facility: CLINIC | Age: 61
End: 2021-08-16

## 2021-08-16 NOTE — TELEPHONE ENCOUNTER
[facility] Call Center    Phone Message    May a detailed message be left on voicemail: yes     Reason for Call: The patient stated she had surgeries that were scheduled at [facility] that were cancelled due to heart issues.  She stated she has since done a 4 day monitor and is requesting a call to discuss Cardiac Clearance to move forward with her surgeries again.  Please advise. Thank you.     Action Taken: Message routed to:  Adult Clinics: Cardiology p [id number]    Travel Screening: Not Applicable

## 2021-08-16 NOTE — TELEPHONE ENCOUNTER
Patient has not been seen by Dr. Leger since 2016. Patient scheduled to see Dr. Fields for cardiac clearance on 9/2/21. Gave patient the option to call the [facility] for a sooner appointment but patient declined.     Christine Moise RN   Cardiology Care Coordinator  [facility]   Phone: [phone]  Fax: [phone]

## 2021-08-17 NOTE — TELEPHONE ENCOUNTER
Patient has appointment at [facility] on 9/2 and is on the wait list at [facility].  Can someone see if [place] or [facility] can get her in earlier than that?  This is for pre-op clearance in a patient with diastolic CHF.

## 2021-08-17 NOTE — TELEPHONE ENCOUNTER
Called the patient re the [facility] message.   The cardiology coordinator had reached out to the patient yesterday.  An appointment is made for 9/2/21 with Dr Fields.   The patient would like the stress test read sooner.     Call to Cardiac Care.  [phone]

## 2021-08-17 NOTE — TELEPHONE ENCOUNTER
A [facility] RN called stating the patient is very anxious to get stress test results so she can get cardiac clearance for surgery. RN had relayed below notes to patient. Writer also placed patient on wait list for possible sooner appointment. Thank you.

## 2021-08-27 ENCOUNTER — VIRTUAL VISIT (OUTPATIENT)
Dept: PSYCHOLOGY | Facility: CLINIC | Age: 61
End: 2021-08-27
Payer: COMMERCIAL

## 2021-08-27 ENCOUNTER — VIRTUAL VISIT (OUTPATIENT)
Dept: PSYCHIATRY | Facility: CLINIC | Age: 61
End: 2021-08-27
Payer: COMMERCIAL

## 2021-08-27 DIAGNOSIS — F33.1 MAJOR DEPRESSIVE DISORDER, RECURRENT EPISODE, MODERATE (H): Primary | ICD-10-CM

## 2021-08-27 DIAGNOSIS — F41.8 MIXED ANXIETY DEPRESSIVE DISORDER: ICD-10-CM

## 2021-08-27 DIAGNOSIS — F41.1 GAD (GENERALIZED ANXIETY DISORDER): ICD-10-CM

## 2021-08-27 PROCEDURE — 90832 PSYTX W PT 30 MINUTES: CPT | Mod: 95 | Performed by: PSYCHOLOGIST

## 2021-08-27 PROCEDURE — 99213 OFFICE O/P EST LOW 20 MIN: CPT | Mod: 95 | Performed by: PSYCHIATRY & NEUROLOGY

## 2021-08-27 RX ORDER — VENLAFAXINE HYDROCHLORIDE 75 MG/1
225 CAPSULE, EXTENDED RELEASE ORAL DAILY
Qty: 270 CAPSULE | Refills: 0 | Status: SHIPPED | OUTPATIENT
Start: 2021-08-27 | End: 2021-09-16

## 2021-08-27 RX ORDER — BUSPIRONE HYDROCHLORIDE 10 MG/1
10 TABLET ORAL 3 TIMES DAILY
Qty: 270 TABLET | Refills: 0 | Status: SHIPPED | OUTPATIENT
Start: 2021-08-27 | End: 2021-12-07

## 2021-08-27 NOTE — PROGRESS NOTES
"Eva Solis is a 61 year old who is being evaluated via a billable telephone visit.      What phone number would you like to be contacted at? See Epic     How would you like to obtain your AVS? [facility]        Outpatient Psychiatric Progress Note    Name: Eva Solis   : 1960                    Primary Care Provider: Renetta Benitez MD   Therapist: Not seeing anyone currently     PHQ-9 scores:  PHQ-9 SCORE 10/20/2020 2021 3/24/2021   PHQ-9 Total Score - - -   PHQ-9 Total Score MyChart - 21 (Severe depression) -   PHQ-9 Total Score 17 21 19       LORETA-7 scores:  LORETA-7 SCORE 10/20/2020 2021 3/24/2021   Total Score - 17 (severe anxiety) -   Total Score 17 17 15       Patient Identification:  Patient is a 61 year old,   White Other female  who presents for return visit with me.  Patient is currently employed. Patient attended the phone/video session alone. Patient prefers to be called: \"Eva\".    Interim History:  I last saw Eva Solis for outpatient psychiatry return visit on 3/24/2021. During that appointment, we:     Continue BuSpar 10 mg 3 times daily for anxiety    Increase Effexor-XR 2 225 mg daily for mood, anxiety, pain    : Pt reports that she has continued to have some major stressors. Sister still struggling with cancer diagnosis and that all affects the pt greatly. Pt also with recent cancelled surgery. She had been all prepped for the surgery and anesthesiologist called it off due to cardiac concerns.  Patient is now following up with cardiology next week in hopes of being able to get cleared for her surgery.  She has been in some significant pain and has been hopeful that the surgery will improve her struggles.  Hard for her to gauge mood and anxiety levels due to ongoing struggles.  No safety concerns.  No SI.  She is tolerating the medications well.  No problematic drug or alcohol use.    Per [facility], Dr. Reinaldo Bowden, during today's team-based visit:  Reported that " she continues to have significant challenges related to her sister's cancer problems. The patient also was within 30 minutes of having a surgery on her back when the anesthesiologist cancelled it because of potential heart problems. Despite this, she feels her medications are fine and she does not want to make any changes at this time.     Past medication trials include but are not limited to:   Amitriptyline  Xanax  lexapro  ambien  wellbutrin XL  buspar  Valium  abilify    Psychiatric ROS:  vEa Solis reports mood has been: Up and down due to stressors  Anxiety has been: Up-and-down due to stressors  Sleep has been: Often disrupted by pain issues  Laney sxs: None  Psychosis sxs: None  ADHD/ADD sxs: None  PTSD sxs: No sxs but does have hx of trauma  PHQ9 and GAD7 scores were reviewed today if completed.   Medication side effects: Denies, tolerating well  Current stressors include: Symptoms, see HPI above  Coping mechanisms and supports include: Family, Hobbies and Friends    Current medications include:   Current Outpatient Medications   Medication Sig     ALPRAZolam (XANAX) 0.25 MG tablet Take 1-2 tablets 30 minutes before flight     busPIRone (BUSPAR) 10 MG tablet Take 1 tablet (10 mg) by mouth 3 times daily     furosemide (LASIX) 20 MG tablet Take 1 tablet (20 mg) by mouth 2 times daily     losartan (COZAAR) 100 MG tablet Take 1 tablet (100 mg) by mouth daily     MIRALAX PO POWD 17 GM DAILY     OMEPRAZOLE PO Take 20 mg by mouth every morning      spironolactone (ALDACTONE) 25 MG tablet Take 1 tablet (25 mg) by mouth daily     topiramate (TOPAMAX) 50 MG tablet Take 1.5 tablets (75 mg) by mouth 2 times daily     venlafaxine (EFFEXOR-XR) 75 MG 24 hr capsule Take 3 capsules (225 mg) by mouth daily     No current facility-administered medications for this visit.     [place]  checked:  04/16/2021 1 04/16/2021 04/19/2021 Alprazolam 0.25 Mg Tablet  10.00 10 To Chr [id number] Sup (0211) 0/0 0.50 LME Comm  "[place]  02/09/2021 1 02/09/2021 02/09/2021 Hydrocodone-Acetamin 5-325 Mg  10.00 3 To Chr [id number] Sup (0211) 0/0 16.67 [place]      Past Medical/Surgical History:  Past Medical History:   Diagnosis Date     Congestive heart failure (H) 3years     Depressive disorder 1-2 years    dealing with on a daily basis     DEPRESSIVE DISORDER NEC 5/4/2004     Diastolic dysfunction      Essential hypertension, benign      Generalized osteoarthrosis, unspecified site     knees     Headache(784.0)      Heart disease 3 years    CHF     Mixed anxiety depressive disorder 1/15/2016     PANIC DISORDER 5/4/2004      has a past medical history of Congestive heart failure (H) (3years), Depressive disorder (1-2 years), DEPRESSIVE DISORDER NEC (5/4/2004), Diastolic dysfunction, Essential hypertension, benign, Generalized osteoarthrosis, unspecified site, Headache(784.0), Heart disease (3 years), Mixed anxiety depressive disorder (1/15/2016), and PANIC DISORDER (5/4/2004). She also has no past medical history of Cancer (H), Cerebral infarction (H), COPD (chronic obstructive pulmonary disease) (H), Diabetes (H), History of blood transfusion, Thyroid disease, or Uncomplicated asthma.    Social History:  Reviewed. No changes to social history except as noted above.    Vital Signs:   None. This is phone/video visit.     Labs:  Most recent laboratory results reviewed and no new pertinent labs.     Review of Systems:  10 systems (general, cardiovascular, respiratory, eyes, ENT, endocrine, GI, , M/S, neurological) were reviewed. Most pertinent finding(s) is/are: Chronic pain. The remaining systems are all unremarkable.    Mental Status Examination (limited as this is by phone/video):  Attitude:  cooperative   Oriented to:  person, place, time, and situation  Attention Span and Concentration:  normal  Speech:  clear, coherent, regular rate, rhythm, and soft volume  Language: intact  Mood: \"Hard to tell\"  Affect:  Subdued, mood " congruent  Associations:  no loose associations  Thought Process:  logical, linear and goal oriented  Thought Content:  no evidence of suicidal ideation or homicidal ideation, no evidence of psychotic thought, no auditory hallucinations present and no visual hallucinations present  Recent and Remote Memory:  Intact to interview. Not formally assessed. No amnesia.  Fund of Knowledge: appropriate  Insight:  good  Judgment:  intact, adequate for safety  Impulse Control:  intact    Suicide Risk Assessment:  Today Eva Solis reports no suicidal ideation.Based on all available evidence including the factors cited above, Eva Solis does not appear to be at imminent risk for self-harm, does not meet criteria for a 72-hr hold, and therefore remains appropriate for ongoing outpatient level of care.  A thorough assessment of risk factors related to suicide and self-harm have been reviewed and are noted above. The patient convincingly denies suicidality on several occasions. Local community safety resources printed and reviewed for patient to use if needed. There was no deceit detected, and the patient presented in a manner that was believable.     DSM5 Diagnosis:  296.32 (F33.1) Major Depressive Disorder, Recurrent Episode, Moderate _  300.02 (F41.1) Generalized Anxiety Disorder   R/O treatment resistant depression versus persistent depressive disorder    Medical comorbidities include:   Patient Active Problem List    Diagnosis Date Noted     Lumbar stenosis with neurogenic claudication 07/01/2021     Priority: Medium     Added automatically from request for surgery [id number]       Chronic bilateral low back pain, unspecified whether sciatica present 04/16/2021     Priority: Medium     Major depressive disorder, recurrent episode, moderate (H) 05/08/2020     Priority: Medium     Primary osteoarthritis of left hip 01/23/2020     Priority: Medium     Migraine without aura and without status migrainosus, not intractable  01/15/2016     Priority: Medium     Morbid obesity, unspecified obesity type (H) 12/02/2015     Priority: Medium     Decreased calculated GFR 03/12/2015     Priority: Medium     Diastolic CHF (H) 11/07/2014     Priority: Medium     HTN, goal below 140/90 03/26/2013     Priority: Medium     Slow transit constipation 12/14/2005     Priority: Medium       Psychosocial & Contextual Factors: See HPI above    Assessment:  From Intake 1/6/2021:  Eva Solis is a 60-year-old female with past psychiatric history including depression and anxiety who presents today for psychiatric evaluation.  The patient has had worsening depression symptoms as well as some worsening anxiety.  She is most recently been maintained on BuSpar and Lexapro.  She is also recently started individual psychotherapy with GLADYS Pope.  She feels as though she has been feeling quite down for the past 3-5 years.  We discussed possibility of dysthymia/persistent depressive disorder.  She has a history of several failed past medication trials including her current Lexapro, amitriptyline, Wellbutrin, and even augmentation with Abilify.  It does not seem she has been on a trial with an SNRI.  At this time we will start a trial with Effexor-XR and taper her Lexapro.  She does have chronic pain, and so hopefully Effexor-XR may also be helpful for some pain.  I encouraged her to continue individual psychotherapy given the fact she has many ongoing psychosocial stressors including history of trauma.    3/24/2021:  Eva Solis reports overall some slight improvement in mood and anxiety.  Continues to have increased psychosocial stressors affecting her mental health.  She is working hard to enforce good boundaries and utilize her cognitive behavioral therapy skills.  She is not currently in therapy but will reengage if she feels it is necessary.  With slight improvement in her symptoms switching to Effexor-XR, I recommend a dose increase at this  time.  She will monitor for any negative side effect worsening.    8/27/2021:  Patient continues to have some significant psychosocial stressors in her life.  She does feel like the medication is likely helpful but overall finds it difficult to truly assess given her current circumstances.  She would like to follow-up in 2-3 months and things have hopefully calm down some.  Declined need/offer for therapy referral today.  No medication changes today.  No safety concerns.  No problematic drug or alcohol use.    Medication side effects and alternatives were reviewed. Health promotion activities recommended and reviewed today. All questions addressed. Education and counseling completed regarding risks and benefits of medications and psychotherapy options. Recommend therapy for additional support.     Treatment Plan:    Continue BuSpar 10 mg 3 times daily for anxiety    Continue Effexor-XR 2 225 mg daily for mood, anxiety, pain    Continue alprazolam/Xanax 0.25-0.50 mg 30 minutes before flying as needed for anxiety.     Continue all other cares per primary care provider.     Continue all other medications as reviewed per electronic medical record today.     Safety plan reviewed. To the Emergency Department as needed or call after hours crisis line at [phone] or [phone]. [place] Crisis Text Line. Text [place] to 044744 or Suicide LifeLine Chat: suicidepreventionlifeline.org/chat    Consider reengaging in therapy if necessary.    Schedule an appointment with me in 2-3 months or sooner as needed. Call [facility] at [phone] to schedule.    Follow up with primary care provider as planned or for acute medical concerns.    Call the psychiatric nurse line with medication questions or concerns at [phone].    MyChart may be used to communicate with your provider, but this is not intended to be used for emergencies.    Risks of benzodiazepine (Ativan, Xanax, Klonopin, Valium, etc) use including,  but not limited to, sedation, tolerance, risk for addiction/dependence. Do not drink alcohol while taking benzodiazepines due to risk of trouble breathing and potential death. Do not drive or operate heavy machinery until it is known how the drug affects you. Discuss with physician or pharmacist before ever taking a benzodiazepine with a narcotic/opioid pain medication.     Administrative Billing:   Phone Call/Video Duration: 8 Minutes    Time spent with patient was 8 minutes and greater than 50% of time or 5 minutes was spent in counseling and coordination of care regarding above diagnoses and treatment plan.     Patient Status:  Patient will continue to be seen for ongoing consultation and stabilization.    Signed:   Carri Fuller DO  [facility]    Disclaimer: This note consists of symbols derived from keyboarding, dictation and/or voice recognition software. As a result, there may be errors in the script that have gone undetected. Please consider this when interpreting information found in this chart.

## 2021-08-27 NOTE — PATIENT INSTRUCTIONS
Treatment Plan:    Continue BuSpar 10 mg 3 times daily for anxiety    Continue Effexor-XR 2 225 mg daily for mood, anxiety, pain    Continue alprazolam/Xanax 0.25-0.50 mg 30 minutes before flying as needed for anxiety.     Continue all other cares per primary care provider.     Continue all other medications as reviewed per electronic medical record today.     Safety plan reviewed. To the Emergency Department as needed or call after hours crisis line at [phone] or [phone]. [place] Crisis Text Line. Text [place] to 436783 or Suicide LifeLine Chat: suicidepreventionlifeline.org/chat    Consider reengaging in therapy if necessary.    Schedule an appointment with me in 2-3 months or sooner as needed. Call [facility] at [phone] to schedule.    Follow up with primary care provider as planned or for acute medical concerns.    Call the psychiatric nurse line with medication questions or concerns at [phone].    [employer] may be used to communicate with your provider, but this is not intended to be used for emergencies.    Risks of benzodiazepine (Ativan, Xanax, Klonopin, Valium, etc) use including, but not limited to, sedation, tolerance, risk for addiction/dependence. Do not drink alcohol while taking benzodiazepines due to risk of trouble breathing and potential death. Do not drive or operate heavy machinery until it is known how the drug affects you. Discuss with physician or pharmacist before ever taking a benzodiazepine with a narcotic/opioid pain medication.
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
## 2021-08-27 NOTE — Clinical Note
Please call this patient to get them scheduled for a follow-up visit in 2-3 months. Please schedule with me and the [facility]. Thanks!

## 2021-08-30 ENCOUNTER — MYC MEDICAL ADVICE (OUTPATIENT)
Dept: NEUROSURGERY | Facility: CLINIC | Age: 61
End: 2021-08-30

## 2021-08-31 ENCOUNTER — TELEPHONE (OUTPATIENT)
Dept: CARDIOLOGY | Facility: CLINIC | Age: 61
End: 2021-08-31

## 2021-09-01 ENCOUNTER — TELEPHONE (OUTPATIENT)
Dept: CARDIOLOGY | Facility: CLINIC | Age: 61
End: 2021-09-01

## 2021-09-01 DIAGNOSIS — G89.29 CHRONIC RIGHT-SIDED BACK PAIN, UNSPECIFIED BACK LOCATION: ICD-10-CM

## 2021-09-01 DIAGNOSIS — M54.9 CHRONIC RIGHT-SIDED BACK PAIN, UNSPECIFIED BACK LOCATION: ICD-10-CM

## 2021-09-01 RX ORDER — CYCLOBENZAPRINE HCL 5 MG
TABLET ORAL
Qty: 30 TABLET | Refills: 0 | Status: SHIPPED | OUTPATIENT
Start: 2021-09-01 | End: 2021-10-28

## 2021-09-01 NOTE — TELEPHONE ENCOUNTER
Called patient to see if she would be willing to trade appointment times from 1330 to 1500 to accommodate another patient needing a sooner appointment.     No answer, left message on machine.    Kiki Cheung [facility]

## 2021-09-02 ENCOUNTER — OFFICE VISIT (OUTPATIENT)
Dept: CARDIOLOGY | Facility: CLINIC | Age: 61
End: 2021-09-02
Payer: COMMERCIAL

## 2021-09-02 VITALS
HEIGHT: 65 IN | OXYGEN SATURATION: 98 % | DIASTOLIC BLOOD PRESSURE: 82 MMHG | SYSTOLIC BLOOD PRESSURE: 159 MMHG | WEIGHT: 293 LBS | BODY MASS INDEX: 48.82 KG/M2 | HEART RATE: 75 BPM

## 2021-09-02 DIAGNOSIS — R94.39 ABNORMAL RESULT OF OTHER CARDIOVASCULAR FUNCTION STUDY: ICD-10-CM

## 2021-09-02 DIAGNOSIS — I10 BENIGN ESSENTIAL HYPERTENSION: ICD-10-CM

## 2021-09-02 DIAGNOSIS — Z01.810 PRE-OPERATIVE CARDIOVASCULAR EXAMINATION: Primary | ICD-10-CM

## 2021-09-02 DIAGNOSIS — I50.32 CHRONIC DIASTOLIC CONGESTIVE HEART FAILURE (H): ICD-10-CM

## 2021-09-02 PROCEDURE — 99204 OFFICE O/P NEW MOD 45 MIN: CPT | Performed by: INTERNAL MEDICINE

## 2021-09-02 ASSESSMENT — MIFFLIN-ST. JEOR: SCORE: 2141.4

## 2021-09-02 NOTE — PATIENT INSTRUCTIONS
You were seen at the [facility] clinic today.  You saw Dr. Kenneth Fields  Here are your Instructions:    1. No further heart testing or changes in treatment are needed for surgery.  2. Follow up with Dr. Fields as needed.

## 2021-09-02 NOTE — PROGRESS NOTES
Chief complaint: Preoperative cardiovascular evaluation for L3-L5 laminectomy    HPI:   Eva Solis is a 61 year old female with history of HTN, class 3 obesity, and previous diastolic HF related to uncontrolled HTN referred for preoperative cardiovascular evaluation for L3-L5 laminectomy.     She was previously followed by Dr. Leger, whom she last saw 6/21/16. She had several episodes of decompensated HF related to severe uncontrolled HTN (SBP 180s) which responded to BP control and diuretics; TTEs showed normal LV function and valves. She has never had any episodes of HF/hypervolemia at any point since 2016.     She had L3-L5 laminectomy, apparently scheduled for 8/11/21, which was apparently cancelled after anesthesia evaluation due to request for cardiac evaluation. Stress SPECT was ordered by preop clinic and showed fixed anterior defect, likely beast attenuation vs. Less likely anterior infarct without ischemia (SSS 4.)    Her exertion is limited by her back pain. Her stationary bike is downstairs and she cannot walk downstairs to use it due to back pain. She sleeps in a chair due to back pain.    She denies any chest pain, dyspnea at rest or with exertion, PND, orthopnea, peripheral edema, palpitations, lightheadedness or syncope.       PAST MEDICAL HISTORY:  Past Medical History:   Diagnosis Date     Congestive heart failure (H) 3years     Depressive disorder 1-2 years    dealing with on a daily basis     DEPRESSIVE DISORDER NEC 5/4/2004     Diastolic dysfunction      Essential hypertension, benign      Generalized osteoarthrosis, unspecified site     knees     Headache(784.0)      Heart disease 3 years    CHF     Mixed anxiety depressive disorder 1/15/2016     PANIC DISORDER 5/4/2004       CURRENT MEDICATIONS:  Current Outpatient Medications   Medication Sig Dispense Refill     ALPRAZolam (XANAX) 0.25 MG tablet Take 1-2 tablets 30 minutes before flight 10 tablet 0     busPIRone (BUSPAR) 10 MG tablet Take  1 tablet (10 mg) by mouth 3 times daily 270 tablet 0     cyclobenzaprine (FLEXERIL) 5 MG tablet TAKE ONE TABLET BY MOUTH THREE TIMES DAILY AS NEEDED FOR MUSCLE SPASM  30 tablet 0     furosemide (LASIX) 20 MG tablet Take 1 tablet (20 mg) by mouth 2 times daily 180 tablet 2     losartan (COZAAR) 100 MG tablet Take 1 tablet (100 mg) by mouth daily 90 tablet 3     MIRALAX PO POWD 17 GM DAILY 1 Bottle 11     OMEPRAZOLE PO Take 20 mg by mouth every morning        spironolactone (ALDACTONE) 25 MG tablet Take 1 tablet (25 mg) by mouth daily 90 tablet 3     topiramate (TOPAMAX) 50 MG tablet Take 1.5 tablets (75 mg) by mouth 2 times daily 270 tablet 3     venlafaxine (EFFEXOR-XR) 75 MG 24 hr capsule Take 3 capsules (225 mg) by mouth daily 270 capsule 0       PAST SURGICAL HISTORY:  Past Surgical History:   Procedure Laterality Date     C  DELIVERY ONLY      , Low Cervical     C  DELIVERY ONLY       C VAGINAL HYSTERECTOMY      without oophorectomy     COLONOSCOPY  10/06/10    attempt at colonscopy to 50cm     INJECT EPIDURAL LUMBAR N/A 10/18/2019    Procedure: INJECTION, SPINE, LUMBAR 4 - LUMBAR 5, EPIDURAL TRANSLAMINAR;  Surgeon: Trever Wheatley MD;  [facility]     INJECT EPIDURAL LUMBAR N/A 2020    Procedure: INJECTION, SPINE, LUMBAR 4-5, EPIDURAL TRANSLAMINAR;  Surgeon: Trever Wheatley MD;  [facility]     INJECT EPIDURAL LUMBAR N/A 2021    Procedure: Lumbar 4-5 Epidural Injection;  Surgeon: Trever Wheatley MD;  Location[facility]     JOINT REPLACEMTN, KNEE RT/LT  2006    Right total knee arthroplasty.     JOINT REPLACEMTN, KNEE RT/LT  2006    Left total knee arthroplasty.     [facility] COLONOSCOPY W/WO BRUSH/WASH  2005    Diverticulosis.  Internal hemorrhoids.       ALLERGIES:     Allergies   Allergen Reactions     Lisinopril Cough       FAMILY HISTORY:  Family History   Problem Relation Age of Onset     Cancer Father         squamous cell ca     Cancer  "Maternal Grandmother         lung     Cerebrovascular Disease Maternal Grandmother      Cancer Paternal Grandmother         squamous cell ca     Diabetes Sister      Cancer Sister         kidney cancer     Kidney Cancer Sister      Other Cancer Sister         kidney cancer     Bipolar Disorder Sister      Connective Tissue Disorder Brother         lupus     Lupus Brother      Kidney Disease Mother         atrophic kidney     Hypertension Mother      Depression Mother         she's being treated for this     Bipolar Disorder Mother      Depression Sister         dont know much     Kidney Disease Maternal Uncle         unclear kidney issue     Anxiety Disorder Son      Anxiety Disorder Niece      Anxiety Disorder Son      Colon Cancer No family hx of      Asthma No family hx of    No family history of premature CAD or sudden death.    SOCIAL HISTORY:  Social History     Tobacco Use     Smoking status: Never Smoker     Smokeless tobacco: Never Used     Tobacco comment: no smokers in household   Vaping Use     Vaping Use: Never used   Substance Use Topics     Alcohol use: No     Drug use: No       ROS:   A comprehensive 14 point review of systems is negative other than as mentioned in HPI.    Exam:  BP (!) 159/82 (BP Location: Left arm, Patient Position: Sitting, Cuff Size: Adult Large)   Pulse 75   Ht 1.66 m (5' 5.35\")   Wt (!) 156.5 kg (345 lb)   SpO2 98%   BMI 56.79 kg/m    GENERAL APPEARANCE: healthy, alert and no distress  EYES: no icterus, no xanthelasmas  ENT: normal palate, mucosa moist, no central cyanosis  NECK: no adenopathy, no asymmetry, masses, or scars, thyroid normal to palpation and no bruits, JVP not elevated  RESPIRATORY: lungs clear to auscultation - no rales, rhonchi or wheezes, no use of accessory muscles, no retractions, respirations are unlabored, normal respiratory rate  CARDIOVASCULAR: regular rhythm, normal S1 with physiologic split S2, no S3 or S4 and no murmur, click or rub, precordium " quiet with normal PMI.  GI: soft, non tender, without hepatosplenomegaly, no masses palpable, bowel sounds normal, aorta not enlarged by palpation, no abdominal bruits  EXTREMITIES: peripheral pulses normal, no edema, no bruits  NEURO: alert and oriented to person/place/time, normal speech, gait and affect  VASC: Radial, dorsalis pedis and posterior tibialis pulses 2+ bilaterally.  SKIN: no ecchymoses, no rashes.  PSYCH: cooperative, affect appropriate.     Labs:  Reviewed.       Testing/Procedures:  I personally visualized:  Regadenoson SPECT 8/12/21:   Fixed anterior perfusion defect (less intense with stress than at rest), likely breast attenuation artifact. A small anterior infarct without ischemia is possible but less likely based on review of the images. SSS 4.     TTE 10/9/13:  1.Left ventricular systolic function is normal. There is mild concentric left    ventricular hypertrophy. The visual ejection fraction is estimated at 60-65%. Grade I left ventricular diastolic dysfunction is noted.   2. The right ventricle is normal size. The right ventricular systolic function is normal.    3. No significant valvular abnormalities   4. Normal CVP. PA pressure cannot be estimated.  No prior echo for comparision      ECG 8/6/21: Normal sinus rhythm. Normal ECG.           Assessment and Plan:   1. Preoperative cardiovascular evaluation for L3-L5 laminectomy:  2. Abnormal stress SPECT (small fixed anterior defect, likely breast attenuation artifact)  3. Essential hypertension  4. Chronic diastolic HF, clinically euvolemic and well-perfused  Laminectomy is a non-emergent procedure that is intermediate-risk based on ACC/AHA guidelines. The patient has no active severe cardiac conditions. Functional capacity is <4 METS. RCRI=1, predicting 0.9% risk of perioperative cardiac complications.   While her stress SPECT is technically abnormal with a small fixed anterior defect, the perfusion abnormality is more significant with at  rest than with stress and is located in typical site for breast attenuation artifact.  Furthermore, even in the event that this did represent a small fixed perfusion defect, the study shows no lesly-infarct ischemia, suggesting that there would be no benefit for revascularization and hence no role for revascularization and no change to management.  Furthermore, she has no anginal symptoms, and has been stable from the heart failure standpoint continuously since 2016.  Her blood pressure has been well controlled, except in the current setting when she has significant uncontrolled back pain.  Given her stable clinical status and likely artifactual stress testing results which even if abnormal would not indicate a change to management, she is of acceptable risk from a cardiac standpoint to proceed with the planned surgery without any further cardiovascular work-up at this time.  She should continue to follow with Dr. Marin, who has provided excellent care. Cardiology follow up can be on an as-needed basis.     The patient states understanding and is agreeable with plan.     Kenneth Fields MD  Cardiology    CC  [facility]

## 2021-09-02 NOTE — PROGRESS NOTES
"Eva Solis's goals for this visit include: Surgery clearance. Would like to go over the results of the stress test. Really wants to get the surgery. Was told to go off medications for surgery     She requests these members of her care team be copied on today's visit information: PCP    PCP: Renetta Benitez    Referring Provider:  Renetta Benitez MD  [address] [address]    BP (!) 159/82 (BP Location: Left arm, Patient Position: Sitting, Cuff Size: Adult Large)   Pulse 75   Ht 1.66 m (5' 5.35\")   Wt (!) 156.5 kg (345 lb)   SpO2 98%   BMI 56.79 kg/m      Do you need any medication refills at today's visit? No.     Franck Cabrera, EMT  Clinic Support  [facility]    [phone]    Employed by [facility] [employer]        "

## 2021-09-07 DIAGNOSIS — M48.061 SPINAL STENOSIS OF LUMBAR REGION, UNSPECIFIED WHETHER NEUROGENIC CLAUDICATION PRESENT: Primary | ICD-10-CM

## 2021-09-14 ENCOUNTER — LAB (OUTPATIENT)
Dept: LAB | Facility: OTHER | Age: 61
End: 2021-09-14
Attending: NEUROLOGICAL SURGERY
Payer: COMMERCIAL

## 2021-09-14 DIAGNOSIS — Z11.59 ENCOUNTER FOR SCREENING FOR OTHER VIRAL DISEASES: ICD-10-CM

## 2021-09-14 PROCEDURE — U0003 INFECTIOUS AGENT DETECTION BY NUCLEIC ACID (DNA OR RNA); SEVERE ACUTE RESPIRATORY SYNDROME CORONAVIRUS 2 (SARS-COV-2) (CORONAVIRUS DISEASE [COVID-19]), AMPLIFIED PROBE TECHNIQUE, MAKING USE OF HIGH THROUGHPUT TECHNOLOGIES AS DESCRIBED BY CMS-2020-01-R: HCPCS

## 2021-09-14 PROCEDURE — U0005 INFEC AGEN DETEC AMPLI PROBE: HCPCS

## 2021-09-15 LAB — SARS-COV-2 RNA RESP QL NAA+PROBE: NEGATIVE

## 2021-09-16 ENCOUNTER — ANESTHESIA EVENT (OUTPATIENT)
Dept: SURGERY | Facility: CLINIC | Age: 61
End: 2021-09-16
Payer: COMMERCIAL

## 2021-09-16 RX ORDER — TOPIRAMATE 50 MG/1
75 TABLET, FILM COATED ORAL 2 TIMES DAILY
COMMUNITY
End: 2022-03-14

## 2021-09-16 RX ORDER — AMOXICILLIN 500 MG/1
2000 CAPSULE ORAL
COMMUNITY
End: 2021-10-28

## 2021-09-16 RX ORDER — VENLAFAXINE HYDROCHLORIDE 75 MG/1
150 CAPSULE, EXTENDED RELEASE ORAL EVERY MORNING
COMMUNITY
End: 2022-01-04

## 2021-09-16 RX ORDER — VENLAFAXINE HYDROCHLORIDE 75 MG/1
75 CAPSULE, EXTENDED RELEASE ORAL EVERY EVENING
COMMUNITY
End: 2021-12-07

## 2021-09-16 NOTE — PROGRESS NOTES
PTA medications updated by Medication Scribe prior to surgery via phone call with patient (last doses completed by Nurse)     Medication history sources: Patient, Surescripts and H&P  In the past week, patient estimated taking medication this percent of the time: Greater than 90%  Adherence assessment: N/A Not Observed    Significant changes made to the medication list:  None      Additional medication history information:   None    Medication reconciliation completed by provider prior to medication history? No    Time spent in this activity: 25 minutes    The information provided in this note is only as accurate as the sources available at the time of update(s)      Prior to Admission medications    Medication Sig Last Dose Taking? Auth Provider   ALPRAZolam (XANAX) 0.25 MG tablet Take 1-2 tablets 30 minutes before flight  at PRN Yes Renetta Benitez MD   amoxicillin (AMOXIL) 500 MG capsule Take 2,000 mg by mouth once as needed (ONE PRIOR TO DENTAL VISIT) (4 X 500 mg)  at PRN Yes Reported, Patient   busPIRone (BUSPAR) 10 MG tablet Take 1 tablet (10 mg) by mouth 3 times daily 9/16/2021 at PM Yes Carri Fuller,    cyclobenzaprine (FLEXERIL) 5 MG tablet TAKE ONE TABLET BY MOUTH THREE TIMES DAILY AS NEEDED FOR MUSCLE SPASM  9/15/2021 at PRN Yes Renetta Benitez MD   furosemide (LASIX) 20 MG tablet Take 1 tablet (20 mg) by mouth 2 times daily 9/16/2021 at PM Yes Renetta Benitez MD   losartan (COZAAR) 100 MG tablet Take 1 tablet (100 mg) by mouth daily 9/16/2021 at AM Yes Renetta Benitez MD   MIRALAX PO POWD 17 GM DAILY  at PRN Yes Renetta Benitez MD   omeprazole (PRILOSEC) 20 MG DR capsule Take 20 mg by mouth every morning  9/16/2021 at AM Yes Reported, Patient   spironolactone (ALDACTONE) 25 MG tablet Take 1 tablet (25 mg) by mouth daily 9/16/2021 at AM Yes Renetta Benitez MD   topiramate (TOPAMAX) 50 MG tablet Take 75 mg by mouth 2 times daily (1.5 X 50 mg) 9/16/2021 at PM Yes  Reported, Patient   venlafaxine (EFFEXOR-XR) 75 MG 24 hr capsule Take 150 mg by mouth every morning (2 X 75 mg) In addition to evening dose. 9/16/2021 at AM Yes Reported, Patient   venlafaxine (EFFEXOR-XR) 75 MG 24 hr capsule Take 75 mg by mouth every evening In addition to morning dose 9/16/2021 at PM Yes Reported, Patient       Medication history completed by:    Jonathan Vallecillo CPhT  Medication [facility]

## 2021-09-17 ENCOUNTER — HOSPITAL ENCOUNTER (OUTPATIENT)
Facility: CLINIC | Age: 61
Discharge: HOME | End: 2021-09-18
Attending: NEUROLOGICAL SURGERY | Admitting: NEUROLOGICAL SURGERY
Payer: COMMERCIAL

## 2021-09-17 ENCOUNTER — APPOINTMENT (OUTPATIENT)
Dept: GENERAL RADIOLOGY | Facility: CLINIC | Age: 61
End: 2021-09-17
Attending: NEUROLOGICAL SURGERY
Payer: COMMERCIAL

## 2021-09-17 ENCOUNTER — ANESTHESIA (OUTPATIENT)
Dept: SURGERY | Facility: CLINIC | Age: 61
End: 2021-09-17
Payer: COMMERCIAL

## 2021-09-17 DIAGNOSIS — M48.062 LUMBAR STENOSIS WITH NEUROGENIC CLAUDICATION: Primary | ICD-10-CM

## 2021-09-17 DIAGNOSIS — M48.061 SPINAL STENOSIS OF LUMBAR REGION, UNSPECIFIED WHETHER NEUROGENIC CLAUDICATION PRESENT: ICD-10-CM

## 2021-09-17 LAB
ANION GAP SERPL CALCULATED.3IONS-SCNC: 5 MMOL/L (ref 3–14)
BUN SERPL-MCNC: 15 MG/DL (ref 7–30)
CALCIUM SERPL-MCNC: 8.7 MG/DL (ref 8.5–10.1)
CHLORIDE BLD-SCNC: 111 MMOL/L (ref 94–109)
CO2 SERPL-SCNC: 23 MMOL/L (ref 20–32)
CREAT SERPL-MCNC: 1.02 MG/DL (ref 0.52–1.04)
GFR SERPL CREATININE-BSD FRML MDRD: 60 ML/MIN/1.73M2
GLUCOSE BLD-MCNC: 122 MG/DL (ref 70–99)
POTASSIUM BLD-SCNC: 4.3 MMOL/L (ref 3.4–5.3)
SODIUM SERPL-SCNC: 139 MMOL/L (ref 133–144)

## 2021-09-17 PROCEDURE — 250N000025 HC SEVOFLURANE, PER MIN: Performed by: NEUROLOGICAL SURGERY

## 2021-09-17 PROCEDURE — 250N000011 HC RX IP 250 OP 636: Performed by: NURSE PRACTITIONER

## 2021-09-17 PROCEDURE — 250N000009 HC RX 250: Performed by: NURSE ANESTHETIST, CERTIFIED REGISTERED

## 2021-09-17 PROCEDURE — 36415 COLL VENOUS BLD VENIPUNCTURE: CPT | Performed by: PHYSICIAN ASSISTANT

## 2021-09-17 PROCEDURE — 250N000011 HC RX IP 250 OP 636: Performed by: ANESTHESIOLOGY

## 2021-09-17 PROCEDURE — 370N000017 HC ANESTHESIA TECHNICAL FEE, PER MIN: Performed by: NEUROLOGICAL SURGERY

## 2021-09-17 PROCEDURE — 99207 PR CDG-CODE CATEGORY CHANGED: CPT | Performed by: PHYSICIAN ASSISTANT

## 2021-09-17 PROCEDURE — 63048 LAM FACETEC &FORAMOT EA ADDL: CPT | Mod: AS | Performed by: NURSE PRACTITIONER

## 2021-09-17 PROCEDURE — 250N000013 HC RX MED GY IP 250 OP 250 PS 637: Performed by: PHYSICIAN ASSISTANT

## 2021-09-17 PROCEDURE — 999N000141 HC STATISTIC PRE-PROCEDURE NURSING ASSESSMENT: Performed by: NEUROLOGICAL SURGERY

## 2021-09-17 PROCEDURE — 250N000011 HC RX IP 250 OP 636: Performed by: NEUROLOGICAL SURGERY

## 2021-09-17 PROCEDURE — 710N000009 HC RECOVERY PHASE 1, LEVEL 1, PER MIN: Performed by: NEUROLOGICAL SURGERY

## 2021-09-17 PROCEDURE — 999N000179 XR SURGERY CARM FLUORO LESS THAN 5 MIN W STILLS

## 2021-09-17 PROCEDURE — 250N000011 HC RX IP 250 OP 636: Performed by: NURSE ANESTHETIST, CERTIFIED REGISTERED

## 2021-09-17 PROCEDURE — 999N000128 HC STATISTIC PERIPHERAL IV START W/O US GUIDANCE

## 2021-09-17 PROCEDURE — 250N000009 HC RX 250: Performed by: NEUROLOGICAL SURGERY

## 2021-09-17 PROCEDURE — 272N000001 HC OR GENERAL SUPPLY STERILE: Performed by: NEUROLOGICAL SURGERY

## 2021-09-17 PROCEDURE — 258N000003 HC RX IP 258 OP 636: Performed by: NURSE ANESTHETIST, CERTIFIED REGISTERED

## 2021-09-17 PROCEDURE — 360N000084 HC SURGERY LEVEL 4 W/ FLUORO, PER MIN: Performed by: NEUROLOGICAL SURGERY

## 2021-09-17 PROCEDURE — 250N000013 HC RX MED GY IP 250 OP 250 PS 637: Performed by: NURSE PRACTITIONER

## 2021-09-17 PROCEDURE — 63047 LAM FACETEC & FORAMOT LUMBAR: CPT | Performed by: NEUROLOGICAL SURGERY

## 2021-09-17 PROCEDURE — 99213 OFFICE O/P EST LOW 20 MIN: CPT | Performed by: PHYSICIAN ASSISTANT

## 2021-09-17 PROCEDURE — 63048 LAM FACETEC &FORAMOT EA ADDL: CPT | Performed by: NEUROLOGICAL SURGERY

## 2021-09-17 PROCEDURE — 63047 LAM FACETEC & FORAMOT LUMBAR: CPT | Mod: AS | Performed by: NURSE PRACTITIONER

## 2021-09-17 PROCEDURE — 80048 BASIC METABOLIC PNL TOTAL CA: CPT | Performed by: PHYSICIAN ASSISTANT

## 2021-09-17 RX ORDER — POLYETHYLENE GLYCOL 3350 17 G/17G
17 POWDER, FOR SOLUTION ORAL DAILY
Status: DISCONTINUED | OUTPATIENT
Start: 2021-09-17 | End: 2021-09-18 | Stop reason: HOSPADM

## 2021-09-17 RX ORDER — ONDANSETRON 2 MG/ML
INJECTION INTRAMUSCULAR; INTRAVENOUS PRN
Status: DISCONTINUED | OUTPATIENT
Start: 2021-09-17 | End: 2021-09-17

## 2021-09-17 RX ORDER — LIDOCAINE HYDROCHLORIDE 20 MG/ML
INJECTION, SOLUTION INFILTRATION; PERINEURAL PRN
Status: DISCONTINUED | OUTPATIENT
Start: 2021-09-17 | End: 2021-09-17

## 2021-09-17 RX ORDER — FENTANYL CITRATE 50 UG/ML
INJECTION, SOLUTION INTRAMUSCULAR; INTRAVENOUS PRN
Status: DISCONTINUED | OUTPATIENT
Start: 2021-09-17 | End: 2021-09-17

## 2021-09-17 RX ORDER — OXYCODONE HYDROCHLORIDE 5 MG/1
5 TABLET ORAL EVERY 4 HOURS PRN
Status: DISCONTINUED | OUTPATIENT
Start: 2021-09-17 | End: 2021-09-17

## 2021-09-17 RX ORDER — HYDROMORPHONE HCL IN WATER/PF 6 MG/30 ML
0.2 PATIENT CONTROLLED ANALGESIA SYRINGE INTRAVENOUS EVERY 5 MIN PRN
Status: DISCONTINUED | OUTPATIENT
Start: 2021-09-17 | End: 2021-09-17 | Stop reason: HOSPADM

## 2021-09-17 RX ORDER — ONDANSETRON 4 MG/1
4 TABLET, ORALLY DISINTEGRATING ORAL EVERY 30 MIN PRN
Status: DISCONTINUED | OUTPATIENT
Start: 2021-09-17 | End: 2021-09-17 | Stop reason: HOSPADM

## 2021-09-17 RX ORDER — NALOXONE HYDROCHLORIDE 0.4 MG/ML
0.4 INJECTION, SOLUTION INTRAMUSCULAR; INTRAVENOUS; SUBCUTANEOUS
Status: DISCONTINUED | OUTPATIENT
Start: 2021-09-17 | End: 2021-09-18 | Stop reason: HOSPADM

## 2021-09-17 RX ORDER — LOSARTAN POTASSIUM 100 MG/1
100 TABLET ORAL DAILY
Status: DISCONTINUED | OUTPATIENT
Start: 2021-09-17 | End: 2021-09-18 | Stop reason: HOSPADM

## 2021-09-17 RX ORDER — FENTANYL CITRATE 0.05 MG/ML
25 INJECTION, SOLUTION INTRAMUSCULAR; INTRAVENOUS EVERY 5 MIN PRN
Status: DISCONTINUED | OUTPATIENT
Start: 2021-09-17 | End: 2021-09-17 | Stop reason: HOSPADM

## 2021-09-17 RX ORDER — HYDROXYZINE HYDROCHLORIDE 25 MG/1
25 TABLET, FILM COATED ORAL EVERY 6 HOURS PRN
Status: DISCONTINUED | OUTPATIENT
Start: 2021-09-17 | End: 2021-09-17 | Stop reason: HOSPADM

## 2021-09-17 RX ORDER — DEXAMETHASONE SODIUM PHOSPHATE 4 MG/ML
INJECTION, SOLUTION INTRA-ARTICULAR; INTRALESIONAL; INTRAMUSCULAR; INTRAVENOUS; SOFT TISSUE PRN
Status: DISCONTINUED | OUTPATIENT
Start: 2021-09-17 | End: 2021-09-17

## 2021-09-17 RX ORDER — HALOPERIDOL 5 MG/ML
1 INJECTION INTRAMUSCULAR
Status: DISCONTINUED | OUTPATIENT
Start: 2021-09-17 | End: 2021-09-17 | Stop reason: HOSPADM

## 2021-09-17 RX ORDER — POLYETHYLENE GLYCOL 3350 17 G/17G
17 POWDER, FOR SOLUTION ORAL DAILY
Status: DISCONTINUED | OUTPATIENT
Start: 2021-09-18 | End: 2021-09-17

## 2021-09-17 RX ORDER — NALOXONE HYDROCHLORIDE 0.4 MG/ML
0.2 INJECTION, SOLUTION INTRAMUSCULAR; INTRAVENOUS; SUBCUTANEOUS
Status: DISCONTINUED | OUTPATIENT
Start: 2021-09-17 | End: 2021-09-18 | Stop reason: HOSPADM

## 2021-09-17 RX ORDER — CEFAZOLIN SODIUM 1 G/3ML
1 INJECTION, POWDER, FOR SOLUTION INTRAMUSCULAR; INTRAVENOUS EVERY 8 HOURS
Status: DISCONTINUED | OUTPATIENT
Start: 2021-09-17 | End: 2021-09-18

## 2021-09-17 RX ORDER — AMOXICILLIN 250 MG
1 CAPSULE ORAL 2 TIMES DAILY
Status: DISCONTINUED | OUTPATIENT
Start: 2021-09-17 | End: 2021-09-18 | Stop reason: HOSPADM

## 2021-09-17 RX ORDER — ONDANSETRON 4 MG/1
4 TABLET, ORALLY DISINTEGRATING ORAL EVERY 6 HOURS PRN
Status: DISCONTINUED | OUTPATIENT
Start: 2021-09-17 | End: 2021-09-18 | Stop reason: HOSPADM

## 2021-09-17 RX ORDER — BISACODYL 10 MG
10 SUPPOSITORY, RECTAL RECTAL DAILY PRN
Status: DISCONTINUED | OUTPATIENT
Start: 2021-09-17 | End: 2021-09-18 | Stop reason: HOSPADM

## 2021-09-17 RX ORDER — CYCLOBENZAPRINE HCL 5 MG
5-10 TABLET ORAL 3 TIMES DAILY PRN
Status: DISCONTINUED | OUTPATIENT
Start: 2021-09-17 | End: 2021-09-18 | Stop reason: HOSPADM

## 2021-09-17 RX ORDER — LIDOCAINE 40 MG/G
CREAM TOPICAL
Status: DISCONTINUED | OUTPATIENT
Start: 2021-09-17 | End: 2021-09-18 | Stop reason: HOSPADM

## 2021-09-17 RX ORDER — CEFAZOLIN SODIUM IN 0.9 % NACL 3 G/100 ML
3 INTRAVENOUS SOLUTION, PIGGYBACK (ML) INTRAVENOUS SEE ADMIN INSTRUCTIONS
Status: DISCONTINUED | OUTPATIENT
Start: 2021-09-17 | End: 2021-09-17

## 2021-09-17 RX ORDER — HYDROMORPHONE HCL IN WATER/PF 6 MG/30 ML
0.4 PATIENT CONTROLLED ANALGESIA SYRINGE INTRAVENOUS EVERY 5 MIN PRN
Status: DISCONTINUED | OUTPATIENT
Start: 2021-09-17 | End: 2021-09-17 | Stop reason: HOSPADM

## 2021-09-17 RX ORDER — SODIUM CHLORIDE, SODIUM LACTATE, POTASSIUM CHLORIDE, CALCIUM CHLORIDE 600; 310; 30; 20 MG/100ML; MG/100ML; MG/100ML; MG/100ML
INJECTION, SOLUTION INTRAVENOUS CONTINUOUS
Status: DISCONTINUED | OUTPATIENT
Start: 2021-09-17 | End: 2021-09-17 | Stop reason: HOSPADM

## 2021-09-17 RX ORDER — OXYCODONE HYDROCHLORIDE 5 MG/1
10 TABLET ORAL EVERY 4 HOURS PRN
Status: DISCONTINUED | OUTPATIENT
Start: 2021-09-17 | End: 2021-09-18 | Stop reason: HOSPADM

## 2021-09-17 RX ORDER — NEOSTIGMINE METHYLSULFATE 1 MG/ML
VIAL (ML) INJECTION PRN
Status: DISCONTINUED | OUTPATIENT
Start: 2021-09-17 | End: 2021-09-17

## 2021-09-17 RX ORDER — MEPERIDINE HYDROCHLORIDE 25 MG/ML
12.5 INJECTION INTRAMUSCULAR; INTRAVENOUS; SUBCUTANEOUS EVERY 5 MIN PRN
Status: DISCONTINUED | OUTPATIENT
Start: 2021-09-17 | End: 2021-09-17 | Stop reason: HOSPADM

## 2021-09-17 RX ORDER — GLYCOPYRROLATE 0.2 MG/ML
INJECTION, SOLUTION INTRAMUSCULAR; INTRAVENOUS PRN
Status: DISCONTINUED | OUTPATIENT
Start: 2021-09-17 | End: 2021-09-17

## 2021-09-17 RX ORDER — PROPOFOL 10 MG/ML
INJECTION, EMULSION INTRAVENOUS PRN
Status: DISCONTINUED | OUTPATIENT
Start: 2021-09-17 | End: 2021-09-17

## 2021-09-17 RX ORDER — CEFAZOLIN SODIUM IN 0.9 % NACL 3 G/100 ML
3 INTRAVENOUS SOLUTION, PIGGYBACK (ML) INTRAVENOUS
Status: DISCONTINUED | OUTPATIENT
Start: 2021-09-17 | End: 2021-09-17

## 2021-09-17 RX ORDER — BUPIVACAINE HYDROCHLORIDE AND EPINEPHRINE 2.5; 5 MG/ML; UG/ML
INJECTION, SOLUTION INFILTRATION; PERINEURAL PRN
Status: DISCONTINUED | OUTPATIENT
Start: 2021-09-17 | End: 2021-09-17 | Stop reason: HOSPADM

## 2021-09-17 RX ORDER — VENLAFAXINE HYDROCHLORIDE 75 MG/1
75 CAPSULE, EXTENDED RELEASE ORAL EVERY EVENING
Status: DISCONTINUED | OUTPATIENT
Start: 2021-09-17 | End: 2021-09-18 | Stop reason: HOSPADM

## 2021-09-17 RX ORDER — ACETAMINOPHEN 325 MG/1
650 TABLET ORAL EVERY 4 HOURS PRN
Status: DISCONTINUED | OUTPATIENT
Start: 2021-09-20 | End: 2021-09-18 | Stop reason: HOSPADM

## 2021-09-17 RX ORDER — VENLAFAXINE HYDROCHLORIDE 150 MG/1
150 CAPSULE, EXTENDED RELEASE ORAL EVERY MORNING
Status: DISCONTINUED | OUTPATIENT
Start: 2021-09-18 | End: 2021-09-18 | Stop reason: HOSPADM

## 2021-09-17 RX ORDER — ACETAMINOPHEN 325 MG/1
975 TABLET ORAL EVERY 8 HOURS
Status: DISCONTINUED | OUTPATIENT
Start: 2021-09-17 | End: 2021-09-18 | Stop reason: HOSPADM

## 2021-09-17 RX ORDER — ONDANSETRON 2 MG/ML
4 INJECTION INTRAMUSCULAR; INTRAVENOUS EVERY 30 MIN PRN
Status: DISCONTINUED | OUTPATIENT
Start: 2021-09-17 | End: 2021-09-17 | Stop reason: HOSPADM

## 2021-09-17 RX ORDER — SPIRONOLACTONE 25 MG/1
25 TABLET ORAL DAILY
Status: DISCONTINUED | OUTPATIENT
Start: 2021-09-17 | End: 2021-09-18 | Stop reason: HOSPADM

## 2021-09-17 RX ORDER — HYDROXYZINE HYDROCHLORIDE 25 MG/1
25 TABLET, FILM COATED ORAL EVERY 6 HOURS PRN
Status: DISCONTINUED | OUTPATIENT
Start: 2021-09-17 | End: 2021-09-18 | Stop reason: HOSPADM

## 2021-09-17 RX ORDER — HYDROMORPHONE HCL IN WATER/PF 6 MG/30 ML
0.4 PATIENT CONTROLLED ANALGESIA SYRINGE INTRAVENOUS
Status: DISCONTINUED | OUTPATIENT
Start: 2021-09-17 | End: 2021-09-18 | Stop reason: HOSPADM

## 2021-09-17 RX ORDER — FUROSEMIDE 20 MG
20 TABLET ORAL 2 TIMES DAILY
Status: DISCONTINUED | OUTPATIENT
Start: 2021-09-17 | End: 2021-09-18 | Stop reason: HOSPADM

## 2021-09-17 RX ORDER — FENTANYL CITRATE 0.05 MG/ML
50 INJECTION, SOLUTION INTRAMUSCULAR; INTRAVENOUS EVERY 5 MIN PRN
Status: DISCONTINUED | OUTPATIENT
Start: 2021-09-17 | End: 2021-09-17 | Stop reason: HOSPADM

## 2021-09-17 RX ORDER — SODIUM CHLORIDE, SODIUM LACTATE, POTASSIUM CHLORIDE, CALCIUM CHLORIDE 600; 310; 30; 20 MG/100ML; MG/100ML; MG/100ML; MG/100ML
INJECTION, SOLUTION INTRAVENOUS CONTINUOUS PRN
Status: DISCONTINUED | OUTPATIENT
Start: 2021-09-17 | End: 2021-09-17

## 2021-09-17 RX ORDER — HYDRALAZINE HYDROCHLORIDE 20 MG/ML
10 INJECTION INTRAMUSCULAR; INTRAVENOUS EVERY 4 HOURS PRN
Status: DISCONTINUED | OUTPATIENT
Start: 2021-09-17 | End: 2021-09-18 | Stop reason: HOSPADM

## 2021-09-17 RX ORDER — CALCIUM CARBONATE 500 MG/1
500 TABLET, CHEWABLE ORAL 4 TIMES DAILY PRN
Status: DISCONTINUED | OUTPATIENT
Start: 2021-09-17 | End: 2021-09-18 | Stop reason: HOSPADM

## 2021-09-17 RX ORDER — ONDANSETRON 2 MG/ML
4 INJECTION INTRAMUSCULAR; INTRAVENOUS EVERY 6 HOURS PRN
Status: DISCONTINUED | OUTPATIENT
Start: 2021-09-17 | End: 2021-09-18 | Stop reason: HOSPADM

## 2021-09-17 RX ORDER — OXYCODONE HYDROCHLORIDE 5 MG/1
5 TABLET ORAL EVERY 4 HOURS PRN
Status: DISCONTINUED | OUTPATIENT
Start: 2021-09-17 | End: 2021-09-17 | Stop reason: HOSPADM

## 2021-09-17 RX ORDER — BUSPIRONE HYDROCHLORIDE 10 MG/1
10 TABLET ORAL 3 TIMES DAILY
Status: DISCONTINUED | OUTPATIENT
Start: 2021-09-17 | End: 2021-09-18 | Stop reason: HOSPADM

## 2021-09-17 RX ORDER — LABETALOL HYDROCHLORIDE 5 MG/ML
10 INJECTION, SOLUTION INTRAVENOUS
Status: DISCONTINUED | OUTPATIENT
Start: 2021-09-17 | End: 2021-09-17 | Stop reason: HOSPADM

## 2021-09-17 RX ORDER — HYDROMORPHONE HCL IN WATER/PF 6 MG/30 ML
0.2 PATIENT CONTROLLED ANALGESIA SYRINGE INTRAVENOUS
Status: DISCONTINUED | OUTPATIENT
Start: 2021-09-17 | End: 2021-09-18 | Stop reason: HOSPADM

## 2021-09-17 RX ORDER — LABETALOL HYDROCHLORIDE 5 MG/ML
10 INJECTION, SOLUTION INTRAVENOUS EVERY 10 MIN PRN
Status: DISCONTINUED | OUTPATIENT
Start: 2021-09-17 | End: 2021-09-17 | Stop reason: HOSPADM

## 2021-09-17 RX ORDER — OXYCODONE HYDROCHLORIDE 5 MG/1
5 TABLET ORAL EVERY 4 HOURS PRN
Status: DISCONTINUED | OUTPATIENT
Start: 2021-09-17 | End: 2021-09-18 | Stop reason: HOSPADM

## 2021-09-17 RX ADMIN — FENTANYL CITRATE 100 MCG: 50 INJECTION, SOLUTION INTRAMUSCULAR; INTRAVENOUS at 10:44

## 2021-09-17 RX ADMIN — VENLAFAXINE HYDROCHLORIDE 75 MG: 75 CAPSULE, EXTENDED RELEASE ORAL at 21:10

## 2021-09-17 RX ADMIN — FENTANYL CITRATE 25 MCG: 50 INJECTION, SOLUTION INTRAMUSCULAR; INTRAVENOUS at 13:32

## 2021-09-17 RX ADMIN — TOPIRAMATE 75 MG: 50 TABLET, FILM COATED ORAL at 21:10

## 2021-09-17 RX ADMIN — OXYCODONE HYDROCHLORIDE 5 MG: 5 TABLET ORAL at 16:22

## 2021-09-17 RX ADMIN — HYDROMORPHONE HYDROCHLORIDE 0.5 MG: 1 INJECTION, SOLUTION INTRAMUSCULAR; INTRAVENOUS; SUBCUTANEOUS at 11:24

## 2021-09-17 RX ADMIN — Medication 3 G: at 10:49

## 2021-09-17 RX ADMIN — MIDAZOLAM 1 MG: 1 INJECTION INTRAMUSCULAR; INTRAVENOUS at 10:39

## 2021-09-17 RX ADMIN — GLYCOPYRROLATE 0.8 MG: 0.2 INJECTION, SOLUTION INTRAMUSCULAR; INTRAVENOUS at 12:24

## 2021-09-17 RX ADMIN — SUCCINYLCHOLINE CHLORIDE 200 MG: 20 INJECTION, SOLUTION INTRAMUSCULAR; INTRAVENOUS; PARENTERAL at 10:45

## 2021-09-17 RX ADMIN — ROCURONIUM BROMIDE 20 MG: 10 INJECTION INTRAVENOUS at 11:31

## 2021-09-17 RX ADMIN — ACETAMINOPHEN 975 MG: 325 TABLET, FILM COATED ORAL at 16:05

## 2021-09-17 RX ADMIN — SODIUM CHLORIDE, POTASSIUM CHLORIDE, SODIUM LACTATE AND CALCIUM CHLORIDE: 600; 310; 30; 20 INJECTION, SOLUTION INTRAVENOUS at 10:37

## 2021-09-17 RX ADMIN — PROPOFOL 300 MG: 10 INJECTION, EMULSION INTRAVENOUS at 10:44

## 2021-09-17 RX ADMIN — LIDOCAINE HYDROCHLORIDE 100 MG: 20 INJECTION, SOLUTION INFILTRATION; PERINEURAL at 10:44

## 2021-09-17 RX ADMIN — ACETAMINOPHEN 975 MG: 325 TABLET, FILM COATED ORAL at 21:47

## 2021-09-17 RX ADMIN — FENTANYL CITRATE 50 MCG: 50 INJECTION, SOLUTION INTRAMUSCULAR; INTRAVENOUS at 13:39

## 2021-09-17 RX ADMIN — BUSPIRONE HYDROCHLORIDE 10 MG: 10 TABLET ORAL at 17:53

## 2021-09-17 RX ADMIN — CEFAZOLIN 1 G: 1 INJECTION, POWDER, FOR SOLUTION INTRAMUSCULAR; INTRAVENOUS at 18:47

## 2021-09-17 RX ADMIN — SENNOSIDES AND DOCUSATE SODIUM 1 TABLET: 8.6; 5 TABLET ORAL at 21:10

## 2021-09-17 RX ADMIN — HYDROMORPHONE HYDROCHLORIDE 0.2 MG: 0.2 INJECTION, SOLUTION INTRAMUSCULAR; INTRAVENOUS; SUBCUTANEOUS at 21:21

## 2021-09-17 RX ADMIN — NEOSTIGMINE METHYLSULFATE 5 MG: 1 INJECTION, SOLUTION INTRAVENOUS at 12:24

## 2021-09-17 RX ADMIN — PROPOFOL 40 MG: 10 INJECTION, EMULSION INTRAVENOUS at 12:26

## 2021-09-17 RX ADMIN — HYDROMORPHONE HYDROCHLORIDE 0.4 MG: 0.2 INJECTION, SOLUTION INTRAMUSCULAR; INTRAVENOUS; SUBCUTANEOUS at 13:55

## 2021-09-17 RX ADMIN — ONDANSETRON 4 MG: 2 INJECTION INTRAMUSCULAR; INTRAVENOUS at 12:19

## 2021-09-17 RX ADMIN — LOSARTAN POTASSIUM 100 MG: 100 TABLET, FILM COATED ORAL at 16:06

## 2021-09-17 RX ADMIN — MIDAZOLAM 1 MG: 1 INJECTION INTRAMUSCULAR; INTRAVENOUS at 10:37

## 2021-09-17 RX ADMIN — HYDROMORPHONE HYDROCHLORIDE 0.2 MG: 0.2 INJECTION, SOLUTION INTRAMUSCULAR; INTRAVENOUS; SUBCUTANEOUS at 18:47

## 2021-09-17 RX ADMIN — SPIRONOLACTONE 25 MG: 25 TABLET ORAL at 16:06

## 2021-09-17 RX ADMIN — FENTANYL CITRATE 50 MCG: 50 INJECTION, SOLUTION INTRAMUSCULAR; INTRAVENOUS at 13:49

## 2021-09-17 RX ADMIN — PROPOFOL 30 MG: 10 INJECTION, EMULSION INTRAVENOUS at 12:36

## 2021-09-17 RX ADMIN — DEXAMETHASONE SODIUM PHOSPHATE 4 MG: 4 INJECTION, SOLUTION INTRA-ARTICULAR; INTRALESIONAL; INTRAMUSCULAR; INTRAVENOUS; SOFT TISSUE at 10:51

## 2021-09-17 RX ADMIN — ROCURONIUM BROMIDE 50 MG: 10 INJECTION INTRAVENOUS at 10:53

## 2021-09-17 RX ADMIN — BUSPIRONE HYDROCHLORIDE 10 MG: 10 TABLET ORAL at 21:10

## 2021-09-17 ASSESSMENT — MIFFLIN-ST. JEOR: SCORE: 2169.8

## 2021-09-17 NOTE — ANESTHESIA CARE TRANSFER NOTE
Patient: Eva Solis    Procedure(s):  Lumbar 3 to Lumbar 5 Laminectomy    Diagnosis: Spinal stenosis of lumbar region, unspecified whether neurogenic claudication present [M48.061]  Diagnosis Additional Information: No value filed.    Anesthesia Type:   General     Note:    Oropharynx: oropharynx clear of all foreign objects and spontaneously breathing  Level of Consciousness: drowsy  Oxygen Supplementation: face mask  Level of Supplemental Oxygen (L/min / FiO2): 8  Independent Airway: airway patency satisfactory and stable  Dentition: dentition unchanged  Vital Signs Stable: post-procedure vital signs reviewed and stable  Report to RN Given: handoff report given  Patient transferred to: PACU    Handoff Report: Identifed the Patient, Identified the Reponsible Provider, Reviewed the pertinent medical history, Discussed the surgical course, Reviewed Intra-OP anesthesia mangement and issues during anesthesia, Set expectations for post-procedure period and Allowed opportunity for questions and acknowledgement of understanding      Vitals:  Vitals Value Taken Time   /90 09/17/21 1253   Temp     Pulse 93 09/17/21 1255   Resp 60 09/17/21 1255   SpO2 99 % 09/17/21 1255   Vitals shown include unvalidated device data.    Electronically Signed By: STACEY Cabrera CRNA  September 17, 2021  12:56 PM

## 2021-09-17 NOTE — ANESTHESIA PROCEDURE NOTES
Airway       Patient location during procedure: OR       Procedure Start/Stop Times: 9/17/2021 10:46 AM  Staff -        CRNA: Alexy Romero APRN CRNA       Performed By: CRNA  Consent for Airway        Urgency: elective  Indications and Patient Condition       Indications for airway management: lesly-procedural       Induction type:intravenous       Mask difficulty assessment: 2 - vent by mask + OA or adjuvant +/- NMBA    Final Airway Details       Final airway type: endotracheal airway       Successful airway: ETT - single  Endotracheal Airway Details        ETT size (mm): 7.0       Cuffed: yes       Successful intubation technique: video laryngoscopy       VL Blade Size: Glidescope 3       Grade View of Cords: 1       Adjucts: stylet       Position: Right       Measured from: gums/teeth       Secured at (cm): 23       Bite block used: Soft    Post intubation assessment        Placement verified by: capnometry, equal breath sounds and chest rise        Number of attempts at approach: 1       Number of other approaches attempted: 0       Secured with: pink tape       Ease of procedure: easy       Dentition: Intact and Unchanged

## 2021-09-17 NOTE — ANESTHESIA POSTPROCEDURE EVALUATION
Patient: Eva Solis    Procedure(s):  Lumbar 3 to Lumbar 5 Laminectomy    Diagnosis:Spinal stenosis of lumbar region, unspecified whether neurogenic claudication present [M48.061]  Diagnosis Additional Information: No value filed.    Anesthesia Type:  General    Note:  Disposition: Admission   Postop Pain Control: Uneventful            Sign Out: Well controlled pain   PONV: No   Neuro/Psych: Uneventful            Sign Out: Acceptable/Baseline neuro status   Airway/Respiratory: Uneventful            Sign Out: Acceptable/Baseline resp. status   CV/Hemodynamics: Uneventful            Sign Out: Acceptable CV status; No obvious hypovolemia; No obvious fluid overload   Other NRE: NONE   DID A NON-ROUTINE EVENT OCCUR? No           Last vitals:  Vitals Value Taken Time   /96 09/17/21 1400   Temp     Pulse 64 09/17/21 1403   Resp 6 09/17/21 1403   SpO2 95 % 09/17/21 1403   Vitals shown include unvalidated device data.    Electronically Signed By: Reed Shultz MD  September 17, 2021  5:39 PM

## 2021-09-17 NOTE — CONSULTS
[facility]  Consult Note - Hospitalist Service     Date of Admission:  9/17/2021  Consult Requested by: Dr. Wallis  Reason for Consult: Medical co management    Assessment & Plan      Eva Solis is a 61 year old female admitted on 9/17/2021. She has a past medical history of HFpEF, hypertension and depression who was admitted in the care of neurosurgery following L3-L4, L4-L5 bilateral laminectomy and decompression.  Hospital service was consulted for medical comanagement     Lumbar spinal stenosis status post L3-L4, L4-L5 bilateral laminectomy and decompression  -Postoperative day 0  -Routine  postoperative management and pain control defer to primary service.     Essential hypertension   HFpER [losartan 100 mg daily, spironolactone 25 mg daily and Lasix 20 mg daily] Most recent EF 10/2013 with EF 60-65% and grade 1 diastolic dysfunction.  She underwent nuclear stress test 8/12/2021 which did reveal a fixed anterior defect.  She was referred to cardiology who it was likely secondary to breast attenuation.  - Continue PTA losartan and spironolactone  - Hold Lasix for now  - Daily weights and I&Os  - Hydralazine prn for elevated BP     CKD, III: Baseline Cr 1-1.1  - BMP in am  - Avoid nephrotoxins     Depression  Anxiety  -Continue PTA BuSpar and Effexor     GERD  -Continue PTA PPI     Migraines  - Continue PTA topamax     The patient's care was discussed with the Patient.    Kaur Leo PA-C  [facility]  Securely message with the Vocera Web Console (learn more here)  Text page via [employer] Paging/Directory      Clinically Significant Risk Factors Present on Admission                   ______________________________________________________________________    Chief Complaint   S/p Lumbar decompression    History is obtained from the patient    History of Present Illness   Eva Solis is a 61 year old female with a past medical history of HFpEF,  hypertension, anxiety and depression who was admitted in the care of neurosurgery following a lumbar laminectomy and decompression.     Procedure was performed by Dr. Wallis Under general anesthesia.  Patient tolerated the procedure well.  EBL documented 100 mL.     Presently, she is evaluated in her hospital room. Post-op pain is well controlled. Denies chest pain or SOB. No N/V. She took her spironolactone this am.     Review of Systems   The 10 point Review of Systems is negative other than noted in the HPI or here.     Past Medical History    I have reviewed this patient's medical history and updated it with pertinent information if needed.   Past Medical History:   Diagnosis Date     Congestive heart failure (H) 3years     Depressive disorder 1-2 years    dealing with on a daily basis     DEPRESSIVE DISORDER NEC 2004     Diastolic dysfunction      Essential hypertension, benign      Generalized osteoarthrosis, unspecified site     knees     Headache(784.0)      Heart disease 3 years    CHF     Mixed anxiety depressive disorder 1/15/2016     PANIC DISORDER 2004       Past Surgical History   I have reviewed this patient's surgical history and updated it with pertinent information if needed.  Past Surgical History:   Procedure Laterality Date     C  DELIVERY ONLY      , Low Cervical     C  DELIVERY ONLY       C VAGINAL HYSTERECTOMY      without oophorectomy     COLONOSCOPY  10/06/10    attempt at colonscopy to 50cm     INJECT EPIDURAL LUMBAR N/A 10/18/2019    Procedure: INJECTION, SPINE, LUMBAR 4 - LUMBAR 5, EPIDURAL TRANSLAMINAR;  Surgeon: Trever Wheatley MD;  [facility]     INJECT EPIDURAL LUMBAR N/A 2020    Procedure: INJECTION, SPINE, LUMBAR 4-5, EPIDURAL TRANSLAMINAR;  Surgeon: Trever Wheatley MD;  [facility]     INJECT EPIDURAL LUMBAR N/A 2021    Procedure: Lumbar 4-5 Epidural Injection;  Surgeon: Trever Wheatley MD;  Location[facility]     JOINT  REPLACEMTN, KNEE RT/LT  5/11/2006    Right total knee arthroplasty.     JOINT REPLACEMTN, KNEE RT/LT  07/19/2006    Left total knee arthroplasty.     [facility] COLONOSCOPY W/WO BRUSH/WASH  11/21/2005    Diverticulosis.  Internal hemorrhoids.       Social History   I have reviewed this patient's social history and updated it with pertinent information if needed.  Social History     Tobacco Use     Smoking status: Never Smoker     Smokeless tobacco: Never Used     Tobacco comment: no smokers in household   Vaping Use     Vaping Use: Never used   Substance Use Topics     Alcohol use: No     Drug use: No       Family History   I have reviewed this patient's family history and updated it with pertinent information if needed.  Family History   Problem Relation Age of Onset     Cancer Father         squamous cell ca     Cancer Maternal Grandmother         lung     Cerebrovascular Disease Maternal Grandmother      Cancer Paternal Grandmother         squamous cell ca     Diabetes Sister      Cancer Sister         kidney cancer     Kidney Cancer Sister      Other Cancer Sister         kidney cancer     Bipolar Disorder Sister      Connective Tissue Disorder Brother         lupus     Lupus Brother      Kidney Disease Mother         atrophic kidney     Hypertension Mother      Depression Mother         she's being treated for this     Bipolar Disorder Mother      Depression Sister         dont know much     Kidney Disease Maternal Uncle         unclear kidney issue     Anxiety Disorder Son      Anxiety Disorder Niece      Anxiety Disorder Son      Colon Cancer No family hx of      Asthma No family hx of        Medications   I have reviewed this patient's current medications    Allergies   Allergies   Allergen Reactions     Lisinopril Cough       Physical Exam   Vital Signs: Temp: 98.2  F (36.8  C) Temp src: Oral BP: (!) 140/73 Pulse: 61   Resp: 17 SpO2: 95 % O2 Device: Nasal cannula Oxygen Delivery: 3 LPM  Weight: 349 lbs 0  oz    Constitutional: Alert, resting comfortably in NAD, obese  HEENT: Head normocephalic, atraumatic. Eyes sclera non icteric. Oropharynx clear and most  Respiratory: Normal effort, symmetric expansion, no crackles or wheezing  Cardiovascular: RRR no murmurs   GI: Non distended, normal bowels sounds, no tenderness or guarding  MSK: LE without edema. Dorsalis pedis pulse palpated bilaterally.   Skin/Integumen: Clear  Neuro: CN II-XII grossly intact  Psych:  Alert and oriented x 3. Normal affect      Data   Results for orders placed or performed during the hospital encounter of 09/17/21 (from the past 24 hour(s))   XR Surgery MIREYA Fluoro L/T 5 Min w Stills    Narrative    SURGERY C-ARM FLUORO LESS THAN 5 MINUTES WITH STILLS   9/17/2021 12:15  PM     HISTORY: L3-5 laminectomy.    COMPARISON: None.      Impression    IMPRESSION: Low quality intraoperative fluoroscopic spot images during  spinal surgery. Refer to operative report.    SHAVON PATEL MD         SYSTEM ID:  [id number]

## 2021-09-17 NOTE — ANESTHESIA PREPROCEDURE EVALUATION
Anesthesia Pre-Procedure Evaluation    Patient: Eva Solis   MRN: [id number] : 1960        Preoperative Diagnosis: Spinal stenosis of lumbar region, unspecified whether neurogenic claudication present [M48.061]   Procedure : Procedure(s):  Lumbar 3 to Lumbar 5 Laminectomy     Past Medical History:   Diagnosis Date     Congestive heart failure (H) 3years     Depressive disorder 1-2 years    dealing with on a daily basis     DEPRESSIVE DISORDER NEC 2004     Diastolic dysfunction      Essential hypertension, benign      Generalized osteoarthrosis, unspecified site     knees     Headache(784.0)      Heart disease 3 years    CHF     Mixed anxiety depressive disorder 1/15/2016     PANIC DISORDER 2004      Past Surgical History:   Procedure Laterality Date     C  DELIVERY ONLY      , Low Cervical     C  DELIVERY ONLY       C VAGINAL HYSTERECTOMY      without oophorectomy     COLONOSCOPY  10/06/10    attempt at colonscopy to 50cm     INJECT EPIDURAL LUMBAR N/A 10/18/2019    Procedure: INJECTION, SPINE, LUMBAR 4 - LUMBAR 5, EPIDURAL TRANSLAMINAR;  Surgeon: Trever Wheatley MD;  Location: [facility]     INJECT EPIDURAL LUMBAR N/A 2020    Procedure: INJECTION, SPINE, LUMBAR 4-5, EPIDURAL TRANSLAMINAR;  Surgeon: Trever Wheatley MD;  Location: [facility]     INJECT EPIDURAL LUMBAR N/A 2021    Procedure: Lumbar 4-5 Epidural Injection;  Surgeon: Trever Wheatley MD;  Location: [facility]     JOINT REPLACEMTN, KNEE RT/LT  2006    Right total knee arthroplasty.     JOINT REPLACEMTN, KNEE RT/LT  2006    Left total knee arthroplasty.     ZZHC COLONOSCOPY W/WO BRUSH/WASH  2005    Diverticulosis.  Internal hemorrhoids.      Allergies   Allergen Reactions     Lisinopril Cough      Social History     Tobacco Use     Smoking status: Never Smoker     Smokeless tobacco: Never Used     Tobacco comment: no smokers in household   Substance Use Topics     Alcohol use: No      Wt  "Readings from Last 1 Encounters:   09/17/21 (!) 158.3 kg (349 lb)        Anesthesia Evaluation   Pt has had prior anesthetic.     No history of anesthetic complications       ROS/MED HX  ENT/Pulmonary:  - neg pulmonary ROS  (-) sleep apnea   Neurologic: Comment: Back pain. Radiculopathy.      Cardiovascular: Comment: \"1. Preoperative cardiovascular evaluation for L3-L5 laminectomy:  2. Abnormal stress SPECT (small fixed anterior defect, likely breast attenuation artifact)  3. Essential hypertension  4. Chronic diastolic HF, clinically euvolemic and well-perfused\"    Approved by cardiology for surgery.    8/21     The nuclear stress test is probably negative for inducible myocardial ischemia or infarction. There is a fixed anterior defect- likely breast attenuation but cannot exclude nontransmural myocardial infarction.     Left ventricular function is hyperdynamic.     The left ventricular ejection fraction at stress is 79%.     A prior study was conducted on 10/16/2013.  This study has changes noted when compared with the prior study.    (+) hypertension-----CHF     METS/Exercise Tolerance:     Hematologic:  - neg hematologic  ROS     Musculoskeletal: Comment: Generalized osteoarthrosis, unspecified site knees      (+) arthritis,     GI/Hepatic:  - neg GI/hepatic ROS     Renal/Genitourinary:  - neg Renal ROS     Endo: Comment: BMI 56.    (+) Obesity,     Psychiatric/Substance Use:     (+) psychiatric history depression and anxiety     Infectious Disease:       Malignancy:  - neg malignancy ROS     Other:            Physical Exam    Airway  airway exam normal           Respiratory Devices and Support         Dental  no notable dental history         Cardiovascular   cardiovascular exam normal          Pulmonary   pulmonary exam normal                OUTSIDE LABS:  CBC:   Lab Results   Component Value Date    WBC 7.4 08/06/2021    WBC 6.6 09/02/2020    HGB 14.3 08/06/2021    HGB 14.1 09/02/2020    HCT 42.0 08/06/2021 "    HCT 42.4 09/02/2020     08/06/2021     09/02/2020     BMP:   Lab Results   Component Value Date     08/06/2021     04/12/2021    POTASSIUM 3.8 08/06/2021    POTASSIUM 3.7 04/12/2021    CHLORIDE 111 (H) 08/06/2021    CHLORIDE 108 04/12/2021    CO2 23 08/06/2021    CO2 23 04/12/2021    BUN 13 08/06/2021    BUN 15 04/12/2021    CR 0.96 08/06/2021    CR 1.11 (H) 04/12/2021    GLC 98 08/06/2021    GLC 96 04/12/2021     COAGS:   Lab Results   Component Value Date    INR 0.98 09/25/2013     POC: No results found for: BGM, HCG, HCGS  HEPATIC:   Lab Results   Component Value Date    ALBUMIN 3.7 08/06/2021    PROTTOTAL 7.8 08/06/2021    ALT 27 08/06/2021    AST 15 08/06/2021    ALKPHOS 85 08/06/2021    BILITOTAL 0.4 08/06/2021     OTHER:   Lab Results   Component Value Date    DEVANTE 8.5 08/06/2021    PHOS 2.9 05/18/2015    MAG 1.7 02/06/2015    TSH 1.25 08/27/2019       Anesthesia Plan    ASA Status:  2      Anesthesia Type: General.     - Airway: ETT   Induction: Intravenous.   Maintenance: Balanced.   Techniques and Equipment:     - Airway: Video-Laryngoscope         Consents    Anesthesia Plan(s) and associated risks, benefits, and realistic alternatives discussed. Questions answered and patient/representative(s) expressed understanding.     - Discussed with:  Patient         Postoperative Care    Pain management: IV analgesics, Multi-modal analgesia.   PONV prophylaxis: Ondansetron (or other 5HT-3), Dexamethasone or Solumedrol     Comments:         H&P reviewed: Unable to attach H&P to encounter due to EHR limitations. H&P Update: appropriate H&P reviewed, patient examined. No interval changes since H&P (within 30 days).         Reed Shultz MD

## 2021-09-17 NOTE — PLAN OF CARE
"BP (!) 140/73   Pulse 61   Temp 98.2  F (36.8  C) (Oral)   Resp 17   Ht 1.676 m (5' 6\")   Wt (!) 158.3 kg (349 lb)   SpO2 95%   BMI 56.33 kg/m      Nursing shift note  Summary: Patient in with back surgery  Alertness/orientation: Lethargic  Neuro: Intact  Cardiac: WDL  Resp: WDL IPI 7  GI: WDL  : No ramos present. Patient is voiding spontaneously  CMS: Intact  Mobility: A1 gaitbelt walker  Pain: 4-5 no meds given yet  Diet: Regular  Skin: Incision has significant drainage, will need to be changed  Plan: See PT tomorrow  Other Important Info:     "

## 2021-09-17 NOTE — OP NOTE
Date of surgery: 9/17/2021  Surgeon: Leonard Wallis MD  Assistant: RANJAN Lynn  Note: Alexa Abdul was present for and assisted with the entire surgery, and his/her role as an assistant was crucial for aid in positioning, exposure, suctioning, retraction, and closure    Preoperative diagnosis: Lumbar stenosis and neurogenic claudications  Postoperative diagnosis: Lumbar stenosis and neurogenic claudications    Procedure:  1.  L3-4 bilateral laminectomy and medial facetectomy for decompression of stenosis  2.  L4-5 bilateral laminectomy and medial facetectomy for decompression of stenosis  3.  Use of intraoperative fluoroscopy    EBL: 100 mL    Indications: 61-year-old female who presented with severe back and leg pain, profound loss of power in his legs and difficulty walking.  MRI showed severe stenosis at L3-4 and L4-5.  He underwent non-operative management with failure to improve.  Risks, benefits, indications, and alternatives were discussed with the patient and family in detail, and they wished to proceed.    Description of surgery: The patient was positioned prone.  Sterile prepping and draping procedures were performed.  Antibiotics were administered and timeout was performed.  A midline lumbar incision was performed.  The monopolar was used to expose the spinous processes, lamina, and medial facets from L3-L5.  Fluoroscopy was used to verify the correct level.  The Leksell rongeurs was used to remove the spinous processes at L3-4 and L4-5.  The high speed drill was then used to perform bilateral laminectomies and medial facetectomies at both L3-4 and L4-5.  The Kerrison rongeurs were then used to remove the Ligamentum flavum at both L3-4 and L4-5, with decompression of the thecal sac.  The bilateral nerve roots at both levels were noted to be decompressed.  Hemostasis was achieved and antibiotic irrigation was performed.  The fascia was closed with 0-Vicryl sutures, and the dermal layer  was closed with 2-0 vicryl sutures.  There were no intraprocedural complications.

## 2021-09-17 NOTE — BRIEF OP NOTE
[facility] Brief Operative Note    Pre-operative diagnosis: Spinal stenosis of lumbar region, unspecified whether neurogenic claudication present [M48.061]   Post-operative diagnosis As above   Procedure: Procedure(s):  Lumbar 3 to Lumbar 5 Laminectomy   Surgeon(s): Surgeon(s) and Role:     * Leonard Wallis MD - Primary   Estimated blood loss: 100 mL    Specimens: * No specimens in log *   Findings: See op note

## 2021-09-18 ENCOUNTER — APPOINTMENT (OUTPATIENT)
Dept: PHYSICAL THERAPY | Facility: CLINIC | Age: 61
End: 2021-09-18
Attending: NURSE PRACTITIONER
Payer: COMMERCIAL

## 2021-09-18 VITALS
HEIGHT: 66 IN | RESPIRATION RATE: 18 BRPM | TEMPERATURE: 98.6 F | DIASTOLIC BLOOD PRESSURE: 69 MMHG | BODY MASS INDEX: 47.09 KG/M2 | SYSTOLIC BLOOD PRESSURE: 129 MMHG | OXYGEN SATURATION: 92 % | HEART RATE: 75 BPM | WEIGHT: 293 LBS

## 2021-09-18 PROCEDURE — 250N000011 HC RX IP 250 OP 636: Performed by: NURSE PRACTITIONER

## 2021-09-18 PROCEDURE — 97530 THERAPEUTIC ACTIVITIES: CPT | Mod: GP

## 2021-09-18 PROCEDURE — 97116 GAIT TRAINING THERAPY: CPT | Mod: GP

## 2021-09-18 PROCEDURE — 250N000013 HC RX MED GY IP 250 OP 250 PS 637: Performed by: PHYSICIAN ASSISTANT

## 2021-09-18 PROCEDURE — 97161 PT EVAL LOW COMPLEX 20 MIN: CPT | Mod: GP

## 2021-09-18 PROCEDURE — 250N000013 HC RX MED GY IP 250 OP 250 PS 637: Performed by: NURSE PRACTITIONER

## 2021-09-18 RX ORDER — CEFAZOLIN SODIUM 2 G/100ML
2 INJECTION, SOLUTION INTRAVENOUS EVERY 8 HOURS
Status: DISCONTINUED | OUTPATIENT
Start: 2021-09-18 | End: 2021-09-18 | Stop reason: HOSPADM

## 2021-09-18 RX ORDER — AMOXICILLIN 250 MG
1 CAPSULE ORAL 2 TIMES DAILY PRN
Qty: 30 TABLET | Refills: 0 | Status: SHIPPED | OUTPATIENT
Start: 2021-09-18 | End: 2021-10-28

## 2021-09-18 RX ORDER — OXYCODONE HYDROCHLORIDE 5 MG/1
5-10 TABLET ORAL EVERY 4 HOURS PRN
Qty: 40 TABLET | Refills: 0 | Status: SHIPPED | OUTPATIENT
Start: 2021-09-18 | End: 2021-09-24

## 2021-09-18 RX ADMIN — SENNOSIDES AND DOCUSATE SODIUM 1 TABLET: 8.6; 5 TABLET ORAL at 09:41

## 2021-09-18 RX ADMIN — LOSARTAN POTASSIUM 100 MG: 100 TABLET, FILM COATED ORAL at 09:41

## 2021-09-18 RX ADMIN — CEFAZOLIN 1 G: 1 INJECTION, POWDER, FOR SOLUTION INTRAMUSCULAR; INTRAVENOUS at 11:24

## 2021-09-18 RX ADMIN — TOPIRAMATE 75 MG: 50 TABLET, FILM COATED ORAL at 09:41

## 2021-09-18 RX ADMIN — BUSPIRONE HYDROCHLORIDE 10 MG: 10 TABLET ORAL at 09:41

## 2021-09-18 RX ADMIN — ACETAMINOPHEN 975 MG: 325 TABLET, FILM COATED ORAL at 06:17

## 2021-09-18 RX ADMIN — OXYCODONE HYDROCHLORIDE 5 MG: 5 TABLET ORAL at 13:55

## 2021-09-18 RX ADMIN — POLYETHYLENE GLYCOL 3350 17 G: 17 POWDER, FOR SOLUTION ORAL at 09:41

## 2021-09-18 RX ADMIN — HYDROMORPHONE HYDROCHLORIDE 0.4 MG: 0.2 INJECTION, SOLUTION INTRAMUSCULAR; INTRAVENOUS; SUBCUTANEOUS at 07:33

## 2021-09-18 RX ADMIN — VENLAFAXINE HYDROCHLORIDE 150 MG: 150 CAPSULE, EXTENDED RELEASE ORAL at 09:41

## 2021-09-18 RX ADMIN — OXYCODONE HYDROCHLORIDE 10 MG: 5 TABLET ORAL at 09:41

## 2021-09-18 RX ADMIN — OMEPRAZOLE 20 MG: 20 CAPSULE, DELAYED RELEASE ORAL at 09:41

## 2021-09-18 RX ADMIN — SPIRONOLACTONE 25 MG: 25 TABLET ORAL at 09:41

## 2021-09-18 RX ADMIN — OXYCODONE HYDROCHLORIDE 10 MG: 5 TABLET ORAL at 04:31

## 2021-09-18 RX ADMIN — CEFAZOLIN 1 G: 1 INJECTION, POWDER, FOR SOLUTION INTRAMUSCULAR; INTRAVENOUS at 02:52

## 2021-09-18 RX ADMIN — ACETAMINOPHEN 975 MG: 325 TABLET, FILM COATED ORAL at 13:55

## 2021-09-18 NOTE — PROVIDER NOTIFICATION
MD paged [phone] Eva Solis. Does this patient need to go home on an antibiotic? The education materials indicate that this is the case, but I don't see any prescriptions. Thanks, Ambrocio MUÑIZ [phone]

## 2021-09-18 NOTE — PLAN OF CARE
Reason for Admission: L3-L5 laminectomy    Cognitive/Mentation: A/Ox 4  Neuros/CMS: Intact  VS: Stable.  GI: BS +, + flatus.   : Up to restroom. Continent.  Pulmonary: LS clear bilaterally.  Pain: Treating with PO oxycodone and scheduled pain meds.     Drains: R SUDARSHAN drain  Skin: Back incision, dressing changed previous shift  Activity: Assist x 1 with GB.  Diet: Regular with thin liquids. Takes pills whole.     Therapies recs: PT/OT  Discharge: Pending care plan    End of shift summary: Patient reported 10/10 pain in back as well as a headache half-way through shift, was given 10 mg of PO oxycodone. Headache relieved and pain more manageable. Scheduled tylenol given for pain as well.

## 2021-09-18 NOTE — PLAN OF CARE
Physical Therapy Discharge Summary    Reason for therapy discharge:    Discharged to home.    Progress towards therapy goal(s). See goals on Care Plan in [facility] electronic health record for goal details.  Goals partially met.  Barriers to achieving goals:   discharge from facility.    Therapy recommendation(s):    Pt would benefit from use of walker with ambulation, assist from  on stairs.      Jenise Goyal, PT on 9/18/2021 at 2:49 PM

## 2021-09-18 NOTE — PROGRESS NOTES
09/18/21 0953   Quick Adds   Type of Visit Initial PT Evaluation   Living Environment   People in home spouse   Current Living Arrangements house  ([facility])   Home Accessibility stairs to enter home   Number of Stairs, Main Entrance 3   Stair Railings, Main Entrance railings on both sides of stairs;railings safe and in good condition   Transportation Anticipated family or friend will provide   Living Environment Comments  works from home, will be there to provide assist. works at insurance office work.    Self-Care   Usual Activity Tolerance poor   Current Activity Tolerance fair   Regular Exercise No   Equipment Currently Used at Home none   Activity/Exercise/Self-Care Comment tub to step over, has walker and cane at home but hasn't used. was sleeping in a recliner chair. Hasn't walked normal in over a year.    Disability/Function   Hearing Difficulty or Deaf no   Wear Glasses or Blind yes   Vision Management glasses   Concentrating, Remembering or Making Decisions Difficulty no   Difficulty Communicating no   Difficulty Eating/Swallowing no   Walking or Climbing Stairs Difficulty yes   Dressing/Bathing Difficulty yes   Dressing/Bathing Management spouse would assist with donning socks.    Toileting issues no   Doing Errands Independently Difficulty (such as shopping) yes   Errands Management  does the errands.    Fall history within last six months yes   Number of times patient has fallen within last six months 3  (hurt wrist when fell in the shower 1-2 months ago)   General Information   Onset of Illness/Injury or Date of Surgery 09/17/21   Referring Physician Leonard Wallis MD   Patient/Family Therapy Goals Statement (PT) Plan to go home with  to assist 24 H   Pertinent History of Current Problem (include personal factors and/or comorbidities that impact the POC) Per notes: Eva Solis is a 61 year old female admitted on 9/17/2021. She has a past medical history of HFpEF,  hypertension and depression who was admitted in the care of neurosurgery following L3-L4, L4-L5 bilateral laminectomy and decompression. see MR for more info   Existing Precautions/Restrictions fall;spinal   Heart Disease Risk Factors Overweight;Lack of physical activity   Cognition   Orientation Status (Cognition) oriented x 3   Pain Assessment   Patient Currently in Pain Yes, see Vital Sign flowsheet  (5/10)   Integumentary/Edema   Integumentary/Edema Comments Dressing on lower back appears CDI, SUDARSHAN drain also intact   Posture    Posture Comments WFL   Range of Motion (ROM)   ROM Comment grossly WFL   Strength   Manual Muscle Testing Quick Adds MMT: Ankle   MMT: Ankle, Rehab Eval   Ankle Dorsiflexion - Left Side (3-/5) fair minus, left   Ankle Plantarflexion - Left Side (3-/5) fair minus, left   Ankle Dorsiflexion - Right Side (3-/5) fair minus, right   Ankle Plantarflexion - Right Side (3-/5) fair minus, right   Bed Mobility   Comment (Bed Mobility) not assess, pt plans on sleeping in recliner chair at home   Transfers   Transfer Safety Comments Sit<>stand FWW, Von   Gait/Stairs (Locomotion)   Comment (Gait/Stairs) Amb with FWW, CGA   Balance   Balance Comments Able to maintain seated balance with BUE support. Able to maintain standing balance in FWW, CGA>    Sensory Examination   Sensory Perception Comments states baseline numbness in B feet.    Clinical Impression   Criteria for Skilled Therapeutic Intervention yes, treatment indicated   PT Diagnosis (PT) Impaired gait   Influenced by the following impairments pain, impaired decreased strength, impaired activity tolerance, impaired mobility.    Functional limitations due to impairments Decreased functional independence, ADLs and IADL's.    Clinical Presentation Stable/Uncomplicated   Clinical Presentation Rationale per MR and clinical judgement   Clinical Decision Making (Complexity) low complexity   Therapy Frequency (PT) 2x/day   Predicted Duration of Therapy  Intervention (days/wks) 2 days   Planned Therapy Interventions (PT) bed mobility training;cryotherapy;gait training;stair training;transfer training;strengthening;ROM (range of motion);balance training   Anticipated Equipment Needs at Discharge (PT) gait belt;shower chair;toileting equipment   Risk & Benefits of therapy have been explained evaluation/treatment results reviewed;care plan/treatment goals reviewed;risks/benefits reviewed;current/potential barriers reviewed;participants voiced agreement with care plan;participants included;patient   PT Discharge Planning    PT Discharge Recommendation (DC Rec) home with assist   PT Rationale for DC Rec Anticipate pt may return home with  that is able to provide 24H assist when PT goals are met and pt is medically ready. Pt is a fall risk at this time and needs assistance when gettting out of the chair.    Total Evaluation Time   Total Evaluation Time (Minutes) 3     Jenise Goyal, PT on 9/18/2021 at 11:42 AM

## 2021-09-18 NOTE — PLAN OF CARE
OT Note: Pt with expressed concerns with toileting and showering at discharge. No full eval warranted but educated on equipment options for home. Discussed toilet tongs, bidet, positioning, shower chair, reacher and sock aid. Pt verbalizes understanding. No further questions at this time.

## 2021-09-18 NOTE — DISCHARGE SUMMARY
Service Date: 2021  Discharge Date: 2021    PRIMARY DIAGNOSIS:  Lumbar spinal stenosis.    OPERATION PERFORMED:  L3 through L5 laminectomy performed by Dr. Wallis on 2021.    HOSPITAL COURSE:  The patient is a 61-year-old female who presented to the clinic with a diagnosis of lumbar spinal stenosis in which surgical management was deemed appropriate and the patient was prepared for surgery on 2021 at which time she underwent the above-described procedure.  Please see the dictated operative report for further details.  She tolerated surgery well and there were no complications.    Postoperatively, she has made an adequate neurosurgical recovery.  She remained afebrile with stable vital signs.  She is tolerating a regular diet without difficulty and ambulating well.  Her operative incision is healing well and there are no signs of drainage, erythema or edema.  She is ready to discharge home on 2021 in improved condition as compared to admission.    All instructions regarding diet, activity, wound care, bowel management and followup were given to the patient, she was released in good condition.    DISCHARGE MEDICATIONS:     1.  Oxycodone 5 mg, instructed to take 1-2 tablets every 4 hours as needed for pain.  2.  Senna 8.6/50, #30, instructed to take 1 tablet twice daily as needed for constipation.    PLAN:  Followup in the Neurosurgery Clinic on 2021 for a nurse visit and EP Clinic on 10/28/2021 and 2021.    As always, the patient was instructed to call or return to the clinic for any worsening or changes in symptoms.    As Dictated by RANJAN STANLEY        D: 2021   T: 2021   MT: RUBY    Name:     ROSANNE ALFRED  MRN:      [id number]        Account:      [id number]   :      1960           Service Date: 2021                                  Discharge Date: 2021     Document: [id number]    cc:  Renetta Benitez MD

## 2021-09-18 NOTE — PLAN OF CARE
Pt had a Lumbar 3 to Lumbar 5 Laminectomy. POD#0. A/o X4. Neuros intact. VSS. Reg diet with thin liquids. Takes pills whole. Up with 1/GB. Urinating without difficulties.Back pain rated 7/10 and given 0.2 mg dilaudid. C/O headache and managed with schedule tylenol.Pt scoring green on the Aggression Stop Light Tool. Discharge pending.

## 2021-09-20 ENCOUNTER — TELEPHONE (OUTPATIENT)
Dept: NEUROSURGERY | Facility: CLINIC | Age: 61
End: 2021-09-20

## 2021-09-20 NOTE — TELEPHONE ENCOUNTER
Post-Op Call    DOS: 9/17/21  Surgery: Lumbar 3 to Lumbar 5 Laminectomy  Surgeon: Dr. Wallis     Called patient for post-operative follow-up.       Attempted to reach out to patient, no answer. Left voice message for patient to call clinic back to further discuss.

## 2021-09-22 ENCOUNTER — MYC MEDICAL ADVICE (OUTPATIENT)
Dept: FAMILY MEDICINE | Facility: OTHER | Age: 61
End: 2021-09-22

## 2021-09-22 ENCOUNTER — MYC MEDICAL ADVICE (OUTPATIENT)
Dept: NEUROSURGERY | Facility: CLINIC | Age: 61
End: 2021-09-22

## 2021-09-22 DIAGNOSIS — M48.062 LUMBAR STENOSIS WITH NEUROGENIC CLAUDICATION: ICD-10-CM

## 2021-09-22 RX ORDER — OXYCODONE HYDROCHLORIDE 5 MG/1
5-10 TABLET ORAL EVERY 4 HOURS PRN
Qty: 40 TABLET | Refills: 0 | Status: CANCELLED | OUTPATIENT
Start: 2021-09-22

## 2021-09-22 NOTE — TELEPHONE ENCOUNTER
Pulled [place]  report, no current prescriptions were available. Oxy last prescribed on 9/18/2021 by our practice. 40#, equating to 6 doses per day.     Please see [employer] message for pain assessment and regimen documentation.     Bee Pastor RN

## 2021-09-23 RX ORDER — OXYCODONE HYDROCHLORIDE 5 MG/1
5-10 TABLET ORAL EVERY 4 HOURS PRN
Qty: 40 TABLET | Refills: 0 | OUTPATIENT
Start: 2021-09-23

## 2021-09-23 NOTE — TELEPHONE ENCOUNTER
This is a duplicate request that has previously been approved or is in process. Sent denial notification to pharmacy.   Prescription denied per [facility] Refill Protocol.  Maricruz Cassa RN

## 2021-09-23 NOTE — TELEPHONE ENCOUNTER
Patient just had surgery, post op pain medication should be done by surgeon, I see that she has already reached out to surgery, so will decline this request as it is now appropriately routed to the surgeon.

## 2021-09-24 RX ORDER — OXYCODONE HYDROCHLORIDE 5 MG/1
5-10 TABLET ORAL EVERY 4 HOURS PRN
Qty: 40 TABLET | Refills: 0 | Status: SHIPPED | OUTPATIENT
Start: 2021-09-24 | End: 2021-10-28

## 2021-09-30 ENCOUNTER — OFFICE VISIT (OUTPATIENT)
Dept: NEUROSURGERY | Facility: CLINIC | Age: 61
End: 2021-09-30
Payer: COMMERCIAL

## 2021-09-30 VITALS
HEIGHT: 66 IN | RESPIRATION RATE: 18 BRPM | SYSTOLIC BLOOD PRESSURE: 155 MMHG | HEART RATE: 87 BPM | DIASTOLIC BLOOD PRESSURE: 95 MMHG | TEMPERATURE: 97.8 F | WEIGHT: 293 LBS | BODY MASS INDEX: 47.09 KG/M2

## 2021-09-30 DIAGNOSIS — Z98.890 S/P LAMINECTOMY: Primary | ICD-10-CM

## 2021-09-30 PROCEDURE — 99207 PR NO CHARGE NURSE ONLY: CPT

## 2021-09-30 ASSESSMENT — MIFFLIN-ST. JEOR: SCORE: 2178.88

## 2021-09-30 ASSESSMENT — PAIN SCALES - GENERAL: PAINLEVEL: MILD PAIN (2)

## 2021-09-30 NOTE — PATIENT INSTRUCTIONS
Instructions for Patient    Work letter provided.    BP elevated. Reminder to take BP medications when you get home    Keep your incision clean and dry at all times.     Ok to shower. No baths, swimming, or submerging in water until incision is well healed.      No lifting greater than 10-15 pounds. No bending, twisting, or overhead reaching.    Walking: Walking is the best way to start exercise after surgery. Take short frequent walks. You may gradually increase the distance as tolerated. If you feel pain, decrease your activity, but DO NOT stop walking. Walking will help you gain strength, prevent muscle soreness and spasms, and prevent blood clots.     Weaning from narcotic pain medications    When it is time, start weaning by extending the time between doses.     For example, if you're taking 2 tabs every 4 hours, spread it out to 2 tabs over 4.5, 5, 6 hours.     At that point you can certainly cut down to 1 tab, then wean to an as needed basis until completely done with them.    Medication refills: call our clinic 2-3 business days before you are out of medication. A nurse will call you back to obtain a pain assessment.     Don't take more than 3000mg of Acetaminophen in 24 hours    Ok to begin taking NSAIDs (ex: ibuprofen/Advil)    Encouraged icing for at least 1-2 times throughout the day for 20-30 minutes at a time. Avoid heat to the incision area.     Taking stool softeners regularly can reduce constipation commonly caused by narcotic pain medications .    Contact clinic right away if you develop:     Infection    New injury    Bladder or bowel changes or loss of control      Signs of blood clot:  o Swelling and/or warmth in one or both legs  o Pain or tenderness in your leg, ankle, foot, or arm   o Red or discolored skin     Go to the Emergency Room     If sudden onset of severe headache, weakness, confusion, change in level of consciousness, pain, or loss of movement.    Chest pain or trouble breathing      Post-operative appointments    6 week post op, 3 months post op  Courtney ATKINS RN  [facility]  [facility]  [address]  [place] [address]  Telephone:  [phone]   Fax:  [phone]

## 2021-09-30 NOTE — PROGRESS NOTES
Post-op Nurse Visit:    Patient presents today s/p L3-5 laminectomy on 9/17/21 per the order of Dr. Wallis.    Pain/Neuro Assessment  2/10 to low back described as dull/pressure.   Pain (compared to pre-op): almost gone  Numbness/tingling: almost gone comprared to preop. Only present in right great toe.  Strengh: equal strength to bilateral lower extremities . Patient reports continued weakness to upper thigh however improving since surgery.    Pain Relief Measures:  Oxycodone: 1-2 tabs per day  Tylenol: 2-3 tabs per day  Ibuprofen: 2-3 tabs per day  flexeril: not taking regularly. Only took 1 tab since surgery.  Ice: 1-2 times per day    Incision  Steri-strips present. Writer removed.    Incision inspected. Edges well-approximated. No redness, swelling, drainage, or warmth noted.    Assessment  Activity: following restrictions  Patient is walking frequently without difficulty.     Patient's appetite is normal  Bowel/bladder problems? No  Taking stool softeners? No     Refills given at this appointment? 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 Letter provided.    Patient questions answered about sleeping position.    All of patient's questions addressed today. She was instructed to call with any additional questions/concerns.     Instructions for Patient    Work letter provided.    BP elevated. Reminder to take BP medications when you get home    Keep your incision clean and dry at all times.     Ok to shower. No baths, swimming, or submerging in water until incision is well healed.      No lifting greater than 10-15 pounds. No bending, twisting, or overhead reaching.    Walking: Walking is the best way to start exercise after surgery. Take short frequent walks. You may gradually increase the distance as tolerated. If you feel pain, decrease your activity, but DO NOT stop walking. Walking will help you gain strength, prevent muscle soreness  and spasms, and prevent blood clots.     Weaning from narcotic pain medications    When it is time, start weaning by extending the time between doses.     For example, if you're taking 2 tabs every 4 hours, spread it out to 2 tabs over 4.5, 5, 6 hours.     At that point you can certainly cut down to 1 tab, then wean to an as needed basis until completely done with them.    Medication refills: call our clinic 2-3 business days before you are out of medication. A nurse will call you back to obtain a pain assessment.     Don't take more than 3000mg of Acetaminophen in 24 hours    Ok to begin taking NSAIDs (ex: ibuprofen/Advil)    Encouraged icing for at least 1-2 times throughout the day for 20-30 minutes at a time. Avoid heat to the incision area.     Taking stool softeners regularly can reduce constipation commonly caused by narcotic pain medications .    Contact clinic right away if you develop:     Infection    New injury    Bladder or bowel changes or loss of control      Signs of blood clot:  o Swelling and/or warmth in one or both legs  o Pain or tenderness in your leg, ankle, foot, or arm   o Red or discolored skin     Go to the Emergency Room     If sudden onset of severe headache, weakness, confusion, change in level of consciousness, pain, or loss of movement.    Chest pain or trouble breathing     Post-operative appointments    6 week post op, 3 months post op  Courtney ATKINS RN  [facility]  [facility]  [address]  [place] [address]  Telephone:  [phone]   Fax:  [phone]

## 2021-09-30 NOTE — LETTER
September 30, 2021      Eva Solis  [address] [facility] [address]        To Whom It May Concern:    Eva Solis was seen in our clinic. She may return to work 10/6/21 with the following: no lifting more than 10 lbs, no bending, twisting, or overhead reaching. Please allow her breaks as needed.      Sincerely,         RANJAN Machado  (electronically signed)

## 2021-10-23 ENCOUNTER — HEALTH MAINTENANCE LETTER (OUTPATIENT)
Age: 61
End: 2021-10-23

## 2021-10-28 ENCOUNTER — OFFICE VISIT (OUTPATIENT)
Dept: NEUROSURGERY | Facility: CLINIC | Age: 61
End: 2021-10-28
Payer: COMMERCIAL

## 2021-10-28 VITALS
BODY MASS INDEX: 47.09 KG/M2 | TEMPERATURE: 98 F | DIASTOLIC BLOOD PRESSURE: 92 MMHG | HEIGHT: 66 IN | WEIGHT: 293 LBS | SYSTOLIC BLOOD PRESSURE: 150 MMHG

## 2021-10-28 DIAGNOSIS — Z98.890 S/P LAMINECTOMY: Primary | ICD-10-CM

## 2021-10-28 PROCEDURE — 99024 POSTOP FOLLOW-UP VISIT: CPT | Performed by: NURSE PRACTITIONER

## 2021-10-28 ASSESSMENT — PAIN SCALES - GENERAL: PAINLEVEL: NO PAIN (1)

## 2021-10-28 ASSESSMENT — MIFFLIN-ST. JEOR: SCORE: 2135.78

## 2021-10-28 NOTE — PROGRESS NOTES
"Eva Solis is a 61 year old female who presents for:  Chief Complaint   Patient presents with     Neurologic Problem     6 wk f/u: S/p Laminectomy L3-5 DOS  09/17/21        Initial Vitals:  BP (!) 150/92   Temp 98  F (36.7  C) (Temporal)   Ht 5' 6\" (1.676 m)   Wt (!) 341 lb 8 oz (154.9 kg)   BMI 55.12 kg/m   Estimated body mass index is 55.12 kg/m  as calculated from the following:    Height as of this encounter: 5' 6\" (1.676 m).    Weight as of this encounter: 341 lb 8 oz (154.9 kg).. Body surface area is 2.69 meters squared. BP completed using cuff size: regular  No Pain (1)    Nursing Comments:     Lyn Blevins    "

## 2021-10-28 NOTE — LETTER
"    10/28/2021         RE: Eva Solis  [address]  [facility] [address]        Dear Colleague,    Thank you for referring your patient, Eva Solis, to the [facility] NEUROSURGERY [facility]INIC [place]. Please see a copy of my visit note below.    [facility] Neurosurgery  Neurosurgery Follow Up:    HPI: 6 weeks s/p L3-5 laminectomy with Dr. Wallis on 9/17/2021. Doing well. Minimal back pain. No leg pain. Right foot improved. Continues to have feelings of socks on her feet which is not new for her. Incision intact. She is very happy with the results of surgery. Plans to increase activities and to start chair exercises soon.    Medical, surgical, family, and social history unchanged since prior exam.  Exam:  Constitutional:  Alert, well nourished, NAD.  HEENT: Normocephalic, atraumatic.   Pulm:  Without shortness of breath.   CV:  No pitting edema of BLE.      Vital Signs:  BP (!) 150/92   Temp 98  F (36.7  C) (Temporal)   Ht 5' 6\" (1.676 m)   Wt (!) 341 lb 8 oz (154.9 kg)   BMI 55.12 kg/m      Neurological:  Awake  Alert  Oriented x 3  Motor exam:        IP Q DF PF EHL  R   5  5   5   5    5  L   5  5   5   5    5     Able to spontaneously move L/E bilaterally  Sensation intact throughout all L/E dermatomes     Incisions:  Healing nicely.     A/P: s/p lumbar laminectomy. Routine post operative cares. Follow up in 6 weeks. She verbalized understanding and agreement.  Patient Instructions   - May increase lifting restriction to 20-25 pounds.  - Follow up in 6 weeks.   - Call the clinic at [phone] for increased pain or any other questions and concerns.    Alexa Abdul, RADU  [facility]surgery  [address] [place] [address]  [address] [place] [address]  Tel [phone]  Fax [phone]        Again, thank you for allowing me to participate in the care of your patient.        Sincerely,        Alexa Abdul, NP    "

## 2021-10-28 NOTE — PROGRESS NOTES
"[facility] Neurosurgery  Neurosurgery Follow Up:    HPI: 6 weeks s/p L3-5 laminectomy with Dr. Wallis on 9/17/2021. Doing well. Minimal back pain. No leg pain. Right foot improved. Continues to have feelings of socks on her feet which is not new for her. Incision intact. She is very happy with the results of surgery. Plans to increase activities and to start chair exercises soon.    Medical, surgical, family, and social history unchanged since prior exam.  Exam:  Constitutional:  Alert, well nourished, NAD.  HEENT: Normocephalic, atraumatic.   Pulm:  Without shortness of breath.   CV:  No pitting edema of BLE.      Vital Signs:  BP (!) 150/92   Temp 98  F (36.7  C) (Temporal)   Ht 5' 6\" (1.676 m)   Wt (!) 341 lb 8 oz (154.9 kg)   BMI 55.12 kg/m      Neurological:  Awake  Alert  Oriented x 3  Motor exam:        IP Q DF PF EHL  R   5  5   5   5    5  L   5  5   5   5    5     Able to spontaneously move L/E bilaterally  Sensation intact throughout all L/E dermatomes     Incisions:  Healing nicely.     A/P: s/p lumbar laminectomy. Routine post operative cares. Follow up in 6 weeks. She verbalized understanding and agreement.  Patient Instructions   - May increase lifting restriction to 20-25 pounds.  - Follow up in 6 weeks.   - Call the clinic at [phone] for increased pain or any other questions and concerns.    Alexa Abdul, CNP  [facility]surgery  [address] [place] [address]  Tel [phone]  Fax [phone]    "

## 2021-12-16 ENCOUNTER — OFFICE VISIT (OUTPATIENT)
Dept: NEUROSURGERY | Facility: CLINIC | Age: 61
End: 2021-12-16
Payer: COMMERCIAL

## 2021-12-16 VITALS
HEIGHT: 66 IN | BODY MASS INDEX: 47.09 KG/M2 | SYSTOLIC BLOOD PRESSURE: 152 MMHG | DIASTOLIC BLOOD PRESSURE: 92 MMHG | WEIGHT: 293 LBS | TEMPERATURE: 97.8 F

## 2021-12-16 DIAGNOSIS — Z98.890 S/P LAMINECTOMY: Primary | ICD-10-CM

## 2021-12-16 PROCEDURE — 99024 POSTOP FOLLOW-UP VISIT: CPT | Performed by: NURSE PRACTITIONER

## 2021-12-16 ASSESSMENT — MIFFLIN-ST. JEOR: SCORE: 2113.1

## 2021-12-16 ASSESSMENT — PAIN SCALES - GENERAL: PAINLEVEL: NO PAIN (0)

## 2021-12-16 NOTE — PROGRESS NOTES
"[facility] Neurosurgery  Neurosurgery Follow Up:    HPI: 3 months s/p L3-5 laminectomy with Dr. Wallis on 9/17/2021. Doing well. Denies back and leg pain at current. Minimal residual feeling of a sock on her right foot, otherwise resolved. Happy with the results of his surgery. Incision intact.    Medical, surgical, family, and social history unchanged since prior exam.  Exam:  Constitutional:  Alert, well nourished, NAD.  HEENT: Normocephalic, atraumatic.   Pulm:  Without shortness of breath   CV:  No pitting edema of BLE.      Vital Signs:  BP (!) 152/92   Temp 97.8  F (36.6  C) (Temporal)   Ht 5' 6\" (1.676 m)   Wt (!) 336 lb 8 oz (152.6 kg)   BMI 54.31 kg/m      Neurological:  Awake  Alert  Oriented x 3  Motor exam:        IP Q DF PF EHL  R   5  5   5   5    5  L   5  5   5   5    5     Able to spontaneously move L/E bilaterally  Sensation intact throughout all L/E dermatomes     Incisions:  Healing nicely    A/P: s/p lumbar laminectomy. Will continue to increase activities. Follow up as needed. She verbalized understanding and agreement.  Patient Instructions   - May increase activity as tolerated.  - Follow up as needed.   - Call the clinic at [phone] for increased pain or any other questions and concerns.    Alexa Abdul, CNP  [facility] Neurosurgery  [address] [place] [address]  Tel [phone]  Fax [phone]    "

## 2021-12-16 NOTE — PATIENT INSTRUCTIONS
- May increase activity as tolerated.  - Follow up as needed.   - Call the clinic at [phone] for increased pain or any other questions and concerns.

## 2021-12-16 NOTE — LETTER
"    12/16/2021         RE: Eva Solis  [address]  [facility] [address]        Dear Colleague,    Thank you for referring your patient, Eva Solis, to the [facility]UROSURGERY CLINIC [place]. Please see a copy of my visit note below.    [facility] Neurosurgery  Neurosurgery Follow Up:    HPI: 3 months s/p L3-5 laminectomy with Dr. Wallis on 9/17/2021. Doing well. Denies back and leg pain at current. Minimal residual feeling of a sock on her right foot, otherwise resolved. Happy with the results of his surgery. Incision intact.    Medical, surgical, family, and social history unchanged since prior exam.  Exam:  Constitutional:  Alert, well nourished, NAD.  HEENT: Normocephalic, atraumatic.   Pulm:  Without shortness of breath   CV:  No pitting edema of BLE.      Vital Signs:  BP (!) 152/92   Temp 97.8  F (36.6  C) (Temporal)   Ht 5' 6\" (1.676 m)   Wt (!) 336 lb 8 oz (152.6 kg)   BMI 54.31 kg/m      Neurological:  Awake  Alert  Oriented x 3  Motor exam:        IP Q DF PF EHL  R   5  5   5   5    5  L   5  5   5   5    5     Able to spontaneously move L/E bilaterally  Sensation intact throughout all L/E dermatomes     Incisions:  Healing nicely    A/P: s/p lumbar laminectomy. Will continue to increase activities. Follow up as needed. She verbalized understanding and agreement.  Patient Instructions   - May increase activity as tolerated.  - Follow up as needed.   - Call the clinic at [phone] for increased pain or any other questions and concerns.    Alexa Abdul, RADU  [facility] Neurosurgery  [address] [place] [address]  [address] [place] [address]  Tel [phone]  Fax [phone]        Again, thank you for allowing me to participate in the care of your patient.        Sincerely,        Alexa Abdul, NP    "

## 2021-12-16 NOTE — NURSING NOTE
ROSANNE to follow up with Primary Care provider regarding elevated blood pressure. Patient states she forgot to take blood pressure meds today. Alma Rosa Wells, CMA

## 2022-01-03 DIAGNOSIS — F41.8 MIXED ANXIETY DEPRESSIVE DISORDER: ICD-10-CM

## 2022-01-03 RX ORDER — VENLAFAXINE HYDROCHLORIDE 75 MG/1
225 CAPSULE, EXTENDED RELEASE ORAL EVERY EVENING
Qty: 90 CAPSULE | Refills: 0 | Status: CANCELLED | OUTPATIENT
Start: 2022-01-03

## 2022-01-03 NOTE — TELEPHONE ENCOUNTER
Date of Last Office Visit: 08/27/21  Date of Next Office Visit: none--Intake to reach out for follow-up appointment  No shows since last visit: none  Cancellations since last visit: nolne     1.Medication requested:venlafaxine (EFFEXOR-XR) 75 MG 24 hr capsule. Take total of 225 mg QD Date last ordered: 12/7/21 Qty: 90 Refills: 0        Review of [place] ?: N/A     Lapse in medication adherence greater than 5 days?: Unable to determine with information available.  If yes, call patient and gather details: called-Unavailable     Last visit treatment plan:   Treatment Plan:    Continue BuSpar 10 mg 3 times daily for anxiety    Continue Effexor-XR 2 225 mg daily for mood, anxiety, pain    Continue alprazolam/Xanax 0.25-0.50 mg 30 minutes before flying as needed for anxiety.     Continue all other cares per primary care provider.     Continue all other medications as reviewed per electronic medical record today.     Safety plan reviewed. To the Emergency Department as needed or call after hours crisis line at [phone] or [phone]. [place] Crisis Text Line. Text [place] to 730114 or Suicide LifeLine Chat: suicidepreventionlifeline.org/chat    Consider reengaging in therapy if necessary.    Schedule an appointment with me in 2-3 months or sooner as needed. Call [facility] at [phone] to schedule.    Follow up with primary care provider as planned or for acute medical concerns.    Call the psychiatric nurse line with medication questions or concerns at [phone].    MyChart may be used to communicate with your provider, but this is not intended to be used for emergencies.     Risks of benzodiazepine (Ativan, Xanax, Klonopin, Valium, etc) use including, but not limited to, sedation, tolerance, risk for addiction/dependence. Do not drink alcohol while taking benzodiazepines due to risk of trouble breathing and potential death. Do not drive or operate heavy machinery until it is known how the drug  affects you. Discuss with physician or pharmacist before ever taking a benzodiazepine with a narcotic/opioid pain medication  []?Medication refilled per  Medication Refill in Ambulatory Care  policy.     []?Medication unable to be refilled by RN due to criteria not met as indicated below:               []?Eligibility - not seen in the last year              [x]?Supervision - no future appointment              []?Compliance - no shows, cancellations or lapse in therapy              []?Verification - order discrepancy              []?Controlled medication              [x]?Medication not included in policy              []?90-day supply request              []?Other:

## 2022-01-04 ENCOUNTER — OFFICE VISIT (OUTPATIENT)
Dept: FAMILY MEDICINE | Facility: OTHER | Age: 62
End: 2022-01-04
Payer: COMMERCIAL

## 2022-01-04 VITALS
WEIGHT: 293 LBS | RESPIRATION RATE: 18 BRPM | DIASTOLIC BLOOD PRESSURE: 84 MMHG | TEMPERATURE: 96.7 F | SYSTOLIC BLOOD PRESSURE: 130 MMHG | OXYGEN SATURATION: 99 % | HEIGHT: 66 IN | BODY MASS INDEX: 47.09 KG/M2 | HEART RATE: 78 BPM

## 2022-01-04 DIAGNOSIS — H60.502 ACUTE OTITIS EXTERNA OF LEFT EAR, UNSPECIFIED TYPE: ICD-10-CM

## 2022-01-04 DIAGNOSIS — I10 HTN, GOAL BELOW 140/90: ICD-10-CM

## 2022-01-04 DIAGNOSIS — E66.01 MORBID OBESITY, UNSPECIFIED OBESITY TYPE (H): ICD-10-CM

## 2022-01-04 DIAGNOSIS — I50.32 CHRONIC DIASTOLIC CONGESTIVE HEART FAILURE (H): ICD-10-CM

## 2022-01-04 DIAGNOSIS — F33.1 MAJOR DEPRESSIVE DISORDER, RECURRENT EPISODE, MODERATE (H): Primary | ICD-10-CM

## 2022-01-04 PROBLEM — M48.062 LUMBAR STENOSIS WITH NEUROGENIC CLAUDICATION: Status: RESOLVED | Noted: 2021-07-01 | Resolved: 2022-01-04

## 2022-01-04 PROBLEM — G89.29 CHRONIC BILATERAL LOW BACK PAIN, UNSPECIFIED WHETHER SCIATICA PRESENT: Status: RESOLVED | Noted: 2021-04-16 | Resolved: 2022-01-04

## 2022-01-04 PROBLEM — M54.50 CHRONIC BILATERAL LOW BACK PAIN, UNSPECIFIED WHETHER SCIATICA PRESENT: Status: RESOLVED | Noted: 2021-04-16 | Resolved: 2022-01-04

## 2022-01-04 PROBLEM — M48.061 SPINAL STENOSIS OF LUMBAR REGION, UNSPECIFIED WHETHER NEUROGENIC CLAUDICATION PRESENT: Status: RESOLVED | Noted: 2021-09-17 | Resolved: 2022-01-04

## 2022-01-04 PROCEDURE — 99214 OFFICE O/P EST MOD 30 MIN: CPT | Performed by: FAMILY MEDICINE

## 2022-01-04 RX ORDER — MELOXICAM 15 MG/1
15 TABLET ORAL DAILY
Qty: 30 TABLET | Refills: 1 | Status: SHIPPED | OUTPATIENT
Start: 2022-01-04 | End: 2022-03-24

## 2022-01-04 RX ORDER — BUSPIRONE HYDROCHLORIDE 10 MG/1
10 TABLET ORAL 3 TIMES DAILY
Qty: 270 TABLET | Refills: 1 | Status: SHIPPED | OUTPATIENT
Start: 2022-01-04 | End: 2022-07-22

## 2022-01-04 RX ORDER — NEOMYCIN SULFATE, POLYMYXIN B SULFATE, HYDROCORTISONE 3.5; 10000; 1 MG/ML; [USP'U]/ML; MG/ML
3 SOLUTION/ DROPS AURICULAR (OTIC) 4 TIMES DAILY
Qty: 10 ML | Refills: 0 | Status: SHIPPED | OUTPATIENT
Start: 2022-01-04 | End: 2022-03-24

## 2022-01-04 RX ORDER — FUROSEMIDE 20 MG
20 TABLET ORAL DAILY
Qty: 90 TABLET | Refills: 3
Start: 2022-01-04 | End: 2023-07-18

## 2022-01-04 RX ORDER — VENLAFAXINE HYDROCHLORIDE 75 MG/1
225 CAPSULE, EXTENDED RELEASE ORAL EVERY EVENING
Qty: 270 CAPSULE | Refills: 1 | Status: SHIPPED | OUTPATIENT
Start: 2022-01-04 | End: 2022-07-22

## 2022-01-04 ASSESSMENT — ANXIETY QUESTIONNAIRES
2. NOT BEING ABLE TO STOP OR CONTROL WORRYING: MORE THAN HALF THE DAYS
3. WORRYING TOO MUCH ABOUT DIFFERENT THINGS: NEARLY EVERY DAY
5. BEING SO RESTLESS THAT IT IS HARD TO SIT STILL: SEVERAL DAYS
7. FEELING AFRAID AS IF SOMETHING AWFUL MIGHT HAPPEN: MORE THAN HALF THE DAYS
7. FEELING AFRAID AS IF SOMETHING AWFUL MIGHT HAPPEN: MORE THAN HALF THE DAYS
4. TROUBLE RELAXING: MORE THAN HALF THE DAYS
GAD7 TOTAL SCORE: 13
1. FEELING NERVOUS, ANXIOUS, OR ON EDGE: MORE THAN HALF THE DAYS
GAD7 TOTAL SCORE: 13
6. BECOMING EASILY ANNOYED OR IRRITABLE: SEVERAL DAYS
GAD7 TOTAL SCORE: 13

## 2022-01-04 ASSESSMENT — PAIN SCALES - GENERAL: PAINLEVEL: MILD PAIN (2)

## 2022-01-04 ASSESSMENT — PATIENT HEALTH QUESTIONNAIRE - PHQ9
SUM OF ALL RESPONSES TO PHQ QUESTIONS 1-9: 10
SUM OF ALL RESPONSES TO PHQ QUESTIONS 1-9: 10
10. IF YOU CHECKED OFF ANY PROBLEMS, HOW DIFFICULT HAVE THESE PROBLEMS MADE IT FOR YOU TO DO YOUR WORK, TAKE CARE OF THINGS AT HOME, OR GET ALONG WITH OTHER PEOPLE: SOMEWHAT DIFFICULT

## 2022-01-04 ASSESSMENT — MIFFLIN-ST. JEOR: SCORE: 2078.83

## 2022-01-04 NOTE — PROGRESS NOTES
"  Assessment & Plan     Major depressive disorder, recurrent episode, moderate (H)  She would like me to resume prescribing her medications.  She states she has a lot of family stuff that she has worked through.  She still sad about her sisters medical health.  She feels the venlafaxine and BuSpar are working well.  She is not interested in increasing her BuSpar at this time.    - venlafaxine (EFFEXOR-XR) 75 MG 24 hr capsule; Take 3 capsules (225 mg) by mouth every evening In addition to morning dose  - busPIRone (BUSPAR) 10 MG tablet; Take 1 tablet (10 mg) by mouth 3 times daily    Chronic diastolic congestive heart failure (H)  Patient decrease her diuretic on her own.  She does not appear to be volume overloaded.  She is continued to lose weight since her back surgery.  We will continue on her Lasix once a day.  Did discuss that if she has increasing shortness of breath or increasing edema she should let me know.    - furosemide (LASIX) 20 MG tablet; Take 1 tablet (20 mg) by mouth daily    HTN, goal below 140/90  Well-controlled.  Continue her Lasix, losartan and spironolactone.    - furosemide (LASIX) 20 MG tablet; Take 1 tablet (20 mg) by mouth daily    Acute otitis externa of left ear, unspecified type  We will treat her for possible otitis externa secondary to tragus pain and pain with otoscope.  Will treat with Cortisporin.  Will use meloxicam for pain.  If this does not improve in the next couple of weeks she will let me know and we will refer to ENT.    - neomycin-polymyxin-hydrocortisone (CORTISPORIN) 3.5-55544-2 otic solution; Place 3 drops Into the left ear 4 times daily  - meloxicam (MOBIC) 15 MG tablet; Take 1 tablet (15 mg) by mouth daily    Morbid obesity  Congratulated her on her weight loss so far.  She continues to be active following her back surgery.     BMI:   Estimated body mass index is 53.13 kg/m  as calculated from the following:    Height as of this encounter: 1.676 m (5' 5.98\").    " "Weight as of this encounter: 149.2 kg (329 lb).   Weight management plan: Discussed healthy diet and exercise guidelines    Encouraged her to make a physical appointment in the next few months to catch her up on the rest of her preventative cares.  She feels that she is in a better place to care for the rest of her after her back surgery and improvement of her mental health.    Renetta Benitez MD  [facility] JAN LAY is a 61 year old who presents for the following health issues     History of Present Illness       She eats 2-3 servings of fruits and vegetables daily.She consumes 2 sweetened beverage(s) daily.She exercises with enough effort to increase her heart rate 9 or less minutes per day.  She exercises with enough effort to increase her heart rate 3 or less days per week. She is missing 1 dose(s) of medications per week.       Concern: 2 weeks ongoing - Left ear pain and feeling of draining. Causing headaches, itchiness, and jaw is cracking with it.  She denies sinus congestion, nasal congestion, rhinorrhea, postnasal drip, sore throat, fever or chills.    She tells me her back surgery went very well.  Her pain has resolved.  She is able to walk.  Since her surgery she has lost over 20 pounds.    She also feels that her depression has improved.  She states she had to work through some family issues and she feels she is at a good state.  She does not want to return to psychiatry at this time.  She does have some more anxiety and we discussed increasing BuSpar but she declines.    She tells me that she decreased her Lasix from twice daily to once a day.  She denies shortness of breath.  She denies increased swelling.  She always sleeps in recliner but this is as she feels it is more comfortable.  It has nothing to do with her breathing.          Objective    /84   Pulse 78   Temp (!) 96.7  F (35.9  C) (Temporal)   Resp 18   Ht 1.676 m (5' 5.98\")   Wt 149.2 kg " (329 lb)   SpO2 99%   BMI 53.13 kg/m    Body mass index is 53.13 kg/m .  Physical Exam   Gen: no apparent distress  Sinuses: Nontender to palpation  Oropharynx: No erythema or exudate  Right ear: Normal ear canal, normal tympanic membrane  Left ear: Positive tragus pain, some discomfort with using the otoscope.  No obvious canal swelling or discharge.  Tympanic membrane is normal.  Some clicking of the jaw with opening and closing on the left.  NECK: no adenopathy, no asymmetry, no masses  Chest: clear to auscultation without wheeze, rale or rhonchi  Cor: regular rate and rhythm without murmur  ABDOMEN: soft, nontender, no masses and bowel sounds normal  Ext: warm and dry without edema  Psych: Alert and oriented times 3; coherent speech, normal   rate and volume, able to articulate logical thoughts, able   to abstract reason, no tangential thoughts, no hallucinations   or delusions  Her affect is neutral        Answers for HPI/ROS submitted by the patient on 1/4/2022  If you checked off any problems, how difficult have these problems made it for you to do your work, take care of things at home, or get along with other people?: Somewhat difficult  PHQ9 TOTAL SCORE: 10  LORETA 7 TOTAL SCORE: 13

## 2022-01-04 NOTE — TELEPHONE ENCOUNTER
Saw patient today, she feels stable and wants me to resume her medication management, I took care of her refills.

## 2022-01-05 ASSESSMENT — ANXIETY QUESTIONNAIRES: GAD7 TOTAL SCORE: 13

## 2022-01-05 NOTE — TELEPHONE ENCOUNTER
Received rx request for Effexor 75 mg through right fax. Patient's pcp sent all medications to her pharmacy.    FYI for PCP-Pt Lasix did not go to the pharmacy, the script went to no print.     Outpatient Medication Detail     Disp Refills Start End JC   furosemide (LASIX) 20 MG tablet 90 tablet 3 1/4/2022  No   Sig - Route: Take 1 tablet (20 mg) by mouth daily - Oral   Class: No Print Out   Order: [id number]

## 2022-02-12 ENCOUNTER — HEALTH MAINTENANCE LETTER (OUTPATIENT)
Age: 62
End: 2022-02-12

## 2022-03-14 ENCOUNTER — E-VISIT (OUTPATIENT)
Dept: FAMILY MEDICINE | Facility: OTHER | Age: 62
End: 2022-03-14
Payer: COMMERCIAL

## 2022-03-14 DIAGNOSIS — G43.009 MIGRAINE WITHOUT AURA AND WITHOUT STATUS MIGRAINOSUS, NOT INTRACTABLE: Primary | ICD-10-CM

## 2022-03-14 PROCEDURE — 99421 OL DIG E/M SVC 5-10 MIN: CPT | Performed by: PHYSICIAN ASSISTANT

## 2022-03-14 RX ORDER — TOPIRAMATE 50 MG/1
75 TABLET, FILM COATED ORAL 2 TIMES DAILY
Qty: 90 TABLET | Refills: 1 | Status: SHIPPED | OUTPATIENT
Start: 2022-03-14 | End: 2022-05-19

## 2022-03-14 NOTE — PATIENT INSTRUCTIONS
"    Self-Care for Headaches  Most headaches aren't serious and can be relieved with self-care. But some headaches may be a sign of another health problem like eye trouble or high blood pressure. To find the best treatment, learn what kind of headaches you get. For tension headaches, self-care will usually help. To treat migraines, ask your healthcare provider for advice. It's also possible to get both tension and migraine headaches. Self-care involves relieving the pain and avoiding headache \"triggers\" if you can.     Ways to reduce pain and tension  Try these steps:    Apply a cold compress or ice pack to the pain site.    Drink fluids. If nausea makes it hard to drink, try sucking on ice.    Rest. Protect yourself from bright light and loud noises.    Calm your emotions by imagining a peaceful scene.    Massage tight neck, shoulder, and head muscles.    To relax muscles, soak in a hot bath or use a hot shower.  Use medicines  Aspirin or other over-the-counter (OTC) pain medicines such as ibuprofen and acetaminophen can relieve headache. Remember: Never give aspirin to anyone 18 years old or younger because of the risk of getting Reye syndrome. Use pain medicines only when needed. Certain prescription and OTC medicines can lead to rebound headaches if they are taken too often. Check with your healthcare provider or pharmacist about your medicines.   Track your headaches  Keeping a headache diary can help you and your healthcare provider identify what's causing your headaches:     Note when each headache happens.    Identify your activities and the foods you've eaten 6 to 8 hours before the headache began.    Look for any trends or \"triggers.\"    Bring the information to your next medical appointment.  Signs of tension headache  Any of the following can be signs:     Dull pain or feeling of pressure in a tight band around your head    Pain in your neck or shoulders    Headache without a definite beginning or " "end    Headache after an activity such as driving or working on a computer  Signs of migraine  Any of the following can be signs:     Throbbing pain on one or both sides of your head    Nausea or vomiting    Extreme sensitivity to light, sound, and smells    Bright spots, flashes, or other visual changes    Pain or nausea so severe that you can't continue your daily activities  When to call your healthcare provider   Call your healthcare provider if any of these occur:     A headache that lingers after a recent injury or bump to the head    A headache that lasts for more than 3 days    Frequent headaches, especially in the morning  Get medical care right away if you have:    A headache along with slurred speech, changes in your vision, or numbness or weakness in your arms or legs    Headaches with seizures    A fever with a stiff neck or pain when you bend your head toward your chest    A headache that you would call \"the worst headache you have ever had\" or a severe, persistent headache that gets worse or doesn't get better with treatment  Renee last reviewed this educational content on 7/1/2020 2000-2021 [employer]. All rights reserved. This information is not intended as a substitute for professional medical care. Always follow your healthcare professional's instructions.        "

## 2022-03-27 DIAGNOSIS — I50.32 CHRONIC DIASTOLIC CONGESTIVE HEART FAILURE (H): ICD-10-CM

## 2022-03-27 DIAGNOSIS — I10 HTN, GOAL BELOW 140/90: ICD-10-CM

## 2022-03-28 RX ORDER — SPIRONOLACTONE 25 MG/1
25 TABLET ORAL DAILY
Qty: 90 TABLET | Refills: 1 | Status: SHIPPED | OUTPATIENT
Start: 2022-03-28 | End: 2022-07-22

## 2022-03-28 RX ORDER — LOSARTAN POTASSIUM 100 MG/1
100 TABLET ORAL DAILY
Qty: 90 TABLET | Refills: 1 | Status: SHIPPED | OUTPATIENT
Start: 2022-03-28 | End: 2022-07-22

## 2022-05-17 DIAGNOSIS — G43.009 MIGRAINE WITHOUT AURA AND WITHOUT STATUS MIGRAINOSUS, NOT INTRACTABLE: ICD-10-CM

## 2022-05-19 RX ORDER — TOPIRAMATE 50 MG/1
75 TABLET, FILM COATED ORAL 2 TIMES DAILY
Qty: 90 TABLET | Refills: 1 | Status: SHIPPED | OUTPATIENT
Start: 2022-05-19 | End: 2022-07-22

## 2022-06-04 ENCOUNTER — HEALTH MAINTENANCE LETTER (OUTPATIENT)
Age: 62
End: 2022-06-04

## 2022-07-14 ENCOUNTER — NURSE TRIAGE (OUTPATIENT)
Dept: FAMILY MEDICINE | Facility: OTHER | Age: 62
End: 2022-07-14

## 2022-07-14 NOTE — TELEPHONE ENCOUNTER
SITUATION:   Symptoms started bothering the patient about 1-2 days ago. Patient complains of lower left back pain. Pain is rated at a 7/10, pain is described as sharp. Patient has urinary frequency.   Patient feels it is a UTI.   Patient declines having a fever, hematuria, dysuria.   Patient sounds weak and like she is not feeling well.     HOME TREATMENTS:  NA    PLAN:   Patient should be seen for further evaluation. Advised UC.            MINA AlbrechtN, RN, PHN  [place]  July 14, 2022    Reason for Disposition    Side (flank) or lower back pain present    Additional Information    Negative: Shock suspected (e.g., cold/pale/clammy skin, too weak to stand, low BP, rapid pulse)    Negative: Sounds like a life-threatening emergency to the triager    Negative: Followed a genital area injury    Negative: Followed a genital area injury (penis, scrotum)    Negative: Vaginal discharge    Negative: Pus (white, yellow) or bloody discharge from end of penis    Negative: Discomfort (pain, burning or stinging) when passing urine and pregnant    Negative: Discomfort (pain, burning or stinging) when passing urine and female    Negative: Discomfort (pain, burning or stinging) when passing urine and male    Negative: Pain or itching in the vulvar area    Negative: Pain in scrotum is main symptom    Negative: Blood in the urine is main symptom    Negative: Symptoms arising from use of a urinary catheter ([place] or [place])    Negative: Unable to urinate (or only a few drops) > 4 hours and bladder feels very full (e.g., palpable bladder or strong urge to urinate)    Negative: Decreased urination and drinking very little and dehydration suspected (e.g., dark urine, no urine > 12 hours, very dry mouth, very lightheaded)    Negative: Patient sounds very sick or weak to the triager    Negative: Fever > 100.4 F (38.0 C)    Protocols used: URINARY SYMPTOMS-A-OH

## 2022-07-18 ENCOUNTER — TELEPHONE (OUTPATIENT)
Dept: FAMILY MEDICINE | Facility: OTHER | Age: 62
End: 2022-07-18

## 2022-07-18 DIAGNOSIS — G43.009 MIGRAINE WITHOUT AURA AND WITHOUT STATUS MIGRAINOSUS, NOT INTRACTABLE: ICD-10-CM

## 2022-07-18 NOTE — TELEPHONE ENCOUNTER
SITUATION:   Saw last week at [facility] on 07/14/22 for back pain. From there they advised the patient to schedule with PCP/Spine Specialist. Patient was provided specialty number to [place].   Patient was schedule UC f/u with PCP.   BACKGROUND:     PLAN:     Next 5 appointments (look out 90 days)    Jul 22, 2022 10:30 AM  (Arrive by 10:10 AM)  Provider Visit with Renetta Benitez MD  [facility] ([facility] ) [address] [place] [id number]  [phone]   Aug 30, 2022  9:00 AM  (Arrive by 8:40 AM)  Adult Preventative Visit with Renetta Benitez MD  [facility] ([facility] ) [address] [place] [id number]  [phone]                 MINA AlbrechtN, RN, N  [place]  July 18, 2022

## 2022-07-22 ENCOUNTER — TELEPHONE (OUTPATIENT)
Dept: NEUROSURGERY | Facility: OTHER | Age: 62
End: 2022-07-22

## 2022-07-22 ENCOUNTER — OFFICE VISIT (OUTPATIENT)
Dept: FAMILY MEDICINE | Facility: OTHER | Age: 62
End: 2022-07-22
Payer: COMMERCIAL

## 2022-07-22 VITALS
WEIGHT: 293 LBS | SYSTOLIC BLOOD PRESSURE: 133 MMHG | BODY MASS INDEX: 54.5 KG/M2 | HEART RATE: 78 BPM | RESPIRATION RATE: 14 BRPM | TEMPERATURE: 98.5 F | OXYGEN SATURATION: 98 % | DIASTOLIC BLOOD PRESSURE: 80 MMHG

## 2022-07-22 DIAGNOSIS — G43.009 MIGRAINE WITHOUT AURA AND WITHOUT STATUS MIGRAINOSUS, NOT INTRACTABLE: Primary | ICD-10-CM

## 2022-07-22 DIAGNOSIS — Z12.31 VISIT FOR SCREENING MAMMOGRAM: ICD-10-CM

## 2022-07-22 DIAGNOSIS — F33.1 MAJOR DEPRESSIVE DISORDER, RECURRENT EPISODE, MODERATE (H): ICD-10-CM

## 2022-07-22 DIAGNOSIS — I10 HTN, GOAL BELOW 140/90: ICD-10-CM

## 2022-07-22 DIAGNOSIS — Z12.11 SCREEN FOR COLON CANCER: ICD-10-CM

## 2022-07-22 DIAGNOSIS — I50.32 CHRONIC DIASTOLIC CONGESTIVE HEART FAILURE (H): ICD-10-CM

## 2022-07-22 DIAGNOSIS — M51.369 DDD (DEGENERATIVE DISC DISEASE), LUMBAR: ICD-10-CM

## 2022-07-22 LAB
ALBUMIN SERPL-MCNC: 3.7 G/DL (ref 3.4–5)
ALBUMIN UR-MCNC: NEGATIVE MG/DL
ALP SERPL-CCNC: 97 U/L (ref 40–150)
ALT SERPL W P-5'-P-CCNC: 21 U/L (ref 0–50)
ANION GAP SERPL CALCULATED.3IONS-SCNC: 3 MMOL/L (ref 3–14)
APPEARANCE UR: CLEAR
AST SERPL W P-5'-P-CCNC: 14 U/L (ref 0–45)
BACTERIA #/AREA URNS HPF: ABNORMAL /HPF
BILIRUB SERPL-MCNC: 0.8 MG/DL (ref 0.2–1.3)
BILIRUB UR QL STRIP: NEGATIVE
BUN SERPL-MCNC: 19 MG/DL (ref 7–30)
CALCIUM SERPL-MCNC: 8.9 MG/DL (ref 8.5–10.1)
CHLORIDE BLD-SCNC: 106 MMOL/L (ref 94–109)
CO2 SERPL-SCNC: 29 MMOL/L (ref 20–32)
COLOR UR AUTO: YELLOW
CREAT SERPL-MCNC: 1.11 MG/DL (ref 0.52–1.04)
ERYTHROCYTE [DISTWIDTH] IN BLOOD BY AUTOMATED COUNT: 14 % (ref 10–15)
GFR SERPL CREATININE-BSD FRML MDRD: 56 ML/MIN/1.73M2
GLUCOSE BLD-MCNC: 91 MG/DL (ref 70–99)
GLUCOSE UR STRIP-MCNC: NEGATIVE MG/DL
HCT VFR BLD AUTO: 42.6 % (ref 35–47)
HGB BLD-MCNC: 14.2 G/DL (ref 11.7–15.7)
HGB UR QL STRIP: ABNORMAL
KETONES UR STRIP-MCNC: NEGATIVE MG/DL
LEUKOCYTE ESTERASE UR QL STRIP: NEGATIVE
MCH RBC QN AUTO: 29.4 PG (ref 26.5–33)
MCHC RBC AUTO-ENTMCNC: 33.3 G/DL (ref 31.5–36.5)
MCV RBC AUTO: 88 FL (ref 78–100)
NITRATE UR QL: NEGATIVE
PH UR STRIP: 5.5 [PH] (ref 5–7)
PLATELET # BLD AUTO: 248 10E3/UL (ref 150–450)
POTASSIUM BLD-SCNC: 3.9 MMOL/L (ref 3.4–5.3)
PROT SERPL-MCNC: 7.8 G/DL (ref 6.8–8.8)
RBC # BLD AUTO: 4.83 10E6/UL (ref 3.8–5.2)
RBC #/AREA URNS AUTO: ABNORMAL /HPF
SODIUM SERPL-SCNC: 138 MMOL/L (ref 133–144)
SP GR UR STRIP: 1.01 (ref 1–1.03)
UROBILINOGEN UR STRIP-ACNC: 0.2 E.U./DL
WBC # BLD AUTO: 7.1 10E3/UL (ref 4–11)
WBC #/AREA URNS AUTO: ABNORMAL /HPF

## 2022-07-22 PROCEDURE — 80061 LIPID PANEL: CPT | Performed by: FAMILY MEDICINE

## 2022-07-22 PROCEDURE — 81001 URINALYSIS AUTO W/SCOPE: CPT | Performed by: FAMILY MEDICINE

## 2022-07-22 PROCEDURE — 99215 OFFICE O/P EST HI 40 MIN: CPT | Performed by: FAMILY MEDICINE

## 2022-07-22 PROCEDURE — 36415 COLL VENOUS BLD VENIPUNCTURE: CPT | Performed by: FAMILY MEDICINE

## 2022-07-22 PROCEDURE — 85027 COMPLETE CBC AUTOMATED: CPT | Performed by: FAMILY MEDICINE

## 2022-07-22 PROCEDURE — 80053 COMPREHEN METABOLIC PANEL: CPT | Performed by: FAMILY MEDICINE

## 2022-07-22 RX ORDER — SPIRONOLACTONE 25 MG/1
25 TABLET ORAL DAILY
Qty: 90 TABLET | Refills: 3 | Status: SHIPPED | OUTPATIENT
Start: 2022-07-22 | End: 2023-04-11

## 2022-07-22 RX ORDER — BUSPIRONE HYDROCHLORIDE 10 MG/1
10 TABLET ORAL 3 TIMES DAILY
Qty: 270 TABLET | Refills: 3 | Status: SHIPPED | OUTPATIENT
Start: 2022-07-22 | End: 2023-07-18

## 2022-07-22 RX ORDER — CYCLOBENZAPRINE HCL 10 MG
10 TABLET ORAL
Qty: 90 TABLET | Refills: 0 | Status: SHIPPED | OUTPATIENT
Start: 2022-07-22 | End: 2022-08-30

## 2022-07-22 RX ORDER — VENLAFAXINE HYDROCHLORIDE 75 MG/1
CAPSULE, EXTENDED RELEASE ORAL
Qty: 270 CAPSULE | Refills: 3 | Status: SHIPPED | OUTPATIENT
Start: 2022-07-22 | End: 2023-07-18

## 2022-07-22 RX ORDER — LOSARTAN POTASSIUM 100 MG/1
100 TABLET ORAL DAILY
Qty: 90 TABLET | Refills: 3 | Status: SHIPPED | OUTPATIENT
Start: 2022-07-22 | End: 2023-07-18

## 2022-07-22 RX ORDER — CYCLOBENZAPRINE HCL 10 MG
10 TABLET ORAL
COMMUNITY
Start: 2022-07-14 | End: 2022-07-22

## 2022-07-22 RX ORDER — TOPIRAMATE 100 MG/1
100 TABLET, FILM COATED ORAL 2 TIMES DAILY
Qty: 180 TABLET | Refills: 3 | Status: SHIPPED | OUTPATIENT
Start: 2022-07-22 | End: 2023-07-18

## 2022-07-22 ASSESSMENT — PATIENT HEALTH QUESTIONNAIRE - PHQ9
10. IF YOU CHECKED OFF ANY PROBLEMS, HOW DIFFICULT HAVE THESE PROBLEMS MADE IT FOR YOU TO DO YOUR WORK, TAKE CARE OF THINGS AT HOME, OR GET ALONG WITH OTHER PEOPLE: VERY DIFFICULT
SUM OF ALL RESPONSES TO PHQ QUESTIONS 1-9: 20
SUM OF ALL RESPONSES TO PHQ QUESTIONS 1-9: 20

## 2022-07-22 ASSESSMENT — PAIN SCALES - GENERAL: PAINLEVEL: SEVERE PAIN (6)

## 2022-07-22 NOTE — PROGRESS NOTES
Assessment & Plan     Migraine without aura and without status migrainosus, not intractable  Will increase her Topamax from 75 mg twice daily to 100 mg twice daily, also continue muscle relaxer at bedtime, follow up in 1 month.  Consider Neurology consult if unable to control it.    DDD (degenerative disc disease), lumbar  Encouraged her to follow up with her surgeon since her back pain is recurring and surgery was < 1 year ago.    - Neurosurgery Referral; Future  - cyclobenzaprine (FLEXERIL) 10 MG tablet; Take 1 tablet (10 mg) by mouth nightly as needed for muscle spasms    HTN, goal below 140/90  BP controlled, labs drawn and urine done.  Suspect urobilinogen was a false positive as she has not had any evidence of liver disease in the past.  Refills given.    - losartan (COZAAR) 100 MG tablet; Take 1 tablet (100 mg) by mouth daily  - spironolactone (ALDACTONE) 25 MG tablet; Take 1 tablet (25 mg) by mouth daily  - Comprehensive metabolic panel (BMP + Alb, Alk Phos, ALT, AST, Total. Bili, TP)  - CBC with Platelets  - Lipid panel reflex to direct LDL Non-fasting  - UA Macro with Reflex to Micro and Culture - lab collect  - Urine Microscopic    Chronic diastolic congestive heart failure (H)  Stable, refills given.    - losartan (COZAAR) 100 MG tablet; Take 1 tablet (100 mg) by mouth daily  - spironolactone (ALDACTONE) 25 MG tablet; Take 1 tablet (25 mg) by mouth daily  - Comprehensive metabolic panel (BMP + Alb, Alk Phos, ALT, AST, Total. Bili, TP)  - CBC with Platelets  - Lipid panel reflex to direct LDL Non-fasting    Major depressive disorder, recurrent episode, moderate (H)  Stable, refills given.    - busPIRone (BUSPAR) 10 MG tablet; Take 1 tablet (10 mg) by mouth 3 times daily  - venlafaxine (EFFEXOR XR) 75 MG 24 hr capsule; Take 1 in the morning and 2 at night    Visit for screening mammogram  - MA SCREENING DIGITAL BILAT - Future  (s+30); Future    Screen for colon cancer  She would prefer Cologuared.  -  "KACIE[employer])      42 minutes spent on the date of the encounter doing chart review, history and exam, documentation and further activities per the note       BMI:   Estimated body mass index is 54.5 kg/m  as calculated from the following:    Height as of 1/4/22: 1.676 m (5' 5.98\").    Weight as of this encounter: 153.1 kg (337 lb 8 oz).   Weight management plan: Discussed healthy diet and exercise guidelines      Renetta Benitez MD  [facility] is a 62 year old, presenting for the following health issues:  Follow Up ( visit)      History of Present Illness       Back Pain:  She presents for follow up of back pain. Patient's back pain is a chronic problem.  Location of back pain:  Other  Description of back pain: sharp  Back pain spreads: nowhere    Since patient first noticed back pain, pain is: always present, but gets better and worse  Does back pain interfere with her job:  Yes      Headaches:   Since the patient's last clinic visit, headaches are: worsened  The patient is getting headaches:  Every couple days  She is not able to do normal daily activities when she has a migraine.  The patient is taking the following rescue/relief medications:  Excedrin   Patient states \"I get some relief\" from the rescue/relief medications.   The patient is taking the following medications to prevent migraines:  No medications to prevent migraines  In the past 4 weeks, the patient has gone to an Urgent Care or Emergency Room 0 times times due to headaches.    Reason for visit:  Problems with a reading on my liver from my urine?    She eats 2-3 servings of fruits and vegetables daily.She consumes 2 sweetened beverage(s) daily.She exercises with enough effort to increase her heart rate 9 or less minutes per day.  She exercises with enough effort to increase her heart rate 3 or less days per week. She is missing 1 dose(s) of medications per week.  She is not taking " "prescribed medications regularly due to remembering to take.    Today's PHQ-9         PHQ-9 Total Score: 20    PHQ-9 Q9 Thoughts of better off dead/self-harm past 2 weeks :   Not at all    How difficult have these problems made it for you to do your work, take care of things at home, or get along with other people: Very difficult       ED/UC Followup:    Facility:  [facility]  Date of visit: 7/14/22  Reason for visit: Flank Pain  Current Status: pain is going away with the medication. Finished the medrol dose pack yesterday. Still having back issues, states this is bothersome because she has had back surgeries.  Finds muscle relaxant helpful at night.     -Wondering about a handicap sticker. Having a hard time walking. She either needs to be dropped off right at the front door or she just doesn't go places.  States has extensive pain, has to stop frequently because of her back pain and rest, she is embarrassed with others since she needs to stop frequently while walking.    -Wants to follow up on her test results from . States that they came back as something wrong with her liver. Wondering were to go from here about that. Had an elevated urobilinogen on the urine dipstick.    Trouble sleeping the last month.  Will stay awake until 5 am.    Migraines used to have 1 every couple weeks, over the last few months now having them almost every day, can wake up in the morning with a headache.  Used get the headache in the middle of the night and wake up with it, now feels it is coming on when she goes to bed. Has photophobia and phonophobia, dry heaves and nausea, visual flashes, feels \"hungover\" after the pain goes away.          Objective    /80   Pulse 78   Temp 98.5  F (36.9  C) (Temporal)   Resp 14   Wt (!) 153.1 kg (337 lb 8 oz)   SpO2 98%   BMI 54.50 kg/m    Body mass index is 54.5 kg/m .  Physical Exam   Gen: no apparent distress  NECK: no adenopathy, no asymmetry, no masses  Chest: " clear to auscultation without wheeze, rale or rhonchi  Cor: regular rate and rhythm without murmur  ABDOMEN: soft, nontender, no masses and bowel sounds normal  Ext: warm and dry with 1+ bilateral edema  Psych: Alert and oriented times 3; coherent speech, normal   rate and volume, able to articulate logical thoughts, able   to abstract reason, no tangential thoughts, no hallucinations   or delusions  Her affect is flat               .  ..  Answers for HPI/ROS submitted by the patient on 7/22/2022  If you checked off any problems, how difficult have these problems made it for you to do your work, take care of things at home, or get along with other people?: Very difficult  PHQ9 TOTAL SCORE: 20

## 2022-07-22 NOTE — TELEPHONE ENCOUNTER
"Last Visit: 12/16/21,                                                                                              s/p Lumbar 3 to Lumbar 5 Laminectomy with Dr. Wallis on 9/17/21    Next Visit: None as of now     Name of Provider:  Dr. Wallis    Assessment: Patient calls today stating she is \"having a lot of back pain\". The pain is located in the same area as it was prior to her surgery. She states this started a couple months ago. The pain is located in her lower back, does not radiate anywhere else. She states she has n/t in her BLE and both feet that cause her a different kind of pain. She had this n/t prior to surgery and states it went away \"for a while\" after but is now back. It is hard for her to walk long distances due to pain. When siting her pain is at a 3-4/10, while walking her pain is at a 7-8/10. Denies and foot drag/drop, denies any falls but states her BLE do feel weak. She has been using tylenol, ibuprofen, and flexeril for pain control.       Recommendation given: Will route to care team for review and recommendation.   Follow up with NOA vs Dr. Wallis, imaging prior?               "

## 2022-07-22 NOTE — TELEPHONE ENCOUNTER
Former patient of Dr. Wallis's received a new referral to be seen again.  Patient having lots of lumbar pain. Please advise if we should schedule directly with surgeon. Patient wondering if an MRI will be needed to see what is causing the pain.  Patient also stated will need oral sedation for imaging- difficult to lay on back due to pain .

## 2022-07-23 LAB
CHOLEST SERPL-MCNC: 224 MG/DL
FASTING STATUS PATIENT QL REPORTED: YES
HDLC SERPL-MCNC: 42 MG/DL
LDLC SERPL CALC-MCNC: 156 MG/DL
NONHDLC SERPL-MCNC: 182 MG/DL
TRIGL SERPL-MCNC: 131 MG/DL

## 2022-07-25 ENCOUNTER — TELEPHONE (OUTPATIENT)
Dept: NEUROSURGERY | Facility: CLINIC | Age: 62
End: 2022-07-25

## 2022-07-25 NOTE — TELEPHONE ENCOUNTER
Alexa Abdul NP is recommending patient follow up in clinic with an NOA for symptom review.     Message sent to scheduling team to contact patient and schedule an appointment with NOA.

## 2022-08-12 ENCOUNTER — OFFICE VISIT (OUTPATIENT)
Dept: NEUROSURGERY | Facility: CLINIC | Age: 62
End: 2022-08-12
Attending: FAMILY MEDICINE
Payer: COMMERCIAL

## 2022-08-12 DIAGNOSIS — M54.16 LUMBAR RADICULOPATHY: Primary | ICD-10-CM

## 2022-08-12 DIAGNOSIS — M51.369 DDD (DEGENERATIVE DISC DISEASE), LUMBAR: ICD-10-CM

## 2022-08-12 PROCEDURE — 99214 OFFICE O/P EST MOD 30 MIN: CPT | Performed by: PHYSICIAN ASSISTANT

## 2022-08-12 RX ORDER — DIAZEPAM 5 MG
5-10 TABLET ORAL ONCE
Qty: 2 TABLET | Refills: 0 | Status: SHIPPED | OUTPATIENT
Start: 2022-08-12 | End: 2022-08-12

## 2022-08-12 NOTE — LETTER
8/12/2022         RE: Eva Solis  [address]  [place] [address]        Dear Colleague,    Thank you for referring your patient, Eva Solis, to the [facility] [place]. Please see a copy of my visit note below.    Neurosurgery Clinic  Neurosurgery followup:    HPI: Donna is 11 months out from L3-5 laminectomy with Dr. Wallis.  She did well initially.  About 2 to 3 months ago, she began having severe left-sided low back pain, with pain that radiated into the left hip.  She actually was in the emergency department at [facility] recently, and was given an oral steroid and some muscle relaxers.  These helped but did not resolve her symptoms.  She has also noticed an intermittent strange sensation of having no control over her legs bilaterally.  She has not had any falls, but fears that she may start to have some.  No bowel or bladder changes.       Exam:  Constitutional:  Alert, well nourished, NAD.  HEENT: Normocephalic, atraumatic.   Pulm:  Without shortness of breath   CV:  No pitting edema of BLE.      Neurological:  Awake  Alert  Oriented x 3  Motor exam:        IP Q DF PF EHL  R   5  5   5   5    5  L   5  5   5   5    5     Reflexes are 2+ in the patellar and Achilles. There is no clonus. Downgoing Babinski.      Able to spontaneously move L/E bilaterally  Sensation intact throughout all L/E dermatomes       Imaging: na    A/P:    Left-sided low back pain  Lumbar disc degeneration  History of L3-5 laminectomy September 2021    I did have a discussion with the patient regarding her symptoms.  She has now had 2 to 3 months of gradually worsening low back pain with pain that radiates into her left hip.  X-rays from outside facility demonstrate multilevel disc degeneration and a dextroscoliosis.  At this point, I did recommend obtaining a lumbar spine MRI for further evaluation.  She did wish to proceed with that option.  We will contact her once imaging  results are available.      Ganesh Connors PA-C  [facility]surgery  [address] [address]    Tel [phone]  Pager [phone]      The use of Dragon/[employer] Sensorsation services may have been used to construct the content in this note; any grammatical or spelling errors are non-intentional. Please contact the author of this note directly if you are in need of any clarification.        Again, thank you for allowing me to participate in the care of your patient.        Sincerely,        Ganesh Connors PA-C

## 2022-08-12 NOTE — PROGRESS NOTES
Neurosurgery Clinic  Neurosurgery followup:    HPI: Donna is 11 months out from L3-5 laminectomy with Dr. Wallis.  She did well initially.  About 2 to 3 months ago, she began having severe left-sided low back pain, with pain that radiated into the left hip.  She actually was in the emergency department at [facility] recently, and was given an oral steroid and some muscle relaxers.  These helped but did not resolve her symptoms.  She has also noticed an intermittent strange sensation of having no control over her legs bilaterally.  She has not had any falls, but fears that she may start to have some.  No bowel or bladder changes.       Exam:  Constitutional:  Alert, well nourished, NAD.  HEENT: Normocephalic, atraumatic.   Pulm:  Without shortness of breath   CV:  No pitting edema of BLE.      Neurological:  Awake  Alert  Oriented x 3  Motor exam:        IP Q DF PF EHL  R   5  5   5   5    5  L   5  5   5   5    5     Reflexes are 2+ in the patellar and Achilles. There is no clonus. Downgoing Babinski.      Able to spontaneously move L/E bilaterally  Sensation intact throughout all L/E dermatomes       Imaging: na    A/P:    Left-sided low back pain  Lumbar disc degeneration  History of L3-5 laminectomy September 2021    I did have a discussion with the patient regarding her symptoms.  She has now had 2 to 3 months of gradually worsening low back pain with pain that radiates into her left hip.  X-rays from outside facility demonstrate multilevel disc degeneration and a dextroscoliosis.  At this point, I did recommend obtaining a lumbar spine MRI for further evaluation.  She did wish to proceed with that option.  We will contact her once imaging results are available.      Ganesh Connors PA-C  [facility] Neurosurgery  [address]  [address] [place] [id number]    Tel [phone]  Pager [phone]      The use of Dragon/Walkabout dictation services may have been used to  construct the content in this note; any grammatical or spelling errors are non-intentional. Please contact the author of this note directly if you are in need of any clarification.

## 2022-08-23 ENCOUNTER — HOSPITAL ENCOUNTER (OUTPATIENT)
Dept: MRI IMAGING | Facility: CLINIC | Age: 62
Discharge: HOME | End: 2022-08-23
Attending: PHYSICIAN ASSISTANT | Admitting: PHYSICIAN ASSISTANT
Payer: COMMERCIAL

## 2022-08-23 DIAGNOSIS — M54.16 LUMBAR RADICULOPATHY: ICD-10-CM

## 2022-08-23 PROCEDURE — A9585 GADOBUTROL INJECTION: HCPCS | Performed by: PHYSICIAN ASSISTANT

## 2022-08-23 PROCEDURE — 72158 MRI LUMBAR SPINE W/O & W/DYE: CPT

## 2022-08-23 PROCEDURE — 255N000002 HC RX 255 OP 636: Performed by: PHYSICIAN ASSISTANT

## 2022-08-23 RX ORDER — GADOBUTROL 604.72 MG/ML
15 INJECTION INTRAVENOUS ONCE
Status: COMPLETED | OUTPATIENT
Start: 2022-08-23 | End: 2022-08-23

## 2022-08-23 RX ADMIN — GADOBUTROL 14 ML: 604.72 INJECTION INTRAVENOUS at 09:29

## 2022-08-29 ASSESSMENT — ENCOUNTER SYMPTOMS
COUGH: 0
HEARTBURN: 0
HEADACHES: 1
HEMATURIA: 0
CHILLS: 0
EYE PAIN: 0
NERVOUS/ANXIOUS: 1
BREAST MASS: 0
PALPITATIONS: 0
DIZZINESS: 1
DYSURIA: 0
WEAKNESS: 1
FREQUENCY: 0
MYALGIAS: 1
HEMATOCHEZIA: 0
CONSTIPATION: 0
SORE THROAT: 0
SHORTNESS OF BREATH: 1
PARESTHESIAS: 0
ABDOMINAL PAIN: 0
NAUSEA: 0
FEVER: 0
JOINT SWELLING: 1
ARTHRALGIAS: 1
DIARRHEA: 0

## 2022-08-29 ASSESSMENT — PATIENT HEALTH QUESTIONNAIRE - PHQ9
SUM OF ALL RESPONSES TO PHQ QUESTIONS 1-9: 21
SUM OF ALL RESPONSES TO PHQ QUESTIONS 1-9: 21
10. IF YOU CHECKED OFF ANY PROBLEMS, HOW DIFFICULT HAVE THESE PROBLEMS MADE IT FOR YOU TO DO YOUR WORK, TAKE CARE OF THINGS AT HOME, OR GET ALONG WITH OTHER PEOPLE: EXTREMELY DIFFICULT

## 2022-08-30 ENCOUNTER — TELEPHONE (OUTPATIENT)
Dept: NEUROSURGERY | Facility: CLINIC | Age: 62
End: 2022-08-30

## 2022-08-30 ENCOUNTER — OFFICE VISIT (OUTPATIENT)
Dept: FAMILY MEDICINE | Facility: OTHER | Age: 62
End: 2022-08-30
Payer: COMMERCIAL

## 2022-08-30 VITALS
WEIGHT: 293 LBS | RESPIRATION RATE: 20 BRPM | BODY MASS INDEX: 47.09 KG/M2 | DIASTOLIC BLOOD PRESSURE: 84 MMHG | TEMPERATURE: 97.8 F | OXYGEN SATURATION: 98 % | HEART RATE: 86 BPM | HEIGHT: 66 IN | SYSTOLIC BLOOD PRESSURE: 130 MMHG

## 2022-08-30 DIAGNOSIS — Z12.31 VISIT FOR SCREENING MAMMOGRAM: ICD-10-CM

## 2022-08-30 DIAGNOSIS — Z12.11 SCREEN FOR COLON CANCER: ICD-10-CM

## 2022-08-30 DIAGNOSIS — G43.009 MIGRAINE WITHOUT AURA AND WITHOUT STATUS MIGRAINOSUS, NOT INTRACTABLE: ICD-10-CM

## 2022-08-30 DIAGNOSIS — Z00.00 ROUTINE GENERAL MEDICAL EXAMINATION AT A HEALTH CARE FACILITY: Primary | ICD-10-CM

## 2022-08-30 DIAGNOSIS — M51.369 DDD (DEGENERATIVE DISC DISEASE), LUMBAR: ICD-10-CM

## 2022-08-30 PROCEDURE — 99396 PREV VISIT EST AGE 40-64: CPT | Performed by: FAMILY MEDICINE

## 2022-08-30 RX ORDER — CYCLOBENZAPRINE HCL 10 MG
10 TABLET ORAL
Qty: 90 TABLET | Refills: 3 | Status: SHIPPED | OUTPATIENT
Start: 2022-08-30 | End: 2023-07-18

## 2022-08-30 ASSESSMENT — ENCOUNTER SYMPTOMS
DIARRHEA: 0
PARESTHESIAS: 0
DIZZINESS: 1
WEAKNESS: 1
NERVOUS/ANXIOUS: 1
ARTHRALGIAS: 1
DYSURIA: 0
HEADACHES: 1
HEARTBURN: 0
HEMATOCHEZIA: 0
SHORTNESS OF BREATH: 1
BREAST MASS: 0
CHILLS: 0
FREQUENCY: 0
NAUSEA: 0
PALPITATIONS: 0
COUGH: 0
SORE THROAT: 0
EYE PAIN: 0
ABDOMINAL PAIN: 0
JOINT SWELLING: 1
HEMATURIA: 0
MYALGIAS: 1
FEVER: 0
CONSTIPATION: 0

## 2022-08-30 ASSESSMENT — PAIN SCALES - GENERAL: PAINLEVEL: MODERATE PAIN (5)

## 2022-08-30 NOTE — TELEPHONE ENCOUNTER
Saw patient today, she is wondering what the next steps are after having MRI.  I looked at your note and wasn't sure if you were calling her or if you wanted her to come back in and discuss.

## 2022-08-30 NOTE — PROGRESS NOTES
SUBJECTIVE:   CC: Eva Solis is an 62 year old woman who presents for preventive health visit.       Patient has been advised of split billing requirements and indicates understanding: Yes     Healthy Habits:     Getting at least 3 servings of Calcium per day:  NO    Bi-annual eye exam:  Yes    Dental care twice a year:  Yes    Sleep apnea or symptoms of sleep apnea:  Daytime drowsiness    Diet:  Other    Frequency of exercise:  1 day/week    Duration of exercise:  15-30 minutes    Taking medications regularly:  Yes    Medication side effects:  Not applicable    PHQ-2 Total Score: 4    Additional concerns today:  Yes (discuss MRI)      Today's PHQ-2 Score:   PHQ-2 ( 1999 [employer]) 8/29/2022   Q1: Little interest or pleasure in doing things 2   Q2: Feeling down, depressed or hopeless 2   PHQ-2 Score 4   PHQ-2 Total Score (12-17 Years)- Positive if 3 or more points; Administer PHQ-A if positive -   Q1: Little interest or pleasure in doing things More than half the days   Q2: Feeling down, depressed or hopeless More than half the days   PHQ-2 Score 4       Abuse: Current or Past (Physical, Sexual or Emotional) - No  Do you feel safe in your environment? Yes    Have you ever done Advance Care Planning? (For example, a Health Directive, POLST, or a discussion with a medical provider or your loved ones about your wishes): No, advance care planning information given to patient to review.  Patient plans to discuss their wishes with loved ones or provider.      Social History     Tobacco Use     Smoking status: Never Smoker     Smokeless tobacco: Never Used     Tobacco comment: no smokers in household   Substance Use Topics     Alcohol use: No         Alcohol Use 8/29/2022   Prescreen: >3 drinks/day or >7 drinks/week? Not Applicable   Prescreen: >3 drinks/day or >7 drinks/week? -       Reviewed orders with patient.  Reviewed health maintenance and updated orders accordingly - Yes    Breast Cancer Screening:    Breast  CA Risk Assessment (FHS-7) 8/29/2022   Do you have a family history of breast, colon, or ovarian cancer? No / Unknown       Mammogram Screening: Recommended mammography every 1-2 years with patient discussion and risk factor consideration  Pertinent mammograms are reviewed under the imaging tab.    History of abnormal Pap smear: Status post benign hysterectomy. Health Maintenance and Surgical History updated.  PAP / HPV 11/15/2005   PAP (Historical) NIL     Reviewed and updated as needed this visit by clinical staff   Tobacco  Allergies  Meds  Problems  Med Hx  Surg Hx  Fam Hx  Soc   Hx          Reviewed and updated as needed this visit by Provider   Tobacco  Allergies  Meds  Problems  Med Hx  Surg Hx  Fam Hx               Review of Systems   Constitutional: Negative for chills and fever.   HENT: Negative for congestion, ear pain, hearing loss and sore throat.    Eyes: Positive for visual disturbance (sees eye doctor). Negative for pain.   Respiratory: Positive for shortness of breath (chronic and stable). Negative for cough.    Cardiovascular: Positive for peripheral edema (chronic and stable). Negative for chest pain and palpitations.   Gastrointestinal: Negative for abdominal pain, constipation, diarrhea, heartburn, hematochezia and nausea.   Breasts:  Negative for tenderness, breast mass and discharge.   Genitourinary: Negative for dysuria, frequency, genital sores, hematuria, pelvic pain, urgency, vaginal bleeding and vaginal discharge.   Musculoskeletal: Positive for arthralgias, joint swelling and myalgias.   Skin: Negative for rash.   Neurological: Positive for dizziness (with migraines and when getting up fast), weakness and headaches (migraines improved with increase of Topamax, although has migraine today). Negative for paresthesias.   Psychiatric/Behavioral: Negative for mood changes. The patient is nervous/anxious.         OBJECTIVE:   /84   Pulse 86   Temp 97.8  F (36.6  C)  "(Temporal)   Resp 20   Ht 1.68 m (5' 6.14\")   Wt (!) 155.6 kg (343 lb)   SpO2 98%   BMI 55.12 kg/m    Physical Exam  Constitutional:       General: She is not in acute distress.     Appearance: She is well-developed.   HENT:      Right Ear: Tympanic membrane and external ear normal.      Left Ear: Tympanic membrane and external ear normal.      Nose: Nose normal.   Eyes:      General:         Right eye: No discharge.         Left eye: No discharge.      Conjunctiva/sclera: Conjunctivae normal.      Pupils: Pupils are equal, round, and reactive to light.   Neck:      Thyroid: No thyroid mass.   Cardiovascular:      Rate and Rhythm: Normal rate and regular rhythm.      Heart sounds: Normal heart sounds, S1 normal and S2 normal. No murmur heard.  Pulmonary:      Effort: Pulmonary effort is normal. No respiratory distress.      Breath sounds: Normal breath sounds. No wheezing or rales.   Abdominal:      General: Bowel sounds are normal.      Palpations: Abdomen is soft. There is no mass.      Tenderness: There is no abdominal tenderness.   Musculoskeletal:      Cervical back: Neck supple.      Right lower leg: Edema present.      Left lower leg: Edema present.      Comments: Antalgic gait   Lymphadenopathy:      Cervical: No cervical adenopathy.   Skin:     General: Skin is warm and dry.      Findings: No rash.   Neurological:      Mental Status: She is alert and oriented to person, place, and time.   Psychiatric:         Mood and Affect: Affect is flat.         Thought Content: Thought content normal.         Judgment: Judgment normal.         ASSESSMENT/PLAN:   (Z00.00) Routine general medical examination at a health care facility  (primary encounter diagnosis)    (Z12.11) Screen for colon cancer  Comment: plans to turn in Cologuard    (Z12.31) Visit for screening mammogram  Plan: *MA Screening Digital Bilateral          (M51.36) DDD (degenerative disc disease), lumbar  Comment: has had MRI with degenerative " "changes, hasn't heard from neurosurgery, will send message that patient is unclear what the next steps are  Plan: cyclobenzaprine (FLEXERIL) 10 MG tablet          (G43.009) Migraine without aura and without status migrainosus, not intractable  Comment: feels increasing Topamax has been helpful, today's migraine is the first on in several weeks      COUNSELING:  Reviewed preventive health counseling, as reflected in patient instructions    Estimated body mass index is 55.12 kg/m  as calculated from the following:    Height as of this encounter: 1.68 m (5' 6.14\").    Weight as of this encounter: 155.6 kg (343 lb).    Weight management plan: Discussed healthy diet and exercise guidelines    She reports that she has never smoked. She has never used smokeless tobacco.      Counseling Resources:  ATP IV Guidelines  Pooled Cohorts Equation Calculator  Breast Cancer Risk Calculator  BRCA-Related Cancer Risk Assessment: FHS-7 Tool  FRAX Risk Assessment  ICSI Preventive Guidelines  Dietary Guidelines for Americans, 2010  USDA's MyPlate  ASA Prophylaxis  Lung CA Screening    Renetta Benitez MD  [facility]  "

## 2022-09-06 LAB — NONINV COLON CA DNA+OCC BLD SCRN STL QL: NEGATIVE

## 2022-09-26 ENCOUNTER — HOSPITAL ENCOUNTER (OUTPATIENT)
Dept: MAMMOGRAPHY | Facility: CLINIC | Age: 62
Discharge: HOME | End: 2022-09-26
Attending: FAMILY MEDICINE | Admitting: FAMILY MEDICINE
Payer: COMMERCIAL

## 2022-09-26 DIAGNOSIS — Z12.31 VISIT FOR SCREENING MAMMOGRAM: ICD-10-CM

## 2022-09-26 PROCEDURE — 77067 SCR MAMMO BI INCL CAD: CPT

## 2022-10-09 ENCOUNTER — HEALTH MAINTENANCE LETTER (OUTPATIENT)
Age: 62
End: 2022-10-09

## 2023-01-31 NOTE — PROGRESS NOTES
"                                           Progress Note    Patient Name: Eva Solis  Date: 8/24/2020         Service Type: Phone Visit,       Session Start Time: 3:05 pm  Session End Time: 3:55 am     Session Length:   50 minutes    Session #: 10    Attendees: Client attended alone     Reason for Telemedicine Visit: Services only offered telehealth    Originating Site (Patient Location): Patient's place of employment    Distant Site (Provider Location): Provider Remote Setting     The patient has been notified of the following:      \"We have found that certain health care needs can be provided without the need for a face to face visit.  This service lets us provide the care you need with a phone conversation.       I will have full access to your [facility] medical record during this entire phone call.   I will be taking notes for your medical record.      Since this is like an office visit, we will bill your insurance company for this service.       There are potential benefits and risks of telephone visits (e.g. limits to patient confidentiality) that differ from in-person visits.?  Confidentiality still applies for telephone services, and nobody will record the visit.  It is important to be in a quiet, private space that is free of distractions (including cell phone or other devices) during the visit.??      If during the course of the call I believe a telephone visit is not appropriate, you will not be charged for this service\"     Consent has been obtained for this service by care team member: Yes   Treatment Plan Last Reviewed: 6/4/2020    PHQ-9 / LORETA-7 :   PHQ 7/15/2020 8/5/2020 8/24/2020   PHQ-9 Total Score 15 22 22   Q9: Thoughts of better off dead/self-harm past 2 weeks Not at all Not at all Not at all     LORETA-7 SCORE 7/15/2020 8/5/2020 8/24/2020   Total Score - - -   Total Score 14 19 16         DATA  Interactive Complexity: No  Crisis: No       Progress Since Last Session (Related to Symptoms / Goals " no "/ Homework):   Symptoms: Worsening Reports worsening symptoms due to sister's recent cancer Dx.        Homework: Partially completed, Patient has been trying to focus on her self[place]        Episode of Care Goals: No improvement - CONTEMPLATION (Considering change and yet undecided); Intervened by assessing the negative and positive thinking (ambivalence) about behavior change     Current / Ongoing Stressors and Concerns:   Strained relationships with extended family.  Chronic health conditions including midgraine headaches.  Patient reports feeling affected by current events, she states she is wondering about her purpose, denies any thoughts of hurting herself but wonders \"What are we here for?\".   Patient reported late summer 2020 her sister has been diagnosed with Stage 4 cancer and the prognosis is not good.  Patient reports she is working with others her sister knows to help during this difficult time.        Treatment Objective(s) Addressed in This Session:   Decrease frequency and intensity of feeling down, depressed, hopeless  Discuss motivation / ambivalence about taking anti-depressant / mood stabilizer medication(s)  Discussed patients increased depression symptoms and some ways to help decrease those symptoms.  Patient said she was recently prescribed a new medication by her PCP and she is taking it.    Normalized increased depression related to her sister's cancer Dx.  [place]       Intervention:   CBT: Helped patient restructured some of her negative thought processes.  Encouraged patient to keep focus on positive events in her life.  Patient was able to identify the positive relationships with her grown children.             ASSESSMENT: Current Emotional / Mental Status (status of significant symptoms):   Risk status (Self / Other harm or suicidal ideation)   Patient denies current fears or " concerns for personal safety.   Patient denies current or recent suicidal ideation or behaviors.   Patient denies current or recent homicidal ideation or behaviors.   Patient denies current or recent self injurious behavior or ideation.   Patient denies other safety concerns.   Patient reports there has been no change in risk factors since their last session.     Patient reports there has been no change in protective factors since their last session.     Recommended that patient call 911 or go to the local ED should there be a change in any of these risk factors.     Appearance:   N/A due to phone session    Eye Contact:   N/A due to phone session    Psychomotor Behavior: N/A due to phone session    Attitude:   Cooperative    Orientation:   All   Speech    Rate / Production: Normal     Volume:  Normal    Mood:    Anxious  Depressed  Sad  Grieving   Affect:    Tearful Worrisome    Thought Content:  Perservative  Rumination    Thought Form:  Coherent  Tangential    Insight:    Fair  and External locus     Medication Review:   No changes to current psychiatric medication(s)       Medication Compliance:   Yes     Changes in Health Issues:   None reported       Chemical Use Review:   Substance Use: Chemical use reviewed, no active concerns identified      Tobacco Use: No current tobacco use.      Diagnosis:  1. Major depressive disorder, recurrent episode, moderate (H)    2. Anxiety disorder, unspecified        Collateral Reports Completed:   Not Applicable    PLAN: (Patient Tasks / Therapist Tasks / Other)  Patient to focus on self care and medication compliance.  Patient to focus on areas of her life she is able to control while coping with her sister's terminal illness.      Courtney Zuniga, [facility]                                                         ___________________________________                                                   Treatment Plan    Client's Name: Eva Solis  YOB: 1960    Date:  "6/4/2020    DSM-V Diagnoses: 296.32 (F33.1) Major Depressive Disorder, Recurrent Episode, Moderate _ or 300.00 (F41.9) Unspecified Anxiety Disorder  Psychosocial / Contextual Factors:   Strained relationship with extended family, chronic pain and other health conditions, three adult sons, Concern over current events in country / world.    WHODAS:   WHODAS 2.0 Total Score 5/12/2020   Total Score 34         Referral / Collaboration:  Discussed option of psychiatry referral in the future.  .  Plan to coordinate care with PCP.      Anticipated number of session or this episode of care: 10-12      MeasurableTreatment Goal(s) related to diagnosis / functional impairment(s)  Goal 1: Client will reduce depression symptoms.    I will know I've met my goal when \"I would be happier, I wouldn't be so down\".      Objective #A (Client Action)    Client will Decrease frequency and intensity of feeling down, depressed, hopeless.  Reduce rating on PHQ-9, current score 3, goal to be 1 or 0.    Status: New - Date: 6/4/2020     Intervention(s)  Therapist will provide CBT to restructure negative cognitions.  Identify cognitive distortions.  .    Objective #B  Client will establish regular sleep patterns.  Currently sleeping excessively.  .  Status: New - Date: 6/4/2020     Intervention(s)  Therapist will assign homework Patient to notice sleep patterns.  .    Objective #C  Client will Identify negative self-talk and behaviors: challenge core beliefs, myths, and actions.  Status: New - Date: 6/4/2020     Intervention(s)  Therapist will provide CBT during therapy sessions.  .        Patient has reviewed and agreed to the above plan.      Courtney Zuniga, [facility]  June 4, 2020  "

## 2023-04-10 ASSESSMENT — ANXIETY QUESTIONNAIRES
4. TROUBLE RELAXING: MORE THAN HALF THE DAYS
5. BEING SO RESTLESS THAT IT IS HARD TO SIT STILL: MORE THAN HALF THE DAYS
7. FEELING AFRAID AS IF SOMETHING AWFUL MIGHT HAPPEN: MORE THAN HALF THE DAYS
1. FEELING NERVOUS, ANXIOUS, OR ON EDGE: NEARLY EVERY DAY
6. BECOMING EASILY ANNOYED OR IRRITABLE: MORE THAN HALF THE DAYS
GAD7 TOTAL SCORE: 17
2. NOT BEING ABLE TO STOP OR CONTROL WORRYING: NEARLY EVERY DAY
7. FEELING AFRAID AS IF SOMETHING AWFUL MIGHT HAPPEN: MORE THAN HALF THE DAYS
IF YOU CHECKED OFF ANY PROBLEMS ON THIS QUESTIONNAIRE, HOW DIFFICULT HAVE THESE PROBLEMS MADE IT FOR YOU TO DO YOUR WORK, TAKE CARE OF THINGS AT HOME, OR GET ALONG WITH OTHER PEOPLE: SOMEWHAT DIFFICULT
8. IF YOU CHECKED OFF ANY PROBLEMS, HOW DIFFICULT HAVE THESE MADE IT FOR YOU TO DO YOUR WORK, TAKE CARE OF THINGS AT HOME, OR GET ALONG WITH OTHER PEOPLE?: SOMEWHAT DIFFICULT
GAD7 TOTAL SCORE: 17
3. WORRYING TOO MUCH ABOUT DIFFERENT THINGS: NEARLY EVERY DAY
GAD7 TOTAL SCORE: 17

## 2023-04-11 ENCOUNTER — OFFICE VISIT (OUTPATIENT)
Dept: FAMILY MEDICINE | Facility: OTHER | Age: 63
End: 2023-04-11
Payer: COMMERCIAL

## 2023-04-11 VITALS
SYSTOLIC BLOOD PRESSURE: 124 MMHG | BODY MASS INDEX: 49.82 KG/M2 | RESPIRATION RATE: 18 BRPM | OXYGEN SATURATION: 99 % | HEART RATE: 66 BPM | WEIGHT: 293 LBS | DIASTOLIC BLOOD PRESSURE: 80 MMHG | TEMPERATURE: 97.6 F

## 2023-04-11 DIAGNOSIS — I10 HTN, GOAL BELOW 140/90: Primary | ICD-10-CM

## 2023-04-11 DIAGNOSIS — G43.009 MIGRAINE WITHOUT AURA AND WITHOUT STATUS MIGRAINOSUS, NOT INTRACTABLE: ICD-10-CM

## 2023-04-11 DIAGNOSIS — M16.12 PRIMARY OSTEOARTHRITIS OF LEFT HIP: ICD-10-CM

## 2023-04-11 DIAGNOSIS — M51.369 DDD (DEGENERATIVE DISC DISEASE), LUMBAR: ICD-10-CM

## 2023-04-11 DIAGNOSIS — M54.2 NECK PAIN: ICD-10-CM

## 2023-04-11 DIAGNOSIS — I50.32 CHRONIC DIASTOLIC CONGESTIVE HEART FAILURE (H): ICD-10-CM

## 2023-04-11 LAB
ALBUMIN SERPL BCG-MCNC: 4.2 G/DL (ref 3.5–5.2)
ALP SERPL-CCNC: 95 U/L (ref 35–104)
ALT SERPL W P-5'-P-CCNC: 16 U/L (ref 10–35)
ANION GAP SERPL CALCULATED.3IONS-SCNC: 12 MMOL/L (ref 7–15)
AST SERPL W P-5'-P-CCNC: 19 U/L (ref 10–35)
BILIRUB SERPL-MCNC: 0.6 MG/DL
BUN SERPL-MCNC: 11.8 MG/DL (ref 8–23)
CALCIUM SERPL-MCNC: 9.2 MG/DL (ref 8.8–10.2)
CHLORIDE SERPL-SCNC: 104 MMOL/L (ref 98–107)
CHOLEST SERPL-MCNC: 261 MG/DL
CREAT SERPL-MCNC: 0.96 MG/DL (ref 0.51–0.95)
DEPRECATED HCO3 PLAS-SCNC: 23 MMOL/L (ref 22–29)
ERYTHROCYTE [DISTWIDTH] IN BLOOD BY AUTOMATED COUNT: 13.8 % (ref 10–15)
GFR SERPL CREATININE-BSD FRML MDRD: 67 ML/MIN/1.73M2
GLUCOSE SERPL-MCNC: 90 MG/DL (ref 70–99)
HCT VFR BLD AUTO: 42.4 % (ref 35–47)
HDLC SERPL-MCNC: 34 MG/DL
HGB BLD-MCNC: 14.4 G/DL (ref 11.7–15.7)
LDLC SERPL CALC-MCNC: 190 MG/DL
MCH RBC QN AUTO: 29.9 PG (ref 26.5–33)
MCHC RBC AUTO-ENTMCNC: 34 G/DL (ref 31.5–36.5)
MCV RBC AUTO: 88 FL (ref 78–100)
NONHDLC SERPL-MCNC: 227 MG/DL
PLATELET # BLD AUTO: 252 10E3/UL (ref 150–450)
POTASSIUM SERPL-SCNC: 4 MMOL/L (ref 3.4–5.3)
PROT SERPL-MCNC: 7.6 G/DL (ref 6.4–8.3)
RBC # BLD AUTO: 4.82 10E6/UL (ref 3.8–5.2)
SODIUM SERPL-SCNC: 139 MMOL/L (ref 136–145)
TRIGL SERPL-MCNC: 187 MG/DL
TSH SERPL DL<=0.005 MIU/L-ACNC: 2.28 UIU/ML (ref 0.3–4.2)
WBC # BLD AUTO: 6.1 10E3/UL (ref 4–11)

## 2023-04-11 PROCEDURE — 36415 COLL VENOUS BLD VENIPUNCTURE: CPT | Performed by: FAMILY MEDICINE

## 2023-04-11 PROCEDURE — 80053 COMPREHEN METABOLIC PANEL: CPT | Performed by: FAMILY MEDICINE

## 2023-04-11 PROCEDURE — 90471 IMMUNIZATION ADMIN: CPT | Performed by: FAMILY MEDICINE

## 2023-04-11 PROCEDURE — 99214 OFFICE O/P EST MOD 30 MIN: CPT | Mod: 25 | Performed by: FAMILY MEDICINE

## 2023-04-11 PROCEDURE — 85027 COMPLETE CBC AUTOMATED: CPT | Performed by: FAMILY MEDICINE

## 2023-04-11 PROCEDURE — 90750 HZV VACC RECOMBINANT IM: CPT | Performed by: FAMILY MEDICINE

## 2023-04-11 PROCEDURE — 80061 LIPID PANEL: CPT | Performed by: FAMILY MEDICINE

## 2023-04-11 PROCEDURE — 84443 ASSAY THYROID STIM HORMONE: CPT | Performed by: FAMILY MEDICINE

## 2023-04-11 ASSESSMENT — PATIENT HEALTH QUESTIONNAIRE - PHQ9
SUM OF ALL RESPONSES TO PHQ QUESTIONS 1-9: 11
10. IF YOU CHECKED OFF ANY PROBLEMS, HOW DIFFICULT HAVE THESE PROBLEMS MADE IT FOR YOU TO DO YOUR WORK, TAKE CARE OF THINGS AT HOME, OR GET ALONG WITH OTHER PEOPLE: SOMEWHAT DIFFICULT

## 2023-04-11 ASSESSMENT — PAIN SCALES - GENERAL: PAINLEVEL: MODERATE PAIN (5)

## 2023-04-11 ASSESSMENT — ANXIETY QUESTIONNAIRES: GAD7 TOTAL SCORE: 17

## 2023-04-11 ASSESSMENT — ENCOUNTER SYMPTOMS
DIZZINESS: 1
NERVOUS/ANXIOUS: 1

## 2023-04-11 NOTE — PROGRESS NOTES
Assessment & Plan     HTN, goal below 140/90/Chronic diastolic congestive heart failure (H)  Blood pressures controlled and she has no edema on exam.  Last echocardiogram was about 10 years ago.  She did have a stress test done a couple years ago.  She complains of intermittent dizzy spells, 1 of these happened in a warm shower.  This could possibly be orthostatic related.  Will obtain echocardiogram.  We will stop the spironolactone and continue the furosemide.  Labs were drawn as well.  She is due for physical and we will follow-up in 3 months.    - BASIC METABOLIC PANEL; Future  - Echocardiogram Complete; Future  - CBC with platelets  - TSH with free T4 reflex  - Comprehensive metabolic panel (BMP + Alb, Alk Phos, ALT, AST, Total. Bili, TP)    Primary osteoarthritis of left hip  Patient complains of giving out of her left hip.  She has known severe arthritis in this hip.  Will refer to orthopedics.    - Orthopedic  Referral; Future    DDD (degenerative disc disease), lumbar/Neck pain  Since slipping in the shower she has had increased back and neck pain.  Suspect this is musculoskeletal.  She has no radicular symptoms.  Will refer to physical therapy.    - Physical Therapy Referral; Future    Migraine without aura and without status migrainosus, not intractable  Migraines are more frequent.  She is already on Topamax.  She finds Flexeril helpful as well.  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  We discussed options and at this time we will wait and see what happens as she does physical therapy for her neck and back and as she transitions her job and moves.  We will follow-up again in 3 months.    Spent 15 minutes with chart review, patient interview and examination, patient orders and instruction and documentation.    Renetta Benitez MD  [facility]    Carmelita LAY is a  "62 year old, presenting for the following health issues:  Migraine, Anxiety, and Dizziness        4/11/2023     6:53 AM   Additional Questions   Roomed by Linda   Accompanied by Self     History of Present Illness       Back Pain:  She presents for follow up of back pain. Patient's back pain is a chronic problem.  Location of back pain:  Left lower back, left buttock and left hip  Description of back pain: dull ache, sharp and shooting  Back pain spreads: left thigh and left side of neck    Since patient first noticed back pain, pain is: gradually worsening  Does back pain interfere with her job:  Yes      Mental Health Follow-up:  Patient presents to follow-up on Depression & Anxiety.Patient's depression since last visit has been:  Better  The patient is having other symptoms associated with depression.  Patient's anxiety since last visit has been:  No change  The patient is having other symptoms associated with anxiety.  Any significant life events: housing concerns, health concerns and other  Patient is feeling anxious or having panic attacks.  Patient has no concerns about alcohol or drug use.    Headaches:   Since the patient's last clinic visit, headaches are: worsened  The patient is getting headaches:  Twoce a week or more  She is not able to do normal daily activities when she has a migraine.  The patient is taking the following rescue/relief medications:  Excedrin   Patient states \"I get some relief\" from the rescue/relief medications.   The patient is taking the following medications to prevent migraines:  Other  In the past 4 weeks, the patient has gone to an Urgent Care or Emergency Room 0 times times due to headaches.    Reason for visit:  Migraines and dizziness    She eats 2-3 servings of fruits and vegetables daily.She consumes 0 sweetened beverage(s) daily.She exercises with enough effort to increase her heart rate 9 or less minutes per day.  She exercises with enough effort to increase her heart " rate 4 days per week. She is missing 1 dose(s) of medications per week.  She is not taking prescribed medications regularly due to remembering to take.    Today's PHQ-9         PHQ-9 Total Score: 11    PHQ-9 Q9 Thoughts of better off dead/self-harm past 2 weeks :   Not at all    How difficult have these problems made it for you to do your work, take care of things at home, or get along with other people: Somewhat difficult  Today's LORETA-7 Score: 17     Planning to semi-retire in a few weeks, will be working about 15 hours a week at home.  Sold [employer] a year ago, living in an apartment, plans to move into [facility] [place], but will come back to [place] as needed for her job.  Feels anxiety is stable, not all the way better, but not worse.    Headaches every other day.  Has been having dizzy spells, can happen when showering, or when sitting down and eating.  Was in the shower, felt dizzy, turned around and went down.  Denies shortness of breath or sweating.  Doesn't last very long.  Sits down and it goes away on its own.  Happens once every couple weeks.      Fell once that wasn't dizzy related, slipped in the shower.  Hurt her back again, this was 2 weeks ago.  Pain starts in the low back and works its way up into her neck. Moves her neck and feels crunching.  Denies radiation into her legs.  Uses ice on her back, using muscle relaxer which helps the pain some.  Also has left hip pain and sometimes her hip gives away on her.  Has known severe left hip arthritis.        Review of Systems   Psychiatric/Behavioral: The patient is nervous/anxious.           Objective    /80   Pulse 66   Temp 97.6  F (36.4  C) (Temporal)   Resp 18   Wt 140.6 kg (310 lb)   SpO2 99%   BMI 49.82 kg/m    Body mass index is 49.82 kg/m .  Physical Exam   Gen: no apparent distress  Chest: clear to auscultation without wheeze, rale or rhonchi  Cor: regular rate and rhythm without murmur  Ext: warm and dry without edema.  Decreased  range of motion left hip.  Back with no spinous process tenderness.  Does have bilateral paralumbar tenderness to palpation  Psych: Alert and oriented times 3; coherent speech, normal   rate and volume, able to articulate logical thoughts, able   to abstract reason, no tangential thoughts, no hallucinations   or delusions  Her affect is flat

## 2023-04-29 NOTE — PROGRESS NOTES
[address]  [facility] [address]  [phone]  Dept: [phone]  Answers for HPI/ROS submitted by the patient on 3/10/2020   If you checked off any problems, how difficult have these problems made it for you to do your work, take care of things at home, or get along with other people?: Very difficult  PHQ9 TOTAL SCORE: 19    PRE-OP EVALUATION:  Today's date: 3/10/2020    Eva Solis (: 1960) presents for pre-operative evaluation assessment as requested by Dr. Wheatley.  She requires evaluation and anesthesia risk assessment prior to undergoing surgery/procedure for treatment of injection of L4-L5 .    Fax number for surgical facility: In [facility]  Primary Physician: Renetta Benitez  Type of Anesthesia Anticipated: Local with MAC    Patient has a Health Care Directive or Living Will:  NO    Preop Questions 3/10/2020   Who is doing your surgery? dr wheatley   What are you having done? steroid injection   Date of Surgery/Procedure: 3/13/2020   Facility or Hospital where procedure/surgery will be performed: [facility]   1.  Do you have a history of Heart attack, stroke, stent, coronary bypass surgery, or other heart surgery? No   2.  Do you ever have any pain or discomfort in your chest? No   3.  Do you have a history of  Heart Failure? YES - Chronic Diastolic heart failure- stable   4.   Are you troubled by shortness of breath when:  walking on a level surface, or up a slight hill, or at night? YES - stable over the years. No recent exacerbation   5.  Do you currently have a cold, bronchitis or other respiratory infection? YES - acute laryngitis    6.  Do you have a cough, shortness of breath, or wheezing? Mild cough due to recent URI   7.  Do you sometimes get pains in the calves of your legs when you walk? No   8. Do you or anyone in your family have previous history of blood clots? No   9.  Do you or does anyone in your family have a serious bleeding  problem such as prolonged bleeding following surgeries or cuts? No   10. Have you ever had problems with anemia or been told to take iron pills? No   11. Have you had any abnormal blood loss such as black, tarry or bloody stools, or abnormal vaginal bleeding? No   12. Have you ever had a blood transfusion? No   13. Have you or any of your relatives ever had problems with anesthesia? No   14. Do you have sleep apnea, excessive snoring or daytime drowsiness? Snoring. Never been tested   15. Do you have any prosthetic heart valves? No   16. Do you have prosthetic joints? YES - b/l knee replacement in 2006   17. Is there any chance that you may be pregnant? No         HPI:     HPI related to upcoming procedure: patient with history of  Chronic low back pain with spinal canal stenosis is scheduled for SILVIA L3-L5 and is here for pre-op eval      CHF - Patient has a longstanding history of moderate-severe CHF. Exacerbating conditions include hypertension. Currently the patient's condition is same. Current treatment regimen includes ACE inhibitor, diuretic and spirolactone. The patient denies chest pain, edema, orthopnea, SOB or recent weight gain. Last Echocardiogram 2013  EKG 10/2019.     DEPRESSION - Patient has a long history of Depression of moderate severity requiring medication for control with recent symptoms being stable..Current symptoms of depression include none.     HYPERLIPIDEMIA - Patient has a long history of significant Hyperlipidemia requiring medication for treatment with recent good control. Patient reports no problems or side effects with the medication.     HYPERTENSION - Patient has longstanding history of HTN , currently denies any symptoms referable to elevated blood pressure. Specifically denies chest pain, palpitations, dyspnea, orthopnea, PND or peripheral edema. Blood pressure readings have been in normal range. Current medication regimen is as listed below. Patient denies any side effects of  medication.     SLEEP- Patient has a longstanding history of snoring.. Patient has tried OTC medications with limited success.       MEDICAL HISTORY:     Patient Active Problem List    Diagnosis Date Noted     Primary osteoarthritis of left hip 2020     Priority: Medium     Mixed anxiety depressive disorder 01/15/2016     Priority: Medium     Migraine without aura and without status migrainosus, not intractable 01/15/2016     Priority: Medium     Morbid obesity, unspecified obesity type (H) 2015     Priority: Medium     Decreased calculated GFR 2015     Priority: Medium     Diastolic CHF (H) 2014     Priority: Medium     HTN, goal below 140/90 2013     Priority: Medium     Slow transit constipation 2005     Priority: Medium      Past Medical History:   Diagnosis Date     DEPRESSIVE DISORDER NEC 2004     Diastolic dysfunction      Essential hypertension, benign      Generalized osteoarthrosis, unspecified site     knees     Headache(784.0)      PANIC DISORDER 2004     Past Surgical History:   Procedure Laterality Date     C  DELIVERY ONLY      , Low Cervical     C  DELIVERY ONLY       C VAGINAL HYSTERECTOMY      without oophorectomy     COLONOSCOPY  10/06/10    attempt at colonscopy to 50cm     HC COLONOSCOPY W/WO BRUSH/WASH  2005    Diverticulosis.  Internal hemorrhoids.     INJECT EPIDURAL LUMBAR N/A 10/18/2019    Procedure: INJECTION, SPINE, LUMBAR 4 - LUMBAR 5, EPIDURAL TRANSLAMINAR;  Surgeon: Trever Wheatley MD;  Location: [facility]     JOINT REPLACEMTN, KNEE RT/LT  2006    Right total knee arthroplasty.     JOINT REPLACEMTN, KNEE RT/LT  2006    Left total knee arthroplasty.     Current Outpatient Medications   Medication Sig Dispense Refill     busPIRone (BUSPAR) 10 MG tablet Take 1 tablet (10 mg) by mouth 3 times daily 270 tablet 3     cyclobenzaprine (FLEXERIL) 5 MG tablet Take 1 tablet (5 mg) by mouth 3 times daily  "as needed for muscle spasms 30 tablet 3     escitalopram (LEXAPRO) 20 MG tablet Take 1 tablet (20 mg) by mouth daily 90 tablet 3     furosemide (LASIX) 20 MG tablet TAKE ONE TABLET BY MOUTH ONE TIME DAILY 90 tablet 3     losartan (COZAAR) 100 MG tablet Take 1 tablet (100 mg) by mouth daily 90 tablet 3     meloxicam 15 MG PO tablet Take 1 tablet (15 mg) by mouth daily 30 tablet 11     MIRALAX PO POWD 17 GM DAILY 1 Bottle 11     spironolactone (ALDACTONE) 25 MG tablet Take 1 tablet (25 mg) by mouth daily 90 tablet 3     topiramate (TOPAMAX) 50 MG tablet Take 1 tablet (50 mg) by mouth 2 times daily 180 tablet 3     amoxicillin (AMOXIL) 500 MG capsule Only for dental work due to knee surgeries  3     diazepam (VALIUM) 5 MG tablet Take 1 tablet (5 mg) by mouth See Admin Instructions 1 hour before procedure.  Repeat in 30 minutes if needed. (Patient not taking: Reported on 3/10/2020) 2 tablet 1     OTC products: none    Allergies   Allergen Reactions     Lisinopril Cough      Latex Allergy: NO    Social History     Tobacco Use     Smoking status: Never Smoker     Smokeless tobacco: Never Used     Tobacco comment: no smokers in household   Substance Use Topics     Alcohol use: No     History   Drug Use No       REVIEW OF SYSTEMS:   Constitutional, neuro, ENT, endocrine, pulmonary, cardiac, gastrointestinal, genitourinary, musculoskeletal, integument and psychiatric systems are negative, except as otherwise noted.    EXAM:   /88   Pulse 67   Temp 97.6  F (36.4  C) (Temporal)   Resp 18   Ht 1.678 m (5' 6.06\")   Wt (!) 153.3 kg (338 lb)   SpO2 97%   BMI 54.45 kg/m      GENERAL APPEARANCE: healthy, alert and no distress     EYES: EOMI, PERRL     HENT: ear canals and TM's normal and nose and mouth without ulcers or lesions     NECK: no adenopathy, no asymmetry, masses, or scars and thyroid normal to palpation     RESP: lungs clear to auscultation - no rales, rhonchi or wheezes     CV: regular rates and rhythm, " normal S1 S2, no S3 or S4 and no murmur, click or rub     ABDOMEN:  soft, nontender, no HSM or masses and bowel sounds normal     MS: extremities normal- no gross deformities noted, no evidence of inflammation in joints, FROM in all extremities.     SKIN: no suspicious lesions or rashes     NEURO: Normal strength and tone, sensory exam grossly normal, mentation intact and speech normal     PSYCH: mentation appears normal. and affect normal/bright     LYMPHATICS: No cervical adenopathy    DIAGNOSTICS:   EKG: Not indicated due to non-vascular surgery and last ekg on 10/19  (within 30 days for CAD history or last year for cardiac risk factors)    Recent Labs   Lab Test 10/16/19  1043 08/27/19  0958  09/25/13  1948   HGB 13.8 13.8   < > 12.9    241   < > 206   INR  --   --   --  0.98    143   < > 144   POTASSIUM 4.1 4.1   < > 3.4   CR 1.01 1.05*   < > 1.07*    < > = values in this interval not displayed.        IMPRESSION:   Reason for surgery/procedure: Spinal canal stenosis  Diagnosis/reason for consult: cardiovascular risk assessment    The proposed surgical procedure is considered LOW risk.    REVISED CARDIAC RISK INDEX  The patient has the following serious cardiovascular risks for perioperative complications such as (MI, PE, VFib and 3  AV Block):  No serious cardiac risks  INTERPRETATION: 0 risks: Class I (very low risk - 0.4% complication rate)    The patient has the following additional risks for perioperative complications:  The 10-year ASCVD risk score (Saroj CAPUTO Jr., et al., 2013) is: 5.5%    Values used to calculate the score:      Age: 59 years      Sex: Female      Is Non- : No      Diabetic: No      Tobacco smoker: No      Systolic Blood Pressure: 138 mmHg      Is BP treated: Yes      HDL Cholesterol: 43 mg/dL      Total Cholesterol: 218 mg/dL      ICD-10-CM    1. Preop general physical exam  Z01.818    2. Breast cancer screening  Z12.39    3. Migraine without aura and  without status migrainosus, not intractable  G43.009    4. Morbid obesity, unspecified obesity type (H)  E66.01    5. Chronic diastolic congestive heart failure (H)  I50.32    6. HTN, goal below 140/90  I10    7. Chronic midline low back pain with right-sided sciatica  M54.41     G89.29        RECOMMENDATIONS:       Cardiovascular Risk  Performs 2 METs exercise without symptoms (Light housework (dusting, washing dishes) and Climb a flight of stairs) . Mostly limited by morbid obesity.       Obstructive Sleep Apnea (or suspected sleep apnea)  Hospital staff are advised to monitor for sleep related oxygen desaturations due to suspicion of VALERIE        Anticoagulant or Antiplatelet Medication Use  NSAIDS: Meloxicam (Mobic):      Stop 10 days prior to surgery        ACE Inhibitor or Angiotensin Receptor Blocker (ARB) Use  Ace inhibitor or Angiotensin Receptor Blocker (ARB) and will continue this medication due to the higher risk of uncontrolled perioperative hypertension       APPROVAL GIVEN to proceed with proposed procedure, without further diagnostic evaluation     Signed Electronically by: Anca Sraabia MD    Copy of this evaluation report is provided to requesting physician.    [facility] Preop Guidelines    Revised Cardiac Risk Index   normal...

## 2023-05-25 ENCOUNTER — HOSPITAL ENCOUNTER (INPATIENT)
Facility: OTHER | Age: 63
LOS: 1 days | Discharge: HOME | DRG: 378 | End: 2023-05-26
Attending: EMERGENCY MEDICINE | Admitting: INTERNAL MEDICINE
Payer: COMMERCIAL

## 2023-05-25 ENCOUNTER — APPOINTMENT (OUTPATIENT)
Dept: CT IMAGING | Facility: OTHER | Age: 63
DRG: 378 | End: 2023-05-25
Attending: EMERGENCY MEDICINE
Payer: COMMERCIAL

## 2023-05-25 DIAGNOSIS — K57.32 SIGMOID DIVERTICULITIS: Primary | ICD-10-CM

## 2023-05-25 DIAGNOSIS — K92.2 LOWER GI BLEED: ICD-10-CM

## 2023-05-25 PROBLEM — K57.30 SIGMOID DIVERTICULOSIS: Status: ACTIVE | Noted: 2023-05-25

## 2023-05-25 PROBLEM — K80.20 CHOLELITHIASIS: Status: ACTIVE | Noted: 2023-05-25

## 2023-05-25 LAB
ABO/RH(D): NORMAL
ALBUMIN SERPL BCG-MCNC: 4 G/DL (ref 3.5–5.2)
ALBUMIN UR-MCNC: 30 MG/DL
ALP SERPL-CCNC: 85 U/L (ref 35–104)
ALT SERPL W P-5'-P-CCNC: 12 U/L (ref 10–35)
ANION GAP SERPL CALCULATED.3IONS-SCNC: 11 MMOL/L (ref 7–15)
ANTIBODY SCREEN: NEGATIVE
APPEARANCE UR: ABNORMAL
AST SERPL W P-5'-P-CCNC: 15 U/L (ref 10–35)
BACTERIA #/AREA URNS HPF: ABNORMAL /HPF
BASOPHILS # BLD AUTO: 0 10E3/UL (ref 0–0.2)
BASOPHILS NFR BLD AUTO: 0 %
BILIRUB SERPL-MCNC: 0.4 MG/DL
BILIRUB UR QL STRIP: NEGATIVE
BUN SERPL-MCNC: 15.8 MG/DL (ref 8–23)
CALCIUM SERPL-MCNC: 8.7 MG/DL (ref 8.8–10.2)
CHLORIDE SERPL-SCNC: 109 MMOL/L (ref 98–107)
COLOR UR AUTO: YELLOW
CREAT SERPL-MCNC: 0.91 MG/DL (ref 0.51–0.95)
DEPRECATED HCO3 PLAS-SCNC: 23 MMOL/L (ref 22–29)
EOSINOPHIL # BLD AUTO: 0 10E3/UL (ref 0–0.7)
EOSINOPHIL NFR BLD AUTO: 0 %
ERYTHROCYTE [DISTWIDTH] IN BLOOD BY AUTOMATED COUNT: 13.4 % (ref 10–15)
ERYTHROCYTE [DISTWIDTH] IN BLOOD BY AUTOMATED COUNT: 13.5 % (ref 10–15)
ERYTHROCYTE [DISTWIDTH] IN BLOOD BY AUTOMATED COUNT: 13.6 % (ref 10–15)
GFR SERPL CREATININE-BSD FRML MDRD: 71 ML/MIN/1.73M2
GLUCOSE SERPL-MCNC: 107 MG/DL (ref 70–99)
GLUCOSE UR STRIP-MCNC: NEGATIVE MG/DL
HCT VFR BLD AUTO: 28.9 % (ref 35–47)
HCT VFR BLD AUTO: 29.5 % (ref 35–47)
HCT VFR BLD AUTO: 35.1 % (ref 35–47)
HGB BLD-MCNC: 10 G/DL (ref 11.7–15.7)
HGB BLD-MCNC: 10.6 G/DL (ref 11.7–15.7)
HGB BLD-MCNC: 12.1 G/DL (ref 11.7–15.7)
HGB BLD-MCNC: 9.7 G/DL (ref 11.7–15.7)
HGB UR QL STRIP: ABNORMAL
HOLD SPECIMEN: NORMAL
HYALINE CASTS: 6 /LPF
IMM GRANULOCYTES # BLD: 0 10E3/UL
IMM GRANULOCYTES NFR BLD: 0 %
INR PPP: 1.16 (ref 0.85–1.15)
KETONES UR STRIP-MCNC: NEGATIVE MG/DL
LEUKOCYTE ESTERASE UR QL STRIP: ABNORMAL
LYMPHOCYTES # BLD AUTO: 1.1 10E3/UL (ref 0.8–5.3)
LYMPHOCYTES NFR BLD AUTO: 12 %
MCH RBC QN AUTO: 29.7 PG (ref 26.5–33)
MCH RBC QN AUTO: 29.9 PG (ref 26.5–33)
MCH RBC QN AUTO: 30.1 PG (ref 26.5–33)
MCHC RBC AUTO-ENTMCNC: 33.6 G/DL (ref 31.5–36.5)
MCHC RBC AUTO-ENTMCNC: 33.9 G/DL (ref 31.5–36.5)
MCHC RBC AUTO-ENTMCNC: 34.5 G/DL (ref 31.5–36.5)
MCV RBC AUTO: 87 FL (ref 78–100)
MCV RBC AUTO: 88 FL (ref 78–100)
MCV RBC AUTO: 88 FL (ref 78–100)
MONOCYTES # BLD AUTO: 0.5 10E3/UL (ref 0–1.3)
MONOCYTES NFR BLD AUTO: 5 %
MUCOUS THREADS #/AREA URNS LPF: PRESENT /LPF
NEUTROPHILS # BLD AUTO: 7.2 10E3/UL (ref 1.6–8.3)
NEUTROPHILS NFR BLD AUTO: 83 %
NITRATE UR QL: NEGATIVE
NRBC # BLD AUTO: 0 10E3/UL
NRBC BLD AUTO-RTO: 0 /100
PH UR STRIP: 6 [PH] (ref 5–9)
PLATELET # BLD AUTO: 194 10E3/UL (ref 150–450)
PLATELET # BLD AUTO: 200 10E3/UL (ref 150–450)
PLATELET # BLD AUTO: 237 10E3/UL (ref 150–450)
POTASSIUM SERPL-SCNC: 3.8 MMOL/L (ref 3.4–5.3)
PROT SERPL-MCNC: 6.8 G/DL (ref 6.4–8.3)
RBC # BLD AUTO: 3.27 10E6/UL (ref 3.8–5.2)
RBC # BLD AUTO: 3.35 10E6/UL (ref 3.8–5.2)
RBC # BLD AUTO: 4.02 10E6/UL (ref 3.8–5.2)
RBC URINE: 3 /HPF
SODIUM SERPL-SCNC: 143 MMOL/L (ref 136–145)
SP GR UR STRIP: 1.03 (ref 1–1.03)
SPECIMEN EXPIRATION DATE: NORMAL
SQUAMOUS EPITHELIAL: 19 /HPF
UROBILINOGEN UR STRIP-MCNC: 4 MG/DL
WBC # BLD AUTO: 7.3 10E3/UL (ref 4–11)
WBC # BLD AUTO: 8.7 10E3/UL (ref 4–11)
WBC # BLD AUTO: 8.8 10E3/UL (ref 4–11)
WBC URINE: 13 /HPF

## 2023-05-25 PROCEDURE — 250N000011 HC RX IP 250 OP 636: Performed by: EMERGENCY MEDICINE

## 2023-05-25 PROCEDURE — 96375 TX/PRO/DX INJ NEW DRUG ADDON: CPT | Performed by: EMERGENCY MEDICINE

## 2023-05-25 PROCEDURE — 85004 AUTOMATED DIFF WBC COUNT: CPT | Performed by: EMERGENCY MEDICINE

## 2023-05-25 PROCEDURE — 36415 COLL VENOUS BLD VENIPUNCTURE: CPT | Performed by: INTERNAL MEDICINE

## 2023-05-25 PROCEDURE — 99222 1ST HOSP IP/OBS MODERATE 55: CPT | Mod: AI | Performed by: INTERNAL MEDICINE

## 2023-05-25 PROCEDURE — 87086 URINE CULTURE/COLONY COUNT: CPT | Performed by: EMERGENCY MEDICINE

## 2023-05-25 PROCEDURE — 99285 EMERGENCY DEPT VISIT HI MDM: CPT | Performed by: EMERGENCY MEDICINE

## 2023-05-25 PROCEDURE — 85610 PROTHROMBIN TIME: CPT | Performed by: INTERNAL MEDICINE

## 2023-05-25 PROCEDURE — 96375 TX/PRO/DX INJ NEW DRUG ADDON: CPT

## 2023-05-25 PROCEDURE — 250N000013 HC RX MED GY IP 250 OP 250 PS 637: Performed by: INTERNAL MEDICINE

## 2023-05-25 PROCEDURE — 258N000003 HC RX IP 258 OP 636: Performed by: INTERNAL MEDICINE

## 2023-05-25 PROCEDURE — 99285 EMERGENCY DEPT VISIT HI MDM: CPT | Mod: 25 | Performed by: EMERGENCY MEDICINE

## 2023-05-25 PROCEDURE — 96361 HYDRATE IV INFUSION ADD-ON: CPT | Performed by: EMERGENCY MEDICINE

## 2023-05-25 PROCEDURE — 36415 COLL VENOUS BLD VENIPUNCTURE: CPT | Performed by: EMERGENCY MEDICINE

## 2023-05-25 PROCEDURE — 96376 TX/PRO/DX INJ SAME DRUG ADON: CPT | Mod: XU

## 2023-05-25 PROCEDURE — G0378 HOSPITAL OBSERVATION PER HR: HCPCS

## 2023-05-25 PROCEDURE — 120N000001 HC R&B MED SURG/OB

## 2023-05-25 PROCEDURE — 258N000003 HC RX IP 258 OP 636: Performed by: EMERGENCY MEDICINE

## 2023-05-25 PROCEDURE — 99222 1ST HOSP IP/OBS MODERATE 55: CPT | Mod: 25 | Performed by: SURGERY

## 2023-05-25 PROCEDURE — 250N000013 HC RX MED GY IP 250 OP 250 PS 637: Performed by: EMERGENCY MEDICINE

## 2023-05-25 PROCEDURE — 96374 THER/PROPH/DIAG INJ IV PUSH: CPT | Performed by: EMERGENCY MEDICINE

## 2023-05-25 PROCEDURE — 86900 BLOOD TYPING SEROLOGIC ABO: CPT | Performed by: EMERGENCY MEDICINE

## 2023-05-25 PROCEDURE — 74174 CTA ABD&PLVS W/CONTRAST: CPT

## 2023-05-25 PROCEDURE — 250N000011 HC RX IP 250 OP 636: Performed by: INTERNAL MEDICINE

## 2023-05-25 PROCEDURE — 85018 HEMOGLOBIN: CPT | Performed by: INTERNAL MEDICINE

## 2023-05-25 PROCEDURE — 81003 URINALYSIS AUTO W/O SCOPE: CPT | Performed by: EMERGENCY MEDICINE

## 2023-05-25 PROCEDURE — 82040 ASSAY OF SERUM ALBUMIN: CPT | Performed by: EMERGENCY MEDICINE

## 2023-05-25 PROCEDURE — 85018 HEMOGLOBIN: CPT | Performed by: EMERGENCY MEDICINE

## 2023-05-25 RX ORDER — IOPAMIDOL 755 MG/ML
150 INJECTION, SOLUTION INTRAVASCULAR ONCE
Status: COMPLETED | OUTPATIENT
Start: 2023-05-25 | End: 2023-05-25

## 2023-05-25 RX ORDER — NALOXONE HYDROCHLORIDE 0.4 MG/ML
0.2 INJECTION, SOLUTION INTRAMUSCULAR; INTRAVENOUS; SUBCUTANEOUS
Status: DISCONTINUED | OUTPATIENT
Start: 2023-05-25 | End: 2023-05-26 | Stop reason: HOSPADM

## 2023-05-25 RX ORDER — VENLAFAXINE HYDROCHLORIDE 75 MG/1
75 CAPSULE, EXTENDED RELEASE ORAL DAILY
Status: DISCONTINUED | OUTPATIENT
Start: 2023-05-26 | End: 2023-05-26 | Stop reason: HOSPADM

## 2023-05-25 RX ORDER — VENLAFAXINE HYDROCHLORIDE 75 MG/1
75 CAPSULE, EXTENDED RELEASE ORAL 2 TIMES DAILY
Status: DISCONTINUED | OUTPATIENT
Start: 2023-05-25 | End: 2023-05-25

## 2023-05-25 RX ORDER — IBUPROFEN 200 MG
800 TABLET ORAL 4 TIMES DAILY PRN
COMMUNITY
End: 2024-08-02

## 2023-05-25 RX ORDER — NALOXONE HYDROCHLORIDE 0.4 MG/ML
0.4 INJECTION, SOLUTION INTRAMUSCULAR; INTRAVENOUS; SUBCUTANEOUS
Status: DISCONTINUED | OUTPATIENT
Start: 2023-05-25 | End: 2023-05-26 | Stop reason: HOSPADM

## 2023-05-25 RX ORDER — CIPROFLOXACIN 2 MG/ML
400 INJECTION, SOLUTION INTRAVENOUS 2 TIMES DAILY
Status: DISCONTINUED | OUTPATIENT
Start: 2023-05-25 | End: 2023-05-26 | Stop reason: HOSPADM

## 2023-05-25 RX ORDER — TOPIRAMATE 25 MG/1
100 TABLET, FILM COATED ORAL 2 TIMES DAILY
Status: DISCONTINUED | OUTPATIENT
Start: 2023-05-25 | End: 2023-05-26 | Stop reason: HOSPADM

## 2023-05-25 RX ORDER — SODIUM CHLORIDE 9 MG/ML
INJECTION, SOLUTION INTRAVENOUS CONTINUOUS
Status: DISCONTINUED | OUTPATIENT
Start: 2023-05-25 | End: 2023-05-26 | Stop reason: DRUGHIGH

## 2023-05-25 RX ORDER — METRONIDAZOLE 500 MG/100ML
500 INJECTION, SOLUTION INTRAVENOUS 3 TIMES DAILY
Status: DISCONTINUED | OUTPATIENT
Start: 2023-05-25 | End: 2023-05-26 | Stop reason: HOSPADM

## 2023-05-25 RX ORDER — ACETAMINOPHEN 650 MG/1
650 SUPPOSITORY RECTAL EVERY 6 HOURS PRN
Status: DISCONTINUED | OUTPATIENT
Start: 2023-05-25 | End: 2023-05-26 | Stop reason: HOSPADM

## 2023-05-25 RX ORDER — HYDROMORPHONE HYDROCHLORIDE 1 MG/ML
0.5 INJECTION, SOLUTION INTRAMUSCULAR; INTRAVENOUS; SUBCUTANEOUS
Status: DISCONTINUED | OUTPATIENT
Start: 2023-05-25 | End: 2023-05-26 | Stop reason: HOSPADM

## 2023-05-25 RX ORDER — PROCHLORPERAZINE MALEATE 10 MG
10 TABLET ORAL EVERY 6 HOURS PRN
Status: DISCONTINUED | OUTPATIENT
Start: 2023-05-25 | End: 2023-05-26 | Stop reason: HOSPADM

## 2023-05-25 RX ORDER — POLYETHYLENE GLYCOL 3350 17 G/17G
1 POWDER, FOR SOLUTION ORAL
COMMUNITY

## 2023-05-25 RX ORDER — SODIUM CHLORIDE 9 MG/ML
INJECTION, SOLUTION INTRAVENOUS CONTINUOUS
Status: DISCONTINUED | OUTPATIENT
Start: 2023-05-25 | End: 2023-05-26

## 2023-05-25 RX ORDER — ONDANSETRON 4 MG/1
4 TABLET, ORALLY DISINTEGRATING ORAL EVERY 6 HOURS PRN
Status: DISCONTINUED | OUTPATIENT
Start: 2023-05-25 | End: 2023-05-26 | Stop reason: HOSPADM

## 2023-05-25 RX ORDER — BUSPIRONE HYDROCHLORIDE 10 MG/1
10 TABLET ORAL 3 TIMES DAILY
Status: DISCONTINUED | OUTPATIENT
Start: 2023-05-25 | End: 2023-05-26 | Stop reason: HOSPADM

## 2023-05-25 RX ORDER — ACETAMINOPHEN 325 MG/1
650 TABLET ORAL EVERY 6 HOURS PRN
Status: DISCONTINUED | OUTPATIENT
Start: 2023-05-25 | End: 2023-05-26 | Stop reason: HOSPADM

## 2023-05-25 RX ORDER — CYCLOBENZAPRINE HCL 10 MG
10 TABLET ORAL
Status: DISCONTINUED | OUTPATIENT
Start: 2023-05-25 | End: 2023-05-26 | Stop reason: HOSPADM

## 2023-05-25 RX ORDER — VENLAFAXINE HYDROCHLORIDE 75 MG/1
150 CAPSULE, EXTENDED RELEASE ORAL AT BEDTIME
Status: DISCONTINUED | OUTPATIENT
Start: 2023-05-25 | End: 2023-05-26 | Stop reason: HOSPADM

## 2023-05-25 RX ORDER — PROCHLORPERAZINE 25 MG
25 SUPPOSITORY, RECTAL RECTAL EVERY 12 HOURS PRN
Status: DISCONTINUED | OUTPATIENT
Start: 2023-05-25 | End: 2023-05-26 | Stop reason: HOSPADM

## 2023-05-25 RX ORDER — LIDOCAINE 40 MG/G
CREAM TOPICAL
Status: DISCONTINUED | OUTPATIENT
Start: 2023-05-25 | End: 2023-05-26 | Stop reason: HOSPADM

## 2023-05-25 RX ORDER — ONDANSETRON 2 MG/ML
4 INJECTION INTRAMUSCULAR; INTRAVENOUS EVERY 6 HOURS PRN
Status: DISCONTINUED | OUTPATIENT
Start: 2023-05-25 | End: 2023-05-26 | Stop reason: HOSPADM

## 2023-05-25 RX ORDER — ACETAMINOPHEN 325 MG/1
650 TABLET ORAL ONCE
Status: COMPLETED | OUTPATIENT
Start: 2023-05-25 | End: 2023-05-25

## 2023-05-25 RX ADMIN — TOPIRAMATE 100 MG: 25 TABLET, FILM COATED ORAL at 21:17

## 2023-05-25 RX ADMIN — METRONIDAZOLE 500 MG: 500 INJECTION, SOLUTION INTRAVENOUS at 21:19

## 2023-05-25 RX ADMIN — CIPROFLOXACIN 400 MG: 2 INJECTION, SOLUTION INTRAVENOUS at 16:50

## 2023-05-25 RX ADMIN — BUSPIRONE HYDROCHLORIDE 10 MG: 10 TABLET ORAL at 21:17

## 2023-05-25 RX ADMIN — SODIUM CHLORIDE: 9 INJECTION, SOLUTION INTRAVENOUS at 18:04

## 2023-05-25 RX ADMIN — ACETAMINOPHEN 650 MG: 325 TABLET ORAL at 16:50

## 2023-05-25 RX ADMIN — SODIUM CHLORIDE 1000 ML: 9 INJECTION, SOLUTION INTRAVENOUS at 12:40

## 2023-05-25 RX ADMIN — IOPAMIDOL 150 ML: 755 INJECTION, SOLUTION INTRAVENOUS at 13:15

## 2023-05-25 RX ADMIN — FAMOTIDINE 20 MG: 10 INJECTION INTRAVENOUS at 18:22

## 2023-05-25 RX ADMIN — METRONIDAZOLE 500 MG: 500 INJECTION, SOLUTION INTRAVENOUS at 16:50

## 2023-05-25 RX ADMIN — VENLAFAXINE HYDROCHLORIDE 150 MG: 75 CAPSULE, EXTENDED RELEASE ORAL at 21:17

## 2023-05-25 ASSESSMENT — ENCOUNTER SYMPTOMS
FATIGUE: 0
DIARRHEA: 0
CONFUSION: 0
DIFFICULTY URINATING: 0
NUMBNESS: 0
NAUSEA: 1
ACTIVITY CHANGE: 0
DECREASED CONCENTRATION: 0
COLOR CHANGE: 0
ABDOMINAL PAIN: 1
FACIAL ASYMMETRY: 0
ARTHRALGIAS: 0
SHORTNESS OF BREATH: 0
DIZZINESS: 0
APNEA: 0
EYE DISCHARGE: 0
CONSTIPATION: 0
WHEEZING: 0
EYE ITCHING: 0
LIGHT-HEADEDNESS: 0
BLOOD IN STOOL: 1
CHEST TIGHTNESS: 0
ABDOMINAL DISTENTION: 0
VOMITING: 0
ANAL BLEEDING: 1
PALPITATIONS: 0
HEADACHES: 0
STRIDOR: 0
AGITATION: 0
APPETITE CHANGE: 0
CHOKING: 0
COUGH: 0

## 2023-05-25 ASSESSMENT — ACTIVITIES OF DAILY LIVING (ADL)
ADLS_ACUITY_SCORE: 22
ADLS_ACUITY_SCORE: 22
ADLS_ACUITY_SCORE: 35
ADLS_ACUITY_SCORE: 35
ADLS_ACUITY_SCORE: 22
ADLS_ACUITY_SCORE: 35

## 2023-05-25 NOTE — PLAN OF CARE
Goal Outcome Evaluation:      Plan of Care Reviewed With: patient, spouse    Overall Patient Progress: no change       Patient is alert and oriented, calm and cooperative. Vitally stable, lungs are clear. Denies any nausea, some abdominal discomfort. Uses call light appropriately.

## 2023-05-25 NOTE — PROVIDER NOTIFICATION
05/25/23 1716   Initial Information   Patient Belongings remains with patient   Patient Belongings Remaining with Patient cell phone/electronics;glasses;purse/wallet   Did you bring any home meds/supplements to the hospital?  No     A               Admission:  I am responsible for any personal items that are not sent to the safe or pharmacy.  [facility] is not responsible for loss, theft or damage of any property in my possession.    Signature:  _________________________________ Date: _______  Time: _____                                              Staff Signature:  ____________________________ Date: ________  Time: _____      2nd Staff person, if patient is unable/unwilling to sign:    Signature: ________________________________ Date: ________  Time: _____     Discharge:  [facility] has returned all of my personal belongings:    Signature: _________________________________ Date: ________  Time: _____                                          Staff Signature:  ____________________________ Date: ________  Time: _____

## 2023-05-25 NOTE — CONSULTS
Surgical  Consult  Referring physician:  ILAN Kumar  Primary physician:     Renetta Benitez    Chief complaint:   Rectal bleeding    History of present illness:  This is a 62 year old female I am seeing in consultation for rectal bleeding.  The patient had 4-5 episodes of bright red blood per rectum this morning.  She then had some dizziness and lightheadedness and came to the emergency room.  She passed only a small amount of blood here.  Her hemoglobin was 12.1 and then 10.6.  She has had some lower abdominal pain today.  She had a negative Cologuard last year.  She reports she had a colonoscopy in the past but she is unsure of the year.  CT scan shows a gallstone, sigmoid diverticulosis and a possibly inflamed diverticulum of the sigmoid.  No extravasation seen.  She has had  sections and a vaginal hysterectomy.  She is not anticoagulated.     Past medical history:   Past Medical History:   Diagnosis Date     Congestive heart failure (H) 3years     Depressive disorder 1-2 years    dealing with on a daily basis     DEPRESSIVE DISORDER NEC 2004     Diastolic dysfunction      Essential hypertension, benign      Generalized osteoarthrosis, unspecified site     knees     Headache(784.0)      Heart disease 3 years    CHF     Lumbar stenosis with neurogenic claudication 2021    Added automatically from request for surgery [id number]     Mixed anxiety depressive disorder 1/15/2016     PANIC DISORDER 2004       Pastsurgical history:  Past Surgical History:   Procedure Laterality Date     COLONOSCOPY  10/06/10    attempt at colonscopy to 50cm     INJECT EPIDURAL LUMBAR N/A 10/18/2019    Procedure: INJECTION, SPINE, LUMBAR 4 - LUMBAR 5, EPIDURAL TRANSLAMINAR;  Surgeon: Trever Wheatley MD;  Location[facility]     INJECT EPIDURAL LUMBAR N/A 2020    Procedure: INJECTION, SPINE, LUMBAR 4-5, EPIDURAL TRANSLAMINAR;  Surgeon: Trever Wheatley MD;  Location: [facility] OR     INJECT EPIDURAL LUMBAR N/A 2021     Procedure: Lumbar 4-5 Epidural Injection;  Surgeon: Trever Wheatley MD;  Location: [facility]     JOINT REPLACEMTN, KNEE RT/LT  2006    Right total knee arthroplasty.     JOINT REPLACEMTN, KNEE RT/LT  2006    Left total knee arthroplasty.     LAMINECTOMY LUMBAR POSTERIOR MICROSCOPIC TWO LEVELS N/A 2021    Procedure: Lumbar 3 to Lumbar 5 Laminectomy;  Surgeon: Leonard Wallis MD;  Location: [facility] OR     ZZC  DELIVERY ONLY      , Low Cervical     ZZC  DELIVERY ONLY       ZZC VAGINAL HYSTERECTOMY      without oophorectomy     [facility]    Diverticulosis.  Internal hemorrhoids.       Current medications:  Current Outpatient Medications   Medication Sig Dispense Refill     busPIRone (BUSPAR) 10 MG tablet Take 1 tablet (10 mg) by mouth 3 times daily 270 tablet 3     cyclobenzaprine (FLEXERIL) 10 MG tablet Take 1 tablet (10 mg) by mouth nightly as needed for muscle spasms 90 tablet 3     furosemide (LASIX) 20 MG tablet Take 1 tablet (20 mg) by mouth daily 90 tablet 3     losartan (COZAAR) 100 MG tablet Take 1 tablet (100 mg) by mouth daily 90 tablet 3     MIRALAX PO POWD 17 GM DAILY 1 Bottle 11     omeprazole (PRILOSEC) 20 MG DR capsule Take 20 mg by mouth every morning        topiramate (TOPAMAX) 100 MG tablet Take 1 tablet (100 mg) by mouth 2 times daily (1.5 X 50 mg) 180 tablet 3     venlafaxine (EFFEXOR XR) 75 MG 24 hr capsule Take 1 in the morning and 2 at night 270 capsule 3       Allergies:  Allergies   Allergen Reactions     Lisinopril Cough       Family history:  Family History   Problem Relation Age of Onset     Cancer Father         squamous cell ca     Cancer Maternal Grandmother         lung     Cerebrovascular Disease Maternal Grandmother      Cancer Paternal Grandmother         squamous cell ca     Diabetes Sister      Cancer Sister         kidney cancer     Kidney Cancer Sister      Other Cancer Sister         kidney cancer,  AND HOW HAS STAGE 4 KIDNEY CANCER AND IS TERMINAL     Bipolar Disorder Sister      Connective Tissue Disorder Brother         lupus     Lupus Brother      Kidney Disease Mother         atrophic kidney     Hypertension Mother      Depression Mother         DONT KNOW MUCH     Bipolar Disorder Mother      Diabetes Mother      Depression Sister         dont know much     Kidney Disease Maternal Uncle         unclear kidney issue     Anxiety Disorder Son      Anxiety Disorder Niece      Anxiety Disorder Son      Colon Cancer No family hx of      Asthma No family hx of        Social history:  Social History     Socioeconomic History     Marital status:      Spouse name: Shun     Number of children: 3     Years of education: Not on file     Highest education level: Some college, no degree   Occupational History     Occupation: Processor   Tobacco Use     Smoking status: Never     Smokeless tobacco: Never     Tobacco comments:     no smokers in household   Vaping Use     Vaping status: Never Used     Passive vaping exposure: Yes   Substance and Sexual Activity     Alcohol use: No     Drug use: No     Sexual activity: Yes     Partners: Male     Birth control/protection: None     Comment: hysterectomy/bilateral ovaries remain   Other Topics Concern      Service No     Blood Transfusions No     Caffeine Concern No     Occupational Exposure No     Hobby Hazards No     Sleep Concern No     Stress Concern Yes     Weight Concern Yes     Special Diet Not Asked     Back Care Not Asked     Exercise No     Bike Helmet Not Asked     Seat Belt Yes     Self-Exams No     Parent/sibling w/ CABG, MI or angioplasty before 65F 55M? Yes   Social History Narrative     Not on file     Social Determinants of Health     Financial Resource Strain: Low Risk  (1/6/2021)    Overall Financial Resource Strain (CARDIA)      Difficulty of Paying Living Expenses: Not hard at all   Food Insecurity: No Food Insecurity (1/6/2021)    Hunger  "Vital Sign      Worried About Running Out of Food in the Last Year: Never true      Ran Out of Food in the Last Year: Never true   Transportation Needs: No Transportation Needs (1/6/2021)    PRAPARE - Transportation      Lack of Transportation (Medical): No      Lack of Transportation (Non-Medical): No   Physical Activity: Not on file   Stress: Stress Concern Present (1/6/2021)    [employer] - Occupational Stress Questionnaire      Feeling of Stress : Rather much   Social Connections: Unknown (1/6/2021)    Social Connection and Isolation Panel [NHANES]      Frequency of Communication with Friends and Family: Not asked      Frequency of Social Gatherings with Friends and Family: Not asked      Attends Shinto Services: Not asked      Active Member of Clubs or Organizations: Not asked      Attends Club or Organization Meetings: Not asked      Marital Status:    Intimate Partner Violence: Not At Risk (1/6/2021)    Humiliation, Afraid, Rape, and Kick questionnaire      Fear of Current or Ex-Partner: No      Emotionally Abused: No      Physically Abused: No      Sexually Abused: No   Housing Stability: Not on file       PROBLEM LIST:  Patient Active Problem List   Diagnosis     HTN, goal below 140/90     Diastolic CHF (H)     Morbid obesity, unspecified obesity type (H)     Migraine without aura and without status migrainosus, not intractable     Primary osteoarthritis of left hip     Major depressive disorder, recurrent episode, moderate (H)     Lower GI bleed           Physical exam: BP (!) 148/70   Pulse 61   Temp 97.3  F (36.3  C) (Tympanic)   Resp 12   Ht 1.676 m (5' 6\")   Wt 140.6 kg (310 lb)   SpO2 100%   BMI 50.04 kg/m      General: this is a pleasant female patient in no acute distress.  Patient is awake alert and oriented x3 .   EXAM:  Chest/Respiratory Exam: Normal - Clear to auscultation without rales, rhonchi, or wheezing.  Cardiovascular Exam: normal, regular " rate and rhythm  Abdomen: Soft and nontender.  Mild discomfort in the lower abdomen on the left, no peritoneal signs.     Assessment:   1.  Lower GI bleeding most likely diverticular in origin.  Discussed with the patient that 80% of the stop spontaneously.  Of the 20% that do not stop spontaneously, it was either progressed to hemorrhage and hypotension which leads to an angiogram.  If it is persistently slow we would then proceed to a mechanical bowel prep and colonoscopy.  If she is in the 80% that stop spontaneously, would recommend an outpatient colonoscopy in 6 to 8 weeks since there is a question of inflammation (so we would want to reduce the risk of perforation).  2.  Cholelithiasis    Plan:    IV hydration  Clear liquids  Follow serial hemoglobin and proceed as above.   Consider antibiotics for the focal diverticulitis     Bong Dumont MD

## 2023-05-25 NOTE — PHARMACY-ADMISSION MEDICATION HISTORY
Pharmacist Admission Medication History    Admission medication history is complete. The information provided in this note is only as accurate as the sources available at the time of the update.    Medication reconciliation/reorder completed by provider prior to medication history? No    Information Source(s): Patient and CareEverywhere/SureScripts via in-person    Pertinent Information: Patient was mostly able to discuss her home medications. She knew that she takes 6 prescription medications, she also knew her spironolactone was recently discontinued.     Changes made to PTA medication list:    Added:   o Ibuprofen PRN  o Excedrin Migraine PRN    Deleted: None    Changed:   o Miralax is taken twice weekly (as opposed to daily)    Medication Affordability: no issues noted per patient       Allergies reviewed with patient and updates made in EHR: yes    Medication History Completed By: Angelica Kurtz [facility] 5/25/2023 6:53 PM    Prior to Admission medications    Medication Sig Last Dose Taking? Auth Provider Long Term End Date   aspirin-acetaminophen-caffeine (EXCEDRIN MIGRAINE) 250-250-65 MG tablet Take 3 tablets by mouth daily as needed for headaches Past Week at na Yes Unknown, Entered By History     busPIRone (BUSPAR) 10 MG tablet Take 1 tablet (10 mg) by mouth 3 times daily 5/24/2023 at pm Yes Renetta Benitez MD Yes    cyclobenzaprine (FLEXERIL) 10 MG tablet Take 1 tablet (10 mg) by mouth nightly as needed for muscle spasms Past Week at na Yes Renetta Benitez MD     furosemide (LASIX) 20 MG tablet Take 1 tablet (20 mg) by mouth daily 5/24/2023 at am Yes Renetta Benitez MD Yes    ibuprofen (ADVIL/MOTRIN) 200 MG tablet Take 800 mg by mouth 4 times daily as needed for pain 5/24/2023 at na Yes Unknown, Entered By History     losartan (COZAAR) 100 MG tablet Take 1 tablet (100 mg) by mouth daily 5/24/2023 at am Yes Renetta Benitez MD Yes    omeprazole (PRILOSEC) 20 MG DR capsule Take 20 mg  by mouth every morning  5/24/2023 at am Yes Reported, Patient     polyethylene glycol (MIRALAX) 17 GM/Dose powder Take 1 Capful by mouth twice a week Past Week at na Yes Unknown, Entered By History     topiramate (TOPAMAX) 100 MG tablet Take 1 tablet (100 mg) by mouth 2 times daily (1.5 X 50 mg) 5/24/2023 at pm Yes Renetta Benitez MD Yes    venlafaxine (EFFEXOR XR) 75 MG 24 hr capsule Take 1 in the morning and 2 at night 5/24/2023 at pm Yes Renetta Benitez MD Yes

## 2023-05-25 NOTE — PROGRESS NOTES
1.  Has the patient had a previous reaction to IV contrast?no  2.  Does the patient have kidney disease? no    3.  Is the patient on dialysis? no    If YES to any of these questions, exam will be reviewed with a Radiologist before administering contrast.  IV Contrast- Discharge Instructions After Your CT Scan      The IV contrast you received today will be filtered from your bloodstream by your kidneys during the next 24 hours and pass from the body in urine.  You will not be aware of this process and your urine will not change in color.  To help this process you should drink at least 4 additional glasses of water or juice today.  This reduces stress on your kidneys.    Most contrast reactions are immediate.  Should you develop symptoms of concern after discharge, contact the department at the number below.  After hours you should contact your personal physician.  If you develop breathing distress or wheezing, call 911.

## 2023-05-25 NOTE — H&P
"[facility]    History and Physical  Hospitalist       Date of Admission:  5/25/2023    Assessment & Plan   Eva Solis is a 62 year old female who presents with acute lower GI bleed likely due to diverticulitis.    Clinically Significant Risk Factors Present on Admission                  # Hypertension: Noted on problem list      # Severe Obesity: Estimated body mass index is 50.04 kg/m  as calculated from the following:    Height as of this encounter: 1.676 m (5' 6\").    Weight as of this encounter: 140.6 kg (310 lb).           Principal Problem:    Focal Sigmoid diverticulitis    Assessment: Acute onset of pain and clear inflammation on imaging and benefit of empiric antibiotic treatment outweighs risk.  Otherwise hemodynamically stable and having almost no further bright red blood per rectum and likely tapering off.    Plan:   -Cipro/Flagyl day 1  -Check C. Difficile  -Clears  -Appreciate general surgery consult  -Trend hemoglobin and check coags  -If having further bleeding will need to prep for colonoscopy    Active Problems:  Essential HTN, goal below 140/90    Assessment: Stable    Plan:   -Hold home Lasix and ARB      Cholelithiasis    Assessment: Incidentally noted an asymptomatic    Plan:   -Monitor    DVT Prophylaxis: Pneumatic Compression Devices  Code Status: Full Code    Kasi Kumar MD    Primary Care Physician   Renetta Benitez    Chief Complaint   Abdominal pain and bright red blood per rectum    History is obtained from the patient and chart review.    History of Present Illness   Eva Solis is a 62 year old female history of obesity presenting with probable lower GI bleed likely secondary to diverticulitis.  Patient has been her normal state of health but earlier this morning was awoken by some acute onset of left lower quadrant abdominal pain, subsequently had multiple loose bright red bloody stools.  She felt increasingly lightheaded and was brought to the " ER.    In the ER she has been hemodynamically stable but had another of loose bloody stool, underwent CT abdomen pelvis with findings noted, general surgery was urgently consulted and patient was subsequently admitted for further management.    Past Medical History    I have reviewed this patient's medical history and updated it with pertinent information if needed.   Past Medical History:   Diagnosis Date     Congestive heart failure (H) 3years     Depressive disorder 1-2 years    dealing with on a daily basis     DEPRESSIVE DISORDER NEC 5/4/2004     Diastolic dysfunction      Essential hypertension, benign      Generalized osteoarthrosis, unspecified site     knees     Headache(784.0)      Heart disease 3 years    CHF     Lumbar stenosis with neurogenic claudication 7/1/2021    Added automatically from request for surgery [id number]     Mixed anxiety depressive disorder 1/15/2016     PANIC DISORDER 5/4/2004       Past Surgical History   I have reviewed this patient's surgical history and updated it with pertinent information if needed.  Past Surgical History:   Procedure Laterality Date     COLONOSCOPY  10/06/10    attempt at colonscopy to 50cm     INJECT EPIDURAL LUMBAR N/A 10/18/2019    Procedure: INJECTION, SPINE, LUMBAR 4 - LUMBAR 5, EPIDURAL TRANSLAMINAR;  Surgeon: Trever Wheatley MD;  Location: [facility] OR     INJECT EPIDURAL LUMBAR N/A 6/12/2020    Procedure: INJECTION, SPINE, LUMBAR 4-5, EPIDURAL TRANSLAMINAR;  Surgeon: Trever Wheatley MD;  Location: [facility] OR     INJECT EPIDURAL LUMBAR N/A 4/22/2021    Procedure: Lumbar 4-5 Epidural Injection;  Surgeon: Trever Wheatley MD;  Location: [facility] OR     JOINT REPLACEMTN, KNEE RT/LT  5/11/2006    Right total knee arthroplasty.     JOINT REPLACEMTN, KNEE RT/LT  07/19/2006    Left total knee arthroplasty.     LAMINECTOMY LUMBAR POSTERIOR MICROSCOPIC TWO LEVELS N/A 9/17/2021    Procedure: Lumbar 3 to Lumbar 5 Laminectomy;  Surgeon: Leonard Wallis MD;  Location:  OR     [facility]   DELIVERY ONLY      , Low Cervical     [facility]  DELIVERY ONLY       [facility] VAGINAL HYSTERECTOMY      without oophorectomy     [facility] COLONOSCOPY W/WO BRUSH/WASH  2005    Diverticulosis.  Internal hemorrhoids.       Prior to Admission Medications   Prior to Admission Medications   Prescriptions Last Dose Informant Patient Reported? Taking?   MIRALAX PO POWD  Self No No   Si GM DAILY   busPIRone (BUSPAR) 10 MG tablet   No No   Sig: Take 1 tablet (10 mg) by mouth 3 times daily   cyclobenzaprine (FLEXERIL) 10 MG tablet   No No   Sig: Take 1 tablet (10 mg) by mouth nightly as needed for muscle spasms   furosemide (LASIX) 20 MG tablet   No No   Sig: Take 1 tablet (20 mg) by mouth daily   losartan (COZAAR) 100 MG tablet   No No   Sig: Take 1 tablet (100 mg) by mouth daily   omeprazole (PRILOSEC) 20 MG DR capsule  Self Yes No   Sig: Take 20 mg by mouth every morning    topiramate (TOPAMAX) 100 MG tablet   No No   Sig: Take 1 tablet (100 mg) by mouth 2 times daily (1.5 X 50 mg)   venlafaxine (EFFEXOR XR) 75 MG 24 hr capsule   No No   Sig: Take 1 in the morning and 2 at night      Facility-Administered Medications: None     Allergies   Allergies   Allergen Reactions     Lisinopril Cough       Social History   I have reviewed this patient's social history and updated it with pertinent information if needed. Eva Solis  reports that she has never smoked. She has never used smokeless tobacco. She reports that she does not drink alcohol and does not use drugs.    Family History   I have reviewed this patient's family history and updated it with pertinent information if needed.   Family History   Problem Relation Age of Onset     Cancer Father         squamous cell ca     Cancer Maternal Grandmother         lung     Cerebrovascular Disease Maternal Grandmother      Cancer Paternal Grandmother         squamous cell ca     Diabetes Sister      Cancer Sister         kidney cancer     Kidney  Cancer Sister      Other Cancer Sister         kidney cancer, AND HOW HAS STAGE 4 KIDNEY CANCER AND IS TERMINAL     Bipolar Disorder Sister      Connective Tissue Disorder Brother         lupus     Lupus Brother      Kidney Disease Mother         atrophic kidney     Hypertension Mother      Depression Mother         DONT KNOW MUCH     Bipolar Disorder Mother      Diabetes Mother      Depression Sister         dont know much     Kidney Disease Maternal Uncle         unclear kidney issue     Anxiety Disorder Son      Anxiety Disorder Niece      Anxiety Disorder Son      Colon Cancer No family hx of      Asthma No family hx of        Review of Systems     Constitutional: normal energy and appetite, no recent sick contacts, no COVID-related symptoms.  Eyes: no changes in vision  Ears, nose, mouth, throat, and face: no mouth sores, dysphagia, or odynophagia  Respiratory: no shortness of breath, cough, or wheezing.   Cardiovascular: no chest pain, palpitations, orthopnea, increased lower extremity edema, or syncope.   Gastrointestinal: no constipation, diarrhea, nausea, vomiting or abdominal pain.  She is never had anything like this happen to her before.  Genitourinary: no dysuria, hematuria, urgency or frequency.   Hematologic/lymphatic: no unintentional weight loss or night sweats.  Musculoskeletal: no pain to extremities or falls.   Neurological: no new weakness, tingling, numbness.   Endocrine: not a known diabetic.     Physical Exam   Temp: 97.7  F (36.5  C) Temp src: Tympanic BP: (!) 163/90 Pulse: 71   Resp: 16 SpO2: 98 % O2 Device: None (Room air)    Vital Signs with Ranges  Temp:  [97.3  F (36.3  C)-97.7  F (36.5  C)] 97.7  F (36.5  C)  Pulse:  [59-84] 71  Resp:  [7-16] 16  BP: (127-163)/(69-90) 163/90  SpO2:  [98 %-100 %] 98 %  310 lbs 0 oz    Exam:  GENERAL: Talkative, sitting up in side of the bed, pleasant and appropriate, in no apparent distress.  Head: normocephalic and atraumatic  Eyes: anicteric and  non-injected sclera  Nose: no rhinorrhea or epistaxis.   Throat: moist mucous membranes with no active oral lesions.  NECK: Supple, jugular venous distension not present.  CARDIOVASCULAR: regular rate and rhythm, no murmurs, rubs, or gallops. Normal S1/S2. No lower extremity edema.   RESPIRATORY: clear to auscultation bilaterally, no wheezes, no crackles.    GI: Obese, soft, only minimal tenderness with palpation of the left lower quadrant and no peritoneal signs of rebound or guarding, non-distended, normoactive bowel sounds.  MUSCULOSKELETAL: warm and well perfused, 2+ dorsalis pedis pulses.    SKIN: no pallor, jaundice or rashes.  NEUROLOGY: AAOx3, follows commands, speech and language without focal deficits.     Data   Data reviewed today:   Recent Labs   Lab 05/25/23  1356 05/25/23  1222   WBC  --  8.8   HGB 10.6* 12.1   MCV  --  87   PLT  --  237   NA  --  143   POTASSIUM  --  3.8   CHLORIDE  --  109*   CO2  --  23   BUN  --  15.8   CR  --  0.91   ANIONGAP  --  11   DEVANTE  --  8.7*   GLC  --  107*   ALBUMIN  --  4.0   PROTTOTAL  --  6.8   BILITOTAL  --  0.4   ALKPHOS  --  85   ALT  --  12   AST  --  15       Recent Results (from the past 24 hour(s))   CTA Abdomen Pelvis with Contrast    Narrative    PROCEDURE:  CTA ABDOMEN PELVIS WITH CONTRAST.    HISTORY:  brbpr x 5, h/o diverticulosis    TECHNIQUE:  Initial pre-contrast  and localizer images were  obtained. Precontrast, arterial and delayed CT angiographic images of  the abdomen and pelvis were obtained according to a GI bleed protocol.  Routine transaxial and post-processed (multiplanar and/or MIP)  reformations were obtained. This CT exam was performed using one or  more the following dose reduction techniques: automated exposure  control, adjustment of the mA and/or kV according to patient size,  and/or iterative reconstruction technique.    COMPARISON:  None.    MEDS/CONTRAST: 150 mL [id number]    FINDINGS:      Limited images through the lung bases  demonstrate no consolidation or  mass.    No intrahepatic mass is identified. There is a dense gallstone in the  gallbladder neck. No pancreatic mass is identified. The adrenal glands  and spleen are unremarkable. There is atrophy and hypoenhancement of a  portion of the left kidney. There is no hydronephrosis.     The small bowel is normal in caliber. The appendix is normal. Moderate  scattered stool is seen in the colon and rectum. There is mild focal  inflammation of a single sigmoid diverticula on series 5 image 68.     No evidence of intraluminal extravasated contrast is seen to suggest  active bleeding.    No suspicious osseous lesion is identified.      Impression    IMPRESSION:    Acute sigmoid diverticulitis without free air or abscess formation.    JASON HOUSTON MD         SYSTEM ID:  [id number]

## 2023-05-25 NOTE — ED TRIAGE NOTES
"Pt reports waking up at 0400 with intense pain in abd and had diarrhea with blood in stool. Pt reports BRB but large amounts and is unable to see through the toilet water. Pt reports 5 episodes of bloody diarrhea.  BP (!) 142/89   Pulse 84   Temp 97.3  F (36.3  C) (Tympanic)   Resp 16   Ht 1.676 m (5' 6\")   Wt 140.6 kg (310 lb)   SpO2 100%   BMI 50.04 kg/m      Jackelin Castro RN on 5/25/2023 at 11:15 AM       Triage Assessment     Row Name 05/25/23 1114       Respiratory WDL    Respiratory WDL WDL       Skin Circulation/Temperature WDL    Skin Circulation/Temperature WDL WDL       Cognitive/Neuro/Behavioral WDL    Cognitive/Neuro/Behavioral WDL WDL              "

## 2023-05-25 NOTE — ED PROVIDER NOTES
History     Chief Complaint   Patient presents with     Rectal Bleeding     HPI  Eva Solis is a 62 year old female who presents with 5 episodes of acute onset bright red blood per rectum starting this morning around 4:00 AM.  Patient reports left lower quadrant abdominal pain throughout these episodes but has no pain with defecation.  Patient has never had blood in her stool before, is unaware of a personal history of hemorrhoids, and had a normal Cologuard test August 2022.  Patient has not been ill recently and denies recent fever, shortness of breath, abdominal pain, difficulty with urinating or bowel movements, or diarrhea.    Allergies:  Allergies   Allergen Reactions     Lisinopril Cough       Problem List:    Patient Active Problem List    Diagnosis Date Noted     Lower GI bleed 05/25/2023     Priority: Medium     Major depressive disorder, recurrent episode, moderate (H) 05/08/2020     Priority: Medium     Primary osteoarthritis of left hip 01/23/2020     Priority: Medium     Migraine without aura and without status migrainosus, not intractable 01/15/2016     Priority: Medium     Morbid obesity, unspecified obesity type (H) 12/02/2015     Priority: Medium     Diastolic CHF (H) 11/07/2014     Priority: Medium     HTN, goal below 140/90 03/26/2013     Priority: Medium        Past Medical History:    Past Medical History:   Diagnosis Date     Congestive heart failure (H) 3years     Depressive disorder 1-2 years     DEPRESSIVE DISORDER NEC 5/4/2004     Diastolic dysfunction      Essential hypertension, benign      Generalized osteoarthrosis, unspecified site      Headache(784.0)      Heart disease 3 years     Lumbar stenosis with neurogenic claudication 7/1/2021     Mixed anxiety depressive disorder 1/15/2016     PANIC DISORDER 5/4/2004       Past Surgical History:    Past Surgical History:   Procedure Laterality Date     COLONOSCOPY  10/06/10    attempt at colonscopy to 50cm     INJECT EPIDURAL LUMBAR  N/A 10/18/2019    Procedure: INJECTION, SPINE, LUMBAR 4 - LUMBAR 5, EPIDURAL TRANSLAMINAR;  Surgeon: Trever Wheatley MD;  Location: [facility]     INJECT EPIDURAL LUMBAR N/A 2020    Procedure: INJECTION, SPINE, LUMBAR 4-5, EPIDURAL TRANSLAMINAR;  Surgeon: Trever Wheatley MD;  Location: [facility]     INJECT EPIDURAL LUMBAR N/A 2021    Procedure: Lumbar 4-5 Epidural Injection;  Surgeon: Trever Wheatley MD;  Location: [facility]     JOINT REPLACEMTN, KNEE RT/LT  2006    Right total knee arthroplasty.     JOINT REPLACEMTN, KNEE RT/LT  2006    Left total knee arthroplasty.     LAMINECTOMY LUMBAR POSTERIOR MICROSCOPIC TWO LEVELS N/A 2021    Procedure: Lumbar 3 to Lumbar 5 Laminectomy;  Surgeon: Leonard Wallis MD;  Location: [facility]     ZZC  DELIVERY ONLY      , Low Cervical     ZZC  DELIVERY ONLY       ZZC VAGINAL HYSTERECTOMY      without oophorectomy     ZZHC COLONOSCOPY W/WO BRUSH/WASH  2005    Diverticulosis.  Internal hemorrhoids.       Family History:    Family History   Problem Relation Age of Onset     Cancer Father         squamous cell ca     Cancer Maternal Grandmother         lung     Cerebrovascular Disease Maternal Grandmother      Cancer Paternal Grandmother         squamous cell ca     Diabetes Sister      Cancer Sister         kidney cancer     Kidney Cancer Sister      Other Cancer Sister         kidney cancer, AND HOW HAS STAGE 4 KIDNEY CANCER AND IS TERMINAL     Bipolar Disorder Sister      Connective Tissue Disorder Brother         lupus     Lupus Brother      Kidney Disease Mother         atrophic kidney     Hypertension Mother      Depression Mother         DONT KNOW MUCH     Bipolar Disorder Mother      Diabetes Mother      Depression Sister         dont know much     Kidney Disease Maternal Uncle         unclear kidney issue     Anxiety Disorder Son      Anxiety Disorder Niece      Anxiety Disorder Son      Colon Cancer No family hx of       "Asthma No family hx of        Social History:  Marital Status:   [2]  Social History     Tobacco Use     Smoking status: Never     Smokeless tobacco: Never     Tobacco comments:     no smokers in household   Vaping Use     Vaping status: Never Used     Passive vaping exposure: Yes   Substance Use Topics     Alcohol use: No     Drug use: No        Medications:    busPIRone (BUSPAR) 10 MG tablet  cyclobenzaprine (FLEXERIL) 10 MG tablet  furosemide (LASIX) 20 MG tablet  losartan (COZAAR) 100 MG tablet  MIRALAX PO POWD  omeprazole (PRILOSEC) 20 MG DR capsule  topiramate (TOPAMAX) 100 MG tablet  venlafaxine (EFFEXOR XR) 75 MG 24 hr capsule          Review of Systems   Constitutional: Negative for activity change, appetite change and fatigue.   HENT: Negative for congestion and dental problem.    Eyes: Negative for discharge and itching.   Respiratory: Negative for apnea, cough, choking, chest tightness, shortness of breath, wheezing and stridor.    Cardiovascular: Negative for chest pain, palpitations and leg swelling.   Gastrointestinal: Positive for abdominal pain, anal bleeding, blood in stool and nausea. Negative for abdominal distention, constipation, diarrhea and vomiting.   Endocrine: Negative for cold intolerance and heat intolerance.   Genitourinary: Negative for difficulty urinating and dyspareunia.   Musculoskeletal: Negative for arthralgias.   Skin: Negative for color change.   Neurological: Negative for dizziness, facial asymmetry, light-headedness, numbness and headaches.   Psychiatric/Behavioral: Negative for agitation, behavioral problems, confusion and decreased concentration.       Physical Exam   BP: (!) 142/89  Pulse: 84  Temp: 97.3  F (36.3  C)  Resp: 16  Height: 167.6 cm (5' 6\")  Weight: 140.6 kg (310 lb)  SpO2: 100 %      Physical Exam  HENT:      Head: Normocephalic.      Mouth/Throat:      Mouth: Mucous membranes are moist.   Cardiovascular:      Rate and Rhythm: Normal rate and regular " rhythm.      Pulses: Normal pulses.      Heart sounds: Normal heart sounds.   Pulmonary:      Effort: Pulmonary effort is normal.      Breath sounds: Normal breath sounds.   Abdominal:      General: Abdomen is flat.   Musculoskeletal:         General: Swelling present. No tenderness.   Neurological:      General: No focal deficit present.      Mental Status: She is alert. Mental status is at baseline.         ED Course              ED Course as of 05/25/23 1631   Thu May 25, 2023   1236 Patient got up went down the hallway to go to the bathroom.  She did have a large amount of bright red blood.  She got quite lightheaded and presyncopal, however by the time they got her back into bed her vital signs were normal.  She appears to have a pretty brisk lower GI bleed.  We will give her a bolus of normal saline.  Her hemoglobin is back and is still normal at 12.1, however I suspect that that will be much lower if we recheck it in a few hours.  Will obtain CT scan of abdomen, anticipate admission with surgical consultation.     Procedures                Results for orders placed or performed during the hospital encounter of 05/25/23 (from the past 24 hour(s))   CBC with platelets differential    Narrative    The following orders were created for panel order CBC with platelets differential.  Procedure                               Abnormality         Status                     ---------                               -----------         ------                     CBC with platelets and d...[[id number]]                      Final result                 Please view results for these tests on the individual orders.   Comprehensive metabolic panel   Result Value Ref Range    Sodium 143 136 - 145 mmol/L    Potassium 3.8 3.4 - 5.3 mmol/L    Chloride 109 (H) 98 - 107 mmol/L    Carbon Dioxide (CO2) 23 22 - 29 mmol/L    Anion Gap 11 7 - 15 mmol/L    Urea Nitrogen 15.8 8.0 - 23.0 mg/dL    Creatinine 0.91 0.51 - 0.95 mg/dL    Calcium  8.7 (L) 8.8 - 10.2 mg/dL    Glucose 107 (H) 70 - 99 mg/dL    Alkaline Phosphatase 85 35 - 104 U/L    AST 15 10 - 35 U/L    ALT 12 10 - 35 U/L    Protein Total 6.8 6.4 - 8.3 g/dL    Albumin 4.0 3.5 - 5.2 g/dL    Bilirubin Total 0.4 <=1.2 mg/dL    GFR Estimate 71 >60 mL/min/1.73m2   UA with Microscopic reflex to Culture    Specimen: Urine, Clean Catch   Result Value Ref Range    Color Urine Yellow Colorless, Straw, Light Yellow, Yellow    Appearance Urine Slightly Cloudy (A) Clear    Glucose Urine Negative Negative mg/dL    Bilirubin Urine Negative Negative    Ketones Urine Negative Negative mg/dL    Specific Gravity Urine 1.027 1.000 - 1.030    Blood Urine Large (A) Negative    pH Urine 6.0 5.0 - 9.0    Protein Albumin Urine 30 (A) Negative mg/dL    Urobilinogen Urine 4.0 (A) Normal, 2.0 mg/dL    Nitrite Urine Negative Negative    Leukocyte Esterase Urine Moderate (A) Negative    Bacteria Urine Few (A) None Seen /HPF    Mucus Urine Present (A) None Seen /LPF    RBC Urine 3 (H) <=2 /HPF    WBC Urine 13 (H) <=5 /HPF    Squamous Epithelials Urine 19 (H) <=1 /HPF    Hyaline Casts Urine 6 (H) <=2 /LPF    Narrative    Urine Culture ordered based on laboratory criteria   CBC with platelets and differential   Result Value Ref Range    WBC Count 8.8 4.0 - 11.0 10e3/uL    RBC Count 4.02 3.80 - 5.20 10e6/uL    Hemoglobin 12.1 11.7 - 15.7 g/dL    Hematocrit 35.1 35.0 - 47.0 %    MCV 87 78 - 100 fL    MCH 30.1 26.5 - 33.0 pg    MCHC 34.5 31.5 - 36.5 g/dL    RDW 13.4 10.0 - 15.0 %    Platelet Count 237 150 - 450 10e3/uL    % Neutrophils 83 %    % Lymphocytes 12 %    % Monocytes 5 %    % Eosinophils 0 %    % Basophils 0 %    % Immature Granulocytes 0 %    NRBCs per 100 WBC 0 <1 /100    Absolute Neutrophils 7.2 1.6 - 8.3 10e3/uL    Absolute Lymphocytes 1.1 0.8 - 5.3 10e3/uL    Absolute Monocytes 0.5 0.0 - 1.3 10e3/uL    Absolute Eosinophils 0.0 0.0 - 0.7 10e3/uL    Absolute Basophils 0.0 0.0 - 0.2 10e3/uL    Absolute Immature  Granulocytes 0.0 <=0.4 10e3/uL    Absolute NRBCs 0.0 10e3/uL   Extra Tube    Narrative    The following orders were created for panel order Extra Tube.  Procedure                               Abnormality         Status                     ---------                               -----------         ------                     Extra Blue Top Tube[[id number]]                              Final result               Extra Red Top Tube[[id number]]                               Final result               Extra Green Top (Lithium...[[id number]]                      Final result                 Please view results for these tests on the individual orders.   Extra Blue Top Tube   Result Value Ref Range    Hold Specimen JIC    Extra Red Top Tube   Result Value Ref Range    Hold Specimen JIC    Extra Green Top (Lithium Heparin) Tube   Result Value Ref Range    Hold Specimen JIC    ABO/Rh type and screen    Narrative    The following orders were created for panel order ABO/Rh type and screen.  Procedure                               Abnormality         Status                     ---------                               -----------         ------                     Adult Type and Screen[[id number]]                            Final result                 Please view results for these tests on the individual orders.   Adult Type and Screen   Result Value Ref Range    ABO/RH(D) A POS     Antibody Screen Negative Negative    SPECIMEN EXPIRATION DATE [id number]    CTA Abdomen Pelvis with Contrast    Narrative    PROCEDURE:  CTA ABDOMEN PELVIS WITH CONTRAST.    HISTORY:  brbpr x 5, h/o diverticulosis    TECHNIQUE:  Initial pre-contrast  and localizer images were  obtained. Precontrast, arterial and delayed CT angiographic images of  the abdomen and pelvis were obtained according to a GI bleed protocol.  Routine transaxial and post-processed (multiplanar and/or MIP)  reformations were obtained. This CT exam was performed using one  or  more the following dose reduction techniques: automated exposure  control, adjustment of the mA and/or kV according to patient size,  and/or iterative reconstruction technique.    COMPARISON:  None.    MEDS/CONTRAST: 150 mL Isovue-370    FINDINGS:      Limited images through the lung bases demonstrate no consolidation or  mass.    No intrahepatic mass is identified. There is a dense gallstone in the  gallbladder neck. No pancreatic mass is identified. The adrenal glands  and spleen are unremarkable. There is atrophy and hypoenhancement of a  portion of the left kidney. There is no hydronephrosis.     The small bowel is normal in caliber. The appendix is normal. Moderate  scattered stool is seen in the colon and rectum. There is mild focal  inflammation of a single sigmoid diverticula on series 5 image 68.     No evidence of intraluminal extravasated contrast is seen to suggest  active bleeding.    No suspicious osseous lesion is identified.      Impression    IMPRESSION:    Acute sigmoid diverticulitis without free air or abscess formation.    JASON HOUSTON MD         SYSTEM ID:  [id number]   Hemoglobin   Result Value Ref Range    Hemoglobin 10.6 (L) 11.7 - 15.7 g/dL       Medications   0.9% sodium chloride BOLUS (0 mLs Intravenous Stopped 5/25/23 1412)     Followed by   sodium chloride 0.9% infusion (has no administration in time range)   ciprofloxacin (CIPRO) infusion 400 mg (has no administration in time range)   metroNIDAZOLE (FLAGYL) infusion 500 mg (has no administration in time range)   iopamidol (ISOVUE-370) solution 150 mL (150 mLs Intravenous $Given 5/25/23 1315)       Assessments & Plan (with Medical Decision Making)     I have reviewed the nursing notes.    I have reviewed the findings, diagnosis, plan and need for follow up with the patient.    Medical Decision Making  This is a 62-year-old female presenting for 5 episodes of acute onset hematochezia characterized as bright red blood, associated  with left lower quadrant abdominal pain, found to have acute sigmoid diverticulitis without free air or abscess formation on abdominal CT.  In the emergency department, patient had progressive orthostatic hypotension with lightheadedness and nausea likely secondary to blood loss.  Initial hemoglobin was normal and repeat hemoglobin 2 hours later was decreased at 10.6. Patient was given 1L bolus NS and started on NS infusion at 125 mL/hr.  Patient will need to be admitted to the hospital for further observation and possible antibiotics.        New Prescriptions    No medications on file       Final diagnoses:   Lower GI bleed     Leland Duarte MD on 5/25/2023 at 2:32 PM     5/25/2023   [facility]      Note started by MS3 RODERICK,  Irevised, edited and addended note as needed.  In addition patient was seen and examined by myself including both history and physical examination. My findings are reflected in the note above.  I called and spoke with both Dr. Dumont from general surgery as well as Dr. Kumar, hospitalist.  She has been accepted to the medical floor.  Vital signs have been stable.         Leland Duarte MD  05/25/23 [id number]

## 2023-05-26 VITALS
SYSTOLIC BLOOD PRESSURE: 131 MMHG | OXYGEN SATURATION: 96 % | TEMPERATURE: 97.9 F | WEIGHT: 293 LBS | HEIGHT: 66 IN | BODY MASS INDEX: 47.09 KG/M2 | RESPIRATION RATE: 18 BRPM | HEART RATE: 68 BPM | DIASTOLIC BLOOD PRESSURE: 71 MMHG

## 2023-05-26 LAB
ALBUMIN SERPL BCG-MCNC: 3.5 G/DL (ref 3.5–5.2)
ALP SERPL-CCNC: 71 U/L (ref 35–104)
ALT SERPL W P-5'-P-CCNC: 11 U/L (ref 10–35)
ANION GAP SERPL CALCULATED.3IONS-SCNC: 10 MMOL/L (ref 7–15)
AST SERPL W P-5'-P-CCNC: 15 U/L (ref 10–35)
BILIRUB SERPL-MCNC: 0.4 MG/DL
BUN SERPL-MCNC: 13.2 MG/DL (ref 8–23)
CALCIUM SERPL-MCNC: 8.2 MG/DL (ref 8.8–10.2)
CHLORIDE SERPL-SCNC: 109 MMOL/L (ref 98–107)
CREAT SERPL-MCNC: 0.83 MG/DL (ref 0.51–0.95)
DEPRECATED HCO3 PLAS-SCNC: 22 MMOL/L (ref 22–29)
ERYTHROCYTE [DISTWIDTH] IN BLOOD BY AUTOMATED COUNT: 13.5 % (ref 10–15)
GFR SERPL CREATININE-BSD FRML MDRD: 80 ML/MIN/1.73M2
GLUCOSE SERPL-MCNC: 105 MG/DL (ref 70–99)
HCT VFR BLD AUTO: 30.5 % (ref 35–47)
HGB BLD-MCNC: 10.2 G/DL (ref 11.7–15.7)
MAGNESIUM SERPL-MCNC: 2 MG/DL (ref 1.7–2.3)
MCH RBC QN AUTO: 29.8 PG (ref 26.5–33)
MCHC RBC AUTO-ENTMCNC: 33.4 G/DL (ref 31.5–36.5)
MCV RBC AUTO: 89 FL (ref 78–100)
PLATELET # BLD AUTO: 204 10E3/UL (ref 150–450)
POTASSIUM SERPL-SCNC: 3.5 MMOL/L (ref 3.4–5.3)
PROT SERPL-MCNC: 6 G/DL (ref 6.4–8.3)
RBC # BLD AUTO: 3.42 10E6/UL (ref 3.8–5.2)
SODIUM SERPL-SCNC: 141 MMOL/L (ref 136–145)
WBC # BLD AUTO: 6.7 10E3/UL (ref 4–11)

## 2023-05-26 PROCEDURE — 250N000013 HC RX MED GY IP 250 OP 250 PS 637: Performed by: INTERNAL MEDICINE

## 2023-05-26 PROCEDURE — 82040 ASSAY OF SERUM ALBUMIN: CPT | Performed by: INTERNAL MEDICINE

## 2023-05-26 PROCEDURE — 96376 TX/PRO/DX INJ SAME DRUG ADON: CPT

## 2023-05-26 PROCEDURE — 83735 ASSAY OF MAGNESIUM: CPT | Performed by: INTERNAL MEDICINE

## 2023-05-26 PROCEDURE — 258N000003 HC RX IP 258 OP 636: Performed by: INTERNAL MEDICINE

## 2023-05-26 PROCEDURE — 36415 COLL VENOUS BLD VENIPUNCTURE: CPT | Performed by: INTERNAL MEDICINE

## 2023-05-26 PROCEDURE — 85027 COMPLETE CBC AUTOMATED: CPT | Performed by: INTERNAL MEDICINE

## 2023-05-26 PROCEDURE — 250N000013 HC RX MED GY IP 250 OP 250 PS 637: Performed by: STUDENT IN AN ORGANIZED HEALTH CARE EDUCATION/TRAINING PROGRAM

## 2023-05-26 PROCEDURE — 99239 HOSP IP/OBS DSCHRG MGMT >30: CPT | Performed by: STUDENT IN AN ORGANIZED HEALTH CARE EDUCATION/TRAINING PROGRAM

## 2023-05-26 PROCEDURE — G0378 HOSPITAL OBSERVATION PER HR: HCPCS

## 2023-05-26 PROCEDURE — 250N000011 HC RX IP 250 OP 636: Performed by: INTERNAL MEDICINE

## 2023-05-26 RX ORDER — METRONIDAZOLE 500 MG/1
500 TABLET ORAL 2 TIMES DAILY
Qty: 7 TABLET | Refills: 0 | Status: SHIPPED | OUTPATIENT
Start: 2023-05-26 | End: 2023-07-18

## 2023-05-26 RX ORDER — CIPROFLOXACIN 500 MG/1
500 TABLET, FILM COATED ORAL 2 TIMES DAILY
Qty: 6 TABLET | Refills: 0 | Status: SHIPPED | OUTPATIENT
Start: 2023-05-26 | End: 2023-05-26

## 2023-05-26 RX ORDER — CIPROFLOXACIN 500 MG/1
500 TABLET, FILM COATED ORAL 2 TIMES DAILY
Qty: 7 TABLET | Refills: 0 | Status: SHIPPED | OUTPATIENT
Start: 2023-05-26 | End: 2023-07-18

## 2023-05-26 RX ORDER — METRONIDAZOLE 500 MG/1
500 TABLET ORAL 2 TIMES DAILY
Qty: 6 TABLET | Refills: 0 | Status: SHIPPED | OUTPATIENT
Start: 2023-05-26 | End: 2023-05-26

## 2023-05-26 RX ADMIN — SODIUM CHLORIDE: 9 INJECTION, SOLUTION INTRAVENOUS at 05:24

## 2023-05-26 RX ADMIN — FAMOTIDINE 20 MG: 10 INJECTION INTRAVENOUS at 05:15

## 2023-05-26 RX ADMIN — ACETAMINOPHEN, ASPIRIN, CAFFEINE 1 TABLET: 250; 65; 250 TABLET, FILM COATED ORAL at 10:47

## 2023-05-26 RX ADMIN — BUSPIRONE HYDROCHLORIDE 10 MG: 10 TABLET ORAL at 05:14

## 2023-05-26 RX ADMIN — TOPIRAMATE 100 MG: 25 TABLET, FILM COATED ORAL at 08:02

## 2023-05-26 RX ADMIN — METRONIDAZOLE 500 MG: 500 INJECTION, SOLUTION INTRAVENOUS at 05:15

## 2023-05-26 RX ADMIN — CIPROFLOXACIN 400 MG: 2 INJECTION, SOLUTION INTRAVENOUS at 08:02

## 2023-05-26 RX ADMIN — VENLAFAXINE HYDROCHLORIDE 75 MG: 75 CAPSULE, EXTENDED RELEASE ORAL at 09:05

## 2023-05-26 ASSESSMENT — ACTIVITIES OF DAILY LIVING (ADL)
ADLS_ACUITY_SCORE: 22

## 2023-05-26 NOTE — PROGRESS NOTES
SAFETY CHECKLIST  ID Bands and Risk clasps correct and in place (DNR, Fall risk, Allergy, Latex, Limb):  Yes  All Lines Reconciled and labeled correctly: Yes  Whiteboard updated:Yes  Environmental interventions: Yes  Verify Tele #: NA

## 2023-05-26 NOTE — DISCHARGE SUMMARY
[facility]    Discharge Summary  Hospitalist    Date of Admission:  5/25/2023  Date of Discharge:  5/26/2023   Discharging Provider: Isaak Beltran MD  Date of Service (when I saw the patient): 05/26/23    Discharge Diagnoses   Principal Problem:    Focal Sigmoid diverticulitis (5/25/2023)  Active Problems:    HTN, goal below 140/90 (3/26/2013)    Lower GI bleed (5/25/2023)    Sigmoid diverticulosis (5/25/2023)    Cholelithiasis (5/25/2023)      History of Present Illness   Eva Solis is an 62 year old woman with a past medical history of diastolic heart failure, cholelithiasis, hypertension, depression, and morbid obesity who was admitted for a diverticular bleed and possible sigmoid diverticulitis.    The patient presented to the emergency room on 5- for bright red blood per rectum.  On the morning of admission she awoke by acute onset left lower quadrant abdominal pain and had multiple loose bright red bloody stools.  She became increasingly lightheaded and was brought to the emergency department.  In the emergency department she was found to be hemodynamically stable but had a large bloody stool and underwent a CT abdomen pelvis which demonstrated acute sigmoid diverticulitis without abscess or free air.  GI bleed thought to be lower and related to her diverticulitis.    On hospital day #2 patient had no further bleeding after treatment of antibiotics.  General surgery saw the patient initially in the emergency department and patient remained with a stable hemoglobin.  She will follow-up with her PCP within the next week for recheck of hemoglobin.  She will complete a 5-day total course of antibiotics for diverticulitis.  Discussed indications to return to the emergency department if she were to have a repeat bleed.  Recommend that she have a repeat colonoscopy later this summer for further evaluation of her GI bleed.    Hospital Course   Eva Solis was admitted on  5/25/2023.  The following problems were addressed during her hospitalization:     Focal Sigmoid diverticulitis  Diverticular bleed  Acute GI bleed  Acute blood loss anemia    Assessment: Acute onset of pain and clear inflammation on imaging, treated with ciprofloxacin and metronidazole, she was instructed to oral on the day of admission.  Baseline hemoglobin 14.4 with a benjie of 9.7.  Recommend rechecking hemoglobin on hospital follow-up    Plan:   -Transition ciprofloxacin and Flagyl to p.o., 3 additional days on discharge from the hospital  -Advance diet as tolerated if tolerates solid foods for lunch may discharge this afternoon  -Appreciate general surgery consult       Active Problems:  Essential HTN, goal below 140/90    Assessment: Stable    Plan:   -Hold home Lasix and ARB       Cholelithiasis    Assessment: Incidentally noted an asymptomatic    Plan:   -Monitor    Isaak Beltran MD    Significant Results and Procedures       Pending Results   These results will be followed up by PCP at follow-up  Unresulted Labs Ordered in the Past 30 Days of this Admission     Date and Time Order Name Status Description    5/25/2023 12:41 PM Urine Culture In process           Code Status   Full Code       Primary Care Physician   Renetta Benitez    Physical Exam   Temp: 98  F (36.7  C) Temp src: Tympanic BP: (!) 159/75 Pulse: 65   Resp: 18 SpO2: 98 % O2 Device: None (Room air)    Vitals:    05/25/23 1113 05/26/23 0600   Weight: 140.6 kg (310 lb) 135.8 kg (299 lb 4.8 oz)     Vital Signs with Ranges  Temp:  [97.3  F (36.3  C)-98  F (36.7  C)] 98  F (36.7  C)  Pulse:  [59-84] 65  Resp:  [7-18] 18  BP: (127-163)/(69-90) 159/75  SpO2:  [97 %-100 %] 98 %  I/O last 3 completed shifts:  In: 480 [P.O.:480]  Out: -     Constitutional: Pleasant 62-year-old woman sitting in bed no acute distress  Eyes: Anicteric  ENT: Within normal limits  Respiratory: Clear to auscultation  Cardiovascular: Regular rate and rhythm  GI: Soft  nontender abdomen bowel sounds present    Discharge Disposition   Discharged to home  Condition at discharge: Stable    Consultations This Hospital Stay   None    Time Spent on this Encounter   I, Isaak Beltran MD, personally saw the patient today and spent greater than 30 minutes discharging this patient.    Discharge Orders   No discharge procedures on file.  Discharge Medications   Current Discharge Medication List      CONTINUE these medications which have NOT CHANGED    Details   aspirin-acetaminophen-caffeine (EXCEDRIN MIGRAINE) 250-250-65 MG tablet Take 3 tablets by mouth daily as needed for headaches      busPIRone (BUSPAR) 10 MG tablet Take 1 tablet (10 mg) by mouth 3 times daily  Qty: 270 tablet, Refills: 3    Associated Diagnoses: Major depressive disorder, recurrent episode, moderate (H)      cyclobenzaprine (FLEXERIL) 10 MG tablet Take 1 tablet (10 mg) by mouth nightly as needed for muscle spasms  Qty: 90 tablet, Refills: 3    Associated Diagnoses: DDD (degenerative disc disease), lumbar      furosemide (LASIX) 20 MG tablet Take 1 tablet (20 mg) by mouth daily  Qty: 90 tablet, Refills: 3    Associated Diagnoses: Chronic diastolic congestive heart failure (H); HTN, goal below 140/90      ibuprofen (ADVIL/MOTRIN) 200 MG tablet Take 800 mg by mouth 4 times daily as needed for pain      losartan (COZAAR) 100 MG tablet Take 1 tablet (100 mg) by mouth daily  Qty: 90 tablet, Refills: 3    Associated Diagnoses: Chronic diastolic congestive heart failure (H); HTN, goal below 140/90      omeprazole (PRILOSEC) 20 MG DR capsule Take 20 mg by mouth every morning       polyethylene glycol (MIRALAX) 17 GM/Dose powder Take 1 Capful by mouth twice a week      topiramate (TOPAMAX) 100 MG tablet Take 1 tablet (100 mg) by mouth 2 times daily (1.5 X 50 mg)  Qty: 180 tablet, Refills: 3    Associated Diagnoses: Migraine without aura and without status migrainosus, not intractable      venlafaxine (EFFEXOR XR) 75 MG 24 hr  capsule Take 1 in the morning and 2 at night  Qty: 270 capsule, Refills: 3    Associated Diagnoses: Major depressive disorder, recurrent episode, moderate (H)           Allergies   Allergies   Allergen Reactions     Lisinopril Cough     Data   Most Recent 3 CBC's:  Recent Labs   Lab Test 05/26/23  0550 05/25/23  2324 05/25/23  1949   WBC 6.7 7.3 8.7   HGB 10.2* 9.7* 10.0*   MCV 89 88 88    200 194      Most Recent 3 BMP's:  Recent Labs   Lab Test 05/26/23  0550 05/25/23  1222 04/11/23  0755    143 139   POTASSIUM 3.5 3.8 4.0   CHLORIDE 109* 109* 104   CO2 22 23 23   BUN 13.2 15.8 11.8   CR 0.83 0.91 0.96*   ANIONGAP 10 11 12   DEVANTE 8.2* 8.7* 9.2   * 107* 90     Most Recent 2 LFT's:  Recent Labs   Lab Test 05/26/23  0550 05/25/23  1222   AST 15 15   ALT 11 12   ALKPHOS 71 85   BILITOTAL 0.4 0.4     Most Recent INR's and Anticoagulation Dosing History:  Anticoagulation Dose History         Latest Ref Rng & Units 7/21/2006 7/22/2006 7/24/2006 9/25/2013   Recent Dosing and Labs   INR 0.85 - 1.15 1.12   1.07   1.34   0.98         5/25/2023   Recent Dosing and Labs   INR 1.16                Most Recent 3 Troponin's:  Recent Labs   Lab Test 09/02/20  0824   TROPI <0.015     Most Recent Cholesterol Panel:  Recent Labs   Lab Test 04/11/23  0755   CHOL 261*   *   HDL 34*   TRIG 187*     Most Recent 6 Bacteria Isolates From Any Culture (See EPIC Reports for Culture Details):No lab results found.  Most Recent TSH, T4 and A1c Labs:  Recent Labs   Lab Test 04/11/23  0755   TSH 2.28     Results for orders placed or performed during the hospital encounter of 05/25/23   CTA Abdomen Pelvis with Contrast    Narrative    PROCEDURE:  CTA ABDOMEN PELVIS WITH CONTRAST.    HISTORY:  brbpr x 5, h/o diverticulosis    TECHNIQUE:  Initial pre-contrast  and localizer images were  obtained. Precontrast, arterial and delayed CT angiographic images of  the abdomen and pelvis were obtained according to a GI bleed  protocol.  Routine transaxial and post-processed (multiplanar and/or MIP)  reformations were obtained. This CT exam was performed using one or  more the following dose reduction techniques: automated exposure  control, adjustment of the mA and/or kV according to patient size,  and/or iterative reconstruction technique.    COMPARISON:  None.    MEDS/CONTRAST: 150 mL [id number]    FINDINGS:      Limited images through the lung bases demonstrate no consolidation or  mass.    No intrahepatic mass is identified. There is a dense gallstone in the  gallbladder neck. No pancreatic mass is identified. The adrenal glands  and spleen are unremarkable. There is atrophy and hypoenhancement of a  portion of the left kidney. There is no hydronephrosis.     The small bowel is normal in caliber. The appendix is normal. Moderate  scattered stool is seen in the colon and rectum. There is mild focal  inflammation of a single sigmoid diverticula on series 5 image 68.     No evidence of intraluminal extravasated contrast is seen to suggest  active bleeding.    No suspicious osseous lesion is identified.      Impression    IMPRESSION:    Acute sigmoid diverticulitis without free air or abscess formation.    JASON HOUSTON MD         SYSTEM ID:  [id number]

## 2023-05-26 NOTE — PROGRESS NOTES
GENERAL SURGERY PROGRESS NOTE  5/26/2023      Interval history:   No acute events overnight.  Patient denies abdominal pain.  She is ambulating dependently, voiding per self and bowel function is present.  Denies chuck blood, but had some with wiping.  Denies dizziness, lightheadedness or nausea with standing.    Physical Exam:   Vital signs are reviewed and there are no significant  abnormalities.     UOP over past 24 hours is recorded as x1  BM x1    General: Laying in bed, appears comfortable  Abdomen: Obese abdomen, soft, nondistended, nontender      Labs are reviewed and are significant for WBC 6.7, hemoglobin 10.2    No new imaging       Assessment / Plan:   Eva Solis is a 62 year old female with rectal bleeding, likely bleeding diverticula, possible uncomplicated diverticulitis.  Abdominal exam is benign, patient is hemodynamically stable and hemoglobin is stable.    Advance diet as tolerated   saline lock IV fluids  Transition to oral antibiotics  Discharged home to complete course of oral antibiotics later today if she tolerates p.o. meds  No need for follow-up colonoscopy for uncomplicated diverticulitis      RELL Hobson MD   5/26/2023

## 2023-05-26 NOTE — PLAN OF CARE
"Goal Outcome Evaluation:    Pt is alert and orientated. /76 (BP Location: Left arm, Patient Position: Semi-Anthony's, Cuff Size: Adult Large)   Pulse 64   Temp 97.4  F (36.3  C) (Tympanic)   Resp 16   Ht 1.676 m (5' 6\")   Wt 140.6 kg (310 lb)   SpO2 97%   BMI 50.04 kg/m    Lung sounds clear. Voiding without difficulty. Pt states that the dizziness when transferring is getting better. Pt c/o a headache at the start of the shift, tylenol relieved headache. Rectal bleeding at the start of the shift was just noticeable with wiping now bleeding has seems to be minimal when wiping. Will continue to monitor. Sonya Amin RN on 5/26/2023 at 5:41 AM        "

## 2023-05-26 NOTE — PHARMACY - DISCHARGE MEDICATION RECONCILIATION AND EDUCATION
Pharmacy:  Discharge Counseling and Medication Reconciliation    Eva Solis  [id number]  [place] [address]  [phone] (home) [phone] (work)  62 year old female  PCP: Renetta Benietz    Allergies: Lisinopril    Discharge Counseling:    Pharmacist met with patient (and/or family) today to review the medication portion of the After Visit Summary (with an emphasis on NEW medications) and to address patient's questions/concerns.    Summary of Education: met with patient to discuss administration, duration, and side effects of new medications.  Patient told to avoid alcohol for up to three days post antibiotics.     Materials Provided:  MedCounselor sheets printed from Clinical Pharmacology on: cipro, flagyl    Discharge Medication Reconciliation:    It has been determined that the patient has an adequate supply of medications available or which can be obtained from the patient's preferred pharmacy, which HE/SHE has confirmed as: [facility]    Thank you for the consult.    Sabina Reyna RPH........May 26, 2023 10:19 AM

## 2023-05-26 NOTE — PROGRESS NOTES
[facility] DISCHARGE NOTE    Patient discharged to home at 1:48 PM via wheel chair. Accompanied by spouse and staff. Discharge instructions reviewed with patient, opportunity offered to ask questions. Prescriptions sent to patients preferred pharmacy. All belongings sent with patient.    Dontae Michelle RN

## 2023-05-26 NOTE — PLAN OF CARE
"Patient is alert and orientated. Moderate headache, gave prn Excedrin with relief. Lungs clear throughout, dim in bases, denies SOB, on room air. Heart RRR, denies chest pain. Trace edema to bilat feet/ankles, elevated as tolerated, denies numbness or tingling. Abdomen non distended, soft throughout, denies discomfort, had soft formed brown BM without any blood. Advanced to regular diet for lunch, tolerated well, denies n/v or abdominal pain. Voiding without difficulty per patient. VSS.     /71 (BP Location: Left arm, Patient Position: Semi-Anthony's, Cuff Size: Adult Large)   Pulse 68   Temp 97.9  F (36.6  C) (Tympanic)   Resp 18   Ht 1.676 m (5' 6\")   Wt 135.8 kg (299 lb 4.8 oz)   SpO2 96%   BMI 48.31 kg/m      Problem: Plan of Care - These are the overarching goals to be used throughout the patient stay.    Goal: Plan of Care Review  Description: The Plan of Care Review/Shift note should be completed every shift.  The Outcome Evaluation is a brief statement about your assessment that the patient is improving, declining, or no change.  This information will be displayed automatically on your shift note.  Outcome: Adequate for Care Transition  Goal: Patient-Specific Goal (Individualized)  Description: You can add care plan individualizations to a care plan. Examples of Individualization might be:  \"Parent requests to be called daily at 9am for status\", \"I have a hard time hearing out of my right ear\", or \"Do not touch me to wake me up as it startles me\".  Outcome: Adequate for Care Transition  Goal: Absence of Hospital-Acquired Illness or Injury  Outcome: Adequate for Care Transition  Intervention: Prevent and Manage VTE (Venous Thromboembolism) Risk  Recent Flowsheet Documentation  Taken 5/26/2023 0807 by Dontae Michelle RN  VTE Prevention/Management: SCDs (sequential compression devices) off  Goal: Optimal Comfort and Wellbeing  Outcome: Adequate for Care Transition  Goal: Readiness for Transition of " Care  Outcome: Adequate for Care Transition   Goal Outcome Evaluation:

## 2023-05-27 LAB — BACTERIA UR CULT: ABNORMAL

## 2023-05-30 ENCOUNTER — HOSPITAL ENCOUNTER (OUTPATIENT)
Dept: CARDIOLOGY | Facility: CLINIC | Age: 63
Discharge: HOME | End: 2023-05-30
Attending: FAMILY MEDICINE | Admitting: FAMILY MEDICINE
Payer: COMMERCIAL

## 2023-05-30 DIAGNOSIS — I50.32 CHRONIC DIASTOLIC CONGESTIVE HEART FAILURE (H): ICD-10-CM

## 2023-05-30 DIAGNOSIS — I10 HTN, GOAL BELOW 140/90: ICD-10-CM

## 2023-05-30 LAB — LVEF ECHO: NORMAL

## 2023-05-30 PROCEDURE — 93306 TTE W/DOPPLER COMPLETE: CPT

## 2023-05-30 PROCEDURE — 93306 TTE W/DOPPLER COMPLETE: CPT | Mod: 26 | Performed by: INTERNAL MEDICINE

## 2023-06-01 ASSESSMENT — ANXIETY QUESTIONNAIRES
7. FEELING AFRAID AS IF SOMETHING AWFUL MIGHT HAPPEN: SEVERAL DAYS
1. FEELING NERVOUS, ANXIOUS, OR ON EDGE: NEARLY EVERY DAY
6. BECOMING EASILY ANNOYED OR IRRITABLE: SEVERAL DAYS
GAD7 TOTAL SCORE: 14
4. TROUBLE RELAXING: MORE THAN HALF THE DAYS
IF YOU CHECKED OFF ANY PROBLEMS ON THIS QUESTIONNAIRE, HOW DIFFICULT HAVE THESE PROBLEMS MADE IT FOR YOU TO DO YOUR WORK, TAKE CARE OF THINGS AT HOME, OR GET ALONG WITH OTHER PEOPLE: SOMEWHAT DIFFICULT
7. FEELING AFRAID AS IF SOMETHING AWFUL MIGHT HAPPEN: SEVERAL DAYS
GAD7 TOTAL SCORE: 14
8. IF YOU CHECKED OFF ANY PROBLEMS, HOW DIFFICULT HAVE THESE MADE IT FOR YOU TO DO YOUR WORK, TAKE CARE OF THINGS AT HOME, OR GET ALONG WITH OTHER PEOPLE?: SOMEWHAT DIFFICULT
5. BEING SO RESTLESS THAT IT IS HARD TO SIT STILL: SEVERAL DAYS
3. WORRYING TOO MUCH ABOUT DIFFERENT THINGS: NEARLY EVERY DAY
GAD7 TOTAL SCORE: 14
2. NOT BEING ABLE TO STOP OR CONTROL WORRYING: NEARLY EVERY DAY

## 2023-06-01 ASSESSMENT — PATIENT HEALTH QUESTIONNAIRE - PHQ9
SUM OF ALL RESPONSES TO PHQ QUESTIONS 1-9: 14
10. IF YOU CHECKED OFF ANY PROBLEMS, HOW DIFFICULT HAVE THESE PROBLEMS MADE IT FOR YOU TO DO YOUR WORK, TAKE CARE OF THINGS AT HOME, OR GET ALONG WITH OTHER PEOPLE: SOMEWHAT DIFFICULT
SUM OF ALL RESPONSES TO PHQ QUESTIONS 1-9: 14

## 2023-06-02 ENCOUNTER — OFFICE VISIT (OUTPATIENT)
Dept: INTERNAL MEDICINE | Facility: OTHER | Age: 63
End: 2023-06-02
Payer: COMMERCIAL

## 2023-06-02 VITALS
RESPIRATION RATE: 20 BRPM | HEART RATE: 69 BPM | WEIGHT: 293 LBS | OXYGEN SATURATION: 98 % | SYSTOLIC BLOOD PRESSURE: 134 MMHG | DIASTOLIC BLOOD PRESSURE: 82 MMHG | TEMPERATURE: 98.4 F | BODY MASS INDEX: 47.09 KG/M2 | HEIGHT: 66 IN

## 2023-06-02 DIAGNOSIS — K57.30 DIVERTICULAR DISEASE OF COLON: ICD-10-CM

## 2023-06-02 DIAGNOSIS — Z09 HOSPITAL DISCHARGE FOLLOW-UP: Primary | ICD-10-CM

## 2023-06-02 DIAGNOSIS — K92.2 LOWER GI BLEED: ICD-10-CM

## 2023-06-02 LAB
BASOPHILS # BLD AUTO: 0 10E3/UL (ref 0–0.2)
BASOPHILS NFR BLD AUTO: 1 %
EOSINOPHIL # BLD AUTO: 0.1 10E3/UL (ref 0–0.7)
EOSINOPHIL NFR BLD AUTO: 2 %
ERYTHROCYTE [DISTWIDTH] IN BLOOD BY AUTOMATED COUNT: 14.1 % (ref 10–15)
HCT VFR BLD AUTO: 33.9 % (ref 35–47)
HGB BLD-MCNC: 11.3 G/DL (ref 11.7–15.7)
HOLD SPECIMEN: NORMAL
IMM GRANULOCYTES # BLD: 0 10E3/UL
IMM GRANULOCYTES NFR BLD: 1 %
LYMPHOCYTES # BLD AUTO: 1.6 10E3/UL (ref 0.8–5.3)
LYMPHOCYTES NFR BLD AUTO: 27 %
MCH RBC QN AUTO: 30.4 PG (ref 26.5–33)
MCHC RBC AUTO-ENTMCNC: 33.3 G/DL (ref 31.5–36.5)
MCV RBC AUTO: 91 FL (ref 78–100)
MONOCYTES # BLD AUTO: 0.4 10E3/UL (ref 0–1.3)
MONOCYTES NFR BLD AUTO: 7 %
NEUTROPHILS # BLD AUTO: 3.7 10E3/UL (ref 1.6–8.3)
NEUTROPHILS NFR BLD AUTO: 62 %
NRBC # BLD AUTO: 0 10E3/UL
NRBC BLD AUTO-RTO: 0 /100
PLATELET # BLD AUTO: 284 10E3/UL (ref 150–450)
RBC # BLD AUTO: 3.72 10E6/UL (ref 3.8–5.2)
WBC # BLD AUTO: 5.8 10E3/UL (ref 4–11)

## 2023-06-02 PROCEDURE — 99213 OFFICE O/P EST LOW 20 MIN: CPT

## 2023-06-02 PROCEDURE — 36415 COLL VENOUS BLD VENIPUNCTURE: CPT | Mod: ZL

## 2023-06-02 PROCEDURE — 85025 COMPLETE CBC W/AUTO DIFF WBC: CPT | Mod: ZL

## 2023-06-02 ASSESSMENT — PAIN SCALES - GENERAL: PAINLEVEL: NO PAIN (0)

## 2023-06-02 ASSESSMENT — ENCOUNTER SYMPTOMS
VOMITING: 0
DIARRHEA: 0
CHILLS: 0
ABDOMINAL PAIN: 0
DIZZINESS: 0
NAUSEA: 0
WEAKNESS: 0
FEVER: 0
HEADACHES: 0
HEMATOCHEZIA: 0

## 2023-06-02 ASSESSMENT — PATIENT HEALTH QUESTIONNAIRE - PHQ9
10. IF YOU CHECKED OFF ANY PROBLEMS, HOW DIFFICULT HAVE THESE PROBLEMS MADE IT FOR YOU TO DO YOUR WORK, TAKE CARE OF THINGS AT HOME, OR GET ALONG WITH OTHER PEOPLE: SOMEWHAT DIFFICULT
SUM OF ALL RESPONSES TO PHQ QUESTIONS 1-9: 14

## 2023-06-02 ASSESSMENT — ANXIETY QUESTIONNAIRES: GAD7 TOTAL SCORE: 14

## 2023-06-02 NOTE — PROGRESS NOTES
Assessment & Plan   Rosanne Solis is a 62 year old presenting for the following health issues:      ICD-10-CM    1. Hospital discharge follow-up  Z09       2. Lower GI bleed  K92.2       3. Diverticular disease of colon  K57.30 Colonoscopy Screening  Referral     CBC and Differential     CBC and Differential        Hemoglobin rechecked today which is trending up 11.3 from 10.2- she remains vitally stable and reports to feel well. It was recommended that she pursue a colonoscopy after 6 weeks by general surgeon. Referral for this has been placed. She will continue to monitor symptoms at home and will return if she develops any new abdominal pain, bloody stools, nausea, or vomiting.      No follow-ups on file.    STACEY Youssef [facility]      Subjective   ROSANNE is a 62 year old, presenting for the following health issues:    Patient presents to clinic for hospital follow up for diverticulitis. She presented to the ER on 5-25-23 with acute onset of LLQ pain and multiple loose bright red bloody stools. She was symptomatic with this- underwent CT scan which showed acute sigmoid diverticulitis without abscess or free air. She was started on antibiotics and monitoring of hemoglobin. She was hemodynamically stable during hospital stay.     She states that she is feeling much better today. She had her first bowel movement since being discharged yesterday which was normal, formed, and did not appear to have any blood present. She denies any abdominal pain, nausea, or fevers.         Hospital F/U (Hospital follow up for diverticulitis    Julianna Betancourt LPN on 6/2/2023 at 9:42 AM//)      History of Present Illness       Reason for visit:  Follow up from a GI bleed    She eats 2-3 servings of fruits and vegetables daily.She consumes 1 sweetened beverage(s) daily.She exercises with enough effort to increase her heart rate 9 or less minutes per day.  She exercises with enough  "effort to increase her heart rate 3 or less days per week.   She is taking medications regularly.    Today's PHQ-9         PHQ-9 Total Score: 14    PHQ-9 Q9 Thoughts of better off dead/self-harm past 2 weeks :   Not at all    How difficult have these problems made it for you to do your work, take care of things at home, or get along with other people: Somewhat difficult  Today's LORETA-7 Score: 14         Hospital Follow-up Visit:    Hospital/Nursing Home/ Rehab Facility: [facility]  Date of Admission: 5-25-23  Date of Discharge: 5-26-23  Reason(s) for Admission: diverticulitis    Was your hospitalization related to COVID-19? No   Problems taking medications regularly:  None  Medication changes since discharge: None  Problems adhering to non-medication therapy:  None    Summary of hospitalization:  [facility] discharge summary reviewed  Diagnostic Tests/Treatments reviewed.  Follow up needed: none  Other Healthcare Providers Involved in Patient s Care:         None  Update since discharge: improved.       Chief Complaint   Patient presents with     Hospital F/U     Hospital follow up for diverticulitis    Julianna Betancourt LPN on 6/2/2023 at 9:42 AM           Initial /82 (BP Location: Right arm, Patient Position: Sitting, Cuff Size: Adult Large)   Pulse 69   Temp 98.4  F (36.9  C)   Resp 20   Ht 1.676 m (5' 6\")   Wt 135.7 kg (299 lb 2 oz)   SpO2 98%   BMI 48.28 kg/m   Estimated body mass index is 48.28 kg/m  as calculated from the following:    Height as of this encounter: 1.676 m (5' 6\").    Weight as of this encounter: 135.7 kg (299 lb 2 oz).  Medication Reconciliation: complete    FOOD SECURITY SCREENING QUESTIONS  Hunger Vital Signs:  Within the past 12 months we worried whether our food would run out before we got money to buy more. Never  Within the past 12 months the food we bought just didn't last and we didn't have money to get more. Never  Julianna Betancourt, " "LPN 6/2/2023 9:48 AM       STACEY Youssef CNP    Plan of care communicated with patient and family             Review of Systems   Constitutional: Negative for chills and fever.   Gastrointestinal: Negative for abdominal pain, diarrhea, hematochezia, nausea and vomiting.   Neurological: Negative for dizziness, weakness and headaches.            Objective    /82 (BP Location: Right arm, Patient Position: Sitting, Cuff Size: Adult Large)   Pulse 69   Temp 98.4  F (36.9  C)   Resp 20   Ht 1.676 m (5' 6\")   Wt 135.7 kg (299 lb 2 oz)   SpO2 98%   BMI 48.28 kg/m    Body mass index is 48.28 kg/m .  Physical Exam  Vitals reviewed.   Constitutional:       General: She is not in acute distress.     Appearance: Normal appearance. She is not toxic-appearing.   HENT:      Head: Normocephalic and atraumatic.   Cardiovascular:      Rate and Rhythm: Normal rate and regular rhythm.      Pulses: Normal pulses.      Heart sounds: Normal heart sounds. No murmur heard.  Pulmonary:      Effort: Pulmonary effort is normal. No respiratory distress.      Breath sounds: Normal breath sounds. No wheezing.   Abdominal:      General: Bowel sounds are normal.      Palpations: Abdomen is soft.      Tenderness: There is no abdominal tenderness.   Musculoskeletal:         General: Normal range of motion.   Skin:     General: Skin is warm and dry.   Neurological:      General: No focal deficit present.      Mental Status: She is alert and oriented to person, place, and time.      Motor: No weakness.      Gait: Gait normal.   Psychiatric:         Mood and Affect: Mood normal.         Behavior: Behavior normal.        Results for orders placed or performed in visit on 06/02/23   Extra Tube     Status: None    Narrative    The following orders were created for panel order Extra Tube.  Procedure                               Abnormality         Status                     ---------                               -----------         " ------                     Extra Serum Separator Tu...[[id number]]                      Final result                 Please view results for these tests on the individual orders.   CBC with platelets and differential     Status: Abnormal   Result Value Ref Range    WBC Count 5.8 4.0 - 11.0 10e3/uL    RBC Count 3.72 (L) 3.80 - 5.20 10e6/uL    Hemoglobin 11.3 (L) 11.7 - 15.7 g/dL    Hematocrit 33.9 (L) 35.0 - 47.0 %    MCV 91 78 - 100 fL    MCH 30.4 26.5 - 33.0 pg    MCHC 33.3 31.5 - 36.5 g/dL    RDW 14.1 10.0 - 15.0 %    Platelet Count 284 150 - 450 10e3/uL    % Neutrophils 62 %    % Lymphocytes 27 %    % Monocytes 7 %    % Eosinophils 2 %    % Basophils 1 %    % Immature Granulocytes 1 %    NRBCs per 100 WBC 0 <1 /100    Absolute Neutrophils 3.7 1.6 - 8.3 10e3/uL    Absolute Lymphocytes 1.6 0.8 - 5.3 10e3/uL    Absolute Monocytes 0.4 0.0 - 1.3 10e3/uL    Absolute Eosinophils 0.1 0.0 - 0.7 10e3/uL    Absolute Basophils 0.0 0.0 - 0.2 10e3/uL    Absolute Immature Granulocytes 0.0 <=0.4 10e3/uL    Absolute NRBCs 0.0 10e3/uL   Extra Serum Separator Tube (SST)     Status: None   Result Value Ref Range    Hold Specimen [facility]    CBC and Differential     Status: Abnormal    Narrative    The following orders were created for panel order CBC and Differential.  Procedure                               Abnormality         Status                     ---------                               -----------         ------                     CBC with platelets and d...[[id number]]  Abnormal            Final result                 Please view results for these tests on the individual orders.

## 2023-06-02 NOTE — PATIENT INSTRUCTIONS
Will check bloodwork today- will mychart you with results    Referral placed for colonoscopy- have this scheduled after 6 weeks

## 2023-06-08 ENCOUNTER — ANCILLARY PROCEDURE (OUTPATIENT)
Dept: GENERAL RADIOLOGY | Facility: OTHER | Age: 63
End: 2023-06-08
Attending: ORTHOPAEDIC SURGERY
Payer: COMMERCIAL

## 2023-06-08 ENCOUNTER — OFFICE VISIT (OUTPATIENT)
Dept: ORTHOPEDICS | Facility: OTHER | Age: 63
End: 2023-06-08
Attending: FAMILY MEDICINE
Payer: COMMERCIAL

## 2023-06-08 VITALS — BODY MASS INDEX: 47.09 KG/M2 | HEIGHT: 66 IN | RESPIRATION RATE: 20 BRPM | WEIGHT: 293 LBS

## 2023-06-08 DIAGNOSIS — M16.12 PRIMARY OSTEOARTHRITIS OF LEFT HIP: ICD-10-CM

## 2023-06-08 DIAGNOSIS — M46.1 SACROILIAC INFLAMMATION (H): ICD-10-CM

## 2023-06-08 DIAGNOSIS — M16.12 PRIMARY OSTEOARTHRITIS OF LEFT HIP: Primary | ICD-10-CM

## 2023-06-08 PROCEDURE — 99214 OFFICE O/P EST MOD 30 MIN: CPT | Performed by: ORTHOPAEDIC SURGERY

## 2023-06-08 PROCEDURE — 73502 X-RAY EXAM HIP UNI 2-3 VIEWS: CPT | Mod: TC | Performed by: RADIOLOGY

## 2023-06-08 NOTE — LETTER
6/8/2023         RE: Eva Solis  [id number]  [facility] [address]        Dear Colleague,    Thank you for referring your patient, Eva Solis, to the [facility] [place]. Please see a copy of my visit note below.    Eva Solis is a 63 year old female who is seen in follow-up for left hip pain.  She indicates pain primarily in the buttock into the lateral aspect of the hip.  It does go down the thigh all the way to the foot.  She has had previous x-ray showing osteoarthritis of both hips.  She has also had lumbar surgery with L3-5 laminectomy on September 17, 2021.  She currently has constant pain rated 4 out of 10.  It hurts most with standing.  She also has some difficulty sleeping on her left side.    X-ray today is repeated of the pelvis showing advanced arthritis of both hips.    Past Medical History:   Diagnosis Date     Congestive heart failure (H) 3years     Depressive disorder 1-2 years    dealing with on a daily basis     DEPRESSIVE DISORDER NEC 5/4/2004     Diastolic dysfunction      Essential hypertension, benign      Generalized osteoarthrosis, unspecified site     knees     Headache(784.0)      Heart disease 3 years    CHF     Lumbar stenosis with neurogenic claudication 7/1/2021    Added automatically from request for surgery [id number]     Mixed anxiety depressive disorder 1/15/2016     PANIC DISORDER 5/4/2004       Past Surgical History:   Procedure Laterality Date     COLONOSCOPY  10/06/10    attempt at colonscopy to 50cm     INJECT EPIDURAL LUMBAR N/A 10/18/2019    Procedure: INJECTION, SPINE, LUMBAR 4 - LUMBAR 5, EPIDURAL TRANSLAMINAR;  Surgeon: Trever Wheatley MD;  [facility]     INJECT EPIDURAL LUMBAR N/A 6/12/2020    Procedure: INJECTION, SPINE, LUMBAR 4-5, EPIDURAL TRANSLAMINAR;  Surgeon: Trever Wheatley MD;  Location: [facility]     INJECT EPIDURAL LUMBAR N/A 4/22/2021    Procedure: Lumbar 4-5 Epidural Injection;  Surgeon: Trever Wheatley MD;   Location: [facility]     JOINT REPLACEMTN, KNEE RT/LT  2006    Right total knee arthroplasty.     JOINT REPLACEMTN, KNEE RT/LT  2006    Left total knee arthroplasty.     LAMINECTOMY LUMBAR POSTERIOR MICROSCOPIC TWO LEVELS N/A 2021    Procedure: Lumbar 3 to Lumbar 5 Laminectomy;  Surgeon: Leonard Wallis MD;  Location: [facility] OR     ZZC  DELIVERY ONLY      , Low Cervical     ZZC  DELIVERY ONLY       ZZC VAGINAL HYSTERECTOMY      without oophorectomy     ZZHC COLONOSCOPY W/WO BRUSH/WASH  2005    Diverticulosis.  Internal hemorrhoids.       Family History   Problem Relation Age of Onset     Cancer Father         squamous cell ca     Cancer Maternal Grandmother         lung     Cerebrovascular Disease Maternal Grandmother      Cancer Paternal Grandmother         squamous cell ca     Diabetes Sister      Cancer Sister         kidney cancer     Kidney Cancer Sister      Other Cancer Sister         kidney cancer, AND HOW HAS STAGE 4 KIDNEY CANCER AND IS TERMINAL     Bipolar Disorder Sister      Connective Tissue Disorder Brother         lupus     Lupus Brother      Kidney Disease Mother         atrophic kidney     Hypertension Mother      Depression Mother         DONT KNOW MUCH     Bipolar Disorder Mother      Diabetes Mother      Depression Sister         dont know much     Kidney Disease Maternal Uncle         unclear kidney issue     Anxiety Disorder Son      Anxiety Disorder Niece      Anxiety Disorder Son      Colon Cancer No family hx of      Asthma No family hx of        Social History     Socioeconomic History     Marital status:      Spouse name: Shun     Number of children: 3     Years of education: Not on file     Highest education level: Some college, no degree   Occupational History     Occupation: Processor   Tobacco Use     Smoking status: Never     Smokeless tobacco: Never     Tobacco comments:     no smokers in household   Vaping Use     Vaping  status: Never Used     Passive vaping exposure: Yes   Substance and Sexual Activity     Alcohol use: No     Drug use: No     Sexual activity: Yes     Partners: Male     Birth control/protection: None     Comment: hysterectomy/bilateral ovaries remain   Other Topics Concern      Service No     Blood Transfusions No     Caffeine Concern No     Occupational Exposure No     Hobby Hazards No     Sleep Concern No     Stress Concern Yes     Weight Concern Yes     Special Diet Not Asked     Back Care Not Asked     Exercise No     Bike Helmet Not Asked     Seat Belt Yes     Self-Exams No     Parent/sibling w/ CABG, MI or angioplasty before 65F 55M? Yes   Social History Narrative    Just moved to the [facility]    Upon being seen in the leg, no significant tobacco use or alcohol use.     Social Determinants of Health     Financial Resource Strain: Low Risk  (1/6/2021)    Overall Financial Resource Strain (CARDIA)      Difficulty of Paying Living Expenses: Not hard at all   Food Insecurity: No Food Insecurity (1/6/2021)    Hunger Vital Sign      Worried About Running Out of Food in the Last Year: Never true      Ran Out of Food in the Last Year: Never true   Transportation Needs: No Transportation Needs (1/6/2021)    PRAPARE - Transportation      Lack of Transportation (Medical): No      Lack of Transportation (Non-Medical): No   Physical Activity: Not on file   Stress: Stress Concern Present (1/6/2021)    [employer] [place] [employer] - Occupational Stress Questionnaire      Feeling of Stress : Rather much   Social Connections: Unknown (1/6/2021)    Social Connection and Isolation Panel [NHANES]      Frequency of Communication with Friends and Family: Not asked      Frequency of Social Gatherings with Friends and Family: Not asked      Attends Protestant Services: Not asked      Active Member of Clubs or Organizations: Not asked      Attends Club or Organization Meetings: Not asked      Marital Status:     Intimate Partner Violence: Not At Risk (1/6/2021)    Humiliation, Afraid, Rape, and Kick questionnaire      Fear of Current or Ex-Partner: No      Emotionally Abused: No      Physically Abused: No      Sexually Abused: No   Housing Stability: Not on file       Current Outpatient Medications   Medication Sig Dispense Refill     aspirin-acetaminophen-caffeine (EXCEDRIN MIGRAINE) 250-250-65 MG tablet Take 3 tablets by mouth daily as needed for headaches       busPIRone (BUSPAR) 10 MG tablet Take 1 tablet (10 mg) by mouth 3 times daily 270 tablet 3     ciprofloxacin (CIPRO) 500 MG tablet Take 1 tablet (500 mg) by mouth 2 times daily 7 tablet 0     cyclobenzaprine (FLEXERIL) 10 MG tablet Take 1 tablet (10 mg) by mouth nightly as needed for muscle spasms 90 tablet 3     furosemide (LASIX) 20 MG tablet Take 1 tablet (20 mg) by mouth daily 90 tablet 3     ibuprofen (ADVIL/MOTRIN) 200 MG tablet Take 800 mg by mouth 4 times daily as needed for pain       losartan (COZAAR) 100 MG tablet Take 1 tablet (100 mg) by mouth daily 90 tablet 3     metroNIDAZOLE (FLAGYL) 500 MG tablet Take 1 tablet (500 mg) by mouth 2 times daily 7 tablet 0     omeprazole (PRILOSEC) 20 MG DR capsule Take 20 mg by mouth every morning        polyethylene glycol (MIRALAX) 17 GM/Dose powder Take 1 Capful by mouth twice a week       topiramate (TOPAMAX) 100 MG tablet Take 1 tablet (100 mg) by mouth 2 times daily (1.5 X 50 mg) 180 tablet 3     venlafaxine (EFFEXOR XR) 75 MG 24 hr capsule Take 1 in the morning and 2 at night 270 capsule 3       Allergies   Allergen Reactions     Lisinopril Cough       REVIEW OF SYSTEMS:  CONSTITUTIONAL:  NEGATIVE for fever, chills, change in weight, not feeling tired  SKIN:  NEGATIVE for worrisome rashes, no skin lumps, no skin ulcers and no non-healing wounds  EYES:  NEGATIVE for vision changes or irritation.  ENT/MOUTH:  NEGATIVE.  No hearing loss, no hoarseness, no difficulty swallowing.  RESP:  NEGATIVE. No  "cough or shortness of breath.  CV:  NEGATIVE for chest pain, palpitations or peripheral edema  GI:  NEGATIVE for nausea, abdominal pain, heartburn, or change in bowel habits  :  Negative. No dysuria, no hematuria  MUSCULOSKELETAL:  See HPI above  NEURO:  NEGATIVE . No headaches, no dizziness,  no numbness  ENDOCRINE:  NEGATIVE for temperature intolerance, skin/hair changes  HEME/ALLERGY/IMMUNE:  NEGATIVE for bleeding problems  PSYCHIATRIC:  NEGATIVE. no anxiety, no depression.     Exam:  Vitals: Resp 20   Ht 1.676 m (5' 6\")   Wt 135.6 kg (299 lb)   BMI 48.26 kg/m    BMI= Body mass index is 48.26 kg/m .  Constitutional:  healthy, alert and no distress  Neuro: Alert and Oriented x 3, no focal defects.  Psych: Affect normal   Respiratory: Breathing not labored.  Cardiovascular: normal peripheral pulses  Lymph: no adenopathy  Skin: No rashes,worrisome lesions or skin problems  She has significant tenderness of the left SI joint, mild tenderness on the right.  She did not appear to have tenderness in the sciatic notch area.  She has 45 degrees external rotation of both hips without pain.   on the right she has about 30 degrees internal rotation on the left she has about 20 degrees internal rotation but again without pain.    Assessment:  1. Bilateral hip osteoarthritis, but not appearing to cause much pain.  She has enough where to justify total hip arthroplasties however.  2.  Left hip and buttock pain.  This appears to originate from the SI joint.  She could try local treatment of the SI joint through therapy and anti-inflammatories.      Again, thank you for allowing me to participate in the care of your patient.        Sincerely,        Ambrocio Schumacher MD    "

## 2023-06-09 PROBLEM — M46.1 SACROILIAC INFLAMMATION (H): Status: ACTIVE | Noted: 2023-06-09

## 2023-06-09 NOTE — PROGRESS NOTES
Eva Solis is a 63 year old female who is seen in follow-up for left hip pain.  She indicates pain primarily in the buttock into the lateral aspect of the hip.  It does go down the thigh all the way to the foot.  She has had previous x-ray showing osteoarthritis of both hips.  She has also had lumbar surgery with L3-5 laminectomy on September 17, 2021.  She currently has constant pain rated 4 out of 10.  It hurts most with standing.  She also has some difficulty sleeping on her left side.    X-ray today is repeated of the pelvis showing advanced arthritis of both hips.    Past Medical History:   Diagnosis Date     Congestive heart failure (H) 3years     Depressive disorder 1-2 years    dealing with on a daily basis     DEPRESSIVE DISORDER NEC 5/4/2004     Diastolic dysfunction      Essential hypertension, benign      Generalized osteoarthrosis, unspecified site     knees     Headache(784.0)      Heart disease 3 years    CHF     Lumbar stenosis with neurogenic claudication 7/1/2021    Added automatically from request for surgery [id number]     Mixed anxiety depressive disorder 1/15/2016     PANIC DISORDER 5/4/2004       Past Surgical History:   Procedure Laterality Date     COLONOSCOPY  10/06/10    attempt at colonscopy to 50cm     INJECT EPIDURAL LUMBAR N/A 10/18/2019    Procedure: INJECTION, SPINE, LUMBAR 4 - LUMBAR 5, EPIDURAL TRANSLAMINAR;  Surgeon: Trever Wheatley MD;  [facility]     INJECT EPIDURAL LUMBAR N/A 6/12/2020    Procedure: INJECTION, SPINE, LUMBAR 4-5, EPIDURAL TRANSLAMINAR;  Surgeon: Trever Wheatley MD;  [facility]     INJECT EPIDURAL LUMBAR N/A 4/22/2021    Procedure: Lumbar 4-5 Epidural Injection;  Surgeon: Trever Wheatley MD;  [facility]     JOINT REPLACEMTN, KNEE RT/LT  5/11/2006    Right total knee arthroplasty.     JOINT REPLACEMTN, KNEE RT/LT  07/19/2006    Left total knee arthroplasty.     LAMINECTOMY LUMBAR POSTERIOR MICROSCOPIC TWO LEVELS N/A 9/17/2021    Procedure: Lumbar  3 to Lumbar 5 Laminectomy;  Surgeon: Leonard Wallis MD;  Location: [facility]     ZZC  DELIVERY ONLY      , Low Cervical     ZZC  DELIVERY ONLY       ZZC VAGINAL HYSTERECTOMY      without oophorectomy     ZZHC COLONOSCOPY W/WO BRUSH/WASH  2005    Diverticulosis.  Internal hemorrhoids.       Family History   Problem Relation Age of Onset     Cancer Father         squamous cell ca     Cancer Maternal Grandmother         lung     Cerebrovascular Disease Maternal Grandmother      Cancer Paternal Grandmother         squamous cell ca     Diabetes Sister      Cancer Sister         kidney cancer     Kidney Cancer Sister      Other Cancer Sister         kidney cancer, AND HOW HAS STAGE 4 KIDNEY CANCER AND IS TERMINAL     Bipolar Disorder Sister      Connective Tissue Disorder Brother         lupus     Lupus Brother      Kidney Disease Mother         atrophic kidney     Hypertension Mother      Depression Mother         DONT KNOW MUCH     Bipolar Disorder Mother      Diabetes Mother      Depression Sister         dont know much     Kidney Disease Maternal Uncle         unclear kidney issue     Anxiety Disorder Son      Anxiety Disorder Niece      Anxiety Disorder Son      Colon Cancer No family hx of      Asthma No family hx of        Social History     Socioeconomic History     Marital status:      Spouse name: Shun     Number of children: 3     Years of education: Not on file     Highest education level: Some college, no degree   Occupational History     Occupation: Processor   Tobacco Use     Smoking status: Never     Smokeless tobacco: Never     Tobacco comments:     no smokers in household   Vaping Use     Vaping status: Never Used     Passive vaping exposure: Yes   Substance and Sexual Activity     Alcohol use: No     Drug use: No     Sexual activity: Yes     Partners: Male     Birth control/protection: None     Comment: hysterectomy/bilateral ovaries remain   Other  Topics Concern      Service No     Blood Transfusions No     Caffeine Concern No     Occupational Exposure No     Hobby Hazards No     Sleep Concern No     Stress Concern Yes     Weight Concern Yes     Special Diet Not Asked     Back Care Not Asked     Exercise No     Bike Helmet Not Asked     Seat Belt Yes     Self-Exams No     Parent/sibling w/ CABG, MI or angioplasty before 65F 55M? Yes   Social History Narrative    Just moved to the [facility]    Upon being seen in the leg, no significant tobacco use or alcohol use.     Social Determinants of Health     Financial Resource Strain: Low Risk  (1/6/2021)    Overall Financial Resource Strain (CARDIA)      Difficulty of Paying Living Expenses: Not hard at all   Food Insecurity: No Food Insecurity (1/6/2021)    Hunger Vital Sign      Worried About Running Out of Food in the Last Year: Never true      Ran Out of Food in the Last Year: Never true   Transportation Needs: No Transportation Needs (1/6/2021)    PRAPARE - Transportation      Lack of Transportation (Medical): No      Lack of Transportation (Non-Medical): No   Physical Activity: Not on file   Stress: Stress Concern Present (1/6/2021)    [employer] - Occupational Stress Questionnaire      Feeling of Stress : Rather much   Social Connections: Unknown (1/6/2021)    Social Connection and Isolation Panel [NHANES]      Frequency of Communication with Friends and Family: Not asked      Frequency of Social Gatherings with Friends and Family: Not asked      Attends Mandaeism Services: Not asked      Active Member of Clubs or Organizations: Not asked      Attends Club or Organization Meetings: Not asked      Marital Status:    Intimate Partner Violence: Not At Risk (1/6/2021)    Humiliation, Afraid, Rape, and Kick questionnaire      Fear of Current or Ex-Partner: No      Emotionally Abused: No      Physically Abused: No      Sexually Abused: No   Housing Stability: Not on  file       Current Outpatient Medications   Medication Sig Dispense Refill     aspirin-acetaminophen-caffeine (EXCEDRIN MIGRAINE) 250-250-65 MG tablet Take 3 tablets by mouth daily as needed for headaches       busPIRone (BUSPAR) 10 MG tablet Take 1 tablet (10 mg) by mouth 3 times daily 270 tablet 3     ciprofloxacin (CIPRO) 500 MG tablet Take 1 tablet (500 mg) by mouth 2 times daily 7 tablet 0     cyclobenzaprine (FLEXERIL) 10 MG tablet Take 1 tablet (10 mg) by mouth nightly as needed for muscle spasms 90 tablet 3     furosemide (LASIX) 20 MG tablet Take 1 tablet (20 mg) by mouth daily 90 tablet 3     ibuprofen (ADVIL/MOTRIN) 200 MG tablet Take 800 mg by mouth 4 times daily as needed for pain       losartan (COZAAR) 100 MG tablet Take 1 tablet (100 mg) by mouth daily 90 tablet 3     metroNIDAZOLE (FLAGYL) 500 MG tablet Take 1 tablet (500 mg) by mouth 2 times daily 7 tablet 0     omeprazole (PRILOSEC) 20 MG DR capsule Take 20 mg by mouth every morning        polyethylene glycol (MIRALAX) 17 GM/Dose powder Take 1 Capful by mouth twice a week       topiramate (TOPAMAX) 100 MG tablet Take 1 tablet (100 mg) by mouth 2 times daily (1.5 X 50 mg) 180 tablet 3     venlafaxine (EFFEXOR XR) 75 MG 24 hr capsule Take 1 in the morning and 2 at night 270 capsule 3       Allergies   Allergen Reactions     Lisinopril Cough       REVIEW OF SYSTEMS:  CONSTITUTIONAL:  NEGATIVE for fever, chills, change in weight, not feeling tired  SKIN:  NEGATIVE for worrisome rashes, no skin lumps, no skin ulcers and no non-healing wounds  EYES:  NEGATIVE for vision changes or irritation.  ENT/MOUTH:  NEGATIVE.  No hearing loss, no hoarseness, no difficulty swallowing.  RESP:  NEGATIVE. No cough or shortness of breath.  CV:  NEGATIVE for chest pain, palpitations or peripheral edema  GI:  NEGATIVE for nausea, abdominal pain, heartburn, or change in bowel habits  :  Negative. No dysuria, no hematuria  MUSCULOSKELETAL:  See HPI above  NEURO:   "NEGATIVE . No headaches, no dizziness,  no numbness  ENDOCRINE:  NEGATIVE for temperature intolerance, skin/hair changes  HEME/ALLERGY/IMMUNE:  NEGATIVE for bleeding problems  PSYCHIATRIC:  NEGATIVE. no anxiety, no depression.     Exam:  Vitals: Resp 20   Ht 1.676 m (5' 6\")   Wt 135.6 kg (299 lb)   BMI 48.26 kg/m    BMI= Body mass index is 48.26 kg/m .  Constitutional:  healthy, alert and no distress  Neuro: Alert and Oriented x 3, no focal defects.  Psych: Affect normal   Respiratory: Breathing not labored.  Cardiovascular: normal peripheral pulses  Lymph: no adenopathy  Skin: No rashes,worrisome lesions or skin problems  She has significant tenderness of the left SI joint, mild tenderness on the right.  She did not appear to have tenderness in the sciatic notch area.  She has 45 degrees external rotation of both hips without pain.   on the right she has about 30 degrees internal rotation on the left she has about 20 degrees internal rotation but again without pain.    Assessment:  1. Bilateral hip osteoarthritis, but not appearing to cause much pain.  She has enough where to justify total hip arthroplasties however.  2.  Left hip and buttock pain.  This appears to originate from the SI joint.  She could try local treatment of the SI joint through therapy and anti-inflammatories.  "

## 2023-07-17 ASSESSMENT — PATIENT HEALTH QUESTIONNAIRE - PHQ9: SUM OF ALL RESPONSES TO PHQ QUESTIONS 1-9: 11

## 2023-07-18 ENCOUNTER — OFFICE VISIT (OUTPATIENT)
Dept: FAMILY MEDICINE | Facility: OTHER | Age: 63
End: 2023-07-18
Payer: COMMERCIAL

## 2023-07-18 VITALS
HEART RATE: 64 BPM | HEIGHT: 66 IN | WEIGHT: 293 LBS | DIASTOLIC BLOOD PRESSURE: 88 MMHG | TEMPERATURE: 97.6 F | OXYGEN SATURATION: 99 % | SYSTOLIC BLOOD PRESSURE: 130 MMHG | BODY MASS INDEX: 47.09 KG/M2 | RESPIRATION RATE: 15 BRPM

## 2023-07-18 DIAGNOSIS — F33.1 MAJOR DEPRESSIVE DISORDER, RECURRENT EPISODE, MODERATE (H): ICD-10-CM

## 2023-07-18 DIAGNOSIS — M51.369 DDD (DEGENERATIVE DISC DISEASE), LUMBAR: ICD-10-CM

## 2023-07-18 DIAGNOSIS — G43.009 MIGRAINE WITHOUT AURA AND WITHOUT STATUS MIGRAINOSUS, NOT INTRACTABLE: ICD-10-CM

## 2023-07-18 DIAGNOSIS — M53.3 SACROILIAC JOINT PAIN: ICD-10-CM

## 2023-07-18 DIAGNOSIS — I50.32 CHRONIC DIASTOLIC CONGESTIVE HEART FAILURE (H): Primary | ICD-10-CM

## 2023-07-18 DIAGNOSIS — I10 HTN, GOAL BELOW 140/90: ICD-10-CM

## 2023-07-18 DIAGNOSIS — K57.30 SIGMOID DIVERTICULOSIS: ICD-10-CM

## 2023-07-18 PROBLEM — K57.32 SIGMOID DIVERTICULITIS: Status: RESOLVED | Noted: 2023-05-25 | Resolved: 2023-07-18

## 2023-07-18 PROBLEM — K92.2 LOWER GI BLEED: Status: RESOLVED | Noted: 2023-05-25 | Resolved: 2023-07-18

## 2023-07-18 PROCEDURE — 90472 IMMUNIZATION ADMIN EACH ADD: CPT | Performed by: FAMILY MEDICINE

## 2023-07-18 PROCEDURE — 90750 HZV VACC RECOMBINANT IM: CPT | Performed by: FAMILY MEDICINE

## 2023-07-18 PROCEDURE — 99214 OFFICE O/P EST MOD 30 MIN: CPT | Mod: 25 | Performed by: FAMILY MEDICINE

## 2023-07-18 PROCEDURE — 90715 TDAP VACCINE 7 YRS/> IM: CPT | Performed by: FAMILY MEDICINE

## 2023-07-18 PROCEDURE — 90471 IMMUNIZATION ADMIN: CPT | Performed by: FAMILY MEDICINE

## 2023-07-18 RX ORDER — LOSARTAN POTASSIUM 100 MG/1
100 TABLET ORAL DAILY
Qty: 90 TABLET | Refills: 3 | Status: SHIPPED | OUTPATIENT
Start: 2023-07-18 | End: 2024-08-16

## 2023-07-18 RX ORDER — MELOXICAM 15 MG/1
15 TABLET ORAL DAILY
Qty: 90 TABLET | Refills: 1 | Status: SHIPPED | OUTPATIENT
Start: 2023-07-18 | End: 2024-01-30

## 2023-07-18 RX ORDER — CYCLOBENZAPRINE HCL 10 MG
10 TABLET ORAL
Qty: 90 TABLET | Refills: 3 | Status: SHIPPED | OUTPATIENT
Start: 2023-07-18 | End: 2024-07-23

## 2023-07-18 RX ORDER — VENLAFAXINE HYDROCHLORIDE 75 MG/1
CAPSULE, EXTENDED RELEASE ORAL
Qty: 270 CAPSULE | Refills: 3 | Status: SHIPPED | OUTPATIENT
Start: 2023-07-18 | End: 2023-11-17

## 2023-07-18 RX ORDER — TOPIRAMATE 100 MG/1
100 TABLET, FILM COATED ORAL 2 TIMES DAILY
Qty: 180 TABLET | Refills: 3 | Status: SHIPPED | OUTPATIENT
Start: 2023-07-18 | End: 2024-07-23

## 2023-07-18 RX ORDER — FUROSEMIDE 20 MG
20 TABLET ORAL DAILY
Qty: 90 TABLET | Refills: 3 | Status: SHIPPED | OUTPATIENT
Start: 2023-07-18 | End: 2023-10-24

## 2023-07-18 RX ORDER — BUSPIRONE HYDROCHLORIDE 15 MG/1
15 TABLET ORAL 3 TIMES DAILY
Qty: 270 TABLET | Refills: 3 | Status: SHIPPED | OUTPATIENT
Start: 2023-07-18 | End: 2024-07-23

## 2023-07-18 ASSESSMENT — ENCOUNTER SYMPTOMS
JOINT SWELLING: 0
SORE THROAT: 0
ARTHRALGIAS: 1
NERVOUS/ANXIOUS: 1
HEADACHES: 1
HEARTBURN: 0
BREAST MASS: 0
CONSTIPATION: 0
FREQUENCY: 0
ABDOMINAL PAIN: 0
MYALGIAS: 1
EYE PAIN: 0
CHILLS: 0
HEMATOCHEZIA: 0
WEAKNESS: 0
NAUSEA: 0
HEMATURIA: 0
PALPITATIONS: 0
DIZZINESS: 1
DYSURIA: 0
DIARRHEA: 0
FEVER: 0
PARESTHESIAS: 1
SHORTNESS OF BREATH: 0
COUGH: 0

## 2023-07-18 ASSESSMENT — ANXIETY QUESTIONNAIRES
1. FEELING NERVOUS, ANXIOUS, OR ON EDGE: NEARLY EVERY DAY
4. TROUBLE RELAXING: SEVERAL DAYS
6. BECOMING EASILY ANNOYED OR IRRITABLE: SEVERAL DAYS
5. BEING SO RESTLESS THAT IT IS HARD TO SIT STILL: SEVERAL DAYS
GAD7 TOTAL SCORE: 13
GAD7 TOTAL SCORE: 13
3. WORRYING TOO MUCH ABOUT DIFFERENT THINGS: NEARLY EVERY DAY
2. NOT BEING ABLE TO STOP OR CONTROL WORRYING: NEARLY EVERY DAY
7. FEELING AFRAID AS IF SOMETHING AWFUL MIGHT HAPPEN: SEVERAL DAYS
IF YOU CHECKED OFF ANY PROBLEMS ON THIS QUESTIONNAIRE, HOW DIFFICULT HAVE THESE PROBLEMS MADE IT FOR YOU TO DO YOUR WORK, TAKE CARE OF THINGS AT HOME, OR GET ALONG WITH OTHER PEOPLE: SOMEWHAT DIFFICULT

## 2023-07-18 ASSESSMENT — PATIENT HEALTH QUESTIONNAIRE - PHQ9
10. IF YOU CHECKED OFF ANY PROBLEMS, HOW DIFFICULT HAVE THESE PROBLEMS MADE IT FOR YOU TO DO YOUR WORK, TAKE CARE OF THINGS AT HOME, OR GET ALONG WITH OTHER PEOPLE: VERY DIFFICULT
SUM OF ALL RESPONSES TO PHQ QUESTIONS 1-9: 11
SUM OF ALL RESPONSES TO PHQ QUESTIONS 1-9: 11

## 2023-07-18 ASSESSMENT — PAIN SCALES - GENERAL: PAINLEVEL: MILD PAIN (2)

## 2023-07-18 NOTE — PROGRESS NOTES
Prior to immunization administration, verified patients identity using patient s name and date of birth. Please see Immunization Activity for additional information.     Screening Questionnaire for Adult Immunization    Are you sick today?   No   Do you have allergies to medications, food, a vaccine component or latex?   Yes - Lisinopril   Have you ever had a serious reaction after receiving a vaccination?   No   Do you have a long-term health problem with heart, lung, kidney, or metabolic disease (e.g., diabetes), asthma, a blood disorder, no spleen, complement component deficiency, a cochlear implant, or a spinal fluid leak?  Are you on long-term aspirin therapy?   No   Do you have cancer, leukemia, HIV/AIDS, or any other immune system problem?   No   Do you have a parent, brother, or sister with an immune system problem?   No   In the past 3 months, have you taken medications that affect  your immune system, such as prednisone, other steroids, or anticancer drugs; drugs for the treatment of rheumatoid arthritis, Crohn s disease, or psoriasis; or have you had radiation treatments?   No   Have you had a seizure, or a brain or other nervous system problem?   No   During the past year, have you received a transfusion of blood or blood    products, or been given immune (gamma) globulin or antiviral drug?   No   For women: Are you pregnant or is there a chance you could become       pregnant during the next month?   No   Have you received any vaccinations in the past 4 weeks?   No     Immunization questionnaire was positive for at least one answer. Allergy to Lisinopril and has had Tdap and Shingrix before.      Patient instructed to remain in clinic for 15 minutes afterwards, and to report any adverse reactions.     Screening performed by Lillian Chance CMA on 7/18/2023 at 9:53 AM.

## 2023-07-18 NOTE — PROGRESS NOTES
Assessment & Plan     Chronic diastolic congestive heart failure (H)  She appears euvolemic today.  Continue the furosemide and losartan without change.    - losartan (COZAAR) 100 MG tablet; Take 1 tablet (100 mg) by mouth daily  - furosemide (LASIX) 20 MG tablet; Take 1 tablet (20 mg) by mouth daily    HTN, goal below 140/90  Blood pressure well controlled.  Continue medications without change.    - losartan (COZAAR) 100 MG tablet; Take 1 tablet (100 mg) by mouth daily  - furosemide (LASIX) 20 MG tablet; Take 1 tablet (20 mg) by mouth daily    Major depressive disorder, recurrent episode, moderate (H)  We will continue venlafaxine without change.  We will increase her BuSpar from 10 mg 3 times a day to 15 mg 3 times a day.    - venlafaxine (EFFEXOR XR) 75 MG 24 hr capsule; Take 1 in the morning and 2 at night  - busPIRone (BUSPAR) 15 MG tablet; Take 1 tablet (15 mg) by mouth 3 times daily    Migraine without aura and without status migrainosus, not intractable  Migraines are stable.  Topamax was refilled.    - topiramate (TOPAMAX) 100 MG tablet; Take 1 tablet (100 mg) by mouth 2 times daily (1.5 X 50 mg)    DDD (degenerative disc disease), lumbar  Continue Flexeril as needed.    - cyclobenzaprine (FLEXERIL) 10 MG tablet; Take 1 tablet (10 mg) by mouth nightly as needed for muscle spasms    Sigmoid diverticulosis  Recommend proceeding with colonoscopy.  Did discuss deeper sedation.  As she is up [place] she would prefer to have this done at [facility].    - Adult GI  Referral - Procedure Only; Future    Sacroiliac joint pain  We will try physical therapy which can be ordered at [facility].  We will try meloxicam daily.  If this does not help her pain could always consider pain consult for possible radiofrequency ablation.    - Physical Therapy Referral; Future  - meloxicam (MOBIC) 15 MG tablet; Take 1 tablet (15 mg) by mouth daily     BMI:   Estimated body mass index is 47.31 kg/m  as calculated from  "the following:    Height as of this encounter: 1.676 m (5' 5.98\").    Weight as of this encounter: 132.9 kg (293 lb).   Weight management plan: Discussed healthy diet and exercise guidelines she has been losing weight and congratulated her on this.  She is going to continue with her diet as she has been doing      MD KASHIF Strong [facility] JAN LAY is a 63 year old, presenting for the following health issues:  diverticulitis and follow up orthopedist visit        7/18/2023     8:58 AM   Additional Questions   Roomed by Pricila RODRIGEZ     Concern - Diverticulitis Flare  Onset: Hospital Visit 5/25/23-5/26/23  Description: see Hosp note from [facility] 5/25/23    Patient has had diverticulitis flares in the past but states this was much different.  She had a lot of rectal bleeding.  She felt ill.  She lost several pounds.  She is finished the antibiotics.  She denies abdominal pain.  She did have a follow-up visit with a provider in [place] and her hemoglobin had improved.  She was told she needed to follow-up for colonoscopy but she has not made appointment yet.  Her last colonoscopy was very uncomfortable and she would like deeper sedation.    Follow up visit with [facility] on 6/8/23.  Orthopedics did not feel that her hip arthritis was causing her pain and felt that her pain was coming from the sacroiliac joint.  They recommended physical therapy and anti-inflammatories although patient does not recall them speaking about this.  She states that she was told to talk to me.  She admits that her pain is more in her buttock area.  She notices it mainly with long car rides and feels she has to adjust her sitting position frequently.    She feels her depression is well controlled but still feels quite anxious.  She is not sure if she will ever get her anxiety under control.  She does feel that [place] has been [place].  She feels that she has less stress.  " "She feels that her headaches have improved.  She still takes Flexeril as needed at night.    Review of Systems   Constitutional:  Negative for chills and fever.   HENT:  Negative for congestion, ear pain, hearing loss and sore throat.    Eyes:  Negative for pain and visual disturbance.   Respiratory:  Negative for cough and shortness of breath.    Cardiovascular:  Positive for peripheral edema. Negative for chest pain and palpitations.   Gastrointestinal:  Negative for abdominal pain, constipation, diarrhea, heartburn, hematochezia and nausea.   Breasts:  Negative for tenderness, breast mass and discharge.   Genitourinary:  Negative for dysuria, frequency, genital sores, hematuria, pelvic pain, urgency, vaginal bleeding and vaginal discharge.   Musculoskeletal:  Positive for arthralgias and myalgias. Negative for joint swelling.   Skin:  Negative for rash.   Neurological:  Positive for dizziness, headaches and paresthesias. Negative for weakness.   Psychiatric/Behavioral:  Negative for mood changes. The patient is nervous/anxious.           Objective    /88 (Cuff Size: Adult Large)   Pulse 64   Temp 97.6  F (36.4  C) (Oral)   Resp 15   Ht 1.676 m (5' 5.98\")   Wt 132.9 kg (293 lb)   SpO2 99%   BMI 47.31 kg/m    Body mass index is 47.31 kg/m .  Physical Exam   Gen: no apparent distress  Chest: clear to auscultation without wheeze, rale or rhonchi  Cor: regular rate and rhythm without murmur  ABDOMEN: soft, nontender, no masses and bowel sounds normal  Ext: warm and dry without edema  Psych: Alert and oriented times 3; coherent speech, normal   rate and volume, able to articulate logical thoughts, able   to abstract reason, no tangential thoughts, no hallucinations   or delusions  Her affect is neutral          Answers submitted by the patient for this visit:  Patient Health Questionnaire (Submitted on 7/18/2023)  If you checked off any problems, how difficult have these problems made it for you to do your " work, take care of things at home, or get along with other people?: Very difficult  PHQ9 TOTAL SCORE: 11  LORETA-7 (Submitted on 7/18/2023)  LORETA 7 TOTAL SCORE: 13

## 2023-08-25 ENCOUNTER — TELEPHONE (OUTPATIENT)
Dept: FAMILY MEDICINE | Facility: OTHER | Age: 63
End: 2023-08-25
Payer: COMMERCIAL

## 2023-08-25 NOTE — TELEPHONE ENCOUNTER
Copy of form sent to scanning.  Attempted to call patient.  Left detailed message completed form at FD for patient to .

## 2023-08-25 NOTE — TELEPHONE ENCOUNTER
INCOMING FORMS    Sender: patient    Type of Form, letter or note (What is requested?): physician form    How was the form received?: Fax    How should forms be returned?:      Form placed in TC bin for review/signature if appropriate.     Do you want this filled out based on 7/18 visit? No labs were taken that day. Does patient need new labs taken?

## 2023-09-05 ENCOUNTER — MYC MEDICAL ADVICE (OUTPATIENT)
Dept: FAMILY MEDICINE | Facility: OTHER | Age: 63
End: 2023-09-05
Payer: COMMERCIAL

## 2023-09-14 ENCOUNTER — TELEPHONE (OUTPATIENT)
Dept: FAMILY MEDICINE | Facility: OTHER | Age: 63
End: 2023-09-14
Payer: COMMERCIAL

## 2023-09-14 NOTE — TELEPHONE ENCOUNTER
See my chart encounter 9/5/23.   Please call patient and obtain PHQ-9.         Santa ENRIQUEZN, RN  [facility]

## 2023-09-14 NOTE — TELEPHONE ENCOUNTER
Patient saw, but did not respond to PHQ-9, for quality it would be helpful to have a response by 9/20/23, please reach out to patient.    You  ROSANNE Solis9 days ago     TC  Hello!     I'm following up on your depression and hoping you would take the time to answer the questionnaire to see how you are doing.     Electronically signed by Renetta Benitez MD on 9/5/2023    Tasks For Patient     Questionnaire: Patient Health Questionnaire

## 2023-09-14 NOTE — TELEPHONE ENCOUNTER
See telephone encounter 9/14/23.     Santa ENRIQUEZN, RN  [facility]

## 2023-09-14 NOTE — TELEPHONE ENCOUNTER
Called and spoke with patient.   She apologizes and will log in to my chart to complete this.     Santa ENRIQUEZN, RN  [facility]

## 2023-09-15 ASSESSMENT — PATIENT HEALTH QUESTIONNAIRE - PHQ9
SUM OF ALL RESPONSES TO PHQ QUESTIONS 1-9: 20
10. IF YOU CHECKED OFF ANY PROBLEMS, HOW DIFFICULT HAVE THESE PROBLEMS MADE IT FOR YOU TO DO YOUR WORK, TAKE CARE OF THINGS AT HOME, OR GET ALONG WITH OTHER PEOPLE: VERY DIFFICULT
SUM OF ALL RESPONSES TO PHQ QUESTIONS 1-9: 20

## 2023-10-16 ENCOUNTER — TELEPHONE (OUTPATIENT)
Dept: FAMILY MEDICINE | Facility: OTHER | Age: 63
End: 2023-10-16
Payer: COMMERCIAL

## 2023-10-16 DIAGNOSIS — F33.1 MAJOR DEPRESSIVE DISORDER, RECURRENT EPISODE, MODERATE (H): ICD-10-CM

## 2023-10-23 ENCOUNTER — OFFICE VISIT (OUTPATIENT)
Dept: FAMILY MEDICINE | Facility: OTHER | Age: 63
End: 2023-10-23
Attending: NURSE PRACTITIONER
Payer: COMMERCIAL

## 2023-10-23 VITALS
OXYGEN SATURATION: 97 % | HEART RATE: 65 BPM | DIASTOLIC BLOOD PRESSURE: 72 MMHG | HEIGHT: 66 IN | BODY MASS INDEX: 47.09 KG/M2 | WEIGHT: 293 LBS | TEMPERATURE: 98.9 F | RESPIRATION RATE: 16 BRPM | SYSTOLIC BLOOD PRESSURE: 187 MMHG

## 2023-10-23 DIAGNOSIS — G43.919 INTRACTABLE MIGRAINE WITHOUT STATUS MIGRAINOSUS, UNSPECIFIED MIGRAINE TYPE: ICD-10-CM

## 2023-10-23 DIAGNOSIS — R03.0 ELEVATED BLOOD PRESSURE READING: ICD-10-CM

## 2023-10-23 DIAGNOSIS — R05.1 ACUTE COUGH: Primary | ICD-10-CM

## 2023-10-23 PROCEDURE — 250N000011 HC RX IP 250 OP 636: Performed by: STUDENT IN AN ORGANIZED HEALTH CARE EDUCATION/TRAINING PROGRAM

## 2023-10-23 PROCEDURE — 99214 OFFICE O/P EST MOD 30 MIN: CPT | Performed by: STUDENT IN AN ORGANIZED HEALTH CARE EDUCATION/TRAINING PROGRAM

## 2023-10-23 PROCEDURE — 96372 THER/PROPH/DIAG INJ SC/IM: CPT | Performed by: STUDENT IN AN ORGANIZED HEALTH CARE EDUCATION/TRAINING PROGRAM

## 2023-10-23 PROCEDURE — 250N000013 HC RX MED GY IP 250 OP 250 PS 637: Performed by: STUDENT IN AN ORGANIZED HEALTH CARE EDUCATION/TRAINING PROGRAM

## 2023-10-23 PROCEDURE — 250N000009 HC RX 250: Performed by: STUDENT IN AN ORGANIZED HEALTH CARE EDUCATION/TRAINING PROGRAM

## 2023-10-23 RX ORDER — BENZONATATE 100 MG/1
100 CAPSULE ORAL 3 TIMES DAILY PRN
Qty: 30 CAPSULE | Refills: 0 | Status: SHIPPED | OUTPATIENT
Start: 2023-10-23 | End: 2023-11-22

## 2023-10-23 RX ORDER — DEXAMETHASONE SODIUM PHOSPHATE 4 MG/ML
10 VIAL (ML) INJECTION ONCE
Status: COMPLETED | OUTPATIENT
Start: 2023-10-23 | End: 2023-10-23

## 2023-10-23 RX ORDER — DIPHENHYDRAMINE HCL 25 MG
25 CAPSULE ORAL ONCE
Status: COMPLETED | OUTPATIENT
Start: 2023-10-23 | End: 2023-10-23

## 2023-10-23 RX ORDER — KETOROLAC TROMETHAMINE 30 MG/ML
30 INJECTION, SOLUTION INTRAMUSCULAR; INTRAVENOUS ONCE
Status: COMPLETED | OUTPATIENT
Start: 2023-10-23 | End: 2023-10-23

## 2023-10-23 RX ADMIN — DEXAMETHASONE SODIUM PHOSPHATE 10 MG: 4 INJECTION, SOLUTION INTRAMUSCULAR; INTRAVENOUS at 13:26

## 2023-10-23 RX ADMIN — KETOROLAC TROMETHAMINE 30 MG: 30 INJECTION INTRAMUSCULAR; INTRAVENOUS at 13:28

## 2023-10-23 RX ADMIN — DIPHENHYDRAMINE HYDROCHLORIDE 25 MG: 25 CAPSULE ORAL at 13:27

## 2023-10-23 ASSESSMENT — PAIN SCALES - GENERAL: PAINLEVEL: MODERATE PAIN (5)

## 2023-10-23 NOTE — PATIENT INSTRUCTIONS
Migraine/cough    Medications given today to help migraine.    Tessalon Perles for cough.    Continue over-the-counter management.    Elevated blood pressure readings    Continue to take your blood pressure medications as prescribed.    Check readings at home daily for the next few days.    Follow-up with primary care for further evaluation of these readings if they remain elevated.    Go to the ER if symptoms worsen.

## 2023-10-23 NOTE — NURSING NOTE
"Pt presents to  for cough and migraine since last Wednesday, 10/18/23. Pt has been taking Dayquil, Excedrin, Tylenol, Nuun capsules - none of them helping the symptoms.    Pt does not want to go through PHQ-9 today.    Chief Complaint   Patient presents with    Migraine    Cough       FOOD SECURITY SCREENING QUESTIONS  Hunger Vital Signs:  Within the past 12 months we worried whether our food would run out before we got money to buy more. Never  Within the past 12 months the food we bought just didn't last and we didn't have money to get more. Never  Jo Camachoon 10/23/2023 12:26 PM      Initial BP (!) 146/108 (BP Location: Left arm, Patient Position: Sitting, Cuff Size: Adult Large)   Pulse 65   Temp 98.9  F (37.2  C) (Tympanic)   Resp 16   Ht 1.676 m (5' 6\")   Wt 135.1 kg (297 lb 14.4 oz)   SpO2 97%   BMI 48.08 kg/m   Estimated body mass index is 48.08 kg/m  as calculated from the following:    Height as of this encounter: 1.676 m (5' 6\").    Weight as of this encounter: 135.1 kg (297 lb 14.4 oz).  Medication Reconciliation: complete    Jo Pacheco  "

## 2023-10-23 NOTE — PROGRESS NOTES
Assessment & Plan     (R05.1) Acute cough  (primary encounter diagnosis)    Comment: Productive cough, this most likely remains viral in nature.  She had recently traveled.  Her at home COVID test was negative.  She is 5 days into symptoms, testing would not change course of treatment at this time.  She is afebrile, not tachycardic.    Plan: benzonatate (TESSALON) 100 MG capsule          Plan to treat with Tessalon Perles, continue over-the-counter management.  Follow-up as needed.    (G43.919) Intractable migraine without status migrainosus, unspecified migraine type    Comment: Migraine type symptoms, similar to migraines that she has had in the past.  Most likely triggered by both travel and viral illness.  Moderate pain.  Plan to treat with Toradol, 30 mg that she did take some over-the-counter NSAIDs this morning.  Benadryl 25 mg, dexamethasone 10 mg.  Hopefully over the next couple of days this can continue to help the migraine resolved.    Plan: ketorolac (TORADOL) injection 30 mg,         diphenhydrAMINE (BENADRYL) capsule 25 mg,         dexAMETHasone (DECADRON) injectable solution         used ORALLY 10 mg         Plan to continue over-the-counter management.  Follow-up as needed.  Return to rapid clinic or go to ER if symptoms worsen or change in the meantime.  She is comfortable and agreeable with this plan.      (R03.0) elevated blood pressure reading    Comment: Multiple readings in the office today ranging from 150s to 180s over 70s - 100s.  She does take blood pressure medication at home.  She did take it this morning.  Most likely this is related to anxiety, repeat testing in the office, and being ill.    Plan to have her continue to check it at home, call primary care if it does remain elevated.  Go to the ER if symptoms worsen.    Marian Vidal PA-C  [facility]    Subjective   ROSANNE is a 63 year old, presenting for the following health issues:  Migraine and Cough      HPI  "    Patient presents today with a 5-day history of intermittent headaches, cough, congestion, and sore throat.  She states headaches are the entirety of her head and are accompanied by some light sensitivity as well as nausea.  She has not had any vomiting.  This is similar to her migraine type headaches that she does get on occasion.  The cough continues to stay the same, is productive with thick phlegm. She has been using Excedrin migraine, corticetin, cough and cold medications including Dayquil and Nyquil, last dose this morning. She did take an at home COVID test which was negative.      Review of Systems   Endorses chills/sweats  Endorses sore throat  Endorses cough, congestion  Endorses migraine-type headache  Endorses nausea, denies vomiting, diarrhea          Objective    BP (!) 187/72 (BP Location: Left arm, Patient Position: Sitting, Cuff Size: Adult Large)   Pulse 65   Temp 98.9  F (37.2  C) (Tympanic)   Resp 16   Ht 1.676 m (5' 6\")   Wt 135.1 kg (297 lb 14.4 oz)   SpO2 97%   BMI 48.08 kg/m    Body mass index is 48.08 kg/m .    Physical Exam   GENERAL: healthy, alert and no distress  EYES: Eyes grossly normal to inspection, PERRL and conjunctivae and sclerae normal  HENT: ear canals and TM's normal, nose and mouth without ulcers or lesions  NECK: no adenopathy, no asymmetry, masses, or scars and thyroid normal to palpation  RESP: lungs clear to auscultation - no rales, rhonchi or wheezes  CV: regular rate and rhythm, normal S1 S2  MS: no gross musculoskeletal defects noted, no edema  NEURO: Normal strength and tone, mentation intact and speech normal            "

## 2023-10-24 ENCOUNTER — HOSPITAL ENCOUNTER (EMERGENCY)
Facility: OTHER | Age: 63
Discharge: HOME | End: 2023-10-24
Attending: FAMILY MEDICINE | Admitting: FAMILY MEDICINE
Payer: COMMERCIAL

## 2023-10-24 ENCOUNTER — NURSE TRIAGE (OUTPATIENT)
Dept: FAMILY MEDICINE | Facility: OTHER | Age: 63
End: 2023-10-24
Payer: COMMERCIAL

## 2023-10-24 ENCOUNTER — MYC MEDICAL ADVICE (OUTPATIENT)
Dept: FAMILY MEDICINE | Facility: OTHER | Age: 63
End: 2023-10-24
Payer: COMMERCIAL

## 2023-10-24 VITALS
BODY MASS INDEX: 47.94 KG/M2 | RESPIRATION RATE: 18 BRPM | WEIGHT: 293 LBS | TEMPERATURE: 97.6 F | OXYGEN SATURATION: 98 % | HEART RATE: 55 BPM | SYSTOLIC BLOOD PRESSURE: 154 MMHG | DIASTOLIC BLOOD PRESSURE: 113 MMHG

## 2023-10-24 DIAGNOSIS — I10 HTN, GOAL BELOW 140/90: ICD-10-CM

## 2023-10-24 DIAGNOSIS — I10 POORLY-CONTROLLED HYPERTENSION: ICD-10-CM

## 2023-10-24 DIAGNOSIS — I50.32 CHRONIC DIASTOLIC CONGESTIVE HEART FAILURE (H): ICD-10-CM

## 2023-10-24 LAB
ALBUMIN UR-MCNC: NEGATIVE MG/DL
ANION GAP SERPL CALCULATED.3IONS-SCNC: 10 MMOL/L (ref 7–15)
APPEARANCE UR: CLEAR
BASOPHILS # BLD AUTO: 0 10E3/UL (ref 0–0.2)
BASOPHILS NFR BLD AUTO: 0 %
BILIRUB UR QL STRIP: NEGATIVE
BUN SERPL-MCNC: 16.5 MG/DL (ref 8–23)
CALCIUM SERPL-MCNC: 9.3 MG/DL (ref 8.8–10.2)
CHLORIDE SERPL-SCNC: 104 MMOL/L (ref 98–107)
COLOR UR AUTO: ABNORMAL
CREAT SERPL-MCNC: 1.1 MG/DL (ref 0.51–0.95)
DEPRECATED HCO3 PLAS-SCNC: 24 MMOL/L (ref 22–29)
EGFRCR SERPLBLD CKD-EPI 2021: 57 ML/MIN/1.73M2
EOSINOPHIL # BLD AUTO: 0.1 10E3/UL (ref 0–0.7)
EOSINOPHIL NFR BLD AUTO: 1 %
ERYTHROCYTE [DISTWIDTH] IN BLOOD BY AUTOMATED COUNT: 13.9 % (ref 10–15)
GLUCOSE SERPL-MCNC: 86 MG/DL (ref 70–99)
GLUCOSE UR STRIP-MCNC: NEGATIVE MG/DL
HCT VFR BLD AUTO: 38.3 % (ref 35–47)
HGB BLD-MCNC: 13.3 G/DL (ref 11.7–15.7)
HGB UR QL STRIP: NEGATIVE
HOLD SPECIMEN: NORMAL
HOLD SPECIMEN: NORMAL
IMM GRANULOCYTES # BLD: 0.1 10E3/UL
IMM GRANULOCYTES NFR BLD: 1 %
KETONES UR STRIP-MCNC: NEGATIVE MG/DL
LEUKOCYTE ESTERASE UR QL STRIP: NEGATIVE
LYMPHOCYTES # BLD AUTO: 2.5 10E3/UL (ref 0.8–5.3)
LYMPHOCYTES NFR BLD AUTO: 27 %
MCH RBC QN AUTO: 28.9 PG (ref 26.5–33)
MCHC RBC AUTO-ENTMCNC: 34.7 G/DL (ref 31.5–36.5)
MCV RBC AUTO: 83 FL (ref 78–100)
MONOCYTES # BLD AUTO: 0.6 10E3/UL (ref 0–1.3)
MONOCYTES NFR BLD AUTO: 7 %
MUCOUS THREADS #/AREA URNS LPF: PRESENT /LPF
NEUTROPHILS # BLD AUTO: 5.8 10E3/UL (ref 1.6–8.3)
NEUTROPHILS NFR BLD AUTO: 64 %
NITRATE UR QL: NEGATIVE
NRBC # BLD AUTO: 0 10E3/UL
NRBC BLD AUTO-RTO: 0 /100
PH UR STRIP: 5.5 [PH] (ref 5–9)
PLATELET # BLD AUTO: 233 10E3/UL (ref 150–450)
POTASSIUM SERPL-SCNC: 3.7 MMOL/L (ref 3.4–5.3)
RBC # BLD AUTO: 4.6 10E6/UL (ref 3.8–5.2)
RBC URINE: <1 /HPF
SODIUM SERPL-SCNC: 138 MMOL/L (ref 135–145)
SP GR UR STRIP: 1.02 (ref 1–1.03)
TROPONIN T SERPL HS-MCNC: 31 NG/L
UROBILINOGEN UR STRIP-MCNC: NORMAL MG/DL
WBC # BLD AUTO: 9 10E3/UL (ref 4–11)
WBC URINE: 2 /HPF

## 2023-10-24 PROCEDURE — 81001 URINALYSIS AUTO W/SCOPE: CPT | Performed by: FAMILY MEDICINE

## 2023-10-24 PROCEDURE — 99284 EMERGENCY DEPT VISIT MOD MDM: CPT | Performed by: FAMILY MEDICINE

## 2023-10-24 PROCEDURE — 93005 ELECTROCARDIOGRAM TRACING: CPT | Performed by: FAMILY MEDICINE

## 2023-10-24 PROCEDURE — 85025 COMPLETE CBC W/AUTO DIFF WBC: CPT | Performed by: FAMILY MEDICINE

## 2023-10-24 PROCEDURE — 84484 ASSAY OF TROPONIN QUANT: CPT | Performed by: FAMILY MEDICINE

## 2023-10-24 PROCEDURE — 80048 BASIC METABOLIC PNL TOTAL CA: CPT | Performed by: FAMILY MEDICINE

## 2023-10-24 PROCEDURE — 93010 ELECTROCARDIOGRAM REPORT: CPT | Performed by: INTERNAL MEDICINE

## 2023-10-24 PROCEDURE — 36415 COLL VENOUS BLD VENIPUNCTURE: CPT | Performed by: FAMILY MEDICINE

## 2023-10-24 RX ORDER — FUROSEMIDE 20 MG
40 TABLET ORAL DAILY
COMMUNITY
Start: 2023-10-24 | End: 2023-11-17

## 2023-10-24 ASSESSMENT — ACTIVITIES OF DAILY LIVING (ADL): ADLS_ACUITY_SCORE: 35

## 2023-10-24 NOTE — ED TRIAGE NOTES
Patient reports high blood pressure. She was seen in the clinic on Monday for headache and cough, at that time her bp was 190 systolic and they mentioned sending her to the ER. She reports mild headache today and has been checking her bp at home she is concerned that it is still high.      Triage Assessment (Adult)       Row Name 10/24/23 4070          Triage Assessment    Airway WDL WDL        Respiratory WDL    Respiratory WDL WDL        Skin Circulation/Temperature WDL    Skin Circulation/Temperature WDL WDL        Cardiac WDL    Cardiac WDL WDL        Peripheral/Neurovascular WDL    Peripheral Neurovascular WDL WDL        Cognitive/Neuro/Behavioral WDL    Cognitive/Neuro/Behavioral WDL WDL

## 2023-10-24 NOTE — ED PROVIDER NOTES
History     Chief Complaint   Patient presents with    Hypertension     The history is provided by the patient, the spouse and medical records.     Eva Solis is a 63 year old female here with concerns about elevated blood pressure. She was seen in clinic yesterday and her discharge vitals showed BP elevated. Over the afternoon yesterday her BP was lower but this morning it was 177/93, 212/94, 198/106 and 191/106. She still had a mild headache, no other symptoms.  She called nurse triage and they recommended she come to the ED.  She is on Losartan and Lasix and Dr Sosa stopped her spironolactone in April 2023.     She has a history of HTN, dCHF, obesity, migraine.     Allergies:  Allergies   Allergen Reactions    Lisinopril Cough       Problem List:    Patient Active Problem List    Diagnosis Date Noted    Sacroiliac inflammation (H24) 06/09/2023     Priority: Medium    Sigmoid diverticulosis 05/25/2023     Priority: Medium    Cholelithiasis 05/25/2023     Priority: Medium    Major depressive disorder, recurrent episode, moderate (H) 05/08/2020     Priority: Medium    Primary osteoarthritis of left hip 01/23/2020     Priority: Medium    Migraine without aura and without status migrainosus, not intractable 01/15/2016     Priority: Medium    Morbid obesity, unspecified obesity type (H) 12/02/2015     Priority: Medium    Diastolic CHF (H) 11/07/2014     Priority: Medium    HTN, goal below 140/90 03/26/2013     Priority: Medium        Past Medical History:    Past Medical History:   Diagnosis Date    Congestive heart failure (H) 3years    Depressive disorder 1-2 years    DEPRESSIVE DISORDER NEC 05/04/2004    Diastolic dysfunction     Essential hypertension, benign     Focal Sigmoid diverticulitis 05/25/2023    Generalized osteoarthrosis, unspecified site     Headache(784.0)     Heart disease 3 years    Lower GI bleed 05/25/2023    Lumbar stenosis with neurogenic claudication 07/01/2021    Mixed anxiety  depressive disorder 01/15/2016    PANIC DISORDER 2004       Past Surgical History:    Past Surgical History:   Procedure Laterality Date    COLONOSCOPY  10/06/10    attempt at colonscopy to 50cm    INJECT EPIDURAL LUMBAR N/A 10/18/2019    Procedure: INJECTION, SPINE, LUMBAR 4 - LUMBAR 5, EPIDURAL TRANSLAMINAR;  Surgeon: Trever Wheatley MD;  Location: [facility]    INJECT EPIDURAL LUMBAR N/A 2020    Procedure: INJECTION, SPINE, LUMBAR 4-5, EPIDURAL TRANSLAMINAR;  Surgeon: Trever Wheatley MD;  Location: [facility]    INJECT EPIDURAL LUMBAR N/A 2021    Procedure: Lumbar 4-5 Epidural Injection;  Surgeon: Trveer Wheatley MD;  Location: [facility]    JOINT REPLACEMTN, KNEE RT/LT  2006    Right total knee arthroplasty.    JOINT REPLACEMTN, KNEE RT/LT  2006    Left total knee arthroplasty.    LAMINECTOMY LUMBAR POSTERIOR MICROSCOPIC TWO LEVELS N/A 2021    Procedure: Lumbar 3 to Lumbar 5 Laminectomy;  Surgeon: Leonard Wallis MD;  Location: [facility] OR    ZZC  DELIVERY ONLY      , Low Cervical    ZZC  DELIVERY ONLY      ZZC VAGINAL HYSTERECTOMY      without oophorectomy    ZZHC COLONOSCOPY W/WO BRUSH/WASH  2005    Diverticulosis.  Internal hemorrhoids.       Family History:    Family History   Problem Relation Age of Onset    Cancer Father         squamous cell ca    Cancer Maternal Grandmother         lung    Cerebrovascular Disease Maternal Grandmother     Cancer Paternal Grandmother         squamous cell ca    Diabetes Sister     Cancer Sister         kidney cancer    Kidney Cancer Sister     Other Cancer Sister         kidney cancer, AND HOW HAS STAGE 4 KIDNEY CANCER AND IS TERMINAL    Bipolar Disorder Sister     Connective Tissue Disorder Brother         lupus    Lupus Brother     Kidney Disease Mother         atrophic kidney    Hypertension Mother     Depression Mother         DONT KNOW MUCH    Bipolar Disorder Mother     Diabetes Mother     Depression Sister          dont know much    Kidney Disease Maternal Uncle         unclear kidney issue    Anxiety Disorder Son     Anxiety Disorder Niece     Anxiety Disorder Son     Colon Cancer No family hx of     Asthma No family hx of        Social History:  Marital Status:   [2]  Social History     Tobacco Use    Smoking status: Never     Passive exposure: Never    Smokeless tobacco: Never    Tobacco comments:     no smokers in household   Vaping Use    Vaping Use: Never used   Substance Use Topics    Alcohol use: No    Drug use: No        Medications:    furosemide (LASIX) 20 MG tablet  aspirin-acetaminophen-caffeine (EXCEDRIN MIGRAINE) 250-250-65 MG tablet  benzonatate (TESSALON) 100 MG capsule  busPIRone (BUSPAR) 15 MG tablet  cyclobenzaprine (FLEXERIL) 10 MG tablet  ibuprofen (ADVIL/MOTRIN) 200 MG tablet  losartan (COZAAR) 100 MG tablet  meloxicam (MOBIC) 15 MG tablet  omeprazole (PRILOSEC) 20 MG DR capsule  polyethylene glycol (MIRALAX) 17 GM/Dose powder  topiramate (TOPAMAX) 100 MG tablet  venlafaxine (EFFEXOR XR) 75 MG 24 hr capsule          Review of Systems   All other systems reviewed and are negative.      Physical Exam   BP: (!) 178/79  Pulse: 63  Temp: 97.6  F (36.4  C)  Resp: 18  Weight: 134.7 kg (297 lb)  SpO2: 100 %      Physical Exam  Vitals and nursing note reviewed.   Constitutional:       General: She is not in acute distress.     Appearance: Normal appearance. She is not ill-appearing, toxic-appearing or diaphoretic.   Cardiovascular:      Rate and Rhythm: Normal rate and regular rhythm.      Pulses: Normal pulses.      Heart sounds: Normal heart sounds.   Pulmonary:      Effort: Pulmonary effort is normal. No respiratory distress.      Breath sounds: Normal breath sounds.   Abdominal:      General: Bowel sounds are normal.      Tenderness: There is no abdominal tenderness.   Musculoskeletal:         General: No swelling or tenderness.   Skin:     General: Skin is warm and dry.   Neurological:       General: No focal deficit present.      Mental Status: She is alert and oriented to person, place, and time.   Psychiatric:         Mood and Affect: Mood normal.         Behavior: Behavior normal.         EKG: sinus bradycardia, rate 56, normal axis.    Results for orders placed or performed during the hospital encounter of 10/24/23 (from the past 24 hour(s))   CBC with platelets differential    Narrative    The following orders were created for panel order CBC with platelets differential.  Procedure                               Abnormality         Status                     ---------                               -----------         ------                     CBC with platelets and d...[[id number]]                      Final result                 Please view results for these tests on the individual orders.   Basic metabolic panel   Result Value Ref Range    Sodium 138 135 - 145 mmol/L    Potassium 3.7 3.4 - 5.3 mmol/L    Chloride 104 98 - 107 mmol/L    Carbon Dioxide (CO2) 24 22 - 29 mmol/L    Anion Gap 10 7 - 15 mmol/L    Urea Nitrogen 16.5 8.0 - 23.0 mg/dL    Creatinine 1.10 (H) 0.51 - 0.95 mg/dL    GFR Estimate 57 (L) >60 mL/min/1.73m2    Calcium 9.3 8.8 - 10.2 mg/dL    Glucose 86 70 - 99 mg/dL   Troponin T, High Sensitivity   Result Value Ref Range    Troponin T, High Sensitivity 31 (H) <=14 ng/L   Extra Tube    Narrative    The following orders were created for panel order Extra Tube.  Procedure                               Abnormality         Status                     ---------                               -----------         ------                     Extra Blue Top Tube[[id number]]                              Final result               Extra Red Top Tube[[id number]]                               Final result                 Please view results for these tests on the individual orders.   Extra Blue Top Tube   Result Value Ref Range    Hold Specimen [facility]    Extra Red Top Tube   Result Value Ref Range     Hold Specimen [facility]    CBC with platelets and differential   Result Value Ref Range    WBC Count 9.0 4.0 - 11.0 10e3/uL    RBC Count 4.60 3.80 - 5.20 10e6/uL    Hemoglobin 13.3 11.7 - 15.7 g/dL    Hematocrit 38.3 35.0 - 47.0 %    MCV 83 78 - 100 fL    MCH 28.9 26.5 - 33.0 pg    MCHC 34.7 31.5 - 36.5 g/dL    RDW 13.9 10.0 - 15.0 %    Platelet Count 233 150 - 450 10e3/uL    % Neutrophils 64 %    % Lymphocytes 27 %    % Monocytes 7 %    % Eosinophils 1 %    % Basophils 0 %    % Immature Granulocytes 1 %    NRBCs per 100 WBC 0 <1 /100    Absolute Neutrophils 5.8 1.6 - 8.3 10e3/uL    Absolute Lymphocytes 2.5 0.8 - 5.3 10e3/uL    Absolute Monocytes 0.6 0.0 - 1.3 10e3/uL    Absolute Eosinophils 0.1 0.0 - 0.7 10e3/uL    Absolute Basophils 0.0 0.0 - 0.2 10e3/uL    Absolute Immature Granulocytes 0.1 <=0.4 10e3/uL    Absolute NRBCs 0.0 10e3/uL   UA with Microscopic reflex to Culture    Specimen: Urine, Clean Catch   Result Value Ref Range    Color Urine Light Yellow Colorless, Straw, Light Yellow, Yellow    Appearance Urine Clear Clear    Glucose Urine Negative Negative mg/dL    Bilirubin Urine Negative Negative    Ketones Urine Negative Negative mg/dL    Specific Gravity Urine 1.021 1.000 - 1.030    Blood Urine Negative Negative    pH Urine 5.5 5.0 - 9.0    Protein Albumin Urine Negative Negative mg/dL    Urobilinogen Urine Normal Normal, 2.0 mg/dL    Nitrite Urine Negative Negative    Leukocyte Esterase Urine Negative Negative    Mucus Urine Present (A) None Seen /LPF    RBC Urine <1 <=2 /HPF    WBC Urine 2 <=5 /HPF    Narrative    Urine Culture not indicated       Medications - No data to display    Assessments & Plan (with Medical Decision Making)  Eva Solis is a 63 year old female here with concerns about elevated blood pressure. She was seen in clinic yesterday and her discharge vitals showed BP elevated. Over the afternoon yesterday her BP was lower but this morning it was 177/93, 212/94, 198/106 and 191/106. She  still had a mild headache, no other symptoms.  She called nurse triage and they recommended she come to the ED.  She is on Losartan and Lasix and Dr Sosa stopped her spironolactone in April 2023.  She has a history of HTN, dCHF, obesity, migraine. VS in the ED BP (!) 154/113   Pulse 55   Temp 97.6  F (36.4  C) (Temporal)   Resp 18   Wt 134.7 kg (297 lb)   SpO2 98%   BMI 47.94 kg/m    Exam shows no focal findings. She has no symptoms from this.  EKG stable.  Labs show CBC normal BMP with Cr 1.10, troponin 31, UA negative.  I recommend that she adjust her meds as follows: increase Lasix from 20 mg to 40 mg daily.  I recommend she record her BP for the next few weeks and contact Dr Sosa about the results.      I have reviewed the nursing notes.    I have reviewed the findings, diagnosis, plan and need for follow up with the patient.     Medical Decision Making  The patient's presentation was of moderate complexity (a chronic illness mild to moderate exacerbation, progression, or side effect of treatment).    The patient's evaluation involved:  an assessment requiring an independent historian (see separate area of note for details)  ordering and/or review of 3+ test(s) in this encounter (see separate area of note for details)    The patient's management necessitated moderate risk (prescription drug management including medications given in the ED).        Current Discharge Medication List          Final diagnoses:   Poorly-controlled hypertension       10/24/2023   [facility] AND [facility], Ambrocio Appiah MD  10/24/23 [id number]

## 2023-10-24 NOTE — TELEPHONE ENCOUNTER
S-(situation): Patient is calling and stated she is still having a mild to moderate headache with increasing blood pressure.  She stated she has been resting and her blood pressure is not going down at all.  She stated this morning her readings were the followin:00 am 177/93     9:00 am 198/106   11:30 am 191/106      B-(background): patient stated she was into the clinic yesterday and received an injection of a Toradol mix and at that time her blood pressure was starting to increase.  She stated staff were talking about sending her to the emergency room because of her increasing blood pressure, but stated she could try going home and resting at that time.  She stated she was advised to check her blood pressure intermittently.    A-(assessment): Patient's blood pressure is not decreasing, it is intermittently increasing with continued headache.    R-(recommendations): patient to be seen in the ED at this time per protocol.  Patient and  patient's wife stated they just moved and the closest hospital is [facility] approximately 45 minutes away from their home.  Advised patient's  to pull over and call 911 for worsening symptoms. Patient and patient's wife stated understanding.    Will forward to PCP for FYI/ review.    2023  ROSANNE Solis   to P Nl [facility] (supporting Renetta Benitez MD)         10/24/23  9:33 AM  I will be able to call in a little bit but until then, it is on my chart notes  so I think you should be able to see all the notes from my visit.  Isa Plunkett, RN   to ROSANNE Solis         10/24/23  9:22 AM  Dear Rosanne Solis     After reviewing your Attender message, we need to gather more information from you. Please call the clinic and press 2 - and ask to speak with a Triage Nurse.     Thank you!      Tiffani Kessler [phone]   [place] [phone]  [place] [phone]    Last read by ROSANNE Solis at 11:39 AM on 10/24/2023.  ROSANNE Solis   to P Nl  "Mahamed Meyers (supporting Renetta Benitez MD)         10/24/23  9:19 AM  This is for Renetta or her team! I need to let her know, I went into the [facility] yesterday with major Migraine issues and severe coughing.. the Nurse Practitioner gave me a shot for the migraine and meds for the cough, however, that s not our biggest concern right now . My blood pressure is higher than it has ever been!!!! My readings are from 160/91 to 212/94 to 198/106   I ve done nothing but rest in between these readings just wanted to keep you aware !   Reason for Disposition   Systolic BP >= 180 OR Diastolic >= 110    Additional Information   Negative: Systolic BP >= 180 OR Diastolic >= 110, and missed most recent dose of blood pressure medication   Negative: BP Systolic BP >= 140 OR Diastolic >= 90 and postpartum (from 0 to 6 weeks after delivery)   Negative: Pregnant > 20 weeks or postpartum (< 6 weeks after delivery) and new hand or face swelling   Negative: Pregnant > 20 weeks and BP > 140/90   Negative: Systolic BP >= 160 OR Diastolic >= 100, and any cardiac or neurologic symptoms (e.g., chest pain, difficulty breathing, unsteady gait, blurred vision)   Negative: Patient sounds very sick or weak to the triager   Negative: Sounds like a life-threatening emergency to the triager    Answer Assessment - Initial Assessment Questions  1. BLOOD PRESSURE: \"What is the blood pressure?\" \"Did you take at least two measurements 5 minutes apart?\"  8:00 am 177/93     9:00 am 198/106   11:30 am 191/106    2. ONSET: \"When did you take your blood pressure?\"      See above    3. HOW: \"How did you obtain the blood pressure?\" (e.g., visiting nurse, automatic home BP monitor)      Home BP monitor- wrist    4. HISTORY: \"Do you have a history of high blood pressure?\"      Yes    5. MEDICATIONS: \"Are you taking any medications for blood pressure?\" \"Have you missed any doses recently?\"      Taking medications - not missed    6. OTHER " "SYMPTOMS: \"Do you have any symptoms?\" (e.g., headache, chest pain, blurred vision, difficulty breathing, weakness)      Headache    7. PREGNANCY: \"Is there any chance you are pregnant?\" \"When was your last menstrual period?\"      Na    Protocols used: High Blood Pressure-A-[place]  Mary Cruz RN    "

## 2023-10-24 NOTE — DISCHARGE INSTRUCTIONS
Eva    I recommend that you take two of the 20 mg tablets of Lasix daily.      Record your blood pressure for the next two weeks. Talk to Dr Sosa about this in a few weeks.     Thank you for choosing our Emergency Department for your care.     You may receive a phone call or letter for a survey about your care in our ED.  Please complete this as this is how we improve care for our patients.     If you have any questions after leaving the ED you can call or text me on my cell phone at [phone] and I will get back to you at some point. This does not mean that I am on call and if you are not doing well please return to the ED.     Sincerely,    Dr Hermes Zuniga M.D.

## 2023-10-25 LAB
ATRIAL RATE - MUSE: 56 BPM
DIASTOLIC BLOOD PRESSURE - MUSE: NORMAL MMHG
INTERPRETATION ECG - MUSE: NORMAL
P AXIS - MUSE: 39 DEGREES
PR INTERVAL - MUSE: 196 MS
QRS DURATION - MUSE: 94 MS
QT - MUSE: 440 MS
QTC - MUSE: 424 MS
R AXIS - MUSE: 15 DEGREES
SYSTOLIC BLOOD PRESSURE - MUSE: NORMAL MMHG
T AXIS - MUSE: 59 DEGREES
VENTRICULAR RATE- MUSE: 56 BPM

## 2023-10-28 ENCOUNTER — HEALTH MAINTENANCE LETTER (OUTPATIENT)
Age: 63
End: 2023-10-28

## 2023-11-17 ENCOUNTER — VIRTUAL VISIT (OUTPATIENT)
Dept: FAMILY MEDICINE | Facility: OTHER | Age: 63
End: 2023-11-17
Payer: COMMERCIAL

## 2023-11-17 DIAGNOSIS — I10 HTN, GOAL BELOW 140/90: ICD-10-CM

## 2023-11-17 DIAGNOSIS — Z12.31 VISIT FOR SCREENING MAMMOGRAM: ICD-10-CM

## 2023-11-17 DIAGNOSIS — I50.32 CHRONIC DIASTOLIC CONGESTIVE HEART FAILURE (H): ICD-10-CM

## 2023-11-17 DIAGNOSIS — F33.1 MAJOR DEPRESSIVE DISORDER, RECURRENT EPISODE, MODERATE (H): Primary | ICD-10-CM

## 2023-11-17 PROCEDURE — 99214 OFFICE O/P EST MOD 30 MIN: CPT | Mod: 95 | Performed by: FAMILY MEDICINE

## 2023-11-17 RX ORDER — CLONIDINE HYDROCHLORIDE 0.1 MG/1
0.1 TABLET ORAL 2 TIMES DAILY
Qty: 120 TABLET | Refills: 0 | Status: SHIPPED | OUTPATIENT
Start: 2023-11-17 | End: 2023-12-29

## 2023-11-17 RX ORDER — VENLAFAXINE HYDROCHLORIDE 75 MG/1
CAPSULE, EXTENDED RELEASE ORAL
Qty: 270 CAPSULE | Refills: 3 | Status: SHIPPED | OUTPATIENT
Start: 2023-11-17 | End: 2023-12-06 | Stop reason: SINTOL

## 2023-11-17 RX ORDER — FUROSEMIDE 20 MG
40 TABLET ORAL DAILY
Qty: 180 TABLET | Refills: 0 | Status: SHIPPED | OUTPATIENT
Start: 2023-11-17 | End: 2024-02-13

## 2023-11-17 ASSESSMENT — PATIENT HEALTH QUESTIONNAIRE - PHQ9
SUM OF ALL RESPONSES TO PHQ QUESTIONS 1-9: 18
10. IF YOU CHECKED OFF ANY PROBLEMS, HOW DIFFICULT HAVE THESE PROBLEMS MADE IT FOR YOU TO DO YOUR WORK, TAKE CARE OF THINGS AT HOME, OR GET ALONG WITH OTHER PEOPLE: EXTREMELY DIFFICULT
SUM OF ALL RESPONSES TO PHQ QUESTIONS 1-9: 18

## 2023-11-17 ASSESSMENT — ENCOUNTER SYMPTOMS: HEADACHES: 1

## 2023-11-17 NOTE — PROGRESS NOTES
ROSANNE is a 63 year old who is being evaluated via a billable telephone visit.      What phone number would you like to be contacted at? [phone]  How would you like to obtain your AVS? Cate    Distant Location (provider location):  On-site    Assessment & Plan     Major depressive disorder, recurrent episode, moderate (H)  We discussed that one of the side effects of venlafaxine is hypertension.  Her mood is very situational right now she admits to feeling lonely.  Will decrease her venlafaxine from 225 mg daily to 150 mg daily for 1 week then 75 mg for 1 week and then stop.  We did discuss withdrawal symptoms and if these occur she will let me know and could do a 37.5 mg tablet.  We discussed starting another medication for her mood but as she feels this is more situational and she prefer to be off the less medication and get her blood pressure controlled we will not add another medication at this time.  She does have a follow-up with me next month.  If she finds that decreasing her medication causes worsening mood she will let me know before hand and we will do another virtual visit and discuss alternative therapy.    - venlafaxine (EFFEXOR XR) 75 MG 24 hr capsule; Take 1 twice daily x 1 week, then 1 tablet at night x 1 week, then stop    Chronic diastolic congestive heart failure (H)  The ED physician increased her furosemide and she is running out.  We will send a refill of her new dose.    - furosemide (LASIX) 20 MG tablet; Take 2 tablets (40 mg) by mouth daily    HTN, goal below 140/90  Patient still is having a lot of elevated blood pressures and increased migraines.  This in turn is causing a lot of anxiety.  She has done well in the past with spironolactone and we considered restarting this however with the severity of her blood pressure readings without much change and adding an increased Lasix we discussed using clonidine instead.  We will start out at a low dose and she will continue to monitor her  "blood pressures at home.  If she is not improving or having more symptoms she will or present to the emergency department.  Otherwise she will contact me if she has other concerns until she is able to follow-up with me next month.    - furosemide (LASIX) 20 MG tablet; Take 2 tablets (40 mg) by mouth daily  - cloNIDine (CATAPRES) 0.1 MG tablet; Take 1 tablet (0.1 mg) by mouth 2 times daily    Visit for screening mammogram  - MA SCREENING DIGITAL BILAT - Future  (s+30); Future    Renetta Benitez MD  [facility] JAN LAY is a 63 year old, presenting for the following health issues:  Hypertension, Recheck Medication, Headache (Migraines/), and Depression        11/17/2023     2:45 PM   Additional Questions   Roomed by Clemente NEWTON       Headache     History of Present Illness       Mental Health Follow-up:  Patient presents to follow-up on Depression.Patient's depression since last visit has been:  No change  The patient is not having other symptoms associated with depression.      Any significant life events: No  Patient is feeling anxious or having panic attacks.  Patient has no concerns about alcohol or drug use.    Hypertension: She presents for follow up of hypertension.  She does check blood pressure  regularly outside of the clinic. Outside blood pressures have been over 140/90. She does not follow a low salt diet.     Headaches:   Since the patient's last clinic visit, headaches are: worsened  The patient is getting headaches:  4-5x per week  She is able to do normal daily activities when she has a migraine.  The patient is taking the following rescue/relief medications:  Excedrin   Patient states \"I get some relief\" from the rescue/relief medications.   The patient is taking the following medications to prevent migraines:  Topomax  In the past 4 weeks, the patient has gone to an Urgent Care or Emergency Room 0 times times due to headaches.    She eats 2-3 servings of fruits " and vegetables daily.She consumes 0 sweetened beverage(s) daily.She exercises with enough effort to increase her heart rate 30 to 60 minutes per day.  She exercises with enough effort to increase her heart rate 3 or less days per week.   She is taking medications regularly.       Review of Systems   Neurological:  Positive for headaches.      Blood pressure 170-190s/100s.  States has headaches every other day.  Was seen in the ED and Lasix was increased.  She does not see that her blood pressure gets down any further.  This also makes her more anxious.  [place] as she is quite lonely.      Objective           Vitals:  No vitals were obtained today due to virtual visit.    Physical Exam   alert and no distress  PSYCH: Alert and oriented times 3; coherent speech, normal   rate and volume, able to articulate logical thoughts, able   to abstract reason, no tangential thoughts, no hallucinations   or delusions  Her affect is normal  RESP: No cough, no audible wheezing, able to talk in full sentences  Remainder of exam unable to be completed due to telephone visits            Phone call duration: 15 minutes       79.8

## 2023-12-06 ENCOUNTER — E-VISIT (OUTPATIENT)
Dept: FAMILY MEDICINE | Facility: OTHER | Age: 63
End: 2023-12-06
Payer: COMMERCIAL

## 2023-12-06 DIAGNOSIS — F33.1 MAJOR DEPRESSIVE DISORDER, RECURRENT EPISODE, MODERATE (H): Primary | ICD-10-CM

## 2023-12-06 PROCEDURE — 99421 OL DIG E/M SVC 5-10 MIN: CPT | Performed by: FAMILY MEDICINE

## 2023-12-06 ASSESSMENT — ANXIETY QUESTIONNAIRES
2. NOT BEING ABLE TO STOP OR CONTROL WORRYING: NEARLY EVERY DAY
5. BEING SO RESTLESS THAT IT IS HARD TO SIT STILL: NEARLY EVERY DAY
3. WORRYING TOO MUCH ABOUT DIFFERENT THINGS: NEARLY EVERY DAY
GAD7 TOTAL SCORE: 21
6. BECOMING EASILY ANNOYED OR IRRITABLE: NEARLY EVERY DAY
GAD7 TOTAL SCORE: 21
1. FEELING NERVOUS, ANXIOUS, OR ON EDGE: NEARLY EVERY DAY
7. FEELING AFRAID AS IF SOMETHING AWFUL MIGHT HAPPEN: NEARLY EVERY DAY
4. TROUBLE RELAXING: NEARLY EVERY DAY

## 2023-12-06 ASSESSMENT — PATIENT HEALTH QUESTIONNAIRE - PHQ9
SUM OF ALL RESPONSES TO PHQ QUESTIONS 1-9: 22
10. IF YOU CHECKED OFF ANY PROBLEMS, HOW DIFFICULT HAVE THESE PROBLEMS MADE IT FOR YOU TO DO YOUR WORK, TAKE CARE OF THINGS AT HOME, OR GET ALONG WITH OTHER PEOPLE: EXTREMELY DIFFICULT
SUM OF ALL RESPONSES TO PHQ QUESTIONS 1-9: 22

## 2023-12-07 ASSESSMENT — ANXIETY QUESTIONNAIRES: GAD7 TOTAL SCORE: 21

## 2023-12-07 ASSESSMENT — PATIENT HEALTH QUESTIONNAIRE - PHQ9: SUM OF ALL RESPONSES TO PHQ QUESTIONS 1-9: 22

## 2023-12-29 ENCOUNTER — OFFICE VISIT (OUTPATIENT)
Dept: FAMILY MEDICINE | Facility: OTHER | Age: 63
End: 2023-12-29
Payer: COMMERCIAL

## 2023-12-29 VITALS
SYSTOLIC BLOOD PRESSURE: 134 MMHG | BODY MASS INDEX: 47.09 KG/M2 | HEIGHT: 66 IN | DIASTOLIC BLOOD PRESSURE: 88 MMHG | RESPIRATION RATE: 18 BRPM | WEIGHT: 293 LBS | TEMPERATURE: 97.1 F | HEART RATE: 54 BPM | OXYGEN SATURATION: 98 %

## 2023-12-29 DIAGNOSIS — I10 HTN, GOAL BELOW 140/90: Primary | ICD-10-CM

## 2023-12-29 DIAGNOSIS — G47.00 INSOMNIA, UNSPECIFIED TYPE: ICD-10-CM

## 2023-12-29 DIAGNOSIS — F33.1 MAJOR DEPRESSIVE DISORDER, RECURRENT EPISODE, MODERATE (H): ICD-10-CM

## 2023-12-29 PROCEDURE — 99214 OFFICE O/P EST MOD 30 MIN: CPT | Performed by: FAMILY MEDICINE

## 2023-12-29 RX ORDER — CLONIDINE HYDROCHLORIDE 0.1 MG/1
0.1 TABLET ORAL 2 TIMES DAILY
Qty: 180 TABLET | Refills: 3 | Status: SHIPPED | OUTPATIENT
Start: 2023-12-29 | End: 2024-08-16

## 2023-12-29 RX ORDER — FLUOXETINE 40 MG/1
40 CAPSULE ORAL DAILY
Qty: 90 CAPSULE | Refills: 0 | Status: SHIPPED | OUTPATIENT
Start: 2023-12-29 | End: 2024-03-18

## 2023-12-29 RX ORDER — HYDROXYZINE PAMOATE 50 MG/1
50-100 CAPSULE ORAL
Qty: 30 CAPSULE | Refills: 1 | Status: SHIPPED | OUTPATIENT
Start: 2023-12-29 | End: 2024-01-30

## 2023-12-29 ASSESSMENT — ANXIETY QUESTIONNAIRES
7. FEELING AFRAID AS IF SOMETHING AWFUL MIGHT HAPPEN: NEARLY EVERY DAY
5. BEING SO RESTLESS THAT IT IS HARD TO SIT STILL: NEARLY EVERY DAY
IF YOU CHECKED OFF ANY PROBLEMS ON THIS QUESTIONNAIRE, HOW DIFFICULT HAVE THESE PROBLEMS MADE IT FOR YOU TO DO YOUR WORK, TAKE CARE OF THINGS AT HOME, OR GET ALONG WITH OTHER PEOPLE: VERY DIFFICULT
4. TROUBLE RELAXING: NEARLY EVERY DAY
GAD7 TOTAL SCORE: 21
2. NOT BEING ABLE TO STOP OR CONTROL WORRYING: NEARLY EVERY DAY
GAD7 TOTAL SCORE: 21
1. FEELING NERVOUS, ANXIOUS, OR ON EDGE: NEARLY EVERY DAY
6. BECOMING EASILY ANNOYED OR IRRITABLE: NEARLY EVERY DAY
3. WORRYING TOO MUCH ABOUT DIFFERENT THINGS: NEARLY EVERY DAY

## 2023-12-29 ASSESSMENT — PAIN SCALES - GENERAL: PAINLEVEL: NO PAIN (0)

## 2023-12-29 ASSESSMENT — ENCOUNTER SYMPTOMS: NERVOUS/ANXIOUS: 1

## 2023-12-29 NOTE — PROGRESS NOTES
Assessment & Plan     HTN, goal below 140/90  Well-controlled.  Continue the losartan, furosemide and clonidine.    - cloNIDine (CATAPRES) 0.1 MG tablet; Take 1 tablet (0.1 mg) by mouth 2 times daily    Major depressive disorder, recurrent episode, moderate (H)  Poorly controlled.  Offered psychiatry and counseling but she declines.  Will increase her fluoxetine from 20 mg to 40 mg daily.  Will follow-up in a couple of months.    - FLUoxetine (PROZAC) 40 MG capsule; Take 1 capsule (40 mg) by mouth daily    Insomnia, unspecified type  Discussed referring for sleep consult but patient is not ready to do this.  We discussed trying medication for sleep maintenance as she does not difficulty falling asleep.  Will try hydroxyzine.    - hydrOXYzine (VISTARIL) 50 MG capsule; Take 1-2 capsules ( mg) by mouth nightly as needed (insomnia)      Renetta Benitez MD  [facility] JAN LAY is a 63 year old, presenting for the following health issues:  Hypertension, Depression, and Anxiety      History of Present Illness       Mental Health Follow-up:  Patient presents to follow-up on Depression & Anxiety.Patient's depression since last visit has been:  Bad  The patient is having other symptoms associated with depression.  Patient's anxiety since last visit has been:  Worse  The patient is having other symptoms associated with anxiety.  Any significant life events: health concerns  Patient is feeling anxious or having panic attacks.  Patient has no concerns about alcohol or drug use.    Hypertension: She presents for follow up of hypertension.  She does check blood pressure  regularly outside of the clinic. Outside blood pressures have been over 140/90. She does not follow a low salt diet.     Reason for visit:  I cant remember    She eats 2-3 servings of fruits and vegetables daily.She consumes 1 sweetened beverage(s) daily.She exercises with enough effort to increase her heart rate  "10 to 19 minutes per day.  She exercises with enough effort to increase her heart rate 3 or less days per week.   She is taking medications regularly.       Review of Systems   Psychiatric/Behavioral:  The patient is nervous/anxious.       Patient was having high blood pressure and clonidine was started.  She has checked her blood pressure couple times at home and states it has been normal.  She denies any chest pain or shortness of breath.    Because of her extreme blood pressure her venlafaxine was weaned down.  She had not wanted to do another medication to replace it.  After she stopped it she sent an e-visit for worsening depression.  She complained of insomnia, irritability, depression, feeling scared, dizziness, pounding in her head and an echoing sound.  She was started on fluoxetine.  It was thought that she might be having withdrawal from weaning off of the Effexor.  She states that the echoing and pounding in her head have improved.  She feels the depression might of improved a little bit.    She also stated that she was having issues with sleeping.  She states she only sleeps for about 3 hours at a time and ends up working in the middle the night because she cannot get back to sleep.  She denies having problems falling asleep and feels it is because she is so exhausted.  She feels tired during the day.  Her family states that she snores and her concern with sleep apnea.  However she states she could not do a mask and she does not want to be tested.  She states her  also has issues and looked into inspire and found out that he was not a candidate secondary to his weight and she suspects that she would not be a candidate because of her weight as well does not want to look into this any further.        Objective    /88 (BP Location: Right arm, Patient Position: Sitting, Cuff Size: Adult Regular)   Pulse 54   Temp 97.1  F (36.2  C) (Temporal)   Resp 18   Ht 1.676 m (5' 6\")   Wt 137 kg (302 " lb)   SpO2 98%   BMI 48.74 kg/m    Body mass index is 48.74 kg/m .  Physical Exam   Gen: no apparent distress  Chest: clear to auscultation without wheeze, rale or rhonchi  Cor: regular rate and rhythm without murmur  Psych: Alert and oriented times 3; coherent speech, normal   rate and volume, able to articulate logical thoughts, able   to abstract reason, no tangential thoughts, no hallucinations   or delusions  Her affect is tearful

## 2024-01-06 ENCOUNTER — HEALTH MAINTENANCE LETTER (OUTPATIENT)
Age: 64
End: 2024-01-06

## 2024-01-27 ENCOUNTER — APPOINTMENT (OUTPATIENT)
Dept: CT IMAGING | Facility: OTHER | Age: 64
End: 2024-01-27
Attending: STUDENT IN AN ORGANIZED HEALTH CARE EDUCATION/TRAINING PROGRAM
Payer: COMMERCIAL

## 2024-01-27 ENCOUNTER — HOSPITAL ENCOUNTER (EMERGENCY)
Facility: OTHER | Age: 64
Discharge: HOME | End: 2024-01-27
Attending: STUDENT IN AN ORGANIZED HEALTH CARE EDUCATION/TRAINING PROGRAM | Admitting: STUDENT IN AN ORGANIZED HEALTH CARE EDUCATION/TRAINING PROGRAM
Payer: COMMERCIAL

## 2024-01-27 VITALS
DIASTOLIC BLOOD PRESSURE: 77 MMHG | SYSTOLIC BLOOD PRESSURE: 180 MMHG | HEART RATE: 64 BPM | BODY MASS INDEX: 47.09 KG/M2 | TEMPERATURE: 100 F | HEIGHT: 66 IN | RESPIRATION RATE: 16 BRPM | OXYGEN SATURATION: 96 % | WEIGHT: 293 LBS

## 2024-01-27 DIAGNOSIS — I26.99 ACUTE PULMONARY EMBOLISM WITHOUT ACUTE COR PULMONALE, UNSPECIFIED PULMONARY EMBOLISM TYPE (H): ICD-10-CM

## 2024-01-27 DIAGNOSIS — J18.9 PNEUMONIA OF RIGHT LOWER LOBE DUE TO INFECTIOUS ORGANISM: ICD-10-CM

## 2024-01-27 DIAGNOSIS — J18.9 COMMUNITY ACQUIRED PNEUMONIA OF RIGHT LOWER LOBE OF LUNG: ICD-10-CM

## 2024-01-27 LAB
ANION GAP SERPL CALCULATED.3IONS-SCNC: 10 MMOL/L (ref 7–15)
BASOPHILS # BLD AUTO: 0 10E3/UL (ref 0–0.2)
BASOPHILS NFR BLD AUTO: 0 %
BUN SERPL-MCNC: 21.2 MG/DL (ref 8–23)
CALCIUM SERPL-MCNC: 9 MG/DL (ref 8.8–10.2)
CHLORIDE SERPL-SCNC: 105 MMOL/L (ref 98–107)
CREAT SERPL-MCNC: 1.15 MG/DL (ref 0.51–0.95)
D DIMER PPP FEU-MCNC: 1.65 UG/ML FEU (ref 0–0.5)
DEPRECATED HCO3 PLAS-SCNC: 23 MMOL/L (ref 22–29)
EGFRCR SERPLBLD CKD-EPI 2021: 54 ML/MIN/1.73M2
EOSINOPHIL # BLD AUTO: 0 10E3/UL (ref 0–0.7)
EOSINOPHIL NFR BLD AUTO: 0 %
ERYTHROCYTE [DISTWIDTH] IN BLOOD BY AUTOMATED COUNT: 13 % (ref 10–15)
FLUAV RNA SPEC QL NAA+PROBE: NEGATIVE
FLUBV RNA RESP QL NAA+PROBE: NEGATIVE
GLUCOSE SERPL-MCNC: 95 MG/DL (ref 70–99)
HCT VFR BLD AUTO: 37.3 % (ref 35–47)
HGB BLD-MCNC: 12.8 G/DL (ref 11.7–15.7)
HOLD SPECIMEN: NORMAL
IMM GRANULOCYTES # BLD: 0 10E3/UL
IMM GRANULOCYTES NFR BLD: 0 %
LYMPHOCYTES # BLD AUTO: 0.9 10E3/UL (ref 0.8–5.3)
LYMPHOCYTES NFR BLD AUTO: 8 %
MCH RBC QN AUTO: 29.2 PG (ref 26.5–33)
MCHC RBC AUTO-ENTMCNC: 34.3 G/DL (ref 31.5–36.5)
MCV RBC AUTO: 85 FL (ref 78–100)
MONOCYTES # BLD AUTO: 0.9 10E3/UL (ref 0–1.3)
MONOCYTES NFR BLD AUTO: 8 %
NEUTROPHILS # BLD AUTO: 9.4 10E3/UL (ref 1.6–8.3)
NEUTROPHILS NFR BLD AUTO: 84 %
NRBC # BLD AUTO: 0 10E3/UL
NRBC BLD AUTO-RTO: 0 /100
NT-PROBNP SERPL-MCNC: 174 PG/ML (ref 0–900)
PLATELET # BLD AUTO: 241 10E3/UL (ref 150–450)
POTASSIUM SERPL-SCNC: 3.6 MMOL/L (ref 3.4–5.3)
PROCALCITONIN SERPL IA-MCNC: 0.05 NG/ML
RADIOLOGIST FLAGS: ABNORMAL
RBC # BLD AUTO: 4.39 10E6/UL (ref 3.8–5.2)
RSV RNA SPEC NAA+PROBE: NEGATIVE
SARS-COV-2 RNA RESP QL NAA+PROBE: NEGATIVE
SODIUM SERPL-SCNC: 138 MMOL/L (ref 135–145)
WBC # BLD AUTO: 11.3 10E3/UL (ref 4–11)

## 2024-01-27 PROCEDURE — 250N000011 HC RX IP 250 OP 636: Performed by: STUDENT IN AN ORGANIZED HEALTH CARE EDUCATION/TRAINING PROGRAM

## 2024-01-27 PROCEDURE — 36415 COLL VENOUS BLD VENIPUNCTURE: CPT | Performed by: STUDENT IN AN ORGANIZED HEALTH CARE EDUCATION/TRAINING PROGRAM

## 2024-01-27 PROCEDURE — 87637 SARSCOV2&INF A&B&RSV AMP PRB: CPT | Performed by: STUDENT IN AN ORGANIZED HEALTH CARE EDUCATION/TRAINING PROGRAM

## 2024-01-27 PROCEDURE — 93005 ELECTROCARDIOGRAM TRACING: CPT | Performed by: STUDENT IN AN ORGANIZED HEALTH CARE EDUCATION/TRAINING PROGRAM

## 2024-01-27 PROCEDURE — 85025 COMPLETE CBC W/AUTO DIFF WBC: CPT | Performed by: STUDENT IN AN ORGANIZED HEALTH CARE EDUCATION/TRAINING PROGRAM

## 2024-01-27 PROCEDURE — 71275 CT ANGIOGRAPHY CHEST: CPT

## 2024-01-27 PROCEDURE — 93010 ELECTROCARDIOGRAM REPORT: CPT | Performed by: STUDENT IN AN ORGANIZED HEALTH CARE EDUCATION/TRAINING PROGRAM

## 2024-01-27 PROCEDURE — 83880 ASSAY OF NATRIURETIC PEPTIDE: CPT | Performed by: STUDENT IN AN ORGANIZED HEALTH CARE EDUCATION/TRAINING PROGRAM

## 2024-01-27 PROCEDURE — 99285 EMERGENCY DEPT VISIT HI MDM: CPT | Mod: 25 | Performed by: STUDENT IN AN ORGANIZED HEALTH CARE EDUCATION/TRAINING PROGRAM

## 2024-01-27 PROCEDURE — 99284 EMERGENCY DEPT VISIT MOD MDM: CPT | Performed by: STUDENT IN AN ORGANIZED HEALTH CARE EDUCATION/TRAINING PROGRAM

## 2024-01-27 PROCEDURE — 85379 FIBRIN DEGRADATION QUANT: CPT | Performed by: STUDENT IN AN ORGANIZED HEALTH CARE EDUCATION/TRAINING PROGRAM

## 2024-01-27 PROCEDURE — 84145 PROCALCITONIN (PCT): CPT | Performed by: STUDENT IN AN ORGANIZED HEALTH CARE EDUCATION/TRAINING PROGRAM

## 2024-01-27 PROCEDURE — 80048 BASIC METABOLIC PNL TOTAL CA: CPT | Performed by: STUDENT IN AN ORGANIZED HEALTH CARE EDUCATION/TRAINING PROGRAM

## 2024-01-27 RX ORDER — IOPAMIDOL 755 MG/ML
104 INJECTION, SOLUTION INTRAVASCULAR ONCE
Status: COMPLETED | OUTPATIENT
Start: 2024-01-27 | End: 2024-01-27

## 2024-01-27 RX ORDER — OXYCODONE HYDROCHLORIDE 5 MG/1
10 TABLET ORAL ONCE
Status: DISCONTINUED | OUTPATIENT
Start: 2024-01-27 | End: 2024-01-27 | Stop reason: HOSPADM

## 2024-01-27 RX ORDER — APIXABAN 5 MG (74)
KIT ORAL
Qty: 74 EACH | Refills: 0 | Status: SHIPPED | OUTPATIENT
Start: 2024-01-27 | End: 2024-02-26

## 2024-01-27 RX ORDER — AZITHROMYCIN 250 MG/1
TABLET, FILM COATED ORAL
Qty: 6 TABLET | Refills: 0 | Status: SHIPPED | OUTPATIENT
Start: 2024-01-27 | End: 2024-02-01

## 2024-01-27 RX ORDER — OXYCODONE HYDROCHLORIDE 5 MG/1
5 TABLET ORAL EVERY 6 HOURS PRN
Qty: 10 TABLET | Refills: 0 | Status: SHIPPED | OUTPATIENT
Start: 2024-01-27 | End: 2024-01-30

## 2024-01-27 RX ADMIN — IOPAMIDOL 104 ML: 755 INJECTION, SOLUTION INTRAVENOUS at 13:34

## 2024-01-27 ASSESSMENT — ACTIVITIES OF DAILY LIVING (ADL)
ADLS_ACUITY_SCORE: 35
ADLS_ACUITY_SCORE: 35

## 2024-01-27 NOTE — ED TRIAGE NOTES
Pt here with family with c/o feeling sob x 2 days, pt is having right sided pain and neck pain, VSS, pt brought back into ER     Triage Assessment (Adult)       Row Name 01/27/24 1121          Triage Assessment    Airway WDL WDL        Respiratory WDL    Respiratory WDL WDL        Skin Circulation/Temperature WDL    Skin Circulation/Temperature WDL WDL        Cardiac WDL    Cardiac WDL WDL        Peripheral/Neurovascular WDL    Peripheral Neurovascular WDL WDL        Cognitive/Neuro/Behavioral WDL    Cognitive/Neuro/Behavioral WDL WDL

## 2024-01-27 NOTE — ED PROVIDER NOTES
Emergency Department Provider Note  : 1960 Age: 63 year old Sex: female MRN: [id number]    Chief Complaint   Patient presents with    Breathing Problem       Medical Decision Making / Assessment / Plan   63 year old female with a PMH of heart failure who presents for evaluation of pleuritic chest pain and shortness of breath for the past 2 days.  Borderline febrile here with negative viral testing, focal rales in the right lower lobe, and clear discomfort with inspiration.  Differential includes focal pneumonia,nonspecific viral process, pulmonary embolism among others.  Screening laboratory workup ordered and pending. XR if dimer negative vs CT.      ED Course as of 24 1501   Sat 2024   1234 Influenza A: Negative   1234 Influenza B: Negative   1234 Resp Syncytial Virus: Negative   1234 SARS CoV2 PCR: Negative   1305 WBC(!): 11.3   1305 Absolute Neutrophils(!): 9.4   1315 D-Dimer Quantitative(!): 1.65   1335 Procalcitonin: 0.05   1335 N-Terminal Pro BNP Inpatient: 174   1430 CT shows evidence of segmental and subsegmental PEs in the right lower lobe with adjacent atelectasis versus pneumonia.  Although certainly low-grade fevers can be present in the context of PE, given abnormal breath sounds/Rales, despite normal procalcitonin, would presume this may represent superimposed pneumonia and err on the side of treating empirically.  BNP is not elevated there is no evidence of surrounding pulmonary edema nor evidence of radiographic heart strain to suggest anything more than usual outpatient management with DOAC is warranted today patient in the context of lack of tachycardia and completely normal oxygen saturation.      Discussed findings with the patient who feels comfortable going home with this plan as well.  Will have her follow-up with her PCP as soon as possible.  Discharged with Eliquis with explicit instructions regarding bleeding risk, oxycodone for pleuritic pain, and Augmentin and  azithromycin for what appears to potentially be superimposed pneumonia.  Given strict return precautions for evaluation for changing or worsening symptoms or signs of bleeding.    The patient was informed of the plan and verbalized understanding and agreed with the plan. The patient was given strict return to Emergency Department precautions as well as appropriate follow up instructions. The patient was discharged in stable condition.    New Prescriptions    AMOXICILLIN-CLAVULANATE (AUGMENTIN) 875-125 MG TABLET    Take 1 tablet by mouth 2 times daily for 10 days    APIXABAN STARTER PACK (ELIQUIS DVT/PE STARTER PACK) 5 MG TBPK    Take 10 mg by mouth 2 times daily for 7 days, THEN 5 mg 2 times daily for 23 days.    AZITHROMYCIN (ZITHROMAX) 250 MG TABLET    Take 2 tablets (500 mg) by mouth daily for 1 day, THEN 1 tablet (250 mg) daily for 4 days.    OXYCODONE (ROXICODONE) 5 MG TABLET    Take 1 tablet (5 mg) by mouth every 6 hours as needed       Final diagnoses:   Acute pulmonary embolism without acute cor pulmonale, unspecified pulmonary embolism type (H)   Community acquired pneumonia of right lower lobe of lung   Pneumonia of right lower lobe due to infectious organism       Andi Serrato MD  1/27/2024   Emergency Department    Carmelita Velasquez is a 63 year old female with PMH of obesity, hypertension, diastolic heart failure who presents at 11:24 AM for evaluation of shortness of breath and right-sided chest pain for the past 2 days.  She describes her chest pain as pleuritic and not present at baseline otherwise.  Does not report exertional component.  Describes subjective fevers but none measured at home.  No associated congestion or cough.  No history of DVT or PE.  Reports history remotely of heart failure has been on Lasix and denies feeling edematous and does report compliance with diuretic use.    I have reviewed the Medications, Allergies, Past Medical and Surgical History, and Social History in the  "Epic System and with family.    Review of Systems:  Please see HPI for pertinent positives and negatives. All other systems reviewed and found to be negative.      Objective   BP: (!) 170/67  Pulse: 64  Temp: 100  F (37.8  C)  Resp: 16  Height: 167.6 cm (5' 6\")  Weight: 137 kg (302 lb)  SpO2: 97 %    Physical Exam:   Gen: Mildly uncomfortable appearing but no distress.  HEENT: MMM. AT/NC.  Eye: EOMI.   CV: Well perfused.   Pulm: Clear to auscultation bilaterally outside of focal rales in the right lower lobe.  No respiratory distress.  No tachypnea.  Does splint breathing at end inspiration.  Abd: ND.   Ext: No significant edema.  Neuro: AOx3, no focal deficit noted  Psych: Appropriate affect, cognition intact    Procedures / Critical Care   Procedures    Critical Care Time: none         Medical/Surgical History:  Past Medical History:   Diagnosis Date    Congestive heart failure (H) 3years    Depressive disorder 1-2 years    dealing with on a daily basis    DEPRESSIVE DISORDER NEC 05/04/2004    Diastolic dysfunction     Essential hypertension, benign     Focal Sigmoid diverticulitis 05/25/2023    Generalized osteoarthrosis, unspecified site     knees    Headache(784.0)     Heart disease 3 years    CHF    Lower GI bleed 05/25/2023    Lumbar stenosis with neurogenic claudication 07/01/2021    Added automatically from request for surgery [id number]    Mixed anxiety depressive disorder 01/15/2016    PANIC DISORDER 05/04/2004     Past Surgical History:   Procedure Laterality Date    COLONOSCOPY  10/06/10    attempt at colonscopy to 50cm    INJECT EPIDURAL LUMBAR N/A 10/18/2019    Procedure: INJECTION, SPINE, LUMBAR 4 - LUMBAR 5, EPIDURAL TRANSLAMINAR;  Surgeon: Trever Wheatley MD;  Location: [facility] OR    INJECT EPIDURAL LUMBAR N/A 6/12/2020    Procedure: INJECTION, SPINE, LUMBAR 4-5, EPIDURAL TRANSLAMINAR;  Surgeon: Trever Wheatley MD;  Location: [facility] OR    INJECT EPIDURAL LUMBAR N/A 4/22/2021    Procedure: Lumbar 4-5 Epidural " Injection;  Surgeon: Trever Wheatley MD;  Location: [facility] OR    JOINT REPLACEMTN, KNEE RT/LT  2006    Right total knee arthroplasty.    JOINT REPLACEMTN, KNEE RT/LT  2006    Left total knee arthroplasty.    LAMINECTOMY LUMBAR POSTERIOR MICROSCOPIC TWO LEVELS N/A 2021    Procedure: Lumbar 3 to Lumbar 5 Laminectomy;  Surgeon: Leonard Wallis MD;  Location: [facility] OR    ZZC  DELIVERY ONLY      , Low Cervical    ZZC  DELIVERY ONLY      ZZC VAGINAL HYSTERECTOMY      without oophorectomy    ZZ COLONOSCOPY W/WO BRUSH/WASH  2005    Diverticulosis.  Internal hemorrhoids.       Medications:  No current facility-administered medications for this encounter.     Current Outpatient Medications   Medication    amoxicillin-clavulanate (AUGMENTIN) 875-125 MG tablet    Apixaban Starter Pack (ELIQUIS DVT/PE STARTER PACK) 5 MG TBPK    azithromycin (ZITHROMAX) 250 MG tablet    oxyCODONE (ROXICODONE) 5 MG tablet    aspirin-acetaminophen-caffeine (EXCEDRIN MIGRAINE) 250-250-65 MG tablet    busPIRone (BUSPAR) 15 MG tablet    cloNIDine (CATAPRES) 0.1 MG tablet    cyclobenzaprine (FLEXERIL) 10 MG tablet    FLUoxetine (PROZAC) 40 MG capsule    furosemide (LASIX) 20 MG tablet    hydrOXYzine (VISTARIL) 50 MG capsule    ibuprofen (ADVIL/MOTRIN) 200 MG tablet    losartan (COZAAR) 100 MG tablet    meloxicam (MOBIC) 15 MG tablet    omeprazole (PRILOSEC) 20 MG DR capsule    polyethylene glycol (MIRALAX) 17 GM/Dose powder    topiramate (TOPAMAX) 100 MG tablet       Allergies:  Lisinopril    Relevant labs, images, EKGs, Epic and outside hospital (if applicable) charts were reviewed. The findings, diagnosis, plan, and need for follow up were discussed with the patient/family. Nursing notes were reviewed.      Andi Serrato MD  24 [id number]

## 2024-01-27 NOTE — DISCHARGE INSTRUCTIONS
Like we discussed, unfortunately I see evidence of focal pulmonary embolism but also radiographic suggestion of pneumonia.  I discharged you today with pain medication, blood thinner in the form of Eliquis, and antibiotics.  Is critically follow-up with primary care provider for reevaluation of the symptoms ideally within the next week to ensure symptoms are improving but you also need your primary care provider to help further workup why you had developed this pulm embolism in the first place.  If at any point you develop worsening shortness of breath, chest pain, or new symptoms concerning to you, please return immediately to the ER for evaluation.  Given that we are starting you on a anticoagulant, is also critical to keep an eye out for any spontaneous bleeding.  Most concerning of which would be intracranial bleeding although this is more likely to happen after head trauma.  Any acute significant headache with or without associated nausea, significant acute abdominal pain or blood in her stool/black stools should prompt immediate return to the ER.  Very low threshold to return to the emergency department if you fall or get in any sort of accidents as this could precipitate bleeding as well.

## 2024-01-29 LAB
ATRIAL RATE - MUSE: 60 BPM
DIASTOLIC BLOOD PRESSURE - MUSE: NORMAL MMHG
INTERPRETATION ECG - MUSE: NORMAL
P AXIS - MUSE: 29 DEGREES
PR INTERVAL - MUSE: 184 MS
QRS DURATION - MUSE: 94 MS
QT - MUSE: 424 MS
QTC - MUSE: 424 MS
R AXIS - MUSE: 14 DEGREES
SYSTOLIC BLOOD PRESSURE - MUSE: NORMAL MMHG
T AXIS - MUSE: 26 DEGREES
VENTRICULAR RATE- MUSE: 60 BPM

## 2024-01-30 ENCOUNTER — OFFICE VISIT (OUTPATIENT)
Dept: FAMILY MEDICINE | Facility: OTHER | Age: 64
End: 2024-01-30
Payer: COMMERCIAL

## 2024-01-30 VITALS
BODY MASS INDEX: 48.26 KG/M2 | OXYGEN SATURATION: 98 % | TEMPERATURE: 97.6 F | RESPIRATION RATE: 20 BRPM | HEART RATE: 54 BPM | DIASTOLIC BLOOD PRESSURE: 60 MMHG | WEIGHT: 293 LBS | SYSTOLIC BLOOD PRESSURE: 110 MMHG

## 2024-01-30 DIAGNOSIS — I26.99 ACUTE PULMONARY EMBOLISM WITHOUT ACUTE COR PULMONALE, UNSPECIFIED PULMONARY EMBOLISM TYPE (H): Primary | ICD-10-CM

## 2024-01-30 DIAGNOSIS — E66.01 MORBID OBESITY, UNSPECIFIED OBESITY TYPE (H): ICD-10-CM

## 2024-01-30 DIAGNOSIS — R11.0 NAUSEA: ICD-10-CM

## 2024-01-30 DIAGNOSIS — I50.32 CHRONIC DIASTOLIC CONGESTIVE HEART FAILURE (H): ICD-10-CM

## 2024-01-30 DIAGNOSIS — R51.9 ACUTE INTRACTABLE HEADACHE, UNSPECIFIED HEADACHE TYPE: ICD-10-CM

## 2024-01-30 PROCEDURE — 99214 OFFICE O/P EST MOD 30 MIN: CPT | Performed by: FAMILY MEDICINE

## 2024-01-30 RX ORDER — ONDANSETRON 8 MG/1
8 TABLET, FILM COATED ORAL EVERY 8 HOURS PRN
Qty: 30 TABLET | Refills: 0 | Status: SHIPPED | OUTPATIENT
Start: 2024-01-30

## 2024-01-30 RX ORDER — METHYLPREDNISOLONE 4 MG
TABLET, DOSE PACK ORAL
Qty: 21 TABLET | Refills: 0 | Status: SHIPPED | OUTPATIENT
Start: 2024-01-30 | End: 2024-03-01

## 2024-01-30 ASSESSMENT — PATIENT HEALTH QUESTIONNAIRE - PHQ9
SUM OF ALL RESPONSES TO PHQ QUESTIONS 1-9: 15
SUM OF ALL RESPONSES TO PHQ QUESTIONS 1-9: 15
10. IF YOU CHECKED OFF ANY PROBLEMS, HOW DIFFICULT HAVE THESE PROBLEMS MADE IT FOR YOU TO DO YOUR WORK, TAKE CARE OF THINGS AT HOME, OR GET ALONG WITH OTHER PEOPLE: VERY DIFFICULT

## 2024-01-30 NOTE — PROGRESS NOTES
"  Assessment & Plan     Acute pulmonary embolism without acute cor pulmonale, unspecified pulmonary embolism type (H)  Currently she is taking Eliquis.  She feels that the pleuritic pain has greatly improved.  Did order Eliquis to continue after she finishes her starter pack.  Will also obtain ultrasound of her lower extremity veins.    - apixaban ANTICOAGULANT (ELIQUIS) 5 MG tablet; Take 1 tablet (5 mg) by mouth 2 times daily  - US Lower Extremity Venous Duplex Bilateral; Future    Chronic diastolic congestive heart failure (H)  BNP was normal at the emergency department.  She continues on furosemide    Nausea  She has been having a lot of nausea.  Suspect this is a combination of the Augmentin and her headache.  Will use Zofran as needed.    - ondansetron (ZOFRAN) 8 MG tablet; Take 1 tablet (8 mg) by mouth every 8 hours as needed for nausea    Acute intractable headache, unspecified headache type  Normal neurologic exam.  No neurologic symptoms aside from her headache.  Therefore do not feel she needs imaging at this time.  Discussed using Tylenol 1000 mg 3 times a day which then allows her to still be able to use her Tylenol PM.  Will also try steroids to see if if we can break the headache cycle.    - methylPREDNISolone (MEDROL DOSEPAK) 4 MG tablet therapy pack; Follow Package Directions    Morbid obesity, unspecified obesity type (H)  Did discuss that even though she is on a blood thinner she should still be able to move.  Would not limit her activity because of her bleeding risk.  Weight loss would still be beneficial.      MED REC REQUIRED  Post Medication Reconciliation Status:  Patient was not discharged from an inpatient facility or TCU  BMI  Estimated body mass index is 48.26 kg/m  as calculated from the following:    Height as of 1/27/24: 1.676 m (5' 6\").    Weight as of this encounter: 135.6 kg (299 lb).   Weight management plan: Discussed healthy diet and exercise guidelines        Subjective   ROSANNE is " a 63 year old, presenting for the following health issues:  ER F/U    HPI       ED/UC Followup:    Facility:  [facility]  Date of visit: 1/27/24  Reason for visit: breathing problems  Current Status: not real good.   Headache  Breathing difficulty  nausea    Patient was seen today with her .  She had had a couple of days of shortness of breath and pain with breathing and went to the emergency department.  At that time she was diagnosed with a pulmonary embolism and also treated for possible pneumonia secondary to rales on exam and elevated white blood count.  She was started on Augmentin, Zithromax and Eliquis.  She has stopped taking her Excedrin and meloxicam.    The patient states that she still feels that is difficult to breathe but it is not as painful as it was when she was in the emergency department.  She does admit to feeling nauseated.  In reviewing her risk factors she has not had recent surgery or any travel history.  However she is quite sedentary and is morbidly obese.  She is not aware of any family history of blood clots or clotting disorder.    She also is complaining of a headache that is been going on for the last 10 days.  She feels it is different from her migraine.  She states it feels like there is a helmet on the top of her head.  She denies that it is coming from her neck.  She denies vision changes, speech pattern changes, numbness, tingling or other neurologic symptoms.  She is only using Tylenol and feels it gives minor relief.  She denies nasal congestion, sinus congestion, ear pain or sore throat.    She has had nausea for the last several days.    She tried hydroxyzine but did not find it was helpful and finds more relief with Tylenol PM to help with sleep maintenance.          Objective    /60 (BP Location: Right arm, Patient Position: Sitting, Cuff Size: Adult Regular)   Pulse 54   Temp 97.6  F (36.4  C) (Temporal)   Resp 20   Wt 135.6 kg (299  lb)   SpO2 98%   BMI 48.26 kg/m    Body mass index is 48.26 kg/m .  Physical Exam   Gen: no apparent distress  HENT: Ears normal. Throat and pharynx normal. Neck supple. No adenopathy or masses in the neck or supraclavicular regions. Sinuses non tender.   Chest: clear to auscultation without wheeze, rale or rhonchi  Cor: regular rate and rhythm without murmur  Psych: Alert and oriented times 3; coherent speech, normal   rate and volume, able to articulate logical thoughts, able   to abstract reason, no tangential thoughts, no hallucinations   or delusions  Her affect is mildly anxious          Signed Electronically by: Renetta Benitez MD    Answers submitted by the patient for this visit:  Patient Health Questionnaire (Submitted on 1/30/2024)  If you checked off any problems, how difficult have these problems made it for you to do your work, take care of things at home, or get along with other people?: Very difficult  PHQ9 TOTAL SCORE: 15

## 2024-02-01 ENCOUNTER — NURSE TRIAGE (OUTPATIENT)
Dept: FAMILY MEDICINE | Facility: OTHER | Age: 64
End: 2024-02-01
Payer: COMMERCIAL

## 2024-02-01 ENCOUNTER — MYC MEDICAL ADVICE (OUTPATIENT)
Dept: FAMILY MEDICINE | Facility: OTHER | Age: 64
End: 2024-02-01
Payer: COMMERCIAL

## 2024-02-01 ENCOUNTER — TELEPHONE (OUTPATIENT)
Dept: FAMILY MEDICINE | Facility: OTHER | Age: 64
End: 2024-02-01
Payer: COMMERCIAL

## 2024-02-01 NOTE — TELEPHONE ENCOUNTER
"S-(situation): pt calling stating she is coughing up blood      B-(background): acute pulmonary embolism and pneumonia     A-(assessment): pt is now coughing up blood clots x2 since yesterday, pt has headache, rest of symptoms are similar to yesterdays visit    Mychart message    \"Just wanted to give you an update on a couple of things. I have started coughing a lot more and with the coughing, I m coughing up reddish flem, and one time what looked like a small clot like substance. I m not coughing blood every time, but just wanted you to know.   Also  I m not sure those steroids helped my headache at all! After 2 full days of them and Tylenol periodically, nothing was helping this horrible headache, so this morning decided to take my Excedrin for Migraine and see where that gets me. I do have the ultrasound scheduled for my legs tomorrow at [facility]  so hopefully we will get more answers! That s all I have for now!\"    R-(recommendations): ED now - pt wants provider to review    Marjan Awan RN    Reason for Disposition   History of inherited increased risk of blood clots (e.g., Factor 5 Leiden, Anti-thrombin 3, Protein C or Protein S deficiency, Prothrombin mutation)    Additional Information   Negative: SEVERE difficulty breathing (e.g., struggling for each breath, speaks in single words)   Negative: Chest pain and difficulty breathing   Negative: Bluish (or gray) lips or face now   Negative: Passed out (i.e., lost consciousness, collapsed and was not responding)   Negative: Shock suspected (e.g., cold/pale/clammy skin, too weak to stand, low BP, rapid pulse)   Negative: Difficult to awaken or acting confused (e.g., disoriented, slurred speech)   Negative: Recent chest injury (i.e., past 24 hours)   Negative: Coughed up blood and large amount (Such as: 'a half cup of blood')   Negative: Sounds like a life-threatening emergency to the triager   Negative: History of prior 'blood clot' in leg or lungs " (i.e., deep vein thrombosis, pulmonary embolism)   Negative: Chest pain   Negative: Unclear to triager if the patient is coughing up blood or vomiting blood   Negative: MODERATE difficulty breathing (e.g., speaks in phrases, SOB even at rest, pulse 100-120) and still present when not coughing    Protocols used: Coughing Up Blood-A-OH

## 2024-02-01 NOTE — TELEPHONE ENCOUNTER
It sounds like she has small- blood tinged phlegm, not unexpected as she is on blood thinners.  If she coughs up a lot of blood--then would recommend ED

## 2024-02-01 NOTE — TELEPHONE ENCOUNTER
RN called and relayed provider message    Patient verbalized understanding and in agreement with plan of care.     RN advised against Pako Awan RN

## 2024-02-02 ENCOUNTER — HOSPITAL ENCOUNTER (OUTPATIENT)
Dept: ULTRASOUND IMAGING | Facility: CLINIC | Age: 64
Discharge: HOME | End: 2024-02-02
Attending: FAMILY MEDICINE | Admitting: FAMILY MEDICINE
Payer: COMMERCIAL

## 2024-02-02 DIAGNOSIS — I26.99 ACUTE PULMONARY EMBOLISM WITHOUT ACUTE COR PULMONALE, UNSPECIFIED PULMONARY EMBOLISM TYPE (H): ICD-10-CM

## 2024-02-02 PROCEDURE — 93970 EXTREMITY STUDY: CPT

## 2024-02-05 ENCOUNTER — MYC MEDICAL ADVICE (OUTPATIENT)
Dept: FAMILY MEDICINE | Facility: OTHER | Age: 64
End: 2024-02-05
Payer: COMMERCIAL

## 2024-02-05 DIAGNOSIS — R51.9 ACUTE INTRACTABLE HEADACHE, UNSPECIFIED HEADACHE TYPE: Primary | ICD-10-CM

## 2024-02-06 RX ORDER — METHOCARBAMOL 500 MG/1
500 TABLET, FILM COATED ORAL 4 TIMES DAILY PRN
Qty: 30 TABLET | Refills: 0 | Status: SHIPPED | OUTPATIENT
Start: 2024-02-06

## 2024-02-07 DIAGNOSIS — M53.3 SACROILIAC JOINT PAIN: ICD-10-CM

## 2024-02-07 RX ORDER — MELOXICAM 15 MG/1
15 TABLET ORAL DAILY
Qty: 90 TABLET | Refills: 1 | OUTPATIENT
Start: 2024-02-07

## 2024-02-13 DIAGNOSIS — I10 HTN, GOAL BELOW 140/90: ICD-10-CM

## 2024-02-13 DIAGNOSIS — I50.32 CHRONIC DIASTOLIC CONGESTIVE HEART FAILURE (H): ICD-10-CM

## 2024-02-13 RX ORDER — FUROSEMIDE 20 MG
40 TABLET ORAL DAILY
Qty: 180 TABLET | Refills: 1 | Status: SHIPPED | OUTPATIENT
Start: 2024-02-13 | End: 2024-08-16

## 2024-03-01 ENCOUNTER — VIRTUAL VISIT (OUTPATIENT)
Dept: FAMILY MEDICINE | Facility: OTHER | Age: 64
End: 2024-03-01
Attending: FAMILY MEDICINE
Payer: COMMERCIAL

## 2024-03-01 DIAGNOSIS — G43.009 MIGRAINE WITHOUT AURA AND WITHOUT STATUS MIGRAINOSUS, NOT INTRACTABLE: ICD-10-CM

## 2024-03-01 DIAGNOSIS — M51.369 DDD (DEGENERATIVE DISC DISEASE), LUMBAR: ICD-10-CM

## 2024-03-01 DIAGNOSIS — I26.99 ACUTE PULMONARY EMBOLISM WITHOUT ACUTE COR PULMONALE, UNSPECIFIED PULMONARY EMBOLISM TYPE (H): Primary | ICD-10-CM

## 2024-03-01 DIAGNOSIS — F33.1 MAJOR DEPRESSIVE DISORDER, RECURRENT EPISODE, MODERATE (H): ICD-10-CM

## 2024-03-01 PROCEDURE — 99442 PR PHYSICIAN TELEPHONE EVALUATION 11-20 MIN: CPT | Mod: 93 | Performed by: FAMILY MEDICINE

## 2024-03-01 ASSESSMENT — PATIENT HEALTH QUESTIONNAIRE - PHQ9
SUM OF ALL RESPONSES TO PHQ QUESTIONS 1-9: 18
5. POOR APPETITE OR OVEREATING: NEARLY EVERY DAY

## 2024-03-01 ASSESSMENT — ANXIETY QUESTIONNAIRES
6. BECOMING EASILY ANNOYED OR IRRITABLE: SEVERAL DAYS
7. FEELING AFRAID AS IF SOMETHING AWFUL MIGHT HAPPEN: NEARLY EVERY DAY
1. FEELING NERVOUS, ANXIOUS, OR ON EDGE: NEARLY EVERY DAY
3. WORRYING TOO MUCH ABOUT DIFFERENT THINGS: NEARLY EVERY DAY
GAD7 TOTAL SCORE: 17
2. NOT BEING ABLE TO STOP OR CONTROL WORRYING: NEARLY EVERY DAY
5. BEING SO RESTLESS THAT IT IS HARD TO SIT STILL: SEVERAL DAYS
GAD7 TOTAL SCORE: 17

## 2024-03-01 NOTE — PROGRESS NOTES
ROSANNE is a 63 year old who is being evaluated via a billable telephone visit.      Originating Location (pt. Location): Home    Distant Location (provider location):  On-site    Assessment & Plan     Acute pulmonary embolism without acute cor pulmonale, unspecified pulmonary embolism type (H)  She will continue on her Eliquis for at least a total of 6 months.  Will revisit this in August.    DDD (degenerative disc disease), lumbar  Will put another referral into physical therapy.  Will fill out handicap form and mail to patient and she can fill out her part.    - Physical Therapy  Referral; Future    Migraine without aura and without status migrainosus, not intractable  Currently migraines have resolved.  She continues on her Topamax.    Major depressive disorder, recurrent episode, moderate (H)  Offered to increase her fluoxetine to 60 mg daily.  She declines at this time.  We will follow-up her mood in a couple of months.        Subjective   ROSANNE is a 63 year old, presenting for the following health issues:  No chief complaint on file.    History of Present Illness       Reason for visit:  To talk about all the new meds and how its all going    She eats 2-3 servings of fruits and vegetables daily.She consumes 1 sweetened beverage(s) daily.She exercises with enough effort to increase her heart rate 10 to 19 minutes per day.  She exercises with enough effort to increase her heart rate 3 or less days per week.   She is taking medications regularly.       Patient no longer feels short of breath.  She is no longer coughing and denies pain with breathing.  She states her migraine has now resolved.    She has a lot of back discomfort.  She states that she cannot walk very far without stopping to rest.  As soon as she gets in a store she gets onto a shopping cart and leans over to help.  Physical therapy was unable to see her in [facility] and she has not looked to see if there is other physical therapy who  would be able to see her.  She would like to have a handicap parking permit while she works on trying to get more active.  She would also like to be able to get some exercises for her back.  She has recently joined weight watchers and so far has lost 15 pounds.    She feels her mood is slightly improved since her breathing has also improved.  She is not ready to try increasing her medications yet.        Objective           Vitals:  No vitals were obtained today due to virtual visit.    Physical Exam   General: Alert and no distress //Respiratory: No audible wheeze, cough, or shortness of breath // Psychiatric:  Appropriate affect, tone, and pace of words            Phone call duration: 20 minutes  Signed Electronically by: Renetta Benitez MD

## 2024-03-18 ENCOUNTER — MYC REFILL (OUTPATIENT)
Dept: FAMILY MEDICINE | Facility: OTHER | Age: 64
End: 2024-03-18
Payer: COMMERCIAL

## 2024-03-18 DIAGNOSIS — F33.1 MAJOR DEPRESSIVE DISORDER, RECURRENT EPISODE, MODERATE (H): ICD-10-CM

## 2024-03-18 RX ORDER — FLUOXETINE 40 MG/1
40 CAPSULE ORAL DAILY
Qty: 90 CAPSULE | Refills: 0 | Status: CANCELLED | OUTPATIENT
Start: 2024-03-18

## 2024-03-24 ENCOUNTER — MYC MEDICAL ADVICE (OUTPATIENT)
Dept: FAMILY MEDICINE | Facility: OTHER | Age: 64
End: 2024-03-24
Payer: COMMERCIAL

## 2024-06-21 DIAGNOSIS — F33.1 MAJOR DEPRESSIVE DISORDER, RECURRENT EPISODE, MODERATE (H): ICD-10-CM

## 2024-06-21 DIAGNOSIS — I26.99 ACUTE PULMONARY EMBOLISM WITHOUT ACUTE COR PULMONALE, UNSPECIFIED PULMONARY EMBOLISM TYPE (H): ICD-10-CM

## 2024-06-21 RX ORDER — FLUOXETINE 40 MG/1
40 CAPSULE ORAL DAILY
Qty: 90 CAPSULE | Refills: 0 | Status: SHIPPED | OUTPATIENT
Start: 2024-06-21 | End: 2024-08-16

## 2024-06-27 ASSESSMENT — ANXIETY QUESTIONNAIRES
1. FEELING NERVOUS, ANXIOUS, OR ON EDGE: MORE THAN HALF THE DAYS
7. FEELING AFRAID AS IF SOMETHING AWFUL MIGHT HAPPEN: MORE THAN HALF THE DAYS
5. BEING SO RESTLESS THAT IT IS HARD TO SIT STILL: SEVERAL DAYS
6. BECOMING EASILY ANNOYED OR IRRITABLE: MORE THAN HALF THE DAYS
2. NOT BEING ABLE TO STOP OR CONTROL WORRYING: NEARLY EVERY DAY
7. FEELING AFRAID AS IF SOMETHING AWFUL MIGHT HAPPEN: MORE THAN HALF THE DAYS
IF YOU CHECKED OFF ANY PROBLEMS ON THIS QUESTIONNAIRE, HOW DIFFICULT HAVE THESE PROBLEMS MADE IT FOR YOU TO DO YOUR WORK, TAKE CARE OF THINGS AT HOME, OR GET ALONG WITH OTHER PEOPLE: VERY DIFFICULT
GAD7 TOTAL SCORE: 15
4. TROUBLE RELAXING: MORE THAN HALF THE DAYS
3. WORRYING TOO MUCH ABOUT DIFFERENT THINGS: NEARLY EVERY DAY
GAD7 TOTAL SCORE: 15
8. IF YOU CHECKED OFF ANY PROBLEMS, HOW DIFFICULT HAVE THESE MADE IT FOR YOU TO DO YOUR WORK, TAKE CARE OF THINGS AT HOME, OR GET ALONG WITH OTHER PEOPLE?: VERY DIFFICULT
GAD7 TOTAL SCORE: 15

## 2024-06-27 ASSESSMENT — PATIENT HEALTH QUESTIONNAIRE - PHQ9
SUM OF ALL RESPONSES TO PHQ QUESTIONS 1-9: 24
SUM OF ALL RESPONSES TO PHQ QUESTIONS 1-9: 24
10. IF YOU CHECKED OFF ANY PROBLEMS, HOW DIFFICULT HAVE THESE PROBLEMS MADE IT FOR YOU TO DO YOUR WORK, TAKE CARE OF THINGS AT HOME, OR GET ALONG WITH OTHER PEOPLE: EXTREMELY DIFFICULT

## 2024-06-28 ENCOUNTER — VIRTUAL VISIT (OUTPATIENT)
Dept: FAMILY MEDICINE | Facility: OTHER | Age: 64
End: 2024-06-28
Attending: FAMILY MEDICINE
Payer: COMMERCIAL

## 2024-06-28 DIAGNOSIS — G47.00 INSOMNIA, UNSPECIFIED TYPE: ICD-10-CM

## 2024-06-28 DIAGNOSIS — F33.1 MAJOR DEPRESSIVE DISORDER, RECURRENT EPISODE, MODERATE (H): Primary | ICD-10-CM

## 2024-06-28 PROCEDURE — 99442 PR PHYSICIAN TELEPHONE EVALUATION 11-20 MIN: CPT | Mod: 93 | Performed by: FAMILY MEDICINE

## 2024-06-28 RX ORDER — CLONAZEPAM 1 MG/1
1 TABLET ORAL
Qty: 14 TABLET | Refills: 0 | Status: SHIPPED | OUTPATIENT
Start: 2024-06-28

## 2024-06-28 ASSESSMENT — ENCOUNTER SYMPTOMS: NERVOUS/ANXIOUS: 1

## 2024-06-28 NOTE — PROGRESS NOTES
ROSANNE is a 64 year old who is being evaluated via a billable telephone visit.    What phone number would you like to be contacted at? [phone]  How would you like to obtain your AVS? Cate  Originating Location (pt. Location): Home    Distant Location (provider location):  On-site    Assessment & Plan     Major depressive disorder, recurrent episode, moderate (H)  Patient struggling with the recent death of her sister.  She finds support in her family but is grieving the loss of her best friend.  Will increase her fluoxetine to 60 mg.  She just picked up a 40 mg prescription and so we will give her 20 mg to take along with it.  Also will refer her to counseling.  Will have her follow-up with me in 1 month.  Encouraged her to do activities that she enjoys.    - Adult Mental Health  Referral; Future  - FLUoxetine (PROZAC) 20 MG capsule; Take 1 capsule (20 mg) by mouth daily To take with the 40 mg to equal 60 mg daily    Insomnia, unspecified type  Patient currently is not sleeping well.  Will try short course of clonazepam.    - clonazePAM (KLONOPIN) 1 MG tablet; Take 1 tablet (1 mg) by mouth nightly as needed for sleep        Subjective   ROSANEN is a 64 year old, presenting for the following health issues:  Depression and Anxiety        6/28/2024     3:11 PM   Additional Questions   Roomed by ken   Accompanied by self     History of Present Illness       Mental Health Follow-up:  Patient presents to follow-up on Depression & Anxiety.Patient's depression since last visit has been:  Worse  The patient is having other symptoms associated with depression.  Patient's anxiety since last visit has been:  Bad  The patient is having other symptoms associated with anxiety.  Any significant life events: grief or loss  Patient is feeling anxious or having panic attacks.  Patient has no concerns about alcohol or drug use.          1/30/2024    11:17 AM 3/1/2024     4:06 PM 6/27/2024     4:35 PM   PHQ   PHQ-9 Total  Score 15 18 24   Q9: Thoughts of better off dead/self-harm past 2 weeks Not at all Not at all Not at all          2023     7:47 AM 3/1/2024     4:06 PM 2024     4:39 PM   LORETA-7 SCORE   Total Score 21 (severe anxiety)  15 (severe anxiety)   Total Score 21 17 15      Sister  3 days ago after being placed on hospice a week ago.  Patient was able to be with her sister at her death.  She finds support in her  and sons, but her sister was her best friend and now she doesn't have a go to person.        Objective           Vitals:  No vitals were obtained today due to virtual visit.    Physical Exam   General: Alert and no distress //Respiratory: No audible wheeze, cough, or shortness of breath // Psychiatric:  Sad affect, tone, and pace of words          Phone call duration: 13 minutes  Signed Electronically by: Renetta Benitez MD

## 2024-07-23 DIAGNOSIS — F33.1 MAJOR DEPRESSIVE DISORDER, RECURRENT EPISODE, MODERATE (H): ICD-10-CM

## 2024-07-23 DIAGNOSIS — M51.369 DDD (DEGENERATIVE DISC DISEASE), LUMBAR: ICD-10-CM

## 2024-07-23 DIAGNOSIS — G43.009 MIGRAINE WITHOUT AURA AND WITHOUT STATUS MIGRAINOSUS, NOT INTRACTABLE: ICD-10-CM

## 2024-07-23 RX ORDER — TOPIRAMATE 100 MG/1
100 TABLET, FILM COATED ORAL 2 TIMES DAILY
Qty: 180 TABLET | Refills: 3 | Status: SHIPPED | OUTPATIENT
Start: 2024-07-23

## 2024-07-23 RX ORDER — CYCLOBENZAPRINE HCL 10 MG
10 TABLET ORAL
Qty: 90 TABLET | Refills: 3 | Status: SHIPPED | OUTPATIENT
Start: 2024-07-23

## 2024-07-23 RX ORDER — BUSPIRONE HYDROCHLORIDE 15 MG/1
15 TABLET ORAL 3 TIMES DAILY
Qty: 270 TABLET | Refills: 3 | Status: SHIPPED | OUTPATIENT
Start: 2024-07-23

## 2024-07-30 ASSESSMENT — ANXIETY QUESTIONNAIRES
3. WORRYING TOO MUCH ABOUT DIFFERENT THINGS: NEARLY EVERY DAY
GAD7 TOTAL SCORE: 19
1. FEELING NERVOUS, ANXIOUS, OR ON EDGE: NEARLY EVERY DAY
8. IF YOU CHECKED OFF ANY PROBLEMS, HOW DIFFICULT HAVE THESE MADE IT FOR YOU TO DO YOUR WORK, TAKE CARE OF THINGS AT HOME, OR GET ALONG WITH OTHER PEOPLE?: EXTREMELY DIFFICULT
7. FEELING AFRAID AS IF SOMETHING AWFUL MIGHT HAPPEN: NEARLY EVERY DAY
2. NOT BEING ABLE TO STOP OR CONTROL WORRYING: NEARLY EVERY DAY
IF YOU CHECKED OFF ANY PROBLEMS ON THIS QUESTIONNAIRE, HOW DIFFICULT HAVE THESE PROBLEMS MADE IT FOR YOU TO DO YOUR WORK, TAKE CARE OF THINGS AT HOME, OR GET ALONG WITH OTHER PEOPLE: EXTREMELY DIFFICULT
4. TROUBLE RELAXING: NEARLY EVERY DAY
5. BEING SO RESTLESS THAT IT IS HARD TO SIT STILL: NEARLY EVERY DAY
6. BECOMING EASILY ANNOYED OR IRRITABLE: SEVERAL DAYS
GAD7 TOTAL SCORE: 19
7. FEELING AFRAID AS IF SOMETHING AWFUL MIGHT HAPPEN: NEARLY EVERY DAY

## 2024-08-01 ASSESSMENT — PATIENT HEALTH QUESTIONNAIRE - PHQ9
10. IF YOU CHECKED OFF ANY PROBLEMS, HOW DIFFICULT HAVE THESE PROBLEMS MADE IT FOR YOU TO DO YOUR WORK, TAKE CARE OF THINGS AT HOME, OR GET ALONG WITH OTHER PEOPLE: EXTREMELY DIFFICULT
SUM OF ALL RESPONSES TO PHQ QUESTIONS 1-9: 22
SUM OF ALL RESPONSES TO PHQ QUESTIONS 1-9: 22

## 2024-08-02 ENCOUNTER — VIRTUAL VISIT (OUTPATIENT)
Dept: FAMILY MEDICINE | Facility: OTHER | Age: 64
End: 2024-08-02
Attending: FAMILY MEDICINE
Payer: COMMERCIAL

## 2024-08-02 DIAGNOSIS — F33.1 MAJOR DEPRESSIVE DISORDER, RECURRENT EPISODE, MODERATE (H): Primary | ICD-10-CM

## 2024-08-02 DIAGNOSIS — M51.369 DDD (DEGENERATIVE DISC DISEASE), LUMBAR: ICD-10-CM

## 2024-08-02 DIAGNOSIS — I26.99 ACUTE PULMONARY EMBOLISM WITHOUT ACUTE COR PULMONALE, UNSPECIFIED PULMONARY EMBOLISM TYPE (H): ICD-10-CM

## 2024-08-02 PROCEDURE — 99442 PR PHYSICIAN TELEPHONE EVALUATION 11-20 MIN: CPT | Mod: 93 | Performed by: FAMILY MEDICINE

## 2024-08-02 NOTE — PROGRESS NOTES
ROSANNE is a 64 year old who is being evaluated via a billable telephone visit.    What phone number would you like to be contacted at?   How would you like to obtain your AVS? Cate  Originating Location (pt. Location): Home    Distant Location (provider location):  On-site    Assessment & Plan     Major depressive disorder, recurrent episode, moderate (H)  Patient is still grieving the loss of her sister.  She would like to talk with counselor.  I gave her the phone number to schedule with mental health.  We discussed seeing psychiatry to help out with medication management but she would prefer to work with counseling first and stay on her medications without change.  I feel this is reasonable as I do feel she needs counseling at this time.    DDD (degenerative disc disease), lumbar  Difficult to know what she is needs for her back.  We discussed starting physical therapy and I can refer her to the spine specialist.  She states that she will come in person to see me sometime later this month and we can do a more formal exam at that time as well.    - Physical Therapy  Referral; Future  - Spine  Referral; Future    Acute pulmonary embolism without acute cor pulmonale, unspecified pulmonary embolism type (H)  Patient asked how long she will need to be on anticoagulation.  It has been 6 months since she had her pulmonary embolism.  She no longer short of breath.  We did discuss that we could consider stopping this now.  It is felt that perhaps being sedentary is what caused this.  However we also discussed that if she is considering a possible back surgery that I would hate to stop the medication and then have her develop another clot that would end up pushing her surgery back.  At this time we will keep her on the Eliquis until she is able to see the specialist to determine whether or not she needs surgery and if she is not having surgery would then stop the medication.  Did discuss if she developed  another clot then would consider that she would need lifelong anticoagulation.    - apixaban ANTICOAGULANT (ELIQUIS) 5 MG tablet; Take 1 tablet (5 mg) by mouth 2 times daily      Carmelita LAY is a 64 year old, presenting for the following health issues:   Follow Up      8/2/2024     2:48 PM   Additional Questions   Roomed by Pricila ROWLAND         8/2/2024     2:48 PM   Patient Reported Additional Medications   Patient reports taking the following new medications hair-nail-skin supplement, tylenol     History of Present Illness       Mental Health Follow-up:  Patient presents to follow-up on Depression & Anxiety.Patient's depression since last visit has been:  No change  The patient is not having other symptoms associated with depression.  Patient's anxiety since last visit has been:  No change  The patient is not having other symptoms associated with anxiety.  Any significant life events: grief or loss  Patient is feeling anxious or having panic attacks.  Patient has no concerns about alcohol or drug use.      Feels a little better than last visit.  Just had her sister's [place] last weekend, she was cremated and considering burial in the fall.  Didn't get scheduled with mental health, doesn't remember getting any phone calls and didn't see Appear Here message either.  She knows she needs help.      She is worried about her sister's daughters and feels they need help too.  States it was a whirlwind to go through her sister's stuff so fast as they had to move it out quickly.    She quit her job.  States that she was working very part time and working towards to [employer] anyway, just couldn't handle working while dealing with everything.  She is on Social Security now and states that she is doing OK financially.    Not sure what she likes to do anymore.  Doesn't like doing crafts anymore.    Still having back problems, pain in lower back, can't stand up straight.  Didn't do Physical Therapy last time.    She  has lost weight she was hopeful that would help out her back but she still states her back is so bad it is hard for her to walk.  She went there is any thing else that can be done to help out with her pain          Objective    Vitals - Patient Reported  Pain Score: Moderate Pain (4)        Physical Exam   General: Alert and no distress //Respiratory: No audible wheeze, cough, or shortness of breath // Psychiatric:  Appropriate affect, tone, and pace of words.  Tearful          Phone call duration: 18 minutes  Signed Electronically by: Renetta Benitez MD

## 2024-08-14 DIAGNOSIS — I26.99 ACUTE PULMONARY EMBOLISM WITHOUT ACUTE COR PULMONALE, UNSPECIFIED PULMONARY EMBOLISM TYPE (H): ICD-10-CM

## 2024-08-15 SDOH — HEALTH STABILITY: PHYSICAL HEALTH: ON AVERAGE, HOW MANY MINUTES DO YOU ENGAGE IN EXERCISE AT THIS LEVEL?: PATIENT DECLINED

## 2024-08-15 SDOH — HEALTH STABILITY: PHYSICAL HEALTH: ON AVERAGE, HOW MANY DAYS PER WEEK DO YOU ENGAGE IN MODERATE TO STRENUOUS EXERCISE (LIKE A BRISK WALK)?: 0 DAYS

## 2024-08-15 ASSESSMENT — SOCIAL DETERMINANTS OF HEALTH (SDOH): HOW OFTEN DO YOU GET TOGETHER WITH FRIENDS OR RELATIVES?: PATIENT DECLINED

## 2024-08-16 ENCOUNTER — OFFICE VISIT (OUTPATIENT)
Dept: FAMILY MEDICINE | Facility: OTHER | Age: 64
End: 2024-08-16
Payer: COMMERCIAL

## 2024-08-16 VITALS
DIASTOLIC BLOOD PRESSURE: 64 MMHG | WEIGHT: 248 LBS | HEIGHT: 65 IN | HEART RATE: 57 BPM | SYSTOLIC BLOOD PRESSURE: 100 MMHG | TEMPERATURE: 97.3 F | RESPIRATION RATE: 20 BRPM | BODY MASS INDEX: 41.32 KG/M2 | OXYGEN SATURATION: 98 %

## 2024-08-16 DIAGNOSIS — R26.89 BALANCE PROBLEMS: ICD-10-CM

## 2024-08-16 DIAGNOSIS — F33.1 MAJOR DEPRESSIVE DISORDER, RECURRENT EPISODE, MODERATE (H): ICD-10-CM

## 2024-08-16 DIAGNOSIS — Z00.00 ROUTINE GENERAL MEDICAL EXAMINATION AT A HEALTH CARE FACILITY: Primary | ICD-10-CM

## 2024-08-16 DIAGNOSIS — F40.240 CLAUSTROPHOBIA: ICD-10-CM

## 2024-08-16 DIAGNOSIS — Z12.31 VISIT FOR SCREENING MAMMOGRAM: ICD-10-CM

## 2024-08-16 DIAGNOSIS — I50.32 CHRONIC DIASTOLIC CONGESTIVE HEART FAILURE (H): ICD-10-CM

## 2024-08-16 DIAGNOSIS — I10 HTN, GOAL BELOW 140/90: ICD-10-CM

## 2024-08-16 PROBLEM — M46.1 SACROILIAC INFLAMMATION (H): Status: RESOLVED | Noted: 2023-06-09 | Resolved: 2024-08-16

## 2024-08-16 LAB
ALBUMIN SERPL BCG-MCNC: 4.3 G/DL (ref 3.5–5.2)
ALP SERPL-CCNC: 98 U/L (ref 40–150)
ALT SERPL W P-5'-P-CCNC: 13 U/L (ref 0–50)
ANION GAP SERPL CALCULATED.3IONS-SCNC: 13 MMOL/L (ref 7–15)
AST SERPL W P-5'-P-CCNC: 13 U/L (ref 0–45)
BILIRUB SERPL-MCNC: 0.5 MG/DL
BUN SERPL-MCNC: 16.8 MG/DL (ref 8–23)
CALCIUM SERPL-MCNC: 9.8 MG/DL (ref 8.8–10.4)
CHLORIDE SERPL-SCNC: 104 MMOL/L (ref 98–107)
CHOLEST SERPL-MCNC: 230 MG/DL
CREAT SERPL-MCNC: 1.23 MG/DL (ref 0.51–0.95)
EGFRCR SERPLBLD CKD-EPI 2021: 49 ML/MIN/1.73M2
ERYTHROCYTE [DISTWIDTH] IN BLOOD BY AUTOMATED COUNT: 12.5 % (ref 10–15)
FASTING STATUS PATIENT QL REPORTED: YES
FASTING STATUS PATIENT QL REPORTED: YES
GLUCOSE SERPL-MCNC: 94 MG/DL (ref 70–99)
HCO3 SERPL-SCNC: 24 MMOL/L (ref 22–29)
HCT VFR BLD AUTO: 42.3 % (ref 35–47)
HDLC SERPL-MCNC: 43 MG/DL
HGB BLD-MCNC: 14.4 G/DL (ref 11.7–15.7)
LDLC SERPL CALC-MCNC: 164 MG/DL
MCH RBC QN AUTO: 29.8 PG (ref 26.5–33)
MCHC RBC AUTO-ENTMCNC: 34 G/DL (ref 31.5–36.5)
MCV RBC AUTO: 88 FL (ref 78–100)
NONHDLC SERPL-MCNC: 187 MG/DL
PLATELET # BLD AUTO: 241 10E3/UL (ref 150–450)
POTASSIUM SERPL-SCNC: 4 MMOL/L (ref 3.4–5.3)
PROT SERPL-MCNC: 7.7 G/DL (ref 6.4–8.3)
RBC # BLD AUTO: 4.83 10E6/UL (ref 3.8–5.2)
SODIUM SERPL-SCNC: 141 MMOL/L (ref 135–145)
TRIGL SERPL-MCNC: 116 MG/DL
TSH SERPL DL<=0.005 MIU/L-ACNC: 1.83 UIU/ML (ref 0.3–4.2)
VIT B12 SERPL-MCNC: 300 PG/ML (ref 232–1245)
WBC # BLD AUTO: 6.6 10E3/UL (ref 4–11)

## 2024-08-16 PROCEDURE — 80053 COMPREHEN METABOLIC PANEL: CPT | Performed by: FAMILY MEDICINE

## 2024-08-16 PROCEDURE — 80061 LIPID PANEL: CPT | Performed by: FAMILY MEDICINE

## 2024-08-16 PROCEDURE — 82607 VITAMIN B-12: CPT | Performed by: FAMILY MEDICINE

## 2024-08-16 PROCEDURE — 84443 ASSAY THYROID STIM HORMONE: CPT | Performed by: FAMILY MEDICINE

## 2024-08-16 PROCEDURE — 99396 PREV VISIT EST AGE 40-64: CPT | Performed by: FAMILY MEDICINE

## 2024-08-16 PROCEDURE — 36415 COLL VENOUS BLD VENIPUNCTURE: CPT | Performed by: FAMILY MEDICINE

## 2024-08-16 PROCEDURE — 99214 OFFICE O/P EST MOD 30 MIN: CPT | Mod: 25 | Performed by: FAMILY MEDICINE

## 2024-08-16 PROCEDURE — 85027 COMPLETE CBC AUTOMATED: CPT | Performed by: FAMILY MEDICINE

## 2024-08-16 RX ORDER — DIAZEPAM 5 MG
TABLET ORAL
Qty: 2 TABLET | Refills: 0 | Status: SHIPPED | OUTPATIENT
Start: 2024-08-16 | End: 2024-10-01

## 2024-08-16 RX ORDER — FUROSEMIDE 20 MG
40 TABLET ORAL DAILY
Qty: 180 TABLET | Refills: 3 | Status: SHIPPED | OUTPATIENT
Start: 2024-08-16

## 2024-08-16 RX ORDER — FLUOXETINE 40 MG/1
40 CAPSULE ORAL DAILY
Qty: 90 CAPSULE | Refills: 3 | Status: SHIPPED | OUTPATIENT
Start: 2024-08-16

## 2024-08-16 RX ORDER — CLONIDINE HYDROCHLORIDE 0.1 MG/1
0.1 TABLET ORAL AT BEDTIME
Qty: 90 TABLET | Refills: 3 | Status: SHIPPED | OUTPATIENT
Start: 2024-08-16 | End: 2024-10-01

## 2024-08-16 RX ORDER — LOSARTAN POTASSIUM 100 MG/1
100 TABLET ORAL DAILY
Qty: 90 TABLET | Refills: 3 | Status: SHIPPED | OUTPATIENT
Start: 2024-08-16

## 2024-08-16 ASSESSMENT — PAIN SCALES - GENERAL: PAINLEVEL: MODERATE PAIN (4)

## 2024-08-16 NOTE — PATIENT INSTRUCTIONS
Patient Education   Preventive Care Advice   This is general advice given by our system to help you stay healthy. However, your care team may have specific advice just for you. Please talk to your care team about your preventive care needs.  Nutrition  Eat 5 or more servings of fruits and vegetables each day.  Try wheat bread, brown rice and whole grain pasta (instead of white bread, rice, and pasta).  Get enough calcium and vitamin D. Check the label on foods and aim for 100% of the RDA (recommended daily allowance).  Lifestyle  Exercise at least 150 minutes each week  (30 minutes a day, 5 days a week).  Do muscle strengthening activities 2 days a week. These help control your weight and prevent disease.  No smoking.  Wear sunscreen to prevent skin cancer.  Have a dental exam and cleaning every 6 months.  Yearly exams  See your health care team every year to talk about:  Any changes in your health.  Any medicines your care team has prescribed.  Preventive care, family planning, and ways to prevent chronic diseases.  Shots (vaccines)   HPV shots (up to age 26), if you've never had them before.  Hepatitis B shots (up to age 59), if you've never had them before.  COVID-19 shot: Get this shot when it's due.  Flu shot: Get a flu shot every year.  Tetanus shot: Get a tetanus shot every 10 years.  Pneumococcal, hepatitis A, and RSV shots: Ask your care team if you need these based on your risk.  Shingles shot (for age 50 and up)  General health tests  Diabetes screening:  Starting at age 35, Get screened for diabetes at least every 3 years.  If you are younger than age 35, ask your care team if you should be screened for diabetes.  Cholesterol test: At age 39, start having a cholesterol test every 5 years, or more often if advised.  Bone density scan (DEXA): At age 50, ask your care team if you should have this scan for osteoporosis (brittle bones).  Hepatitis C: Get tested at least once in your life.  STIs (sexually  transmitted infections)  Before age 24: Ask your care team if you should be screened for STIs.  After age 24: Get screened for STIs if you're at risk. You are at risk for STIs (including HIV) if:  You are sexually active with more than one person.  You don't use condoms every time.  You or a partner was diagnosed with a sexually transmitted infection.  If you are at risk for HIV, ask about PrEP medicine to prevent HIV.  Get tested for HIV at least once in your life, whether you are at risk for HIV or not.  Cancer screening tests  Cervical cancer screening: If you have a cervix, begin getting regular cervical cancer screening tests starting at age 21.  Breast cancer scan (mammogram): If you've ever had breasts, begin having regular mammograms starting at age 40. This is a scan to check for breast cancer.  Colon cancer screening: It is important to start screening for colon cancer at age 45.  Have a colonoscopy test every 10 years (or more often if you're at risk) Or, ask your provider about stool tests like a FIT test every year or Cologuard test every 3 years.  To learn more about your testing options, visit:   .  For help making a decision, visit:   https://bit.ly/ai83999.  Prostate cancer screening test: If you have a prostate, ask your care team if a prostate cancer screening test (PSA) at age 55 is right for you.  Lung cancer screening: If you are a current or former smoker ages 50 to 80, ask your care team if ongoing lung cancer screenings are right for you.  For informational purposes only. Not to replace the advice of your health care provider. Copyright   2023 [facility]. All rights reserved. Clinically reviewed by the [facility]. [employer] 488947 - REV 01/24.  Preventing Falls: Care Instructions  Injuries and health problems such as trouble walking or poor eyesight can increase your risk of falling. So can some medicines. But there are things you can do to help  "prevent falls. You can exercise to get stronger. You can also arrange your home to make it safer.    Talk to your doctor about the medicines you take. Ask if any of them increase the risk of falls and whether they can be changed or stopped.   Try to exercise regularly. It can help improve your strength and balance. This can help lower your risk of falling.     Practice fall safety and prevention.    Wear low-heeled shoes that fit well and give your feet good support. Talk to your doctor if you have foot problems that make this hard.  Carry a cellphone or wear a medical alert device that you can use to call for help.  Use stepladders instead of chairs to reach high objects. Don't climb if you're at risk for falls. Ask for help, if needed.  Wear the correct eyeglasses, if you need them.    Make your home safer.    Remove rugs, cords, clutter, and furniture from walkways.  Keep your house well lit. Use night-lights in hallways and bathrooms.  Install and use sturdy handrails on stairways.  Wear nonskid footwear, even inside. Don't walk barefoot or in socks without shoes.    Be safe outside.    Use handrails, curb cuts, and ramps whenever possible.  Keep your hands free by using a shoulder bag or backpack.  Try to walk in well-lit areas. Watch out for uneven ground, changes in pavement, and debris.  Be careful in the winter. Walk on the grass or gravel when sidewalks are slippery. Use de-icer on steps and walkways. Add non-slip devices to shoes.    Put grab bars and nonskid mats in your shower or tub and near the toilet. Try to use a shower chair or bath bench when bathing.   Get into a tub or shower by putting in your weaker leg first. Get out with your strong side first. Have a phone or medical alert device in the bathroom with you.   Where can you learn more?  Go to https://www.[employer].net/patiented  Enter G117 in the search box to learn more about \"Preventing Falls: Care Instructions.\"  Current as of: July 17, " 2023               Content Version: 14.0    2728-5004 [employer].   Care instructions adapted under license by your healthcare professional. If you have questions about a medical condition or this instruction, always ask your healthcare professional. [employer] disclaims any warranty or liability for your use of this information.      Learning About Stress  What is stress?     Stress is your body's response to a hard situation. Your body can have a physical, emotional, or mental response. Stress is a fact of life for most people, and it affects everyone differently. What causes stress for you may not be stressful for someone else.  A lot of things can cause stress. You may feel stress when you go on a job interview, take a test, or run a race. This kind of short-term stress is normal and even useful. It can help you if you need to work hard or react quickly. For example, stress can help you finish an important job on time.  Long-term stress is caused by ongoing stressful situations or events. Examples of long-term stress include long-term health problems, ongoing problems at work, or conflicts in your family. Long-term stress can harm your health.  How does stress affect your health?  When you are stressed, your body responds as though you are in danger. It makes hormones that speed up your heart, make you breathe faster, and give you a burst of energy. This is called the fight-or-flight stress response. If the stress is over quickly, your body goes back to normal and no harm is done.  But if stress happens too often or lasts too long, it can have bad effects. Long-term stress can make you more likely to get sick, and it can make symptoms of some diseases worse. If you tense up when you are stressed, you may develop neck, shoulder, or low back pain. Stress is linked to high blood pressure and heart disease.  Stress also harms your emotional health. It can make you powers, tense, or  depressed. Your relationships may suffer, and you may not do well at work or school.  What can you do to manage stress?  You can try these things to help manage stress:   Do something active. Exercise or activity can help reduce stress. Walking is a great way to get started. Even everyday activities such as housecleaning or yard work can help.  Try yoga or mee chi. These techniques combine exercise and meditation. You may need some training at first to learn them.  Do something you enjoy. For example, listen to music or go to a movie. Practice your hobby or do volunteer work.  Meditate. This can help you relax, because you are not worrying about what happened before or what may happen in the future.  Do guided imagery. Imagine yourself in any setting that helps you feel calm. You can use online videos, books, or a teacher to guide you.  Do breathing exercises. For example:  From a standing position, bend forward from the waist with your knees slightly bent. Let your arms dangle close to the floor.  Breathe in slowly and deeply as you return to a standing position. Roll up slowly and lift your head last.  Hold your breath for just a few seconds in the standing position.  Breathe out slowly and bend forward from the waist.  Let your feelings out. Talk, laugh, cry, and express anger when you need to. Talking with supportive friends or family, a counselor, or a patrizia leader about your feelings is a healthy way to relieve stress. Avoid discussing your feelings with people who make you feel worse.  Write. It may help to write about things that are bothering you. This helps you find out how much stress you feel and what is causing it. When you know this, you can find better ways to cope.  What can you do to prevent stress?  You might try some of these things to help prevent stress:  Manage your time. This helps you find time to do the things you want and need to do.  Get enough sleep. Your body recovers from the stresses  "of the day while you are sleeping.  Get support. Your family, friends, and community can make a difference in how you experience stress.  Limit your news feed. Avoid or limit time on social media or news that may make you feel stressed.  Do something active. Exercise or activity can help reduce stress. Walking is a great way to get started.  Where can you learn more?  Go to https://www.[employer].net/patiented  Enter N032 in the search box to learn more about \"Learning About Stress.\"  Current as of: October 24, 2023               Content Version: 14.0    9615-8888 [employer].   Care instructions adapted under license by your healthcare professional. If you have questions about a medical condition or this instruction, always ask your healthcare professional. [employer] disclaims any warranty or liability for your use of this information.      Learning About Depression Screening  What is depression screening?  Depression screening is a way to see if you have depression symptoms. It may be done by a doctor or counselor. It's often part of a routine checkup. That's because your mental health is just as important as your physical health.  Depression is a mental health condition that affects how you feel, think, and act. You may:  Have less energy.  Lose interest in your daily activities.  Feel sad and grouchy for a long time.  Depression is very common. It affects people of all ages.  Many things can lead to depression. Some people become depressed after they have a stroke or find out they have a major illness like cancer or heart disease. The death of a loved one or a breakup may lead to depression. It can run in families. Most experts believe that a combination of inherited genes and stressful life events can cause it.  What happens during screening?  You may be asked to fill out a form about your depression symptoms. You and the doctor will discuss your answers. The doctor may ask you more " "questions to learn more about how you think, act, and feel.  What happens after screening?  If you have symptoms of depression, your doctor will talk to you about your options.  Doctors usually treat depression with medicines or counseling. Often, combining the two works best. Many people don't get help because they think that they'll get over the depression on their own. But people with depression may not get better unless they get treatment.  The cause of depression is not well understood. There may be many factors involved. But if you have depression, it's not your fault.  A serious symptom of depression is thinking about death or suicide. If you or someone you care about talks about this or about feeling hopeless, get help right away.  It's important to know that depression can be treated. Medicine, counseling, and self-care may help.  Where can you learn more?  Go to https://www.[employer].net/patiented  Enter T185 in the search box to learn more about \"Learning About Depression Screening.\"  Current as of: June 24, 2023  Content Version: 14.1 2006-2024 [employer].   Care instructions adapted under license by your healthcare professional. If you have questions about a medical condition or this instruction, always ask your healthcare professional. [employer] disclaims any warranty or liability for your use of this information.       "

## 2024-08-16 NOTE — PROGRESS NOTES
Preventive Care Visit  [facility]  Renetta Benitez MD, Family Medicine  Aug 16, 2024      Assessment & Plan     Routine general medical examination at a health care facility    Chronic diastolic congestive heart failure (H)  She appears euvolemic on exam today.  She continues on her furosemide.  Labs drawn today.    - losartan (COZAAR) 100 MG tablet; Take 1 tablet (100 mg) by mouth daily  - furosemide (LASIX) 20 MG tablet; Take 2 tablets (40 mg) by mouth daily  - Lipid panel reflex to direct LDL Non-fasting  - Comprehensive metabolic panel (BMP + Alb, Alk Phos, ALT, AST, Total. Bili, TP)    HTN, goal below 140/90  Blood pressure is on the low side.  She complains of balance issues and feeling a little lightheaded.  She states her blood pressures are low at home as well.  Currently she is on losartan, clonidine and furosemide.  Will continue her furosemide secondary to her congestive heart failure.  She does admit to constipation.  Will decrease her clonidine to only bedtime.  She will follow up with me in 1 month.  Will do this virtually and she will let me know what her home readings are.  May be able to eventually stop her clonidine.  Labs drawn.    - losartan (COZAAR) 100 MG tablet; Take 1 tablet (100 mg) by mouth daily  - cloNIDine (CATAPRES) 0.1 MG tablet; Take 1 tablet (0.1 mg) by mouth at bedtime  - furosemide (LASIX) 20 MG tablet; Take 2 tablets (40 mg) by mouth daily  - Lipid panel reflex to direct LDL Non-fasting  - Comprehensive metabolic panel (BMP + Alb, Alk Phos, ALT, AST, Total. Bili, TP)  - TSH with free T4 reflex  - CBC with platelets    Major depressive disorder, recurrent episode, moderate (H)  Patient still has significant depression.  Had put in a referral for counseling however they are unable to see people until January and she does not want to wait that long.  She also only wants to do this by telephone.  Referral specialist contacted me and asked me to put in a  "[facility] consult.  I placed another therapy order that states [facility] and hopefully this will help.  She plans to continue on her fluoxetine without change.  If she is unable to get to counseling we will strongly suggest she consider seeing psychiatry for medication management.     - FLUoxetine (PROZAC) 40 MG capsule; Take 1 capsule (40 mg) by mouth daily  - FLUoxetine (PROZAC) 20 MG capsule; Take 1 capsule (20 mg) by mouth daily To take with the 40 mg to equal 60 mg daily  - Adult Mental Health  Referral; Future    Visit for screening mammogram    - MA Screen Bilateral w/Noe; Future    Balance problems  She describes significant issues with her balance.  She states family members are worried about her.  She states this happens all the time.  She denies that she leans to 1 side or the other but just states she feels unbalanced when she is standing up and moving.  There is a family history of brain cancer in her father.  Obtain vitamin B12 and TSH and will also obtain MRI of her brain.  She does have back problems and is starting physical therapy.    - MR Brain w/o Contrast; Future  - Vitamin B12  - TSH with free T4 reflex    Claustrophobia  She indicates she is claustrophobic.  Will use Valium prior to her MRI.    - diazepam (VALIUM) 5 MG tablet; Take 1 hour before MRI, may repeat x 1    BMI  Estimated body mass index is 40.82 kg/m  as calculated from the following:    Height as of this encounter: 1.66 m (5' 5.35\").    Weight as of this encounter: 112.5 kg (248 lb).   Weight management plan: Discussed healthy diet and exercise guidelines she has been losing weight consistently.  Congratulated her on her current success and she  plans to continue to work on this    Counseling  Appropriate preventive services were addressed with this patient via screening, questionnaire, or discussion as appropriate for fall prevention, nutrition, physical activity, Tobacco-use cessation, social engagement, weight loss and cognition. "  Checklist reviewing preventive services available has been given to the patient.  Reviewed patient's diet, addressing concerns and/or questions.   The patient's PHQ-9 score is consistent with severe depression. She was provided with information regarding depression.         Carmelita LAY is a 64 year old, presenting for the following:  Physical        8/16/2024     9:24 AM   Additional Questions   Roomed by lion         8/16/2024   Forms   Any forms needing to be completed Yes           Health Care Directive  Patient does not have a Health Care Directive or Living Will: Discussed advance care planning with patient; information given to patient to review.    HPI        8/15/2024   General Health   How would you rate your overall physical health? (!) FAIR   Feel stress (tense, anxious, or unable to sleep) Very much      (!) STRESS CONCERN      8/15/2024   Nutrition   Three or more servings of calcium each day? (!) NO   Diet: Other   If other, please elaborate: Weight watchers   How many servings of fruit and vegetables per day? (!) I DON'T KNOW   How many sweetened beverages each day? 0-1            8/15/2024   Exercise   Days per week of moderate/strenous exercise 0 days   Average minutes spent exercising at this level Patient declined      (!) EXERCISE CONCERN      8/15/2024   Social Factors   Frequency of gathering with friends or relatives Patient declined   Worry food won't last until get money to buy more No   Food not last or not have enough money for food? No   Do you have housing? (Housing is defined as stable permanent housing and does not include staying ouside in a car, in a tent, in an abandoned building, in an overnight shelter, or couch-surfing.) Yes   Are you worried about losing your housing? No   Lack of transportation? No   Unable to get utilities (heat,electricity)? No            8/16/2024   Fall Risk   Reason Gait Speed Test Not Completed Patient declines             8/15/2024   Dental    Dentist two times every year? Yes            8/15/2024   TB Screening   Were you born outside of the [place]? No          Today's PHQ-9 Score:       8/16/2024     9:20 AM   PHQ-9 SCORE   PHQ-9 Total Score MyChart 21 (Severe depression)   PHQ-9 Total Score 21         8/15/2024   Substance Use   Alcohol more than 3/day or more than 7/wk Not Applicable   Do you use any other substances recreationally? No        Social History     Tobacco Use    Smoking status: Never     Passive exposure: Never    Smokeless tobacco: Never    Tobacco comments:     no smokers in household   Vaping Use    Vaping status: Never Used   Substance Use Topics    Alcohol use: No    Drug use: No           9/26/2022   LAST FHS-7 RESULTS   1st degree relative breast or ovarian cancer No   Any relative bilateral breast cancer No   Any ONE woman have BOTH breast AND ovarian cancer No   Any woman with breast cancer before 50yrs No   2 or more relatives with breast AND/OR ovarian cancer No   2 or more relatives with breast AND/OR bowel cancer No           Mammogram Screening - Mammogram every 1-2 years updated in Health Maintenance based on mutual decision making        8/15/2024   STI Screening   New sexual partner(s) since last STI/HIV test? No        History of abnormal Pap smear: Status post hysterectomy with removal of cervix and no history of CIN2 or greater or cervical cancer. Health Maintenance and Surgical History updated.        11/15/2005    12:00 AM   PAP / HPV   PAP (Historical) NIL      ASCVD Risk   The 10-year ASCVD risk score (Randi ZAMBRANO, et al., 2019) is: 5.6%    Values used to calculate the score:      Age: 64 years      Sex: Female      Is Non- : No      Diabetic: No      Tobacco smoker: No      Systolic Blood Pressure: 100 mmHg      Is BP treated: Yes      HDL Cholesterol: 34 mg/dL      Total Cholesterol: 261 mg/dL           Reviewed and updated as needed this visit by Provider   Tobacco  Allergies   "Meds  Problems  Med Hx  Surg Hx  Fam Hx               Objective    Exam  /64 (BP Location: Left arm, Patient Position: Sitting, Cuff Size: Adult Regular)   Pulse 57   Temp 97.3  F (36.3  C) (Temporal)   Resp 20   Ht 1.66 m (5' 5.35\")   Wt 112.5 kg (248 lb)   SpO2 98%   BMI 40.82 kg/m     Estimated body mass index is 40.82 kg/m  as calculated from the following:    Height as of this encounter: 1.66 m (5' 5.35\").    Weight as of this encounter: 112.5 kg (248 lb).    Physical Exam  Constitutional:       General: She is not in acute distress.     Appearance: She is well-developed.   HENT:      Right Ear: Tympanic membrane and external ear normal.      Left Ear: Tympanic membrane and external ear normal.      Nose: Nose normal.   Eyes:      General:         Right eye: No discharge.         Left eye: No discharge.      Conjunctiva/sclera: Conjunctivae normal.   Neck:      Thyroid: No thyroid mass.   Cardiovascular:      Rate and Rhythm: Normal rate and regular rhythm.      Heart sounds: Normal heart sounds, S1 normal and S2 normal. No murmur heard.  Pulmonary:      Effort: Pulmonary effort is normal. No respiratory distress.      Breath sounds: Normal breath sounds. No wheezing or rales.   Abdominal:      General: Bowel sounds are normal.      Palpations: Abdomen is soft. There is no mass.      Tenderness: There is no abdominal tenderness.   Musculoskeletal:         General: Normal range of motion.      Cervical back: Neck supple.   Lymphadenopathy:      Cervical: No cervical adenopathy.   Skin:     General: Skin is warm and dry.      Findings: No rash.   Neurological:      Mental Status: She is alert and oriented to person, place, and time.      Motor: No tremor, abnormal muscle tone or pronator drift.      Coordination: Romberg sign positive. Finger-Nose-Finger Test and Heel to Shin Test normal.      Gait: Gait normal.   Psychiatric:         Thought Content: Thought content normal.         Judgment: " Judgment normal.           Signed Electronically by: Renetta Benitez MD    Answers submitted by the patient for this visit:  Patient Health Questionnaire (Submitted on 8/16/2024)  If you checked off any problems, how difficult have these problems made it for you to do your work, take care of things at home, or get along with other people?: Extremely difficult  PHQ9 TOTAL SCORE: 21

## 2024-08-19 ENCOUNTER — TELEPHONE (OUTPATIENT)
Dept: FAMILY MEDICINE | Facility: OTHER | Age: 64
End: 2024-08-19
Payer: COMMERCIAL

## 2024-08-19 NOTE — TELEPHONE ENCOUNTER
[facility] [employer] physician form completed by provider faxed to [phone].  Form mailed to patient and copy sent to scanning.

## 2024-08-21 ENCOUNTER — VIRTUAL VISIT (OUTPATIENT)
Dept: PSYCHOLOGY | Facility: CLINIC | Age: 64
End: 2024-08-21
Payer: COMMERCIAL

## 2024-08-21 DIAGNOSIS — F33.1 MAJOR DEPRESSIVE DISORDER, RECURRENT EPISODE, MODERATE (H): ICD-10-CM

## 2024-08-21 PROCEDURE — 90791 PSYCH DIAGNOSTIC EVALUATION: CPT | Mod: 95 | Performed by: COUNSELOR

## 2024-08-21 ASSESSMENT — ANXIETY QUESTIONNAIRES
3. WORRYING TOO MUCH ABOUT DIFFERENT THINGS: NEARLY EVERY DAY
4. TROUBLE RELAXING: NEARLY EVERY DAY
8. IF YOU CHECKED OFF ANY PROBLEMS, HOW DIFFICULT HAVE THESE MADE IT FOR YOU TO DO YOUR WORK, TAKE CARE OF THINGS AT HOME, OR GET ALONG WITH OTHER PEOPLE?: EXTREMELY DIFFICULT
5. BEING SO RESTLESS THAT IT IS HARD TO SIT STILL: NEARLY EVERY DAY
2. NOT BEING ABLE TO STOP OR CONTROL WORRYING: NEARLY EVERY DAY
3. WORRYING TOO MUCH ABOUT DIFFERENT THINGS: NEARLY EVERY DAY
IF YOU CHECKED OFF ANY PROBLEMS ON THIS QUESTIONNAIRE, HOW DIFFICULT HAVE THESE PROBLEMS MADE IT FOR YOU TO DO YOUR WORK, TAKE CARE OF THINGS AT HOME, OR GET ALONG WITH OTHER PEOPLE: EXTREMELY DIFFICULT
6. BECOMING EASILY ANNOYED OR IRRITABLE: NEARLY EVERY DAY
7. FEELING AFRAID AS IF SOMETHING AWFUL MIGHT HAPPEN: NEARLY EVERY DAY
7. FEELING AFRAID AS IF SOMETHING AWFUL MIGHT HAPPEN: NEARLY EVERY DAY
GAD7 TOTAL SCORE: 21
IF YOU CHECKED OFF ANY PROBLEMS ON THIS QUESTIONNAIRE, HOW DIFFICULT HAVE THESE PROBLEMS MADE IT FOR YOU TO DO YOUR WORK, TAKE CARE OF THINGS AT HOME, OR GET ALONG WITH OTHER PEOPLE: EXTREMELY DIFFICULT
8. IF YOU CHECKED OFF ANY PROBLEMS, HOW DIFFICULT HAVE THESE MADE IT FOR YOU TO DO YOUR WORK, TAKE CARE OF THINGS AT HOME, OR GET ALONG WITH OTHER PEOPLE?: EXTREMELY DIFFICULT
5. BEING SO RESTLESS THAT IT IS HARD TO SIT STILL: NEARLY EVERY DAY
1. FEELING NERVOUS, ANXIOUS, OR ON EDGE: NEARLY EVERY DAY
2. NOT BEING ABLE TO STOP OR CONTROL WORRYING: NEARLY EVERY DAY
GAD7 TOTAL SCORE: 21
6. BECOMING EASILY ANNOYED OR IRRITABLE: NEARLY EVERY DAY
GAD7 TOTAL SCORE: 21
7. FEELING AFRAID AS IF SOMETHING AWFUL MIGHT HAPPEN: NEARLY EVERY DAY
GAD7 TOTAL SCORE: 21
4. TROUBLE RELAXING: NEARLY EVERY DAY
1. FEELING NERVOUS, ANXIOUS, OR ON EDGE: NEARLY EVERY DAY
GAD7 TOTAL SCORE: 21

## 2024-08-21 ASSESSMENT — PATIENT HEALTH QUESTIONNAIRE - PHQ9
SUM OF ALL RESPONSES TO PHQ QUESTIONS 1-9: 19
10. IF YOU CHECKED OFF ANY PROBLEMS, HOW DIFFICULT HAVE THESE PROBLEMS MADE IT FOR YOU TO DO YOUR WORK, TAKE CARE OF THINGS AT HOME, OR GET ALONG WITH OTHER PEOPLE: EXTREMELY DIFFICULT
SUM OF ALL RESPONSES TO PHQ QUESTIONS 1-9: 19

## 2024-08-21 NOTE — PROGRESS NOTES
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 Diagnosis: none  5. Prognosis: Expect Improvement and Relieve Acute Symptoms.  6. Likely consequences of symptoms if not treated: potential for significant decline.  7. Client strengths include:  caring, creative, educated, empathetic, goal-focused, good listener, has a previous history of therapy, insightful, intelligent, motivated, open to learning, open to suggestions / feedback, responsible parent, support of family, friends and providers, supportive, wants to learn, willing to ask questions, willing to relate to others, and work history .     Recommendations:     1. Plan for Safety and Risk Management:   Safety and Risk: Recommended that patient call 911 or go to the local ED should there be a change in any of these risk factors..          Report to child / adult protection services was NA.     2. Patient's identified  no cultural concerns relevant for therapy .     3. Initial Treatment will focus on:    Depressed Mood - decrease lethargy, sadness, crying  Adjustment Difficulties related to: loss of signigicant relationship.     4. Resources/Service Plan:    services are not  indicated.   Modifications to assist communication are not indicated.   Additional disability accommodations are not indicated.      5. Collaboration:   Collaboration / coordination of treatment will be initiated with the following  support professionals:  none  .      6.  Referrals:   The following referral(s) will be initiated:  none at this time .       A Release of Information has been obtained for the following:  none at this time .     Clinical Substantiation/medical necessity for the above recommendations:  significant grief response.    7. EMIL:    EMIL:  Discussed the general effects of drugs and alcohol on health and well-being. Provider gave patient printed information about the  effects of chemical use on their health and well being. Recommendations:  none .     8. Records:   These were reviewed at time of assessment.   Information in this assessment was obtained from the medical record and  provided by patient who is a good historian.    Patient will have open access to their mental health medical record.    9.   Interactive Complexity: No    10. Safety Plan:       Provider Name/ Credentials:  Yuliet Moscoso MA,[facility]  August 21, 2024

## 2024-08-26 ENCOUNTER — VIRTUAL VISIT (OUTPATIENT)
Dept: PSYCHOLOGY | Facility: CLINIC | Age: 64
End: 2024-08-26
Payer: COMMERCIAL

## 2024-08-26 DIAGNOSIS — F33.1 MAJOR DEPRESSIVE DISORDER, RECURRENT EPISODE, MODERATE (H): Primary | ICD-10-CM

## 2024-08-26 PROCEDURE — 90834 PSYTX W PT 45 MINUTES: CPT | Mod: 95 | Performed by: COUNSELOR

## 2024-08-29 ENCOUNTER — HOSPITAL ENCOUNTER (OUTPATIENT)
Dept: MRI IMAGING | Facility: CLINIC | Age: 64
Discharge: HOME | End: 2024-08-29
Attending: FAMILY MEDICINE | Admitting: FAMILY MEDICINE
Payer: COMMERCIAL

## 2024-08-29 DIAGNOSIS — R26.89 BALANCE PROBLEMS: ICD-10-CM

## 2024-08-29 PROCEDURE — 70551 MRI BRAIN STEM W/O DYE: CPT

## 2024-09-10 ENCOUNTER — THERAPY VISIT (OUTPATIENT)
Dept: PHYSICAL THERAPY | Facility: OTHER | Age: 64
End: 2024-09-10
Attending: FAMILY MEDICINE
Payer: COMMERCIAL

## 2024-09-10 DIAGNOSIS — M51.369 DDD (DEGENERATIVE DISC DISEASE), LUMBAR: ICD-10-CM

## 2024-09-10 PROCEDURE — 97110 THERAPEUTIC EXERCISES: CPT | Mod: GP

## 2024-09-10 PROCEDURE — 97161 PT EVAL LOW COMPLEX 20 MIN: CPT | Mod: GP

## 2024-09-10 NOTE — PROGRESS NOTES
PHYSICAL THERAPY EVALUATION  Type of Visit: Evaluation              Subjective       Presenting condition or subjective complaint: Problems with my lower back  Date of onset: 08/02/24 (Referral for chronic LBP  issues)    Relevant medical history: Depression; Dizziness; High blood pressure; Incontinence; Mental Illness; Migraines or headaches; Overweight; Severe headaches   Dates & types of surgery: C Sections, knee Replacements, back surgery Reports her back surgery was not helpful.     Prior diagnostic imaging/testing results: MRI; Other Have already had surgery   Prior therapy history for the same diagnosis, illness or injury: Yes Physical therapy in the past    Prior Level of Function  Transfers: Independent  Ambulation: Independent  ADL: Independent  IADL: Independent.     Living Environment  Social support: With a significant other or spouse   Type of home: House   Stairs to enter the home: Yes 5 Is there a railing: No     Ramp: No   Stairs inside the home: Yes 13 Is there a railing: Yes     Help at home: None; Self Cares (home health aide/personal care attendant, family, etc); Home management tasks (cooking, cleaning); Other  Equipment owned: Straight Cane; Walker; Standard wheelchair; Bath bench     Employment: No    Hobbies/Interests: Crafts, painting, making things    Patient goals for therapy: I am not able to walk very far or stand very long    Pain assessment: Pain present  Location: General low back more to left/Rating: Variable from 2-3/10 to      Objective   LUMBAR SPINE EVALUATION  PAIN: Pain Level at Rest: 3/10  Pain Level with Use: 6/10  Pain Location: lumbar spine  Pain Quality: Aching, Miserable, Nagging, and Throbbing  Pain is Worst: nighttime  Pain is Exacerbated By: Walking  Pain is Relieved By: Sitting, OTC NSAIDS, Some prescription pain meds.   INTEGUMENTARY (edema, incisions): WNL  POSTURE:  Both sitting and standing postures are faulty with decreased lumbar  lordosis and increased  "thoracic kyphosis.   GAIT:   Weightbearing Status: WBAT  Assistive Device(s): Cane (single end), Walker (front wheeled) Not currently being used.  Gait Deviations: Slow, guarded gait.  BALANCE/PROPRIOCEPTION: SLS is 5\" B.   WEIGHTBEARING ALIGNMENT: B TKA in 2006  NON-WEIGHTBEARING ALIGNMENT: WFL   ROM: Lumbar flexion, to knees, rotation to 40 deg B   PELVIC/SI SCREEN: Deferred.  STRENGTH: B LE strength is WFL Note right hip flexors appear weaker than left.     MYOTOMES: NML  DTR S: WNL  CORD SIGNS: WNL  DERMATOMES: WNL  NEURAL TENSION: Negative  FLEXIBILITY: Noted significant soft tissue limitation throughout the lumbar spine and hip region.  LUMBAR/HIP Special Tests: Straight leg raise test negative bilaterally.  Contralateral straight leg raise test negative as well.  PELVIS/SI SPECIAL TESTS: To be determined at later date.  FUNCTIONAL TESTS: Patient is currently unable to do a squat testing due to limited strength in the lower extremity and hip region.  PALPATION: Noted tenderness through the general lumbar spine area most noted along the L3-L5 vertebral motion segments bilaterally.  Also noted is tenderness through the associated paraspinal musculus in this region.  SPINAL SEGMENTAL CONCLUSIONS: Noted limitation to lumbar rotation and extension through L3-L5 S1      Assessment & Plan   CLINICAL IMPRESSIONS  Medical Diagnosis: DDD (degenerative disc disease), lumbar (M51.36)    Treatment Diagnosis:     Impression/Assessment: Patient is a 64 year old female with complaints of chronic low back pain of several years endurance.  Patient's greatest complaint is inability to walk for any distance at this time.  She would like to be able to walk with her  for exercise and recreation.  She also has difficulty sleeping, completing tasks around the house which require any kind of lifting or carrying.  She appears interested in receiving home exercise program as well as clinical treatment as indicated.  She did have " physical therapy for her low back after the L L3-L5 laminectomy several years ago.  Reports however that surgery had little effect on her back pain.  Reports that she also lost almost 100 pounds but that also had little effect on her chronic back pain.  Reports that she is currently carrying a lot of stress due to the recent death of her sister and other issues.  The following significant findings have been identified: Pain, Decreased ROM/flexibility, Decreased joint mobility, Decreased strength, Impaired gait, Impaired muscle performance, and Decreased activity tolerance. These impairments interfere with their ability to perform work tasks, recreational activities, household chores, and community mobility as compared to previous level of function.     Clinical Decision Making (Complexity):  Clinical Presentation: Stable/Uncomplicated  Clinical Presentation Rationale: based on medical and personal factors listed in PT evaluation  Clinical Decision Making (Complexity): Low complexity    PLAN OF CARE  Treatment Interventions:  Modalities: Cryotherapy, E-stim, Hot Pack, Ultrasound  Interventions: Manual Therapy, Neuromuscular Re-education, Therapeutic Activity, Therapeutic Exercise, Aquatic Therapy    Long Term Goals            Frequency of Treatment:    Duration of Treatment:      Recommended Referrals to Other Professionals: No additional referrals indicated  Education Assessment:        Risks and benefits of evaluation/treatment have been explained.   Patient/Family/caregiver agrees with Plan of Care.     Evaluation Time:             Signing Clinician: Loy Hayes, PT

## 2024-09-12 ENCOUNTER — THERAPY VISIT (OUTPATIENT)
Dept: PHYSICAL THERAPY | Facility: OTHER | Age: 64
End: 2024-09-12
Attending: FAMILY MEDICINE
Payer: COMMERCIAL

## 2024-09-12 DIAGNOSIS — M51.369 DDD (DEGENERATIVE DISC DISEASE), LUMBAR: Primary | ICD-10-CM

## 2024-09-12 PROCEDURE — 97140 MANUAL THERAPY 1/> REGIONS: CPT | Mod: GP

## 2024-09-12 PROCEDURE — 97110 THERAPEUTIC EXERCISES: CPT | Mod: GP

## 2024-09-13 ENCOUNTER — MYC MEDICAL ADVICE (OUTPATIENT)
Dept: FAMILY MEDICINE | Facility: OTHER | Age: 64
End: 2024-09-13
Payer: COMMERCIAL

## 2024-09-16 ENCOUNTER — MYC MEDICAL ADVICE (OUTPATIENT)
Dept: FAMILY MEDICINE | Facility: OTHER | Age: 64
End: 2024-09-16
Payer: COMMERCIAL

## 2024-09-17 ENCOUNTER — THERAPY VISIT (OUTPATIENT)
Dept: PHYSICAL THERAPY | Facility: OTHER | Age: 64
End: 2024-09-17
Attending: FAMILY MEDICINE
Payer: COMMERCIAL

## 2024-09-17 ENCOUNTER — NURSE TRIAGE (OUTPATIENT)
Dept: FAMILY MEDICINE | Facility: OTHER | Age: 64
End: 2024-09-17
Payer: COMMERCIAL

## 2024-09-17 DIAGNOSIS — M51.369 DDD (DEGENERATIVE DISC DISEASE), LUMBAR: Primary | ICD-10-CM

## 2024-09-17 PROCEDURE — 97140 MANUAL THERAPY 1/> REGIONS: CPT | Mod: GP

## 2024-09-17 PROCEDURE — 97110 THERAPEUTIC EXERCISES: CPT | Mod: GP

## 2024-09-17 NOTE — TELEPHONE ENCOUNTER
"See MyChart Encounter from 9/16/24.    Nurse Triage SBAR    Is this a 2nd Level Triage? NO    Situation:  Patient reporting worsening hair loss. See FPSIhart message from 9/16/24.     Background:  Patient reports longtime history of hair loss, worsening lately. Has lost approx 100 lbs over the past 2 years, lost her sister in June and reports increased stress in the past couple of months. Has been taking a supplement: atures Bounty Hair, Skin and Nails soft gels Advanced with 2x Biotin 5,000mcg/Argan Oil Infused for approx past 3 months, minimal results. Also using special shampoo. Says has a \"pile of hair\" every time she washes her hair.     Assessment:  See questions below.     Protocol Recommended Disposition:   Home Care, See PCP Within 2 Weeks     Has virtual MRI follow up appointment on Friday 9/27/24 and is okay waiting to discuss hair loss with provider at that time. Any other recommendations from provider at this time?     Recommendation: RN reviewed red flag symptoms with patient and when to see emergency care. They agreed and understood.     Briseida Vergara, RN, BSN      Reason for Disposition   [1] Normal hair loss from aging suspected BUT [2] requesting to talk with doctor (treatments, hair transplants)   Hair loss follows a major stress in past 3 months    Additional Information   Negative: Head lice suspected (e.g., head itching and lice or nits are seen)   Negative: Patient sounds very sick or weak to the triager   Negative: [1] Scalp is red and painful in area of hair loss AND [2] large (more than 1 inch; 2.5 cm)   Negative: [1] Scalp is red and painful in area of hair loss AND [2] small (less than 1 inch; 2.5 cm)   Negative: Scabs or crusts are present in the hair   Negative: Ringworm of the scalp suspected (round patch of hair loss with scales, rough surface, redness or itching)   Negative: [1] Recently diagnosed with ringworm AND [2] getting WORSE on treatment (e.g., increasing size, more " "spots)   Negative: Chemotherapy and questions about hair loss   Negative: [1] Patch of hair loss AND [2] cause not known   Negative: [1] Hair thinning, hair loss, or balding AND [2] cause not known (e.g., no recent precipitating factors such as childbirth, weight loss surgery)   Negative: Hair loss from nervous habit of twisting (or pulling) the hair    Answer Assessment - Initial Assessment Questions  1. LOCATION: \"Where is the hair loss?\" (e.g., all of scalp, parts of scalp, back of head or neck)      All over scalp  2. DESCRIPTION: \"Please describe it.? (e.g., thinning of hair, balding, patches of hair missing)      Thinning all over, not patches  3. ONSET: \"When did the hair loss begin?\" (e.g., sudden or gradual onset; days, weeks, months or years ago)      \"Very long time\", worse lately though   4. OTHER SYMPTOMS: \"What does the scalp look like where the hair is missing?\" (e.g., normal, redness, crusts, scarring)      Normal  5. OTHER FACTORS: \"Have you had any of the following recently: childbirth, severe illness or injury, major surgery, major weight loss, cancer chemo, tight hair dia, serious stress?\"      Has lost \"a lot of weight\" (approx 100 lbs over the past 2 years) and \"going through a lot of stress\"  6. CAUSE: \"What do you think is causing the hair loss?\"      Stress    Protocols used: Hair Loss-A-AH    "

## 2024-09-19 ENCOUNTER — THERAPY VISIT (OUTPATIENT)
Dept: PHYSICAL THERAPY | Facility: OTHER | Age: 64
End: 2024-09-19
Attending: FAMILY MEDICINE
Payer: COMMERCIAL

## 2024-09-19 DIAGNOSIS — M51.369 DDD (DEGENERATIVE DISC DISEASE), LUMBAR: Primary | ICD-10-CM

## 2024-09-19 PROCEDURE — 97110 THERAPEUTIC EXERCISES: CPT | Mod: GP

## 2024-09-19 PROCEDURE — 97140 MANUAL THERAPY 1/> REGIONS: CPT | Mod: GP

## 2024-09-23 ENCOUNTER — VIRTUAL VISIT (OUTPATIENT)
Dept: PSYCHOLOGY | Facility: CLINIC | Age: 64
End: 2024-09-23
Payer: COMMERCIAL

## 2024-09-23 DIAGNOSIS — F33.1 MAJOR DEPRESSIVE DISORDER, RECURRENT EPISODE, MODERATE (H): Primary | ICD-10-CM

## 2024-09-23 PROCEDURE — 90837 PSYTX W PT 60 MINUTES: CPT | Mod: 95 | Performed by: COUNSELOR

## 2024-09-23 ASSESSMENT — PATIENT HEALTH QUESTIONNAIRE - PHQ9
10. IF YOU CHECKED OFF ANY PROBLEMS, HOW DIFFICULT HAVE THESE PROBLEMS MADE IT FOR YOU TO DO YOUR WORK, TAKE CARE OF THINGS AT HOME, OR GET ALONG WITH OTHER PEOPLE: EXTREMELY DIFFICULT
SUM OF ALL RESPONSES TO PHQ QUESTIONS 1-9: 20
SUM OF ALL RESPONSES TO PHQ QUESTIONS 1-9: 20

## 2024-09-23 ASSESSMENT — ANXIETY QUESTIONNAIRES
GAD7 TOTAL SCORE: 18
8. IF YOU CHECKED OFF ANY PROBLEMS, HOW DIFFICULT HAVE THESE MADE IT FOR YOU TO DO YOUR WORK, TAKE CARE OF THINGS AT HOME, OR GET ALONG WITH OTHER PEOPLE?: EXTREMELY DIFFICULT
7. FEELING AFRAID AS IF SOMETHING AWFUL MIGHT HAPPEN: NEARLY EVERY DAY
GAD7 TOTAL SCORE: 18
GAD7 TOTAL SCORE: 18

## 2024-09-23 NOTE — PROGRESS NOTES
Answers submitted by the patient for this visit:  Patient Health Questionnaire (Submitted on 9/23/2024)  If you checked off any problems, how difficult have these problems made it for you to do your work, take care of things at home, or get along with other people?: Extremely difficult  PHQ9 TOTAL SCORE: 20        [facility]                                     Progress Note    Patient Name: Eva Solis  Date: 9/23/24         Service Type: Individual      Session Start Time: 9:35am  Session End Time: 10:30am     Session Length: 55mins    Session #: 3    Attendees: Client    Service Modality:  Video Visit:      Provider verified identity through the following two step process.  Patient provided:  Patient is known previously to provider    Telemedicine Visit: The patient's condition can be safely assessed and treated via synchronous audio and visual telemedicine encounter.      Reason for Telemedicine Visit: Patient convenience (e.g. access to timely appointments / distance to available provider)    Originating Site (Patient Location): Patient's home    Distant Site (Provider Location): Provider Remote Setting- Home Office    Consent:  The patient/guardian has verbally consented to: the potential risks and benefits of telemedicine (video visit) versus in person care; bill my insurance or make self-payment for services provided; and responsibility for payment of non-covered services.     Patient would like the video invitation sent by:  My Chart    Mode of Communication:  Video Conference via Amwell    Distant Location (Provider):  Off-site    As the provider I attest to compliance with applicable laws and regulations related to telemedicine.    DATA  Interactive Complexity: No  Crisis: No        Progress Since Last Session (Related to Symptoms / Goals / Homework):   Symptoms: No change .    Homework: Completed in session      Episode of Care Goals: Minimal progress - ACTION (Actively working  towards change); Intervened by reinforcing change plan / affirming steps taken     Current / Ongoing Stressors and Concerns:  The writer and client both experienced connections issues during today's session.    Client stated she continues to really struggle with depression. Stated she doesn't know what she wants anymore. Stated she's losing her hair and it's alarming to her. Struggling to sleep lately too because she's thinking of her sister.   Having her kids over this weekend to go for one last boat ride, spread her sisters ashes in the lake, and bring the dock in.  Had her other grandson at her house this last weekend.          Treatment Objective(s) Addressed in This Session:   Increase interest, engagement, and pleasure in doing things  Decrease frequency and intensity of feeling down, depressed, hopeless       Intervention:   Created a treatment plan with the client. Processed through some grief themes about her sister but also her family dynamics. Processed her grief around her relationships with her mom and other siblings. Client wishes they could be different and doesn't understand why they are how they are. Worked with the client on radical acceptance and how to continue to work on moving through these feelings.      Assessments completed prior to visit:  The following assessments were completed by patient for this visit:  PHQ9:       3/1/2024     4:06 PM 6/27/2024     4:35 PM 8/1/2024     5:43 PM 8/1/2024     5:45 PM 8/16/2024     9:20 AM 8/21/2024    12:23 PM 9/23/2024     9:18 AM   PHQ-9 SCORE   PHQ-9 Total Score MyChart  24 (Severe depression) 22 (Severe depression) 22 (Severe depression) 21 (Severe depression) 19 (Moderately severe depression) 20 (Severe depression)   PHQ-9 Total Score 18 24 22    22 22 21 19 20     GAD7:       12/6/2023    12:23 AM 12/29/2023     7:47 AM 3/1/2024     4:06 PM 6/27/2024     4:39 PM 7/30/2024    10:47 AM 8/21/2024    12:40 PM 9/23/2024     9:19 AM   LORETA-7 SCORE   Total  "Score 21 (severe anxiety) 21 (severe anxiety)  15 (severe anxiety) 19 (severe anxiety) 21 (severe anxiety) 18 (severe anxiety)   Total Score 21 21 17 15 19 21    21 18         ASSESSMENT: Current Emotional / Mental Status (status of significant symptoms):   Risk status (Self / Other harm or suicidal ideation)   Patient denies current fears or concerns for personal safety.   Patient denies current or recent suicidal ideation or behaviors.   Patient denies current or recent homicidal ideation or behaviors.   Patient denies current or recent self injurious behavior or ideation.   Patient denies other safety concerns.   Patient reports there has been no change in risk factors since their last session.     Patient reports there has been no change in protective factors since their last session.     Recommended that patient call 911 or go to the local ED should there be a change in any of these risk factors.     Appearance:   Appropriate    Eye Contact:   Good    Psychomotor Behavior: Normal    Attitude:   Cooperative    Orientation:   All   Speech    Rate / Production: Normal/ Responsive    Volume:  Normal    Mood:    Depressed  Normal Sad    Affect:    Appropriate  Tearful   Thought Content:  Clear    Thought Form:  Coherent  Logical    Insight:    Good      Medication Review:   No changes to current psychiatric medication(s)     Medication Compliance:   Yes     Changes in Health Issues:   None reported     Chemical Use Review:   Substance Use: Chemical use reviewed, no active concerns identified      Tobacco Use: No current tobacco use.      Diagnosis:  1. Major depressive disorder, recurrent episode, moderate (H)        Collateral Reports Completed:   Not Applicable    PLAN: (Patient Tasks / Therapist Tasks / Other)  Client will work on utilizing \"both/and\" to help her work through grief emotions and working on radical acceptance.         Yuliet Moscoso [facility]                                                     "     ______________________________________________________________________    Individual Treatment Plan    Patient's Name: Eva Solis  YOB: 1960    Date of Creation: 8/26/24  Date Treatment Plan Last Reviewed/Revised: 8/26/24    DSM5 Diagnoses: 296.32 (F33.1) Major Depressive Disorder, Recurrent Episode, Moderate _  Psychosocial / Contextual Factors: sister recently passed away  PROMIS (reviewed every 90 days):     Referral / Collaboration:  Referral to another professional/service is not indicated at this time..    Anticipated number of session for this episode of care: 6-9 sessions  Anticipation frequency of session: Every other week  Anticipated Duration of each session: 38-52 minutes  Treatment plan will be reviewed in 90 days or when goals have been changed.       MeasurableTreatment Goal(s) related to diagnosis / functional impairment(s)  Goal 1: Patient will increase anxiety reduction skills and regulation skills    I will know I've met my goal when I'm able to socialize more again and be okay with having people over.      Objective #A (Patient Action)    Patient will identify three distraction and diversion activities and use those activities to decrease level of anxiety  .  Status: New - Date: 8/26/24      Intervention(s)  Therapist will teach emotional regulation skills. distress tolerance skills, interpersonal effectiveness skills, emotion regluation skills, mindfulness skills, radical acceptance. Therapist will teach client how to ID body cues for anxiety, anxiety reduction techniques, how to ID triggers for depression and anxiety- decrease reactivity/eliminate, lifestyle changes to reduce depression and anxiety, communication skills, explore cognitive beliefs and help client to develop healthy cognitive patterns and beliefs.     Objective #B  Patient will Increase interest, engagement, and pleasure in doing things.  Status: New - Date: 8/26/24      Intervention(s)  Therapist will  teach emotional regulation skills. distress tolerance skills, interpersonal effectiveness skills, emotion regluation skills, mindfulness skills, radical acceptance. Therapist will teach client how to ID body cues for anxiety, anxiety reduction techniques, how to ID triggers for depression and anxiety- decrease reactivity/eliminate, lifestyle changes to reduce depression and anxiety, communication skills, explore cognitive beliefs and help client to develop healthy cognitive patterns and beliefs.     Objective #C  Patient will initiate 2 social contact(s) per day for increasing lengths of time.  Status: New - Date: 8/26/24      Intervention(s)  Therapist will teach emotional regulation skills. distress tolerance skills, interpersonal effectiveness skills, emotion regluation skills, mindfulness skills, radical acceptance. Therapist will teach client how to ID body cues for anxiety, anxiety reduction techniques, how to ID triggers for depression and anxiety- decrease reactivity/eliminate, lifestyle changes to reduce depression and anxiety, communication skills, explore cognitive beliefs and help client to develop healthy cognitive patterns and beliefs.       Goal 2: Patient will process through her grief of losing her sister    I will know I've met my goal when I don't feel like I need to cry everyday.      Objective #A (Patient Action)    Status: New - Date: 8/26/24      Patient will increase understanding of steps in the grief process.    Intervention(s)  Therapist will teach emotional regulation skills. distress tolerance skills, interpersonal effectiveness skills, emotion regluation skills, mindfulness skills, radical acceptance. Therapist will teach client how to ID body cues for anxiety, anxiety reduction techniques, how to ID triggers for depression and anxiety- decrease reactivity/eliminate, lifestyle changes to reduce depression and anxiety, communication skills, explore cognitive beliefs and help client to  develop healthy cognitive patterns and beliefs.     Objective #B  Patient will talk to at least two others about losses and coping.    Status: New - Date: 8/26/24      Intervention(s)  Therapist will teach emotional regulation skills. distress tolerance skills, interpersonal effectiveness skills, emotion regluation skills, mindfulness skills, radical acceptance. Therapist will teach client how to ID body cues for anxiety, anxiety reduction techniques, how to ID triggers for depression and anxiety- decrease reactivity/eliminate, lifestyle changes to reduce depression and anxiety, communication skills, explore cognitive beliefs and help client to develop healthy cognitive patterns and beliefs.     Objective #C  Patient will Decrease frequency and intensity of feeling down, depressed, hopeless.  Status: New - Date: 8/26/24      Intervention(s)  Therapist will teach emotional regulation skills. distress tolerance skills, interpersonal effectiveness skills, emotion regluation skills, mindfulness skills, radical acceptance. Therapist will teach client how to ID body cues for anxiety, anxiety reduction techniques, how to ID triggers for depression and anxiety- decrease reactivity/eliminate, lifestyle changes to reduce depression and anxiety, communication skills, explore cognitive beliefs and help client to develop healthy cognitive patterns and beliefs.       Patient has reviewed and agreed to the above plan.      [facility]uliet Moscoso [facility]  August 26, 2024

## 2024-09-24 ENCOUNTER — THERAPY VISIT (OUTPATIENT)
Dept: PHYSICAL THERAPY | Facility: OTHER | Age: 64
End: 2024-09-24
Attending: FAMILY MEDICINE
Payer: COMMERCIAL

## 2024-09-24 DIAGNOSIS — M51.369 DDD (DEGENERATIVE DISC DISEASE), LUMBAR: Primary | ICD-10-CM

## 2024-09-24 PROCEDURE — 97110 THERAPEUTIC EXERCISES: CPT | Mod: GP

## 2024-09-24 PROCEDURE — 97140 MANUAL THERAPY 1/> REGIONS: CPT | Mod: GP

## 2024-09-26 ENCOUNTER — THERAPY VISIT (OUTPATIENT)
Dept: PHYSICAL THERAPY | Facility: OTHER | Age: 64
End: 2024-09-26
Attending: FAMILY MEDICINE
Payer: COMMERCIAL

## 2024-09-26 DIAGNOSIS — M51.369 DDD (DEGENERATIVE DISC DISEASE), LUMBAR: Primary | ICD-10-CM

## 2024-09-26 PROCEDURE — 97140 MANUAL THERAPY 1/> REGIONS: CPT | Mod: GP

## 2024-09-26 PROCEDURE — 97110 THERAPEUTIC EXERCISES: CPT | Mod: GP

## 2024-09-27 ENCOUNTER — VIRTUAL VISIT (OUTPATIENT)
Dept: FAMILY MEDICINE | Facility: OTHER | Age: 64
End: 2024-09-27
Attending: FAMILY MEDICINE
Payer: COMMERCIAL

## 2024-09-27 DIAGNOSIS — F40.240 CLAUSTROPHOBIA: ICD-10-CM

## 2024-09-27 DIAGNOSIS — F33.1 MAJOR DEPRESSIVE DISORDER, RECURRENT EPISODE, MODERATE (H): Primary | ICD-10-CM

## 2024-09-27 DIAGNOSIS — M51.362 DEGENERATION OF INTERVERTEBRAL DISC OF LUMBAR REGION WITH DISCOGENIC BACK PAIN AND LOWER EXTREMITY PAIN: ICD-10-CM

## 2024-09-27 DIAGNOSIS — I10 HTN, GOAL BELOW 140/90: ICD-10-CM

## 2024-09-27 PROCEDURE — 99443 PR PHYSICIAN TELEPHONE EVALUATION 21-30 MIN: CPT | Mod: 93 | Performed by: FAMILY MEDICINE

## 2024-09-27 ASSESSMENT — ENCOUNTER SYMPTOMS
NERVOUS/ANXIOUS: 1
BACK PAIN: 1

## 2024-09-27 NOTE — PROGRESS NOTES
ROSANNE is a 64 year old who is being evaluated via a billable telephone visit.    What phone number would you like to be contacted at? [phone]  How would you like to obtain your AVS? Cate  Originating Location (pt. Location): Home    Distant Location (provider location):  On-site    Assessment & Plan     Major depressive disorder, recurrent episode, moderate (H)  She will continue with counseling.  She is still struggling with her mood and she has tried and failed multiple medications.  I really do recommend seeing psychiatry again to help with medication management and she is agreeable for me to replace the referral.      Degeneration of intervertebral disc of lumbar region with discogenic back pain and lower extremity pain  She is doing physical therapy but still having significant issues with back pain and balance.  Will obtain MRI of her lumbar spine and refer to the spine specialist.      - MR Lumbar Spine w/o & w Contrast; Future    Claustrophobia  She felt Valium was effective for the MRI of her brain and so we will repeat this prior to the MRI of her lumbar spine    - diazepam (VALIUM) 5 MG tablet; Take 1 hour before MRI, may repeat x 1    HTN, goal below 140/90  Home blood pressures are still on the low side and she still feels lightheaded and unbalanced when she is standing.  Will stop the clonidine.  She will continue to monitor her blood pressure at home.      Subjective   ROSANNE is a 64 year old, presenting for the following health issues:  Depression, Anxiety, Hypertension, and Back Pain      9/27/2024    11:56 AM   Additional Questions   Roomed by ken   Accompanied by self     Anxiety    Back Pain     History of Present Illness       Back Pain:  She presents for follow up of back pain. Patient's back pain is a chronic problem.  Location of back pain:  Right lower back and left lower back  Description of back pain: dull ache, gnawing, sharp and shooting  Back pain spreads: nowhere    Since patient  first noticed back pain, pain is: gradually worsening  Does back pain interfere with her job:  Not applicable       Mental Health Follow-up:  Patient presents to follow-up on Depression & Anxiety.Patient's depression since last visit has been:  No change  The patient is not having other symptoms associated with depression.  Patient's anxiety since last visit has been:  No change  The patient is not having other symptoms associated with anxiety.  Any significant life events: grief or loss  Patient is feeling anxious or having panic attacks.  Patient has no concerns about alcohol or drug use.    Hypertension: She presents for follow up of hypertension.  She does check blood pressure  regularly outside of the clinic. Outpatient blood pressures have not been over 140/90. She does not follow a low salt diet.     She eats 2-3 servings of fruits and vegetables daily.She consumes 1 sweetened beverage(s) daily.She exercises with enough effort to increase her heart rate 10 to 19 minutes per day.  She exercises with enough effort to increase her heart rate 3 or less days per week. She is missing 1 dose(s) of medications per week.  She is not taking prescribed medications regularly due to remembering to take.         8/16/2024     9:20 AM 8/21/2024    12:23 PM 9/23/2024     9:18 AM   PHQ   PHQ-9 Total Score 21 19 20   Q9: Thoughts of better off dead/self-harm past 2 weeks Not at all Not at all Not at all          7/30/2024    10:47 AM 8/21/2024    12:40 PM 9/23/2024     9:19 AM   LORETA-7 SCORE   Total Score 19 (severe anxiety) 21 (severe anxiety) 18 (severe anxiety)   Total Score 19 21    21 18     Started counseling about a month ago.  Feels it is going well.  Looks forward to talking to her, likes her a lot.  Feels the suggestions are good and she is a good listener.  Will be seeing her every couple weeks.  Feels her depression is the same, doesn't feel it has gotten any worse.  Has seen Psychiatry in the past as she has failed  many medications--amitriptyline, Xanax, Lexapro, Ambien, Wellbutrin, Abilify and Effexor.  Currently has a stressful weekend as will be spreading her sister's ashes and dealing with her family.      She had decreased her clonidine and her home blood pressures are running 108-1 23 over 50s to 60s.  She still feels a little lightheaded and unbalanced when she is on her feet.  She has been seen physical therapy and they feel that she is going to need surgery.  She will continue to see them but she is so concerned with her back and her balance and would like to proceed with specialist visit.  Last MRI was a couple years ago.          Objective           Vitals:  No vitals were obtained today due to virtual visit.    Physical Exam   General: Alert and no distress //Respiratory: No audible wheeze, cough, or shortness of breath // Psychiatric:  Appropriate affect, tone, and pace of words          Phone call duration: 21 minutes  Signed Electronically by: Renetta Benitez MD

## 2024-10-01 ENCOUNTER — MYC REFILL (OUTPATIENT)
Dept: FAMILY MEDICINE | Facility: OTHER | Age: 64
End: 2024-10-01
Payer: COMMERCIAL

## 2024-10-01 DIAGNOSIS — F33.1 MAJOR DEPRESSIVE DISORDER, RECURRENT EPISODE, MODERATE (H): ICD-10-CM

## 2024-10-01 RX ORDER — DIAZEPAM 5 MG
TABLET ORAL
Qty: 2 TABLET | Refills: 0 | Status: SHIPPED | OUTPATIENT
Start: 2024-10-01

## 2024-10-03 ENCOUNTER — THERAPY VISIT (OUTPATIENT)
Dept: PHYSICAL THERAPY | Facility: OTHER | Age: 64
End: 2024-10-03
Attending: FAMILY MEDICINE
Payer: COMMERCIAL

## 2024-10-03 DIAGNOSIS — M51.369 DDD (DEGENERATIVE DISC DISEASE), LUMBAR: Primary | ICD-10-CM

## 2024-10-03 PROCEDURE — 97140 MANUAL THERAPY 1/> REGIONS: CPT | Mod: GP

## 2024-10-03 PROCEDURE — 97110 THERAPEUTIC EXERCISES: CPT | Mod: GP

## 2024-10-08 ENCOUNTER — THERAPY VISIT (OUTPATIENT)
Dept: PHYSICAL THERAPY | Facility: OTHER | Age: 64
End: 2024-10-08
Attending: FAMILY MEDICINE
Payer: COMMERCIAL

## 2024-10-08 DIAGNOSIS — M51.369 DDD (DEGENERATIVE DISC DISEASE), LUMBAR: Primary | ICD-10-CM

## 2024-10-08 PROCEDURE — 97110 THERAPEUTIC EXERCISES: CPT | Mod: GP

## 2024-10-09 ENCOUNTER — VIRTUAL VISIT (OUTPATIENT)
Dept: PSYCHOLOGY | Facility: CLINIC | Age: 64
End: 2024-10-09
Payer: COMMERCIAL

## 2024-10-09 DIAGNOSIS — F33.1 MAJOR DEPRESSIVE DISORDER, RECURRENT EPISODE, MODERATE (H): Primary | ICD-10-CM

## 2024-10-09 PROCEDURE — 90834 PSYTX W PT 45 MINUTES: CPT | Mod: 95 | Performed by: COUNSELOR

## 2024-10-09 ASSESSMENT — ANXIETY QUESTIONNAIRES
GAD7 TOTAL SCORE: 16
8. IF YOU CHECKED OFF ANY PROBLEMS, HOW DIFFICULT HAVE THESE MADE IT FOR YOU TO DO YOUR WORK, TAKE CARE OF THINGS AT HOME, OR GET ALONG WITH OTHER PEOPLE?: EXTREMELY DIFFICULT
2. NOT BEING ABLE TO STOP OR CONTROL WORRYING: NEARLY EVERY DAY
7. FEELING AFRAID AS IF SOMETHING AWFUL MIGHT HAPPEN: NEARLY EVERY DAY
4. TROUBLE RELAXING: MORE THAN HALF THE DAYS
5. BEING SO RESTLESS THAT IT IS HARD TO SIT STILL: SEVERAL DAYS
1. FEELING NERVOUS, ANXIOUS, OR ON EDGE: NEARLY EVERY DAY
3. WORRYING TOO MUCH ABOUT DIFFERENT THINGS: NEARLY EVERY DAY
7. FEELING AFRAID AS IF SOMETHING AWFUL MIGHT HAPPEN: NEARLY EVERY DAY
GAD7 TOTAL SCORE: 16
6. BECOMING EASILY ANNOYED OR IRRITABLE: SEVERAL DAYS
GAD7 TOTAL SCORE: 16
IF YOU CHECKED OFF ANY PROBLEMS ON THIS QUESTIONNAIRE, HOW DIFFICULT HAVE THESE PROBLEMS MADE IT FOR YOU TO DO YOUR WORK, TAKE CARE OF THINGS AT HOME, OR GET ALONG WITH OTHER PEOPLE: EXTREMELY DIFFICULT

## 2024-10-09 ASSESSMENT — PATIENT HEALTH QUESTIONNAIRE - PHQ9
SUM OF ALL RESPONSES TO PHQ QUESTIONS 1-9: 18
SUM OF ALL RESPONSES TO PHQ QUESTIONS 1-9: 18
10. IF YOU CHECKED OFF ANY PROBLEMS, HOW DIFFICULT HAVE THESE PROBLEMS MADE IT FOR YOU TO DO YOUR WORK, TAKE CARE OF THINGS AT HOME, OR GET ALONG WITH OTHER PEOPLE: EXTREMELY DIFFICULT

## 2024-10-09 NOTE — PROGRESS NOTES
Answers submitted by the patient for this visit:  Patient Health Questionnaire (Submitted on 10/9/2024)  If you checked off any problems, how difficult have these problems made it for you to do your work, take care of things at home, or get along with other people?: Extremely difficult  PHQ9 TOTAL SCORE: 18  Patient Health Questionnaire (G7) (Submitted on 10/9/2024)  LORETA 7 TOTAL SCORE: 16          [facility]                                     Progress Note    Patient Name: Eva Solis  Date: 10/9/24         Service Type: Individual      Session Start Time: 1:00pm  Session End Time: 1:50pm     Session Length: 50mins    Session #: 4    Attendees: Client    Service Modality:  Video Visit:      Provider verified identity through the following two step process.  Patient provided:  Patient is known previously to provider    Telemedicine Visit: The patient's condition can be safely assessed and treated via synchronous audio and visual telemedicine encounter.      Reason for Telemedicine Visit: Patient convenience (e.g. access to timely appointments / distance to available provider)    Originating Site (Patient Location): Patient's home    Distant Site (Provider Location): Provider Remote Setting- Home Office    Consent:  The patient/guardian has verbally consented to: the potential risks and benefits of telemedicine (video visit) versus in person care; bill my insurance or make self-payment for services provided; and responsibility for payment of non-covered services.     Patient would like the video invitation sent by:  My Chart    Mode of Communication:  Video Conference via [employer]    Distant Location (Provider):  Off-site    As the provider I attest to compliance with applicable laws and regulations related to telemedicine.    DATA  Interactive Complexity: No  Crisis: No        Progress Since Last Session (Related to Symptoms / Goals / Homework):   Symptoms: Improving .    Homework: Completed in  session      Episode of Care Goals: Minimal progress - ACTION (Actively working towards change); Intervened by reinforcing change plan / affirming steps taken     Current / Ongoing Stressors and Concerns:  The client stated she's been engaging in her hobbies more lately. She's been painting more.   Stated her sister's daughters, her kids and grandkids all came over and they [place] her [place]'s ashes [place] and commemorated her life. Stated it was the most beautiful thing she's done.   Her best friends mother recently passed away.   One of her best neighbors recently passed away from dementia.   Another neighbor just found out she has lung cancer.   Still losing a lot of hair.        Treatment Objective(s) Addressed in This Session:   Increase interest, engagement, and pleasure in doing things  Decrease frequency and intensity of feeling down, depressed, hopeless       Intervention:   Client stated she did look into [employer].  Isn't interested right now in going a grief group. Has been doing PT 2x a week with her back/neck. Had back surgery about 3 years ago and might have to have fusions done on her back soon. Want to get it done before her trip in March. Hasn't slept in a bed for a long time, sleeps in a recliner. Can't walk for a distance or stand for long periods of time. Has been getting out a little more. Last week went back home to her son's to watch some of their [employer]' games. Not feeling at home at the cabin really anymore and not sure what she wants to do about that. Explored some extreme self care options and stress reduction skills.       Assessments completed prior to visit:  The following assessments were completed by patient for this visit:  PHQ9:       6/27/2024     4:35 PM 8/1/2024     5:43 PM 8/1/2024     5:45 PM 8/16/2024     9:20 AM 8/21/2024    12:23 PM 9/23/2024     9:18 AM 10/9/2024    10:49 AM   PHQ-9 SCORE   PHQ-9 Total Score MyChart 24 (Severe depression) 22 (Severe depression) 22  (Severe depression) 21 (Severe depression) 19 (Moderately severe depression) 20 (Severe depression) 18 (Moderately severe depression)   PHQ-9 Total Score 24 22    22 22 21 19 20 18     GAD7:       12/29/2023     7:47 AM 3/1/2024     4:06 PM 6/27/2024     4:39 PM 7/30/2024    10:47 AM 8/21/2024    12:40 PM 9/23/2024     9:19 AM 10/9/2024    10:50 AM   LORETA-7 SCORE   Total Score 21 (severe anxiety)  15 (severe anxiety) 19 (severe anxiety) 21 (severe anxiety) 18 (severe anxiety) 16 (severe anxiety)   Total Score 21 17 15 19 21    21 18 16         ASSESSMENT: Current Emotional / Mental Status (status of significant symptoms):   Risk status (Self / Other harm or suicidal ideation)   Patient denies current fears or concerns for personal safety.   Patient denies current or recent suicidal ideation or behaviors.   Patient denies current or recent homicidal ideation or behaviors.   Patient denies current or recent self injurious behavior or ideation.   Patient denies other safety concerns.   Patient reports there has been no change in risk factors since their last session.     Patient reports there has been no change in protective factors since their last session.     Recommended that patient call 911 or go to the local ED should there be a change in any of these risk factors.     Appearance:   Appropriate    Eye Contact:   Good    Psychomotor Behavior: Normal    Attitude:   Cooperative    Orientation:   All   Speech    Rate / Production: Normal/ Responsive    Volume:  Normal    Mood:    Depressed  Normal Sad    Affect:    Appropriate  Tearful   Thought Content:  Clear    Thought Form:  Coherent  Logical    Insight:    Good      Medication Review:   No changes to current psychiatric medication(s)     Medication Compliance:   Yes     Changes in Health Issues:   None reported     Chemical Use Review:   Substance Use: Chemical use reviewed, no active concerns identified      Tobacco Use: No current tobacco use.       Diagnosis:  1. Major depressive disorder, recurrent episode, moderate (H)        Collateral Reports Completed:   Not Applicable    PLAN: (Patient Tasks / Therapist Tasks / Other)  Client will work on self care and stress reduction skills.         Yuliet Moscoso, [facility]                                                         ______________________________________________________________________    Individual Treatment Plan    Patient's Name: Eva Solis  YOB: 1960    Date of Creation: 8/26/24  Date Treatment Plan Last Reviewed/Revised: 8/26/24    DSM5 Diagnoses: 296.32 (F33.1) Major Depressive Disorder, Recurrent Episode, Moderate _  Psychosocial / Contextual Factors: sister recently passed away  PROMIS (reviewed every 90 days):     Referral / Collaboration:  Referral to another professional/service is not indicated at this time..    Anticipated number of session for this episode of care: 6-9 sessions  Anticipation frequency of session: Every other week  Anticipated Duration of each session: 38-52 minutes  Treatment plan will be reviewed in 90 days or when goals have been changed.       MeasurableTreatment Goal(s) related to diagnosis / functional impairment(s)  Goal 1: Patient will increase anxiety reduction skills and regulation skills    I will know I've met my goal when I'm able to socialize more again and be okay with having people over.      Objective #A (Patient Action)    Patient will identify three distraction and diversion activities and use those activities to decrease level of anxiety  .  Status: New - Date: 8/26/24      Intervention(s)  Therapist will teach emotional regulation skills. distress tolerance skills, interpersonal effectiveness skills, emotion regluation skills, mindfulness skills, radical acceptance. Therapist will teach client how to ID body cues for anxiety, anxiety reduction techniques, how to ID triggers for depression and anxiety- decrease reactivity/eliminate,  lifestyle changes to reduce depression and anxiety, communication skills, explore cognitive beliefs and help client to develop healthy cognitive patterns and beliefs.     Objective #B  Patient will Increase interest, engagement, and pleasure in doing things.  Status: New - Date: 8/26/24      Intervention(s)  Therapist will teach emotional regulation skills. distress tolerance skills, interpersonal effectiveness skills, emotion regluation skills, mindfulness skills, radical acceptance. Therapist will teach client how to ID body cues for anxiety, anxiety reduction techniques, how to ID triggers for depression and anxiety- decrease reactivity/eliminate, lifestyle changes to reduce depression and anxiety, communication skills, explore cognitive beliefs and help client to develop healthy cognitive patterns and beliefs.     Objective #C  Patient will initiate 2 social contact(s) per day for increasing lengths of time.  Status: New - Date: 8/26/24      Intervention(s)  Therapist will teach emotional regulation skills. distress tolerance skills, interpersonal effectiveness skills, emotion regluation skills, mindfulness skills, radical acceptance. Therapist will teach client how to ID body cues for anxiety, anxiety reduction techniques, how to ID triggers for depression and anxiety- decrease reactivity/eliminate, lifestyle changes to reduce depression and anxiety, communication skills, explore cognitive beliefs and help client to develop healthy cognitive patterns and beliefs.       Goal 2: Patient will process through her grief of losing her sister    I will know I've met my goal when I don't feel like I need to cry everyday.      Objective #A (Patient Action)    Status: New - Date: 8/26/24      Patient will increase understanding of steps in the grief process.    Intervention(s)  Therapist will teach emotional regulation skills. distress tolerance skills, interpersonal effectiveness skills, emotion regluation skills,  mindfulness skills, radical acceptance. Therapist will teach client how to ID body cues for anxiety, anxiety reduction techniques, how to ID triggers for depression and anxiety- decrease reactivity/eliminate, lifestyle changes to reduce depression and anxiety, communication skills, explore cognitive beliefs and help client to develop healthy cognitive patterns and beliefs.     Objective #B  Patient will talk to at least two others about losses and coping.    Status: New - Date: 8/26/24      Intervention(s)  Therapist will teach emotional regulation skills. distress tolerance skills, interpersonal effectiveness skills, emotion regluation skills, mindfulness skills, radical acceptance. Therapist will teach client how to ID body cues for anxiety, anxiety reduction techniques, how to ID triggers for depression and anxiety- decrease reactivity/eliminate, lifestyle changes to reduce depression and anxiety, communication skills, explore cognitive beliefs and help client to develop healthy cognitive patterns and beliefs.     Objective #C  Patient will Decrease frequency and intensity of feeling down, depressed, hopeless.  Status: New - Date: 8/26/24      Intervention(s)  Therapist will teach emotional regulation skills. distress tolerance skills, interpersonal effectiveness skills, emotion regluation skills, mindfulness skills, radical acceptance. Therapist will teach client how to ID body cues for anxiety, anxiety reduction techniques, how to ID triggers for depression and anxiety- decrease reactivity/eliminate, lifestyle changes to reduce depression and anxiety, communication skills, explore cognitive beliefs and help client to develop healthy cognitive patterns and beliefs.       Patient has reviewed and agreed to the above plan.      Yuliet Moscoso [facility]  August 26, 2024

## 2024-10-15 ENCOUNTER — HOSPITAL ENCOUNTER (OUTPATIENT)
Dept: MRI IMAGING | Facility: CLINIC | Age: 64
Discharge: HOME | End: 2024-10-15
Attending: FAMILY MEDICINE | Admitting: FAMILY MEDICINE
Payer: COMMERCIAL

## 2024-10-15 DIAGNOSIS — M51.369 BULGING LUMBAR DISC: Primary | ICD-10-CM

## 2024-10-15 DIAGNOSIS — M51.362 DEGENERATION OF INTERVERTEBRAL DISC OF LUMBAR REGION WITH DISCOGENIC BACK PAIN AND LOWER EXTREMITY PAIN: ICD-10-CM

## 2024-10-15 PROCEDURE — 72158 MRI LUMBAR SPINE W/O & W/DYE: CPT

## 2024-10-15 PROCEDURE — 255N000002 HC RX 255 OP 636: Performed by: FAMILY MEDICINE

## 2024-10-15 PROCEDURE — A9585 GADOBUTROL INJECTION: HCPCS | Performed by: FAMILY MEDICINE

## 2024-10-15 RX ORDER — GADOBUTROL 604.72 MG/ML
10 INJECTION INTRAVENOUS ONCE
Status: COMPLETED | OUTPATIENT
Start: 2024-10-15 | End: 2024-10-15

## 2024-10-15 RX ADMIN — GADOBUTROL 10 ML: 604.72 INJECTION INTRAVENOUS at 11:21

## 2024-10-17 NOTE — CONFIDENTIAL NOTE
NEUROSURGERY - NEW PREVISIT PLANNING    Referring Provider: Renetta Benitez MD    OVN 09/27/2024   Reason For Visit: M51.362 (ICD-10-CM) - Degeneration of intervertebral disc of lumbar region with discogenic back pain and lower extremity pain   M51.369 (ICD-10-CM) - Bulging lumbar disc    LOV 8/2022 w/ Ganesh Connors, surgery with Bimal in 2021          IMAGING STATUS/LOCATION DATE/TYPE   MRI PACS 10/15/2024  Lumbar  [facility]   CT N/A    XRAY N/A    NOTES STATUS/LOCATION DATE/TYPE   Other specialist OVN: N/A    EMG Procedures 09/24/2018   INJECTION Encounters 04/22/2021, L4-5 ILESI    06/12/2020, L4-5 ILESI    10/18/2019, L4-5 SILVIA   PHYSICAL THERAPY Encounters 2024, multiple visits   SURGERY Encounters (Bimal) 09/17/2021, L3-5 laminectomy

## 2024-10-18 ENCOUNTER — MYC REFILL (OUTPATIENT)
Dept: FAMILY MEDICINE | Facility: OTHER | Age: 64
End: 2024-10-18
Payer: COMMERCIAL

## 2024-10-18 DIAGNOSIS — I10 HTN, GOAL BELOW 140/90: ICD-10-CM

## 2024-10-18 DIAGNOSIS — I50.32 CHRONIC DIASTOLIC CONGESTIVE HEART FAILURE (H): ICD-10-CM

## 2024-10-18 RX ORDER — LOSARTAN POTASSIUM 100 MG/1
100 TABLET ORAL DAILY
Qty: 90 TABLET | Refills: 3 | OUTPATIENT
Start: 2024-10-18

## 2024-10-22 ENCOUNTER — THERAPY VISIT (OUTPATIENT)
Dept: PHYSICAL THERAPY | Facility: OTHER | Age: 64
End: 2024-10-22
Attending: FAMILY MEDICINE
Payer: COMMERCIAL

## 2024-10-22 DIAGNOSIS — M51.369 DDD (DEGENERATIVE DISC DISEASE), LUMBAR: Primary | ICD-10-CM

## 2024-10-22 PROCEDURE — 97140 MANUAL THERAPY 1/> REGIONS: CPT | Mod: GP

## 2024-10-23 ENCOUNTER — VIRTUAL VISIT (OUTPATIENT)
Dept: PSYCHOLOGY | Facility: CLINIC | Age: 64
End: 2024-10-23
Payer: COMMERCIAL

## 2024-10-23 DIAGNOSIS — F33.1 MAJOR DEPRESSIVE DISORDER, RECURRENT EPISODE, MODERATE (H): Primary | ICD-10-CM

## 2024-10-23 PROCEDURE — 90834 PSYTX W PT 45 MINUTES: CPT | Mod: 95 | Performed by: COUNSELOR

## 2024-10-23 ASSESSMENT — PATIENT HEALTH QUESTIONNAIRE - PHQ9
SUM OF ALL RESPONSES TO PHQ QUESTIONS 1-9: 21
10. IF YOU CHECKED OFF ANY PROBLEMS, HOW DIFFICULT HAVE THESE PROBLEMS MADE IT FOR YOU TO DO YOUR WORK, TAKE CARE OF THINGS AT HOME, OR GET ALONG WITH OTHER PEOPLE: EXTREMELY DIFFICULT
SUM OF ALL RESPONSES TO PHQ QUESTIONS 1-9: 21

## 2024-10-23 NOTE — PROGRESS NOTES
Answers submitted by the patient for this visit:  Patient Health Questionnaire (Submitted on 10/23/2024)  If you checked off any problems, how difficult have these problems made it for you to do your work, take care of things at home, or get along with other people?: Extremely difficult  PHQ9 TOTAL SCORE: 21            [facility]                                     Progress Note    Patient Name: Eva Solis  Date: 10/23/24         Service Type: Individual      Session Start Time: 1:05pm  Session End Time: 1:55pm     Session Length: 50mins    Session #: 5    Attendees: Client    Service Modality:  Video Visit:      Provider verified identity through the following two step process.  Patient provided:  Patient is known previously to provider    Telemedicine Visit: The patient's condition can be safely assessed and treated via synchronous audio and visual telemedicine encounter.      Reason for Telemedicine Visit: Patient convenience (e.g. access to timely appointments / distance to available provider)    Originating Site (Patient Location): Patient's home    Distant Site (Provider Location): Provider Remote Setting- Home Office    Consent:  The patient/guardian has verbally consented to: the potential risks and benefits of telemedicine (video visit) versus in person care; bill my insurance or make self-payment for services provided; and responsibility for payment of non-covered services.     Patient would like the video invitation sent by:  My Chart    Mode of Communication:  Video Conference via Amwell    Distant Location (Provider):  Off-site    As the provider I attest to compliance with applicable laws and regulations related to telemedicine.    DATA  Interactive Complexity: No  Crisis: No        Progress Since Last Session (Related to Symptoms / Goals / Homework):   Symptoms: No change .    Homework: Completed in session      Episode of Care Goals: Minimal progress - ACTION (Actively working  towards change); Intervened by reinforcing change plan / affirming steps taken     Current / Ongoing Stressors and Concerns:  The client stated things are going okay.   Recently found out from her other sister that usually doesn't talk to her, that their mom has pancreatic cancer.   Client sees her surgeon tomorrow for her back.        Treatment Objective(s) Addressed in This Session:   Increase interest, engagement, and pleasure in doing things  Decrease frequency and intensity of feeling down, depressed, hopeless       Intervention:   Processed the news about her mom with client. Utilized somatic experiencing skills to work though the freeze she's feeling regarding what to do about their relationship now with this news.  Worked through grief themes, encouraged client still to look into [email or web address].     Assessments completed prior to visit:  The following assessments were completed by patient for this visit:  PHQ9:       8/1/2024     5:43 PM 8/1/2024     5:45 PM 8/16/2024     9:20 AM 8/21/2024    12:23 PM 9/23/2024     9:18 AM 10/9/2024    10:49 AM 10/23/2024    12:36 PM   PHQ-9 SCORE   PHQ-9 Total Score MyChart 22 (Severe depression) 22 (Severe depression) 21 (Severe depression) 19 (Moderately severe depression) 20 (Severe depression) 18 (Moderately severe depression) 21 (Severe depression)   PHQ-9 Total Score 22    22 22 21 19 20 18 21        Patient-reported    Multiple values from one day are sorted in reverse-chronological order     GAD7:       12/29/2023     7:47 AM 3/1/2024     4:06 PM 6/27/2024     4:39 PM 7/30/2024    10:47 AM 8/21/2024    12:40 PM 9/23/2024     9:19 AM 10/9/2024    10:50 AM   LORETA-7 SCORE   Total Score 21 (severe anxiety)  15 (severe anxiety) 19 (severe anxiety) 21 (severe anxiety) 18 (severe anxiety) 16 (severe anxiety)   Total Score 21 17 15 19 21    21 18 16         ASSESSMENT: Current Emotional / Mental Status (status of significant symptoms):   Risk status (Self / Other harm or suicidal  ideation)   Patient denies current fears or concerns for personal safety.   Patient denies current or recent suicidal ideation or behaviors.   Patient denies current or recent homicidal ideation or behaviors.   Patient denies current or recent self injurious behavior or ideation.   Patient denies other safety concerns.   Patient reports there has been no change in risk factors since their last session.     Patient reports there has been no change in protective factors since their last session.     Recommended that patient call 911 or go to the local ED should there be a change in any of these risk factors.     Appearance:   Appropriate    Eye Contact:   Good    Psychomotor Behavior: Normal    Attitude:   Cooperative    Orientation:   All   Speech    Rate / Production: Normal/ Responsive    Volume:  Normal    Mood:    Depressed  Normal Sad    Affect:    Appropriate  Tearful   Thought Content:  Clear    Thought Form:  Coherent  Logical    Insight:    Good      Medication Review:   No changes to current psychiatric medication(s)     Medication Compliance:   Yes     Changes in Health Issues:   None reported     Chemical Use Review:   Substance Use: Chemical use reviewed, no active concerns identified      Tobacco Use: No current tobacco use.      Diagnosis:  1. Major depressive disorder, recurrent episode, moderate (H)        Collateral Reports Completed:   Not Applicable    PLAN: (Patient Tasks / Therapist Tasks / Other)  Client will work on self care and stress reduction skills.         Yuliet Moscoso [facility]                                                         ______________________________________________________________________    Individual Treatment Plan    Patient's Name: Eva Solis  YOB: 1960    Date of Creation: 8/26/24  Date Treatment Plan Last Reviewed/Revised: 8/26/24    DSM5 Diagnoses: 296.32 (F33.1) Major Depressive Disorder, Recurrent Episode, Moderate _  Psychosocial / Contextual  Factors: sister recently passed away  PROMIS (reviewed every 90 days):     Referral / Collaboration:  Referral to another professional/service is not indicated at this time..    Anticipated number of session for this episode of care: 6-9 sessions  Anticipation frequency of session: Every other week  Anticipated Duration of each session: 38-52 minutes  Treatment plan will be reviewed in 90 days or when goals have been changed.       MeasurableTreatment Goal(s) related to diagnosis / functional impairment(s)  Goal 1: Patient will increase anxiety reduction skills and regulation skills    I will know I've met my goal when I'm able to socialize more again and be okay with having people over.      Objective #A (Patient Action)    Patient will identify three distraction and diversion activities and use those activities to decrease level of anxiety  .  Status: New - Date: 8/26/24      Intervention(s)  Therapist will teach emotional regulation skills. distress tolerance skills, interpersonal effectiveness skills, emotion regluation skills, mindfulness skills, radical acceptance. Therapist will teach client how to ID body cues for anxiety, anxiety reduction techniques, how to ID triggers for depression and anxiety- decrease reactivity/eliminate, lifestyle changes to reduce depression and anxiety, communication skills, explore cognitive beliefs and help client to develop healthy cognitive patterns and beliefs.     Objective #B  Patient will Increase interest, engagement, and pleasure in doing things.  Status: New - Date: 8/26/24      Intervention(s)  Therapist will teach emotional regulation skills. distress tolerance skills, interpersonal effectiveness skills, emotion regluation skills, mindfulness skills, radical acceptance. Therapist will teach client how to ID body cues for anxiety, anxiety reduction techniques, how to ID triggers for depression and anxiety- decrease reactivity/eliminate, lifestyle changes to reduce  depression and anxiety, communication skills, explore cognitive beliefs and help client to develop healthy cognitive patterns and beliefs.     Objective #C  Patient will initiate 2 social contact(s) per day for increasing lengths of time.  Status: New - Date: 8/26/24      Intervention(s)  Therapist will teach emotional regulation skills. distress tolerance skills, interpersonal effectiveness skills, emotion regluation skills, mindfulness skills, radical acceptance. Therapist will teach client how to ID body cues for anxiety, anxiety reduction techniques, how to ID triggers for depression and anxiety- decrease reactivity/eliminate, lifestyle changes to reduce depression and anxiety, communication skills, explore cognitive beliefs and help client to develop healthy cognitive patterns and beliefs.       Goal 2: Patient will process through her grief of losing her sister    I will know I've met my goal when I don't feel like I need to cry everyday.      Objective #A (Patient Action)    Status: New - Date: 8/26/24      Patient will increase understanding of steps in the grief process.    Intervention(s)  Therapist will teach emotional regulation skills. distress tolerance skills, interpersonal effectiveness skills, emotion regluation skills, mindfulness skills, radical acceptance. Therapist will teach client how to ID body cues for anxiety, anxiety reduction techniques, how to ID triggers for depression and anxiety- decrease reactivity/eliminate, lifestyle changes to reduce depression and anxiety, communication skills, explore cognitive beliefs and help client to develop healthy cognitive patterns and beliefs.     Objective #B  Patient will talk to at least two others about losses and coping.    Status: New - Date: 8/26/24      Intervention(s)  Therapist will teach emotional regulation skills. distress tolerance skills, interpersonal effectiveness skills, emotion regluation skills, mindfulness skills, radical acceptance.  Therapist will teach client how to ID body cues for anxiety, anxiety reduction techniques, how to ID triggers for depression and anxiety- decrease reactivity/eliminate, lifestyle changes to reduce depression and anxiety, communication skills, explore cognitive beliefs and help client to develop healthy cognitive patterns and beliefs.     Objective #C  Patient will Decrease frequency and intensity of feeling down, depressed, hopeless.  Status: New - Date: 8/26/24      Intervention(s)  Therapist will teach emotional regulation skills. distress tolerance skills, interpersonal effectiveness skills, emotion regluation skills, mindfulness skills, radical acceptance. Therapist will teach client how to ID body cues for anxiety, anxiety reduction techniques, how to ID triggers for depression and anxiety- decrease reactivity/eliminate, lifestyle changes to reduce depression and anxiety, communication skills, explore cognitive beliefs and help client to develop healthy cognitive patterns and beliefs.       Patient has reviewed and agreed to the above plan.      Yuliet Moscoso, [facility]  August 26, 2024

## 2024-10-24 ENCOUNTER — PRE VISIT (OUTPATIENT)
Dept: NEUROSURGERY | Facility: CLINIC | Age: 64
End: 2024-10-24

## 2024-10-24 ENCOUNTER — OFFICE VISIT (OUTPATIENT)
Dept: NEUROSURGERY | Facility: CLINIC | Age: 64
End: 2024-10-24
Attending: FAMILY MEDICINE
Payer: COMMERCIAL

## 2024-10-24 VITALS
DIASTOLIC BLOOD PRESSURE: 86 MMHG | HEART RATE: 95 BPM | SYSTOLIC BLOOD PRESSURE: 149 MMHG | RESPIRATION RATE: 16 BRPM | BODY MASS INDEX: 39.84 KG/M2 | OXYGEN SATURATION: 97 % | WEIGHT: 242 LBS

## 2024-10-24 DIAGNOSIS — M51.369 BULGING LUMBAR DISC: ICD-10-CM

## 2024-10-24 DIAGNOSIS — M51.362 DEGENERATION OF INTERVERTEBRAL DISC OF LUMBAR REGION WITH DISCOGENIC BACK PAIN AND LOWER EXTREMITY PAIN: ICD-10-CM

## 2024-10-24 DIAGNOSIS — M51.360 DEGENERATION OF INTERVERTEBRAL DISC OF LUMBAR REGION WITH DISCOGENIC BACK PAIN: ICD-10-CM

## 2024-10-24 PROCEDURE — 99213 OFFICE O/P EST LOW 20 MIN: CPT | Performed by: NEUROLOGICAL SURGERY

## 2024-10-24 ASSESSMENT — PAIN SCALES - GENERAL: PAINLEVEL_OUTOF10: MODERATE PAIN (5)

## 2024-10-24 NOTE — LETTER
10/24/2024      Eva Solis  [address]  [place] [address]      Dear Colleague,    Thank you for referring your patient, Eva Solis, to the [facility] [place]. Please see a copy of my visit note below.    BP still elevated after recheck. Advised to review with PCP.    Courtney Pearce RN on 10/24/2024 at 12:22 PM      64 year old female s/p L3-5 laminectomy in 2021 returns with back pain.  Notes a year of worsening, throbbing bilateral low back pain, worse when she stands still.  Denies leg pain or symptoms.  Physical therapy and medical management including NSAIDs without improvement.  MR lumbar, personally reviewed, with improved canal patency from L3-L5, worsening disc degeneration and spondylotic change, L3-5 facet arthropathy and Modic changes in multiple vertebra.       Past Medical History:   Diagnosis Date     Congestive heart failure (H) 3years     Depressive disorder 1-2 years    dealing with on a daily basis     DEPRESSIVE DISORDER NEC 05/04/2004     Diastolic dysfunction      Essential hypertension, benign      Focal Sigmoid diverticulitis 05/25/2023     Generalized osteoarthrosis, unspecified site     knees     Headache(784.0)      Heart disease 3 years    CHF     Lower GI bleed 05/25/2023     Lumbar stenosis with neurogenic claudication 07/01/2021    Added automatically from request for surgery [id number]     Mixed anxiety depressive disorder 01/15/2016     PANIC DISORDER 05/04/2004     Past Surgical History:   Procedure Laterality Date     COLONOSCOPY  10/06/10    attempt at colonscopy to 50cm     INJECT EPIDURAL LUMBAR N/A 10/18/2019    Procedure: INJECTION, SPINE, LUMBAR 4 - LUMBAR 5, EPIDURAL TRANSLAMINAR;  Surgeon: Trever Wheatley MD;  Location: [facility] OR     INJECT EPIDURAL LUMBAR N/A 6/12/2020    Procedure: INJECTION, SPINE, LUMBAR 4-5, EPIDURAL TRANSLAMINAR;  Surgeon: Trever Wheatley MD;  Location: [facility] OR     INJECT EPIDURAL LUMBAR N/A 4/22/2021    Procedure:  Lumbar 4-5 Epidural Injection;  Surgeon: Trever Wheatley MD;  Location: [facility] OR     JOINT REPLACEMTN, KNEE RT/LT  2006    Right total knee arthroplasty.     JOINT REPLACEMTN, KNEE RT/LT  2006    Left total knee arthroplasty.     LAMINECTOMY LUMBAR POSTERIOR MICROSCOPIC TWO LEVELS N/A 2021    Procedure: Lumbar 3 to Lumbar 5 Laminectomy;  Surgeon: Leonard Wallis MD;  Location: [facility] OR     ZZC  DELIVERY ONLY      , Low Cervical     ZZC  DELIVERY ONLY       ZZC VAGINAL HYSTERECTOMY      without oophorectomy     ZZHC COLONOSCOPY W/WO BRUSH/WASH  2005    Diverticulosis.  Internal hemorrhoids.         Physical Exam  BP (!) 149/86   Pulse 95   Resp 16   Wt 109.8 kg (242 lb)   SpO2 97%   BMI 39.84 kg/m      Mental status:  Alert and Oriented x 3, speech is fluent.  HEENT:  PERRL.  EOM s intact.  Visual fields full to gross exam  Cranial nerves:  II-XII intact.   Motor:    Shoulder Abduction:  Right:  5/5   Left:  5/5  Biceps:                      Right:  5/5   Left:  5/5  Triceps:                     Right:  5/5   Left:  5/5  Interosseus :             Right:  5/5   Left:  5/5  Hip Flexion:               Right: 5/5  Left:  5/5  Quadriceps:              Right:  5/5  Left:  5/5  Hamstrings:              Right:  5/5  Left:  5/5  Gastroc Soleus:        Right:  5/5  Left:  5/5  Tib/Ant:                      Right:  5/5  Left:  5/5  EHL:                          Right:  5/5  Left:  5/5  Sensation:  Intact  Reflexes:  Negative Saenz's.  Smooth tandem walking.      A/P:  64 year old female s/p L3-5 laminectomy in  returns with back pain    I had a discussion with the patient, reviewing the history, symptoms, and imaging  Leg pain and claudications resolved, with improved canal patency  Worsening back pain in the setting of significant spondylotic change and degenerative change  Will plan for L3-5 MBB/RFA  If no improvement, may potentially refer for Intracept           Again, thank you for allowing me to participate in the care of your patient.        Sincerely,        Leonard Wallis MD

## 2024-10-24 NOTE — PROGRESS NOTES
Referral faxed to [place] for injection with Dr. Read at [facility] fax: [phone]. Verified via RightFax.    Courtney Pearce RN on 10/24/2024 at 2:07 PM

## 2024-10-24 NOTE — PROGRESS NOTES
64 year old female s/p L3-5 laminectomy in 2021 returns with back pain.  Notes a year of worsening, throbbing bilateral low back pain, worse when she stands still.  Denies leg pain or symptoms.  Physical therapy and medical management including NSAIDs without improvement.  MR michael, personally reviewed, with improved canal patency from L3-L5, worsening disc degeneration and spondylotic change, L3-5 facet arthropathy and Modic changes in multiple vertebra.       Past Medical History:   Diagnosis Date    Congestive heart failure (H) 3years    Depressive disorder 1-2 years    dealing with on a daily basis    DEPRESSIVE DISORDER NEC 05/04/2004    Diastolic dysfunction     Essential hypertension, benign     Focal Sigmoid diverticulitis 05/25/2023    Generalized osteoarthrosis, unspecified site     knees    Headache(784.0)     Heart disease 3 years    CHF    Lower GI bleed 05/25/2023    Lumbar stenosis with neurogenic claudication 07/01/2021    Added automatically from request for surgery [id number]    Mixed anxiety depressive disorder 01/15/2016    PANIC DISORDER 05/04/2004     Past Surgical History:   Procedure Laterality Date    COLONOSCOPY  10/06/10    attempt at colonscopy to 50cm    INJECT EPIDURAL LUMBAR N/A 10/18/2019    Procedure: INJECTION, SPINE, LUMBAR 4 - LUMBAR 5, EPIDURAL TRANSLAMINAR;  Surgeon: Trever Wheatley MD;  [facility]    INJECT EPIDURAL LUMBAR N/A 6/12/2020    Procedure: INJECTION, SPINE, LUMBAR 4-5, EPIDURAL TRANSLAMINAR;  Surgeon: Trever Wheatley MD;  Location: [facility]    INJECT EPIDURAL LUMBAR N/A 4/22/2021    Procedure: Lumbar 4-5 Epidural Injection;  Surgeon: Trever Wheatley MD;  Location: [facility]    JOINT REPLACEMTN, KNEE RT/LT  5/11/2006    Right total knee arthroplasty.    JOINT REPLACEMTN, KNEE RT/LT  07/19/2006    Left total knee arthroplasty.    LAMINECTOMY LUMBAR POSTERIOR MICROSCOPIC TWO LEVELS N/A 9/17/2021    Procedure: Lumbar 3 to Lumbar 5 Laminectomy;  Surgeon: Leonard Wallis,  MD;  Location:  OR    Z  DELIVERY ONLY      , Low Cervical    [facility]  DELIVERY ONLY      Z VAGINAL HYSTERECTOMY      without oophorectomy    ZZ COLONOSCOPY W/WO BRUSH/WASH  2005    Diverticulosis.  Internal hemorrhoids.         Physical Exam  BP (!) 149/86   Pulse 95   Resp 16   Wt 109.8 kg (242 lb)   SpO2 97%   BMI 39.84 kg/m      Mental status:  Alert and Oriented x 3, speech is fluent.  HEENT:  PERRL.  EOM s intact.  Visual fields full to gross exam  Cranial nerves:  II-XII intact.   Motor:    Shoulder Abduction:  Right:  5/5   Left:  5/5  Biceps:                      Right:  5/5   Left:  5/5  Triceps:                     Right:  5/5   Left:  5/5  Interosseus :             Right:  5/5   Left:  5/5  Hip Flexion:               Right: 5/5  Left:  5/5  Quadriceps:              Right:  5/5  Left:  5/5  Hamstrings:              Right:  5/5  Left:  5/5  Gastroc Soleus:        Right:  5/5  Left:  5/5  Tib/Ant:                      Right:  5/5  Left:  5/5  EHL:                          Right:  5/5  Left:  5/5  Sensation:  Intact  Reflexes:  Negative Saenz's.  Smooth tandem walking.      A/P:  64 year old female s/p L3-5 laminectomy in  returns with back pain    I had a discussion with the patient, reviewing the history, symptoms, and imaging  Leg pain and claudications resolved, with improved canal patency  Worsening back pain in the setting of significant spondylotic change and degenerative change  Will plan for L3-5 MBB/RFA  If no improvement, may potentially refer for Intracept

## 2024-10-24 NOTE — NURSING NOTE
"Rosanne Solis is a 64 year old female who presents for:  Chief Complaint   Patient presents with    Neurologic Problem       DDD (degenerative disc disease), lumbar    Degeneration of intervertebral disc of lumbar region with discogenic back pain and lower extremity pain    Bulging lumbar disc          Initial Vitals:  BP (!) 148/92   Pulse 95   Resp 16   Wt 242 lb (109.8 kg)   SpO2 97%   BMI 39.84 kg/m   Estimated body mass index is 39.84 kg/m  as calculated from the following:    Height as of 8/16/24: 5' 5.35\" (1.66 m).    Weight as of this encounter: 242 lb (109.8 kg).. Body surface area is 2.25 meters squared. BP completed using cuff size: regular  Moderate Pain (5)    Nursing Comments: ROSANNE to follow up with Primary Care provider regarding elevated blood pressure.      Mirela Galloway    "

## 2024-10-24 NOTE — PATIENT INSTRUCTIONS
Ordered a L3-5 Medial Branch Block/Radiofrequency Ablation (MBB/RFA) in [facility] with Dr. Read. Dr. Read's scheduling number is [phone].     This injection order consists of a series of up to 3 injections. The first two injections are medial branch blocks. They are diagnostic, meaning they are not intended to provide lasting pain relief. After the first MBB injection, you will be sent home with a pain log. When complete, you will follow the instructions to send back to the clinic. We will read the results and call you to let you know if results are positive and if you will move forward with the next injection (MBB #2). If the 2nd MBB is also positive, you will move on to the 3rd injection which is the Radiofrequency Ablation. The RFA is the therapeutic injection intended to provide lasting pain relief. RFA uses an electrical current created by radio waves. The radio waves make heat that destroys nerves along the spine that are causing pain.    [facility]  [facility] - [address] [address]  Telephone:  [phone]        Fax:  [phone]

## 2024-10-24 NOTE — PROGRESS NOTES
BP still elevated after recheck. Advised to review with PCP.    Courtney Pearce RN on 10/24/2024 at 12:22 PM

## 2024-10-29 ENCOUNTER — THERAPY VISIT (OUTPATIENT)
Dept: PHYSICAL THERAPY | Facility: OTHER | Age: 64
End: 2024-10-29
Attending: FAMILY MEDICINE
Payer: COMMERCIAL

## 2024-10-29 DIAGNOSIS — M51.369 DDD (DEGENERATIVE DISC DISEASE), LUMBAR: Primary | ICD-10-CM

## 2024-10-29 PROCEDURE — 97140 MANUAL THERAPY 1/> REGIONS: CPT | Mod: GP

## 2024-10-29 PROCEDURE — 97110 THERAPEUTIC EXERCISES: CPT | Mod: GP

## 2024-10-29 NOTE — PROGRESS NOTES
10/29/24 1025   Appointment Info   Signing clinician's name / credentials Loy Hayes PT   Visits Used 10   Medical Diagnosis DDD (degenerative disc disease), lumbar (M51.36)   PT Tx Diagnosis Chronic low back pain   Precautions/Limitations None   Other pertinent information 1/10   Progress Note/Certification   Onset of illness/injury or Date of Surgery 08/02/24  (Referral for chronic LBP  issues)   Therapy Frequency 2 times per week   Predicted Duration 8 weeks   Progress Note Due Date 10/10/24       Present No   GOALS   PT Goals 2;3;4   PT Goal 1   Goal Identifier Pain   Goal Description Patient will report a steady decrease in chronic low back pain to no higher than 3/10 at worst allowing for increase in her ability to walk for recreation and exercise.  Patient will be able to walk with normal gait pattern for distance of up to 1/2 mile in 4 to 6 weeks.  She will be able to complete the therapeutic exercise program without limitation due to pain in 3 to 4 weeks.  She will be able to walk for up to a mile without limitation due to low back pain within 6 to 8 weeks.   Goal Progress Intermittent days of reduced back pain. However, persistent primarily left low back pain that significantly limits walking or any type of activity.   Target Date 11/08/24   PT Goal 2   Goal Identifier Mobility   Goal Description Patient will demonstrate at least 75 to 80% of nml lumbopelvic mobility without limitation due to either soft tissue restriction and/or low back pain allowing for normal gait pattern, the ability to ambulate up and down slopes and on uneven terrain, improving the ability to complete tasks around the home which require bending twisting turning for tasks including cleaning the floors walls mima, and yard and garden work.  She will achieve these goals within 6 to 8 weeks.   Goal Progress Persistent limited mobility due to significant back pain with movement. Patient is working through  pain avoidance .   Target Date 11/08/24   PT Goal 3   Goal Identifier Gait   Goal Description Patient will be able to walk for distance of up to 1 mile with normal gait velocity without limitation due to low back pain and/or limited mobility at least 2-3 times per week in 6 to 8 weeks.   Goal Progress Up to 1/4 mile max. Many days this is not possible.   Target Date 11/08/24   PT Goal 4   Goal Identifier Strength   Goal Description Patient will demonstrate at least grade 4+/5 MMT strength throughout the entire hip knee and ankle muscle group as well as the core region.  Patient will be able to complete a core strengthening program with progressively more aggressive exercises and will develop consistency with her home program home within 6 to 8 weeks.  Improve strength will restore her ability to ambulate as she desires for exercise and recreation on all surfaces including outdoor wooded trails.  She will accomplish these goals within 6 to 8 weeks.   Target Date 11/08/24   Subjective Report   Subjective Report Patient reports she had seen Dr. Bimal ureña. her back. He will be proceeding with RFA.  radio frequency ablation. Reports walking is very difficult currently.   Objective Measures   Objective Measures Objective Measure 1;Objective Measure 2   Objective Measure 1   Objective Measure Lumbar mobility   Objective Measure 2   Objective Measure Gait endurance   Details up to 1/4 mile on good days.   Treatment Interventions (PT)   Interventions Therapeutic Procedure/Exercise;Manual Therapy   Therapeutic Procedure/Exercise   Therapeutic Procedures: strength, endurance, ROM, flexibility minutes ([id number]) 10   Ther Proc 1 Therapeutic exercise   Ther Proc 1 - Details Reviewed need to maintain consistency with her HEP during the period of time she is recieving RFA.  Current HEP; PPT performance with verbal and tactile cues. Added PPT with single leg lift and supine clams with PPT. x 10 ea.  NuStep (or similar cardio) 10' at  level 4, seat and arm position 9 to activate muscles /cardio training. Reviewed as needed HS stretch in sitting,  piriformis stretch in supine and sitting, lumbar rotation stretches in supine, Continue PPT  with out knee flexion. Continue need significant tactile cues, verbal encouragement.   #1 supine lumbar rotation stretching, #2 knee-to-chest stretching, #3 posterior pelvic tilts, #4 lower extremity strengthening including straight leg raise for flexion, abduction.  #5 seated flank stretching, #6 discussed the PT POC. Long discussion re. patients walking program.   Skilled Intervention Yes; instruction and demonstration   Manual Therapy   Manual Therapy: Mobilization, MFR, MLD, friction massage minutes ([id number]) 30   Manual Therapy 1 Mobilization   Manual Therapy 1 - Details ADDED left hip /SIJ mobilization to relax soft tissues and improve mobility. Continue grade 2-3 mobilization of the L3-L5 facet joints left side with P/A extension mobilization through general lumbar spine and direct SI joint mobilization bilaterally.   Skilled Intervention Yes mobilization to improve lumbar mobility   Patient Response/Progress Again patient demonstrates difficulty getting from supine to sit and then set up to a standing position due to lumbar discomfort.  After some steps in the treatment area she was able to ambulate out to the waiting room.   Education   Learner/Method Patient   Plan   Home program As above.  Patient's home exercise program will be reviewed and revised as indicated   Plan for next session Patient will continue with her cuirrent HEP pending discusion with her neuro surgeon re. PT treatment during the RFA process. We will follow up in 2 weeks. She is encouraged to keep up with walking and all activity as able. Continue stretching program.  Focus on increasing range of motion through the lower extremities and lumbar region progressing core and strengthening exercises as able. Develop HEP.   Total Session Time    Timed Code Treatment Minutes 40   Total Treatment Time (sum of timed and untimed services) 40         PLAN  Continue therapy per current plan of care. May defer during the RFA process.     Beginning/End Dates of Progress Note Reporting Period:    to 10/29/2024    Referring Provider:  Renetta Benitez

## 2024-11-06 ENCOUNTER — VIRTUAL VISIT (OUTPATIENT)
Dept: PSYCHOLOGY | Facility: CLINIC | Age: 64
End: 2024-11-06
Payer: COMMERCIAL

## 2024-11-06 DIAGNOSIS — F33.1 MAJOR DEPRESSIVE DISORDER, RECURRENT EPISODE, MODERATE (H): Primary | ICD-10-CM

## 2024-11-06 PROCEDURE — 90834 PSYTX W PT 45 MINUTES: CPT | Mod: 95 | Performed by: COUNSELOR

## 2024-11-06 ASSESSMENT — ANXIETY QUESTIONNAIRES
8. IF YOU CHECKED OFF ANY PROBLEMS, HOW DIFFICULT HAVE THESE MADE IT FOR YOU TO DO YOUR WORK, TAKE CARE OF THINGS AT HOME, OR GET ALONG WITH OTHER PEOPLE?: EXTREMELY DIFFICULT
5. BEING SO RESTLESS THAT IT IS HARD TO SIT STILL: MORE THAN HALF THE DAYS
3. WORRYING TOO MUCH ABOUT DIFFERENT THINGS: NEARLY EVERY DAY
1. FEELING NERVOUS, ANXIOUS, OR ON EDGE: NEARLY EVERY DAY
IF YOU CHECKED OFF ANY PROBLEMS ON THIS QUESTIONNAIRE, HOW DIFFICULT HAVE THESE PROBLEMS MADE IT FOR YOU TO DO YOUR WORK, TAKE CARE OF THINGS AT HOME, OR GET ALONG WITH OTHER PEOPLE: EXTREMELY DIFFICULT
4. TROUBLE RELAXING: NEARLY EVERY DAY
6. BECOMING EASILY ANNOYED OR IRRITABLE: MORE THAN HALF THE DAYS
7. FEELING AFRAID AS IF SOMETHING AWFUL MIGHT HAPPEN: NEARLY EVERY DAY
2. NOT BEING ABLE TO STOP OR CONTROL WORRYING: NEARLY EVERY DAY
7. FEELING AFRAID AS IF SOMETHING AWFUL MIGHT HAPPEN: NEARLY EVERY DAY
GAD7 TOTAL SCORE: 19

## 2024-11-06 ASSESSMENT — PATIENT HEALTH QUESTIONNAIRE - PHQ9
10. IF YOU CHECKED OFF ANY PROBLEMS, HOW DIFFICULT HAVE THESE PROBLEMS MADE IT FOR YOU TO DO YOUR WORK, TAKE CARE OF THINGS AT HOME, OR GET ALONG WITH OTHER PEOPLE: EXTREMELY DIFFICULT
SUM OF ALL RESPONSES TO PHQ QUESTIONS 1-9: 23
SUM OF ALL RESPONSES TO PHQ QUESTIONS 1-9: 23

## 2024-11-06 NOTE — PROGRESS NOTES
Answers submitted by the patient for this visit:  Patient Health Questionnaire (Submitted on 11/6/2024)  If you checked off any problems, how difficult have these problems made it for you to do your work, take care of things at home, or get along with other people?: Extremely difficult  PHQ9 TOTAL SCORE: 23  Patient Health Questionnaire (G7) (Submitted on 11/6/2024)  LORETA 7 TOTAL SCORE: 19        [facility]                                     Progress Note    Patient Name: Eva Solis  Date: 11/6/24         Service Type: Individual      Session Start Time: 2:05pm  Session End Time: 2:50pm     Session Length: 45mins    Session #: 6    Attendees: Client    Service Modality:  Video Visit:      Provider verified identity through the following two step process.  Patient provided:  Patient is known previously to provider    Telemedicine Visit: The patient's condition can be safely assessed and treated via synchronous audio and visual telemedicine encounter.      Reason for Telemedicine Visit: Patient convenience (e.g. access to timely appointments / distance to available provider)    Originating Site (Patient Location): Patient's home    Distant Site (Provider Location): Provider Remote Setting- Home Office    Consent:  The patient/guardian has verbally consented to: the potential risks and benefits of telemedicine (video visit) versus in person care; bill my insurance or make self-payment for services provided; and responsibility for payment of non-covered services.     Patient would like the video invitation sent by:  My Chart    Mode of Communication:  Video Conference via [employer]    Distant Location (Provider):  Off-site    As the provider I attest to compliance with applicable laws and regulations related to telemedicine.    DATA  Interactive Complexity: No  Crisis: No        Progress Since Last Session (Related to Symptoms / Goals / Homework):   Symptoms: No change .    Homework: Completed in  "session      Episode of Care Goals: Minimal progress - ACTION (Actively working towards change); Intervened by reinforcing change plan / affirming steps taken     Current / Ongoing Stressors and Concerns:  The client stated she received more information about her mom's cancer. She has several tumors throughout her body and skin cancer. They are giving her 3-6 months to live.   Is planning to go see her mom.   Did go visit with her mom's side of the family relatives who didn't even know she was sick.   Saw her surgeon- he refuses to do any fusions. That is a last resort. They are going to do a radio frequency abrasion on the 19th.        Treatment Objective(s) Addressed in This Session:   Increase interest, engagement, and pleasure in doing things  Decrease frequency and intensity of feeling down, depressed, hopeless       Intervention:   Processed through the client's feelings of grief. Educated the client about the complexities of the grief process and how she's re-grieving even her sister's death currently. Worked with client on redirecting cognitive distortions of \"it's my fault our family is like this,\" or \"I've failed my children.\" Encouraged client to do the letter to mom writing exercise still especially before she goes to see her in person.      Assessments completed prior to visit:  The following assessments were completed by patient for this visit:  PHQ9:       8/1/2024     5:45 PM 8/16/2024     9:20 AM 8/21/2024    12:23 PM 9/23/2024     9:18 AM 10/9/2024    10:49 AM 10/23/2024    12:36 PM 11/6/2024     1:42 PM   PHQ-9 SCORE   PHQ-9 Total Score MyChart 22 (Severe depression) 21 (Severe depression) 19 (Moderately severe depression) 20 (Severe depression) 18 (Moderately severe depression) 21 (Severe depression) 23 (Severe depression)   PHQ-9 Total Score 22 21 19 20 18 21  23        Patient-reported     GAD7:       3/1/2024     4:06 PM 6/27/2024     4:39 PM 7/30/2024    10:47 AM 8/21/2024    12:40 PM " 9/23/2024     9:19 AM 10/9/2024    10:50 AM 11/6/2024     1:43 PM   LORETA-7 SCORE   Total Score  15 (severe anxiety) 19 (severe anxiety) 21 (severe anxiety) 18 (severe anxiety) 16 (severe anxiety) 19 (severe anxiety)   Total Score 17 15 19 21    21 18 16 19        Patient-reported    Multiple values from one day are sorted in reverse-chronological order         ASSESSMENT: Current Emotional / Mental Status (status of significant symptoms):   Risk status (Self / Other harm or suicidal ideation)   Patient denies current fears or concerns for personal safety.   Patient denies current or recent suicidal ideation or behaviors.   Patient denies current or recent homicidal ideation or behaviors.   Patient denies current or recent self injurious behavior or ideation.   Patient denies other safety concerns.   Patient reports there has been no change in risk factors since their last session.     Patient reports there has been no change in protective factors since their last session.     Recommended that patient call 911 or go to the local ED should there be a change in any of these risk factors.     Appearance:   Appropriate    Eye Contact:   Good    Psychomotor Behavior: Normal    Attitude:   Cooperative    Orientation:   All   Speech    Rate / Production: Normal/ Responsive    Volume:  Normal    Mood:    Depressed  Normal Sad    Affect:    Appropriate  Tearful   Thought Content:  Clear    Thought Form:  Coherent  Logical    Insight:    Good      Medication Review:   No changes to current psychiatric medication(s)     Medication Compliance:   Yes     Changes in Health Issues:   None reported     Chemical Use Review:   Substance Use: Chemical use reviewed, no active concerns identified      Tobacco Use: No current tobacco use.      Diagnosis:  1. Major depressive disorder, recurrent episode, moderate (H)        Collateral Reports Completed:   Not Applicable    PLAN: (Patient Tasks / Therapist Tasks / Other)  Client will work on  self care and stress reduction skills.   Work on redirecting negative self talk.        [place]ul[place] , [facility]                                                         ______________________________________________________________________    Individual Treatment Plan    Patient's Name: Eva Solis  YOB: 1960    Date of Creation: 8/26/24  Date Treatment Plan Last Reviewed/Revised: 11/6/24    DSM5 Diagnoses: 296.32 (F33.1) Major Depressive Disorder, Recurrent Episode, Moderate _  Psychosocial / Contextual Factors: sister recently passed away  PROMIS (reviewed every 90 days):     Referral / Collaboration:  Referral to another professional/service is not indicated at this time..    Anticipated number of session for this episode of care: 6-9 sessions  Anticipation frequency of session: Every other week  Anticipated Duration of each session: 38-52 minutes  Treatment plan will be reviewed in 90 days or when goals have been changed.       MeasurableTreatment Goal(s) related to diagnosis / functional impairment(s)  Goal 1: Patient will increase anxiety reduction skills and regulation skills    I will know I've met my goal when I'm able to socialize more again and be okay with having people over.      Objective #A (Patient Action)    Patient will identify three distraction and diversion activities and use those activities to decrease level of anxiety  .  Status: Continued - Date(s):11/6/24     Intervention(s)  Therapist will teach emotional regulation skills. distress tolerance skills, interpersonal effectiveness skills, emotion regluation skills, mindfulness skills, radical acceptance. Therapist will teach client how to ID body cues for anxiety, anxiety reduction techniques, how to ID triggers for depression and anxiety- decrease reactivity/eliminate, lifestyle changes to reduce depression and anxiety, communication skills, explore cognitive beliefs and help client to develop healthy cognitive patterns and  beliefs.     Objective #B  Patient will Increase interest, engagement, and pleasure in doing things.  Status: Continued - Date(s):11/6/24     Intervention(s)  Therapist will teach emotional regulation skills. distress tolerance skills, interpersonal effectiveness skills, emotion regluation skills, mindfulness skills, radical acceptance. Therapist will teach client how to ID body cues for anxiety, anxiety reduction techniques, how to ID triggers for depression and anxiety- decrease reactivity/eliminate, lifestyle changes to reduce depression and anxiety, communication skills, explore cognitive beliefs and help client to develop healthy cognitive patterns and beliefs.     Objective #C  Patient will initiate 2 social contact(s) per day for increasing lengths of time.  Status: Continued - Date(s):11/6/24     Intervention(s)  Therapist will teach emotional regulation skills. distress tolerance skills, interpersonal effectiveness skills, emotion regluation skills, mindfulness skills, radical acceptance. Therapist will teach client how to ID body cues for anxiety, anxiety reduction techniques, how to ID triggers for depression and anxiety- decrease reactivity/eliminate, lifestyle changes to reduce depression and anxiety, communication skills, explore cognitive beliefs and help client to develop healthy cognitive patterns and beliefs.       Goal 2: Patient will process through her grief of losing her sister    I will know I've met my goal when I don't feel like I need to cry everyday.      Objective #A (Patient Action)    Status: Continued - Date(s):11/6/24     Patient will increase understanding of steps in the grief process.    Intervention(s)  Therapist will teach emotional regulation skills. distress tolerance skills, interpersonal effectiveness skills, emotion regluation skills, mindfulness skills, radical acceptance. Therapist will teach client how to ID body cues for anxiety, anxiety reduction techniques, how to ID  triggers for depression and anxiety- decrease reactivity/eliminate, lifestyle changes to reduce depression and anxiety, communication skills, explore cognitive beliefs and help client to develop healthy cognitive patterns and beliefs.     Objective #B  Patient will talk to at least two others about losses and coping.    Status: Continued - Date(s):11/6/24     Intervention(s)  Therapist will teach emotional regulation skills. distress tolerance skills, interpersonal effectiveness skills, emotion regluation skills, mindfulness skills, radical acceptance. Therapist will teach client how to ID body cues for anxiety, anxiety reduction techniques, how to ID triggers for depression and anxiety- decrease reactivity/eliminate, lifestyle changes to reduce depression and anxiety, communication skills, explore cognitive beliefs and help client to develop healthy cognitive patterns and beliefs.     Objective #C  Patient will Decrease frequency and intensity of feeling down, depressed, hopeless.  Status: Continued - Date(s):11/6/24     Intervention(s)  Therapist will teach emotional regulation skills. distress tolerance skills, interpersonal effectiveness skills, emotion regluation skills, mindfulness skills, radical acceptance. Therapist will teach client how to ID body cues for anxiety, anxiety reduction techniques, how to ID triggers for depression and anxiety- decrease reactivity/eliminate, lifestyle changes to reduce depression and anxiety, communication skills, explore cognitive beliefs and help client to develop healthy cognitive patterns and beliefs.       Patient has reviewed and agreed to the above plan.      EFRAIN Colindres  11/6/24

## 2024-11-19 ENCOUNTER — HOSPITAL ENCOUNTER (OUTPATIENT)
Dept: GENERAL RADIOLOGY | Facility: OTHER | Age: 64
Discharge: HOME | End: 2024-11-19
Attending: NEUROLOGICAL SURGERY
Payer: COMMERCIAL

## 2024-11-19 ENCOUNTER — MYC MEDICAL ADVICE (OUTPATIENT)
Dept: FAMILY MEDICINE | Facility: OTHER | Age: 64
End: 2024-11-19
Payer: COMMERCIAL

## 2024-11-19 DIAGNOSIS — M51.360 DEGENERATION OF INTERVERTEBRAL DISC OF LUMBAR REGION WITH DISCOGENIC BACK PAIN: ICD-10-CM

## 2024-11-19 PROCEDURE — 250N000009 HC RX 250: Performed by: RADIOLOGY

## 2024-11-19 PROCEDURE — 255N000002 HC RX 255 OP 636: Performed by: RADIOLOGY

## 2024-11-19 PROCEDURE — 250N000011 HC RX IP 250 OP 636: Mod: JW | Performed by: RADIOLOGY

## 2024-11-19 PROCEDURE — 64493 INJ PARAVERT F JNT L/S 1 LEV: CPT | Mod: 50

## 2024-11-19 PROCEDURE — 64494 INJ PARAVERT F JNT L/S 2 LEV: CPT

## 2024-11-19 RX ORDER — LIDOCAINE HYDROCHLORIDE 10 MG/ML
2 INJECTION, SOLUTION INFILTRATION; PERINEURAL ONCE
Status: COMPLETED | OUTPATIENT
Start: 2024-11-19 | End: 2024-11-19

## 2024-11-19 RX ORDER — BUPIVACAINE HYDROCHLORIDE 5 MG/ML
3 INJECTION, SOLUTION EPIDURAL; INTRACAUDAL ONCE
Status: COMPLETED | OUTPATIENT
Start: 2024-11-19 | End: 2024-11-19

## 2024-11-19 RX ADMIN — BUPIVACAINE HYDROCHLORIDE 8 ML: 5 INJECTION, SOLUTION EPIDURAL; INTRACAUDAL; PERINEURAL at 13:22

## 2024-11-19 RX ADMIN — LIDOCAINE HYDROCHLORIDE 6 ML: 10 INJECTION, SOLUTION INFILTRATION; PERINEURAL at 13:22

## 2024-11-19 RX ADMIN — IOHEXOL 3 ML: 240 INJECTION, SOLUTION INTRATHECAL; INTRAVASCULAR; INTRAVENOUS; ORAL at 13:23

## 2024-12-04 ENCOUNTER — VIRTUAL VISIT (OUTPATIENT)
Dept: PSYCHOLOGY | Facility: CLINIC | Age: 64
End: 2024-12-04
Payer: COMMERCIAL

## 2024-12-04 DIAGNOSIS — F33.1 MAJOR DEPRESSIVE DISORDER, RECURRENT EPISODE, MODERATE (H): Primary | ICD-10-CM

## 2024-12-04 PROCEDURE — 90834 PSYTX W PT 45 MINUTES: CPT | Mod: 95 | Performed by: COUNSELOR

## 2024-12-04 ASSESSMENT — ANXIETY QUESTIONNAIRES
3. WORRYING TOO MUCH ABOUT DIFFERENT THINGS: NEARLY EVERY DAY
GAD7 TOTAL SCORE: 18
GAD7 TOTAL SCORE: 18
2. NOT BEING ABLE TO STOP OR CONTROL WORRYING: NEARLY EVERY DAY
4. TROUBLE RELAXING: MORE THAN HALF THE DAYS
7. FEELING AFRAID AS IF SOMETHING AWFUL MIGHT HAPPEN: NEARLY EVERY DAY
5. BEING SO RESTLESS THAT IT IS HARD TO SIT STILL: MORE THAN HALF THE DAYS
1. FEELING NERVOUS, ANXIOUS, OR ON EDGE: NEARLY EVERY DAY
GAD7 TOTAL SCORE: 18
6. BECOMING EASILY ANNOYED OR IRRITABLE: MORE THAN HALF THE DAYS
IF YOU CHECKED OFF ANY PROBLEMS ON THIS QUESTIONNAIRE, HOW DIFFICULT HAVE THESE PROBLEMS MADE IT FOR YOU TO DO YOUR WORK, TAKE CARE OF THINGS AT HOME, OR GET ALONG WITH OTHER PEOPLE: EXTREMELY DIFFICULT
7. FEELING AFRAID AS IF SOMETHING AWFUL MIGHT HAPPEN: NEARLY EVERY DAY
8. IF YOU CHECKED OFF ANY PROBLEMS, HOW DIFFICULT HAVE THESE MADE IT FOR YOU TO DO YOUR WORK, TAKE CARE OF THINGS AT HOME, OR GET ALONG WITH OTHER PEOPLE?: EXTREMELY DIFFICULT

## 2024-12-04 ASSESSMENT — PATIENT HEALTH QUESTIONNAIRE - PHQ9
10. IF YOU CHECKED OFF ANY PROBLEMS, HOW DIFFICULT HAVE THESE PROBLEMS MADE IT FOR YOU TO DO YOUR WORK, TAKE CARE OF THINGS AT HOME, OR GET ALONG WITH OTHER PEOPLE: VERY DIFFICULT
SUM OF ALL RESPONSES TO PHQ QUESTIONS 1-9: 23
SUM OF ALL RESPONSES TO PHQ QUESTIONS 1-9: 23

## 2024-12-04 NOTE — PROGRESS NOTES
Answers submitted by the patient for this visit:  Patient Health Questionnaire (Submitted on 12/4/2024)  If you checked off any problems, how difficult have these problems made it for you to do your work, take care of things at home, or get along with other people?: Very difficult  PHQ9 TOTAL SCORE: 23  Patient Health Questionnaire (G7) (Submitted on 12/4/2024)  LORETA 7 TOTAL SCORE: 18          [facility]                                     Progress Note    Patient Name: Eva Solis  Date: 12/4/24         Service Type: Individual      Session Start Time: 2:05pm  Session End Time: 2:5pm     Session Length: 40mins    Session #: 7    Attendees: Client    Service Modality:  Video Visit:      Provider verified identity through the following two step process.  Patient provided:  Patient is known previously to provider    Telemedicine Visit: The patient's condition can be safely assessed and treated via synchronous audio and visual telemedicine encounter.      Reason for Telemedicine Visit: Patient convenience (e.g. access to timely appointments / distance to available provider)    Originating Site (Patient Location): Patient's home    Distant Site (Provider Location): Provider Remote Setting- Home Office    Consent:  The patient/guardian has verbally consented to: the potential risks and benefits of telemedicine (video visit) versus in person care; bill my insurance or make self-payment for services provided; and responsibility for payment of non-covered services.     Patient would like the video invitation sent by:  My Chart    Mode of Communication:  Video Conference via [facility]    Distant Location (Provider):  Off-site    As the provider I attest to compliance with applicable laws and regulations related to telemedicine.    DATA  Interactive Complexity: No  Crisis: No        Progress Since Last Session (Related to Symptoms / Goals / Homework):   Symptoms: No change .    Homework: Completed in  session      Episode of Care Goals: Minimal progress - ACTION (Actively working towards change); Intervened by reinforcing change plan / affirming steps taken     Current / Ongoing Stressors and Concerns:  The client stated her mom passed away. Got to see her beforehand a few times.   Her brother, Jose Luis, has blood cancer.   Looking forward to their [place] trip next year.   Got her first injections.        Treatment Objective(s) Addressed in This Session:   Increase interest, engagement, and pleasure in doing things  Decrease frequency and intensity of feeling down, depressed, hopeless       Intervention:   1. Empathy and Validation:     Used reflective listening to validate the client's grief experience. Acknowledged the pain of the loss and normalizing their emotional responses as part of the grieving process.  Cognitive Behavioral Therapy (CBT) Techniques:  Explored the client's thoughts surrounding the loss, particularly focusing on feelings of guilt or regret. Used reframing techniques to challenge any maladaptive thoughts. Client was encouraged to adopt a more balanced perspective on their grief and the meaning of their relationship with the .  Emotional Expression and Processing:  Encouraged emotional expression through guided journaling exercises and creative outlets (e.g., drawing, music). This helped the client articulate and release emotions they had not yet fully processed.  Meaning-Making:  Introduced a meaning-making exercise to help the client find new ways of honoring the memory of the loved one while adjusting to life without them. Discussed developing rituals or traditions that might bring comfort.  Grief Education:  Provided psychoeducation on the stages of grief (e.g., Kubler-Ross model) to normalize the client's experience and help them understand the non-linear nature of grieving.      Assessments completed prior to visit:  The following assessments were completed by patient for this  visit:  PHQ9:       8/16/2024     9:20 AM 8/21/2024    12:23 PM 9/23/2024     9:18 AM 10/9/2024    10:49 AM 10/23/2024    12:36 PM 11/6/2024     1:42 PM 12/4/2024     1:47 PM   PHQ-9 SCORE   PHQ-9 Total Score MyChart 21 (Severe depression) 19 (Moderately severe depression) 20 (Severe depression) 18 (Moderately severe depression) 21 (Severe depression) 23 (Severe depression) 23 (Severe depression)   PHQ-9 Total Score 21 19 20 18 21  23  23        Patient-reported     GAD7:       6/27/2024     4:39 PM 7/30/2024    10:47 AM 8/21/2024    12:40 PM 9/23/2024     9:19 AM 10/9/2024    10:50 AM 11/6/2024     1:43 PM 12/4/2024     1:49 PM   LORETA-7 SCORE   Total Score 15 (severe anxiety) 19 (severe anxiety) 21 (severe anxiety) 18 (severe anxiety) 16 (severe anxiety) 19 (severe anxiety) 18 (severe anxiety)   Total Score 15 19 21    21 18 16 19  18        Patient-reported    Multiple values from one day are sorted in reverse-chronological order         ASSESSMENT: Current Emotional / Mental Status (status of significant symptoms):   Risk status (Self / Other harm or suicidal ideation)   Patient denies current fears or concerns for personal safety.   Patient denies current or recent suicidal ideation or behaviors.   Patient denies current or recent homicidal ideation or behaviors.   Patient denies current or recent self injurious behavior or ideation.   Patient denies other safety concerns.   Patient reports there has been no change in risk factors since their last session.     Patient reports there has been no change in protective factors since their last session.     Recommended that patient call 911 or go to the local ED should there be a change in any of these risk factors.     Appearance:   Appropriate    Eye Contact:   Good    Psychomotor Behavior: Normal    Attitude:   Cooperative    Orientation:   All   Speech    Rate / Production: Normal/ Responsive    Volume:  Normal    Mood:    Depressed  Normal Sad     Affect:    Appropriate  Tearful   Thought Content:  Clear    Thought Form:  Coherent  Logical    Insight:    Good      Medication Review:   No changes to current psychiatric medication(s)     Medication Compliance:   Yes     Changes in Health Issues:   None reported     Chemical Use Review:   Substance Use: Chemical use reviewed, no active concerns identified      Tobacco Use: No current tobacco use.      Diagnosis:  1. Major depressive disorder, recurrent episode, moderate (H)        Collateral Reports Completed:   Not Applicable    PLAN: (Patient Tasks / Therapist Tasks / Other)  Continue to provide support in processing the client's grief using a combination of CBT, emotional expression, and grief education.  Focus on developing additional coping strategies, including relaxation techniques (e.g., mindfulness or deep breathing) to manage feelings of overwhelm.        Yuliet Moscoso, [facility]                                                         ______________________________________________________________________    Individual Treatment Plan    Patient's Name: Eva Solis  YOB: 1960    Date of Creation: 8/26/24  Date Treatment Plan Last Reviewed/Revised: 11/6/24    DSM5 Diagnoses: 296.32 (F33.1) Major Depressive Disorder, Recurrent Episode, Moderate _  Psychosocial / Contextual Factors: sister recently passed away  PROMIS (reviewed every 90 days):     Referral / Collaboration:  Referral to another professional/service is not indicated at this time..    Anticipated number of session for this episode of care: 6-9 sessions  Anticipation frequency of session: Every other week  Anticipated Duration of each session: 38-52 minutes  Treatment plan will be reviewed in 90 days or when goals have been changed.       MeasurableTreatment Goal(s) related to diagnosis / functional impairment(s)  Goal 1: Patient will increase anxiety reduction skills and regulation skills    I will know I've met my goal when  I'm able to socialize more again and be okay with having people over.      Objective #A (Patient Action)    Patient will identify three distraction and diversion activities and use those activities to decrease level of anxiety  .  Status: Continued - Date(s):11/6/24     Intervention(s)  Therapist will teach emotional regulation skills. distress tolerance skills, interpersonal effectiveness skills, emotion regluation skills, mindfulness skills, radical acceptance. Therapist will teach client how to ID body cues for anxiety, anxiety reduction techniques, how to ID triggers for depression and anxiety- decrease reactivity/eliminate, lifestyle changes to reduce depression and anxiety, communication skills, explore cognitive beliefs and help client to develop healthy cognitive patterns and beliefs.     Objective #B  Patient will Increase interest, engagement, and pleasure in doing things.  Status: Continued - Date(s):11/6/24     Intervention(s)  Therapist will teach emotional regulation skills. distress tolerance skills, interpersonal effectiveness skills, emotion regluation skills, mindfulness skills, radical acceptance. Therapist will teach client how to ID body cues for anxiety, anxiety reduction techniques, how to ID triggers for depression and anxiety- decrease reactivity/eliminate, lifestyle changes to reduce depression and anxiety, communication skills, explore cognitive beliefs and help client to develop healthy cognitive patterns and beliefs.     Objective #C  Patient will initiate 2 social contact(s) per day for increasing lengths of time.  Status: Continued - Date(s):11/6/24     Intervention(s)  Therapist will teach emotional regulation skills. distress tolerance skills, interpersonal effectiveness skills, emotion regluation skills, mindfulness skills, radical acceptance. Therapist will teach client how to ID body cues for anxiety, anxiety reduction techniques, how to ID triggers for depression and anxiety-  decrease reactivity/eliminate, lifestyle changes to reduce depression and anxiety, communication skills, explore cognitive beliefs and help client to develop healthy cognitive patterns and beliefs.       Goal 2: Patient will process through her grief of losing her sister    I will know I've met my goal when I don't feel like I need to cry everyday.      Objective #A (Patient Action)    Status: Continued - Date(s):11/6/24     Patient will increase understanding of steps in the grief process.    Intervention(s)  Therapist will teach emotional regulation skills. distress tolerance skills, interpersonal effectiveness skills, emotion regluation skills, mindfulness skills, radical acceptance. Therapist will teach client how to ID body cues for anxiety, anxiety reduction techniques, how to ID triggers for depression and anxiety- decrease reactivity/eliminate, lifestyle changes to reduce depression and anxiety, communication skills, explore cognitive beliefs and help client to develop healthy cognitive patterns and beliefs.     Objective #B  Patient will talk to at least two others about losses and coping.    Status: Continued - Date(s):11/6/24     Intervention(s)  Therapist will teach emotional regulation skills. distress tolerance skills, interpersonal effectiveness skills, emotion regluation skills, mindfulness skills, radical acceptance. Therapist will teach client how to ID body cues for anxiety, anxiety reduction techniques, how to ID triggers for depression and anxiety- decrease reactivity/eliminate, lifestyle changes to reduce depression and anxiety, communication skills, explore cognitive beliefs and help client to develop healthy cognitive patterns and beliefs.     Objective #C  Patient will Decrease frequency and intensity of feeling down, depressed, hopeless.  Status: Continued - Date(s):11/6/24     Intervention(s)  Therapist will teach emotional regulation skills. distress tolerance skills, interpersonal  effectiveness skills, emotion regluation skills, mindfulness skills, radical acceptance. Therapist will teach client how to ID body cues for anxiety, anxiety reduction techniques, how to ID triggers for depression and anxiety- decrease reactivity/eliminate, lifestyle changes to reduce depression and anxiety, communication skills, explore cognitive beliefs and help client to develop healthy cognitive patterns and beliefs.       Patient has reviewed and agreed to the above plan.      Yuliet Moscoso [facility]  11/6/24

## 2024-12-10 ENCOUNTER — HOSPITAL ENCOUNTER (OUTPATIENT)
Dept: GENERAL RADIOLOGY | Facility: OTHER | Age: 64
Discharge: HOME | End: 2024-12-10
Attending: NEUROLOGICAL SURGERY
Payer: COMMERCIAL

## 2024-12-10 ENCOUNTER — TRANSFERRED RECORDS (OUTPATIENT)
Dept: HEALTH INFORMATION MANAGEMENT | Facility: CLINIC | Age: 64
End: 2024-12-10
Payer: COMMERCIAL

## 2024-12-10 DIAGNOSIS — M51.360 DEGENERATION OF INTERVERTEBRAL DISC OF LUMBAR REGION WITH DISCOGENIC BACK PAIN: ICD-10-CM

## 2024-12-10 PROCEDURE — 64493 INJ PARAVERT F JNT L/S 1 LEV: CPT | Mod: 50

## 2024-12-10 PROCEDURE — 250N000011 HC RX IP 250 OP 636: Performed by: RADIOLOGY

## 2024-12-10 PROCEDURE — 250N000009 HC RX 250: Performed by: RADIOLOGY

## 2024-12-10 PROCEDURE — 64494 INJ PARAVERT F JNT L/S 2 LEV: CPT

## 2024-12-10 PROCEDURE — 255N000002 HC RX 255 OP 636: Performed by: RADIOLOGY

## 2024-12-10 RX ORDER — LIDOCAINE HYDROCHLORIDE 10 MG/ML
20 INJECTION, SOLUTION INFILTRATION; PERINEURAL ONCE
Status: COMPLETED | OUTPATIENT
Start: 2024-12-10 | End: 2024-12-10

## 2024-12-10 RX ORDER — BUPIVACAINE HYDROCHLORIDE 5 MG/ML
10 INJECTION, SOLUTION EPIDURAL; INTRACAUDAL ONCE
Status: COMPLETED | OUTPATIENT
Start: 2024-12-10 | End: 2024-12-10

## 2024-12-10 RX ADMIN — LIDOCAINE HYDROCHLORIDE 5 ML: 10 INJECTION, SOLUTION INFILTRATION; PERINEURAL at 10:28

## 2024-12-10 RX ADMIN — BUPIVACAINE HYDROCHLORIDE 8 ML: 5 INJECTION, SOLUTION EPIDURAL; INTRACAUDAL; PERINEURAL at 10:31

## 2024-12-10 RX ADMIN — IOHEXOL 3 ML: 240 INJECTION, SOLUTION INTRATHECAL; INTRAVASCULAR; INTRAVENOUS; ORAL at 10:31

## 2024-12-18 ENCOUNTER — VIRTUAL VISIT (OUTPATIENT)
Dept: PSYCHOLOGY | Facility: CLINIC | Age: 64
End: 2024-12-18
Payer: COMMERCIAL

## 2024-12-18 DIAGNOSIS — F33.1 MAJOR DEPRESSIVE DISORDER, RECURRENT EPISODE, MODERATE (H): Primary | ICD-10-CM

## 2024-12-18 PROCEDURE — 90834 PSYTX W PT 45 MINUTES: CPT | Mod: 95 | Performed by: COUNSELOR

## 2024-12-18 ASSESSMENT — ANXIETY QUESTIONNAIRES
3. WORRYING TOO MUCH ABOUT DIFFERENT THINGS: NEARLY EVERY DAY
1. FEELING NERVOUS, ANXIOUS, OR ON EDGE: NEARLY EVERY DAY
2. NOT BEING ABLE TO STOP OR CONTROL WORRYING: NEARLY EVERY DAY
7. FEELING AFRAID AS IF SOMETHING AWFUL MIGHT HAPPEN: NEARLY EVERY DAY
GAD7 TOTAL SCORE: 18
GAD7 TOTAL SCORE: 18
8. IF YOU CHECKED OFF ANY PROBLEMS, HOW DIFFICULT HAVE THESE MADE IT FOR YOU TO DO YOUR WORK, TAKE CARE OF THINGS AT HOME, OR GET ALONG WITH OTHER PEOPLE?: EXTREMELY DIFFICULT
7. FEELING AFRAID AS IF SOMETHING AWFUL MIGHT HAPPEN: NEARLY EVERY DAY
GAD7 TOTAL SCORE: 18
6. BECOMING EASILY ANNOYED OR IRRITABLE: MORE THAN HALF THE DAYS
4. TROUBLE RELAXING: MORE THAN HALF THE DAYS
5. BEING SO RESTLESS THAT IT IS HARD TO SIT STILL: MORE THAN HALF THE DAYS
IF YOU CHECKED OFF ANY PROBLEMS ON THIS QUESTIONNAIRE, HOW DIFFICULT HAVE THESE PROBLEMS MADE IT FOR YOU TO DO YOUR WORK, TAKE CARE OF THINGS AT HOME, OR GET ALONG WITH OTHER PEOPLE: EXTREMELY DIFFICULT

## 2024-12-18 NOTE — PROGRESS NOTES
Answers submitted by the patient for this visit:  Patient Health Questionnaire (Submitted on 12/18/2024)  If you checked off any problems, how difficult have these problems made it for you to do your work, take care of things at home, or get along with other people?: Extremely difficult  PHQ9 TOTAL SCORE: 19  Patient Health Questionnaire (G7) (Submitted on 12/18/2024)  LORETA 7 TOTAL SCORE: 18            [facility]                                     Progress Note    Patient Name: Eva Solis  Date: 12/18/24         Service Type: Individual      Session Start Time: 2:05pm  Session End Time: 2:55pm     Session Length: 50mins    Session #: 8    Attendees: Client    Service Modality:  Video Visit:      Provider verified identity through the following two step process.  Patient provided:  Patient is known previously to provider    Telemedicine Visit: The patient's condition can be safely assessed and treated via synchronous audio and visual telemedicine encounter.      Reason for Telemedicine Visit: Patient convenience (e.g. access to timely appointments / distance to available provider)    Originating Site (Patient Location): Patient's home    Distant Site (Provider Location): Provider Remote Setting- Home Office    Consent:  The patient/guardian has verbally consented to: the potential risks and benefits of telemedicine (video visit) versus in person care; bill my insurance or make self-payment for services provided; and responsibility for payment of non-covered services.     Patient would like the video invitation sent by:  My Chart    Mode of Communication:  Video Conference via Amwell    Distant Location (Provider):  Off-site    As the provider I attest to compliance with applicable laws and regulations related to telemedicine.    DATA  Interactive Complexity: No  Crisis: No        Progress Since Last Session (Related to Symptoms / Goals / Homework):   Symptoms: No change .    Homework: Completed  in session      Episode of Care Goals: Minimal progress - ACTION (Actively working towards change); Intervened by reinforcing change plan / affirming steps taken     Current / Ongoing Stressors and Concerns:  The client stated she's feeling very anxious for the holidays.   Had her mom's  this past weekend. Stated several family members didn't acknowledge her.        Treatment Objective(s) Addressed in This Session:   Increase interest, engagement, and pleasure in doing things  Decrease frequency and intensity of feeling down, depressed, hopeless       Intervention:   1. Empathy and Validation:     Used reflective listening to validate the client's grief experience. Acknowledged the pain of the loss and normalizing their emotional responses as part of the grieving process.  Cognitive Behavioral Therapy (CBT) Techniques:  Explored the client's thoughts surrounding the loss, particularly focusing on feelings of guilt or regret. Used reframing techniques to challenge any maladaptive thoughts. Client was encouraged to adopt a more balanced perspective on their grief and the meaning of their relationship with the .  Emotional Expression and Processing:  Encouraged emotional expression through guided journaling exercises and creative outlets (e.g., drawing, music). This helped the client articulate and release emotions they had not yet fully processed.  Meaning-Making:  Introduced a meaning-making exercise to help the client find new ways of honoring the memory of the loved one while adjusting to life without them. Discussed developing rituals or traditions that might bring comfort.  Grief Education:  Provided psychoeducation on the stages of grief (e.g., Kubler-Ross model) to normalize the client's experience and help them understand the non-linear nature of grieving.      Assessments completed prior to visit:  The following assessments were completed by patient for this visit:  PHQ9:       2024     12:23 PM 9/23/2024     9:18 AM 10/9/2024    10:49 AM 10/23/2024    12:36 PM 11/6/2024     1:42 PM 12/4/2024     1:47 PM 12/18/2024     1:53 PM   PHQ-9 SCORE   PHQ-9 Total Score [place] 19 (Moderately severe depression) 20 (Severe depression) 18 (Moderately severe depression) 21 (Severe depression) 23 (Severe depression) 23 (Severe depression) 19 (Moderately severe depression)   PHQ-9 Total Score 19 20 18 21  23  23  19        Patient-reported     GAD7:       7/30/2024    10:47 AM 8/21/2024    12:40 PM 9/23/2024     9:19 AM 10/9/2024    10:50 AM 11/6/2024     1:43 PM 12/4/2024     1:49 PM 12/18/2024     1:55 PM   LORETA-7 SCORE   Total Score 19 (severe anxiety) 21 (severe anxiety) 18 (severe anxiety) 16 (severe anxiety) 19 (severe anxiety) 18 (severe anxiety) 18 (severe anxiety)   Total Score 19 21    21 18 16 19  18  18        Patient-reported    Multiple values from one day are sorted in reverse-chronological order         ASSESSMENT: Current Emotional / Mental Status (status of significant symptoms):   Risk status (Self / Other harm or suicidal ideation)   Patient denies current fears or concerns for personal safety.   Patient denies current or recent suicidal ideation or behaviors.   Patient denies current or recent homicidal ideation or behaviors.   Patient denies current or recent self injurious behavior or ideation.   Patient denies other safety concerns.   Patient reports there has been no change in risk factors since their last session.     Patient reports there has been no change in protective factors since their last session.     Recommended that patient call 911 or go to the local ED should there be a change in any of these risk factors.     Appearance:   Appropriate    Eye Contact:   Good    Psychomotor Behavior: Normal    Attitude:   Cooperative    Orientation:   All   Speech    Rate / Production: Normal/ Responsive    Volume:  Normal    Mood:    Depressed  Normal Sad    Affect:    Appropriate   Tearful   Thought Content:  Clear    Thought Form:  Coherent  Logical    Insight:    Good      Medication Review:   No changes to current psychiatric medication(s)     Medication Compliance:   Yes     Changes in Health Issues:   None reported     Chemical Use Review:   Substance Use: Chemical use reviewed, no active concerns identified      Tobacco Use: No current tobacco use.      Diagnosis:  1. Major depressive disorder, recurrent episode, moderate (H)        Collateral Reports Completed:   Not Applicable    PLAN: (Patient Tasks / Therapist Tasks / Other)  Continue to provide support in processing the client's grief using a combination of CBT, emotional expression, and grief education.  Focus on developing additional coping strategies, including relaxation techniques (e.g., mindfulness or deep breathing) to manage feelings of overwhelm.        Yuliet Moscoso, [facility]                                                         ______________________________________________________________________    Individual Treatment Plan    Patient's Name: Eva Solis  YOB: 1960    Date of Creation: 8/26/24  Date Treatment Plan Last Reviewed/Revised: 11/6/24    DSM5 Diagnoses: 296.32 (F33.1) Major Depressive Disorder, Recurrent Episode, Moderate _  Psychosocial / Contextual Factors: sister recently passed away  PROMIS (reviewed every 90 days):     Referral / Collaboration:  Referral to another professional/service is not indicated at this time..    Anticipated number of session for this episode of care: 6-9 sessions  Anticipation frequency of session: Every other week  Anticipated Duration of each session: 38-52 minutes  Treatment plan will be reviewed in 90 days or when goals have been changed.       MeasurableTreatment Goal(s) related to diagnosis / functional impairment(s)  Goal 1: Patient will increase anxiety reduction skills and regulation skills    I will know I've met my goal when I'm able to socialize more  again and be okay with having people over.      Objective #A (Patient Action)    Patient will identify three distraction and diversion activities and use those activities to decrease level of anxiety  .  Status: Continued - Date(s):11/6/24     Intervention(s)  Therapist will teach emotional regulation skills. distress tolerance skills, interpersonal effectiveness skills, emotion regluation skills, mindfulness skills, radical acceptance. Therapist will teach client how to ID body cues for anxiety, anxiety reduction techniques, how to ID triggers for depression and anxiety- decrease reactivity/eliminate, lifestyle changes to reduce depression and anxiety, communication skills, explore cognitive beliefs and help client to develop healthy cognitive patterns and beliefs.     Objective #B  Patient will Increase interest, engagement, and pleasure in doing things.  Status: Continued - Date(s):11/6/24     Intervention(s)  Therapist will teach emotional regulation skills. distress tolerance skills, interpersonal effectiveness skills, emotion regluation skills, mindfulness skills, radical acceptance. Therapist will teach client how to ID body cues for anxiety, anxiety reduction techniques, how to ID triggers for depression and anxiety- decrease reactivity/eliminate, lifestyle changes to reduce depression and anxiety, communication skills, explore cognitive beliefs and help client to develop healthy cognitive patterns and beliefs.     Objective #C  Patient will initiate 2 social contact(s) per day for increasing lengths of time.  Status: Continued - Date(s):11/6/24     Intervention(s)  Therapist will teach emotional regulation skills. distress tolerance skills, interpersonal effectiveness skills, emotion regluation skills, mindfulness skills, radical acceptance. Therapist will teach client how to ID body cues for anxiety, anxiety reduction techniques, how to ID triggers for depression and anxiety- decrease reactivity/eliminate,  lifestyle changes to reduce depression and anxiety, communication skills, explore cognitive beliefs and help client to develop healthy cognitive patterns and beliefs.       Goal 2: Patient will process through her grief of losing her sister    I will know I've met my goal when I don't feel like I need to cry everyday.      Objective #A (Patient Action)    Status: Continued - Date(s):11/6/24     Patient will increase understanding of steps in the grief process.    Intervention(s)  Therapist will teach emotional regulation skills. distress tolerance skills, interpersonal effectiveness skills, emotion regluation skills, mindfulness skills, radical acceptance. Therapist will teach client how to ID body cues for anxiety, anxiety reduction techniques, how to ID triggers for depression and anxiety- decrease reactivity/eliminate, lifestyle changes to reduce depression and anxiety, communication skills, explore cognitive beliefs and help client to develop healthy cognitive patterns and beliefs.     Objective #B  Patient will talk to at least two others about losses and coping.    Status: Continued - Date(s):11/6/24     Intervention(s)  Therapist will teach emotional regulation skills. distress tolerance skills, interpersonal effectiveness skills, emotion regluation skills, mindfulness skills, radical acceptance. Therapist will teach client how to ID body cues for anxiety, anxiety reduction techniques, how to ID triggers for depression and anxiety- decrease reactivity/eliminate, lifestyle changes to reduce depression and anxiety, communication skills, explore cognitive beliefs and help client to develop healthy cognitive patterns and beliefs.     Objective #C  Patient will Decrease frequency and intensity of feeling down, depressed, hopeless.  Status: Continued - Date(s):11/6/24     Intervention(s)  Therapist will teach emotional regulation skills. distress tolerance skills, interpersonal effectiveness skills, emotion  regluation skills, mindfulness skills, radical acceptance. Therapist will teach client how to ID body cues for anxiety, anxiety reduction techniques, how to ID triggers for depression and anxiety- decrease reactivity/eliminate, lifestyle changes to reduce depression and anxiety, communication skills, explore cognitive beliefs and help client to develop healthy cognitive patterns and beliefs.       Patient has reviewed and agreed to the above plan.      Yuliet Moscoso [facility]  11/6/24

## 2025-01-16 ENCOUNTER — VIRTUAL VISIT (OUTPATIENT)
Dept: PSYCHOLOGY | Facility: CLINIC | Age: 65
End: 2025-01-16
Payer: COMMERCIAL

## 2025-01-16 DIAGNOSIS — F33.1 MAJOR DEPRESSIVE DISORDER, RECURRENT EPISODE, MODERATE (H): Primary | ICD-10-CM

## 2025-01-16 NOTE — PROGRESS NOTES
[facility]                                     Progress Note    Patient Name: Eva Solis  Date: 1/16/25          Service Type: Individual      Session Start Time: 10:00am  Session End Time: 11:00am     Session Length: 60mins    Session #: 9    Attendees: Client    Service Modality:  Video Visit:      Provider verified identity through the following two step process.  Patient provided:  Patient is known previously to provider    Telemedicine Visit: The patient's condition can be safely assessed and treated via synchronous audio and visual telemedicine encounter.      Reason for Telemedicine Visit: Patient convenience (e.g. access to timely appointments / distance to available provider)    Originating Site (Patient Location): Patient's home    Distant Site (Provider Location): Provider Remote Setting- Home Office    Consent:  The patient/guardian has verbally consented to: the potential risks and benefits of telemedicine (video visit) versus in person care; bill my insurance or make self-payment for services provided; and responsibility for payment of non-covered services.     Patient would like the video invitation sent by:  My Chart    Mode of Communication:  Video Conference via Amwell    Distant Location (Provider):  Off-site    As the provider I attest to compliance with applicable laws and regulations related to telemedicine.    DATA  Interactive Complexity: No  Crisis: No        Progress Since Last Session (Related to Symptoms / Goals / Homework):   Symptoms: No change .    Homework: Completed in session      Episode of Care Goals: Minimal progress - ACTION (Actively working towards change); Intervened by reinforcing change plan / affirming steps taken     Current / Ongoing Stressors and Concerns:  The client stated she's visiting her niece while her  is in [place].   Had an anxiety/panic attack yesterday when she got in the car to go to her niece's house. Stated she hasn't eaten,  which was a contributing factor. Doesn't feel like she has purpose and isn't important. Stated she doesn't eat when her  is gone.   Has an appointment for her back on the 31st for injections that should last for a year.        Treatment Objective(s) Addressed in This Session:   Increase interest, engagement, and pleasure in doing things  Decrease frequency and intensity of feeling down, depressed, hopeless       Intervention:   Provided empathy and validation regarding grief themes.  Continued education around grief and loss. Explored reasons for why the client isn't taking care of herself when she's alone. Worked on CBT skills to help address negative self talk and came up with options for small steps the client can take towards better self care.       Assessments completed prior to visit:  The following assessments were completed by patient for this visit:  PHQ9:       8/21/2024    12:23 PM 9/23/2024     9:18 AM 10/9/2024    10:49 AM 10/23/2024    12:36 PM 11/6/2024     1:42 PM 12/4/2024     1:47 PM 12/18/2024     1:53 PM   PHQ-9 SCORE   PHQ-9 Total Score MyChart 19 (Moderately severe depression) 20 (Severe depression) 18 (Moderately severe depression) 21 (Severe depression) 23 (Severe depression) 23 (Severe depression) 19 (Moderately severe depression)   PHQ-9 Total Score 19 20 18 21  23  23  19        Patient-reported     GAD7:       7/30/2024    10:47 AM 8/21/2024    12:40 PM 9/23/2024     9:19 AM 10/9/2024    10:50 AM 11/6/2024     1:43 PM 12/4/2024     1:49 PM 12/18/2024     1:55 PM   LORETA-7 SCORE   Total Score 19 (severe anxiety) 21 (severe anxiety) 18 (severe anxiety) 16 (severe anxiety) 19 (severe anxiety) 18 (severe anxiety) 18 (severe anxiety)   Total Score 19 21    21 18 16 19  18  18        Patient-reported         ASSESSMENT: Current Emotional / Mental Status (status of significant symptoms):   Risk status (Self / Other harm or suicidal ideation)   Patient denies current fears or concerns for  personal safety.   Patient denies current or recent suicidal ideation or behaviors.   Patient denies current or recent homicidal ideation or behaviors.   Patient denies current or recent self injurious behavior or ideation.   Patient denies other safety concerns.   Patient reports there has been no change in risk factors since their last session.     Patient reports there has been no change in protective factors since their last session.     Recommended that patient call 911 or go to the local ED should there be a change in any of these risk factors.     Appearance:   Appropriate    Eye Contact:   Good    Psychomotor Behavior: Normal    Attitude:   Cooperative    Orientation:   All   Speech    Rate / Production: Normal/ Responsive    Volume:  Normal    Mood:    Depressed  Normal Sad    Affect:    Appropriate  Tearful   Thought Content:  Clear    Thought Form:  Coherent  Logical    Insight:    Good      Medication Review:   No changes to current psychiatric medication(s)     Medication Compliance:   Yes     Changes in Health Issues:   None reported     Chemical Use Review:   Substance Use: Chemical use reviewed, no active concerns identified      Tobacco Use: No current tobacco use.      Diagnosis:  1. Major depressive disorder, recurrent episode, moderate (H)        Collateral Reports Completed:   Not Applicable    PLAN: (Patient Tasks / Therapist Tasks / Other)  Continue to provide support in processing the client's grief using a combination of CBT, emotional expression, and grief education.  Focus on developing additional coping strategies, including mindfulness of current emotions and self care strategies.         Yuliet Moscoso [facility]                                                         ______________________________________________________________________    Individual Treatment Plan    Patient's Name: Eva Solis  YOB: 1960    Date of Creation: 8/26/24  Date Treatment Plan Last  Reviewed/Revised: 1/16/25     DSM5 Diagnoses: 296.32 (F33.1) Major Depressive Disorder, Recurrent Episode, Moderate _  Psychosocial / Contextual Factors: sister recently passed away  PROMIS (reviewed every 90 days):     Referral / Collaboration:  Referral to another professional/service is not indicated at this time..    Anticipated number of session for this episode of care: 6-9 sessions  Anticipation frequency of session: Every other week  Anticipated Duration of each session: 38-52 minutes  Treatment plan will be reviewed in 90 days or when goals have been changed.       MeasurableTreatment Goal(s) related to diagnosis / functional impairment(s)  Goal 1: Patient will increase anxiety reduction skills and regulation skills    I will know I've met my goal when I'm able to socialize more again and be okay with having people over.      Objective #A (Patient Action)    Patient will identify three distraction and diversion activities and use those activities to decrease level of anxiety  .  Status: Continued - Date(s):  1/16/25     Intervention(s)  Therapist will teach emotional regulation skills. distress tolerance skills, interpersonal effectiveness skills, emotion regluation skills, mindfulness skills, radical acceptance. Therapist will teach client how to ID body cues for anxiety, anxiety reduction techniques, how to ID triggers for depression and anxiety- decrease reactivity/eliminate, lifestyle changes to reduce depression and anxiety, communication skills, explore cognitive beliefs and help client to develop healthy cognitive patterns and beliefs.     Objective #B  Patient will Increase interest, engagement, and pleasure in doing things.  Status: Continued - Date(s):  1/16/25     Intervention(s)  Therapist will teach emotional regulation skills. distress tolerance skills, interpersonal effectiveness skills, emotion regluation skills, mindfulness skills, radical acceptance. Therapist will teach client how to ID  body cues for anxiety, anxiety reduction techniques, how to ID triggers for depression and anxiety- decrease reactivity/eliminate, lifestyle changes to reduce depression and anxiety, communication skills, explore cognitive beliefs and help client to develop healthy cognitive patterns and beliefs.     Objective #C  Patient will initiate 2 social contact(s) per day for increasing lengths of time.  Status: Continued - Date(s):  1/16/25      Intervention(s)  Therapist will teach emotional regulation skills. distress tolerance skills, interpersonal effectiveness skills, emotion regluation skills, mindfulness skills, radical acceptance. Therapist will teach client how to ID body cues for anxiety, anxiety reduction techniques, how to ID triggers for depression and anxiety- decrease reactivity/eliminate, lifestyle changes to reduce depression and anxiety, communication skills, explore cognitive beliefs and help client to develop healthy cognitive patterns and beliefs.       Goal 2: Patient will process through her grief of losing her sister    I will know I've met my goal when I don't feel like I need to cry everyday.      Objective #A (Patient Action)    Status: Continued - Date(s): 1/16/25     Patient will increase understanding of steps in the grief process.    Intervention(s)  Therapist will teach emotional regulation skills. distress tolerance skills, interpersonal effectiveness skills, emotion regluation skills, mindfulness skills, radical acceptance. Therapist will teach client how to ID body cues for anxiety, anxiety reduction techniques, how to ID triggers for depression and anxiety- decrease reactivity/eliminate, lifestyle changes to reduce depression and anxiety, communication skills, explore cognitive beliefs and help client to develop healthy cognitive patterns and beliefs.     Objective #B  Patient will talk to at least two others about losses and coping.    Status: Continued - Date(s):  1/16/25      Intervention(s)  Therapist will teach emotional regulation skills. distress tolerance skills, interpersonal effectiveness skills, emotion regluation skills, mindfulness skills, radical acceptance. Therapist will teach client how to ID body cues for anxiety, anxiety reduction techniques, how to ID triggers for depression and anxiety- decrease reactivity/eliminate, lifestyle changes to reduce depression and anxiety, communication skills, explore cognitive beliefs and help client to develop healthy cognitive patterns and beliefs.     Objective #C  Patient will Decrease frequency and intensity of feeling down, depressed, hopeless.  Status: Continued - Date(s): 1/16/25     Intervention(s)  Therapist will teach emotional regulation skills. distress tolerance skills, interpersonal effectiveness skills, emotion regluation skills, mindfulness skills, radical acceptance. Therapist will teach client how to ID body cues for anxiety, anxiety reduction techniques, how to ID triggers for depression and anxiety- decrease reactivity/eliminate, lifestyle changes to reduce depression and anxiety, communication skills, explore cognitive beliefs and help client to develop healthy cognitive patterns and beliefs.       Patient has reviewed and agreed to the above plan.      Yuliet Moscoso [facility]

## 2025-01-23 ENCOUNTER — OFFICE VISIT (OUTPATIENT)
Dept: FAMILY MEDICINE | Facility: OTHER | Age: 65
End: 2025-01-23
Payer: COMMERCIAL

## 2025-01-23 VITALS
HEART RATE: 58 BPM | SYSTOLIC BLOOD PRESSURE: 124 MMHG | WEIGHT: 244 LBS | OXYGEN SATURATION: 99 % | DIASTOLIC BLOOD PRESSURE: 80 MMHG | RESPIRATION RATE: 15 BRPM | BODY MASS INDEX: 40.17 KG/M2 | TEMPERATURE: 97.4 F

## 2025-01-23 DIAGNOSIS — I50.32 CHRONIC DIASTOLIC CONGESTIVE HEART FAILURE (H): ICD-10-CM

## 2025-01-23 DIAGNOSIS — F33.1 MAJOR DEPRESSIVE DISORDER, RECURRENT EPISODE, MODERATE (H): ICD-10-CM

## 2025-01-23 DIAGNOSIS — N18.31 CHRONIC KIDNEY DISEASE, STAGE 3A (H): ICD-10-CM

## 2025-01-23 DIAGNOSIS — R29.818 DIFFICULTY BALANCING: ICD-10-CM

## 2025-01-23 DIAGNOSIS — Z01.818 PREOP EXAMINATION: Primary | ICD-10-CM

## 2025-01-23 DIAGNOSIS — I10 HTN, GOAL BELOW 140/90: ICD-10-CM

## 2025-01-23 DIAGNOSIS — M51.360 DEGENERATION OF INTERVERTEBRAL DISC OF LUMBAR REGION WITH DISCOGENIC BACK PAIN: ICD-10-CM

## 2025-01-23 DIAGNOSIS — E66.01 MORBID OBESITY, UNSPECIFIED OBESITY TYPE (H): ICD-10-CM

## 2025-01-23 LAB
ANION GAP SERPL CALCULATED.3IONS-SCNC: 14 MMOL/L (ref 7–15)
BUN SERPL-MCNC: 18.3 MG/DL (ref 8–23)
CALCIUM SERPL-MCNC: 9.6 MG/DL (ref 8.8–10.4)
CHLORIDE SERPL-SCNC: 107 MMOL/L (ref 98–107)
CREAT SERPL-MCNC: 1.04 MG/DL (ref 0.51–0.95)
EGFRCR SERPLBLD CKD-EPI 2021: 60 ML/MIN/1.73M2
ERYTHROCYTE [DISTWIDTH] IN BLOOD BY AUTOMATED COUNT: 12.4 % (ref 10–15)
GLUCOSE SERPL-MCNC: 88 MG/DL (ref 70–99)
HCO3 SERPL-SCNC: 24 MMOL/L (ref 22–29)
HCT VFR BLD AUTO: 41.8 % (ref 35–47)
HGB BLD-MCNC: 14.5 G/DL (ref 11.7–15.7)
MCH RBC QN AUTO: 30.6 PG (ref 26.5–33)
MCHC RBC AUTO-ENTMCNC: 34.7 G/DL (ref 31.5–36.5)
MCV RBC AUTO: 88 FL (ref 78–100)
PLATELET # BLD AUTO: 252 10E3/UL (ref 150–450)
POTASSIUM SERPL-SCNC: 3.9 MMOL/L (ref 3.4–5.3)
RBC # BLD AUTO: 4.74 10E6/UL (ref 3.8–5.2)
SODIUM SERPL-SCNC: 145 MMOL/L (ref 135–145)
WBC # BLD AUTO: 6.1 10E3/UL (ref 4–11)

## 2025-01-23 ASSESSMENT — PAIN SCALES - GENERAL: PAINLEVEL_OUTOF10: MILD PAIN (2)

## 2025-01-23 NOTE — PROGRESS NOTES
Preoperative Evaluation  [facility]  [address]  [place] [address]  Phone: [phone]  Fax: [phone]  Primary Provider: Renetta Benitez MD  Pre-op Performing Provider: Renetta Benitez MD  Jan 23, 2025 1/23/2025   Surgical Information   What procedure is being done? Radiofrequency Ablation   Facility or Hospital where procedure/surgery will be performed: [facility]   Who is doing the procedure / surgery? Dr. Read (not sure)   Date of surgery / procedure: 1/31/25   Time of surgery / procedure: 12:30   Where do you plan to recover after surgery? at home with family     Fax number for surgical facility: Note does not need to be faxed, will be available electronically in Epic.    Assessment & Plan     The proposed surgical procedure is considered LOW risk.    Preop examination    Degeneration of intervertebral disc of lumbar region with discogenic back pain  Felt short lived improvements with medial branch block    HTN, goal below 140/90  Well controlled.  Labs drawn.    - BASIC METABOLIC PANEL; Future  - CBC with platelets; Future    Morbid obesity, unspecified obesity type (H)  Continues to lose weight    Major depressive disorder, recurrent episode, moderate (H)  Following with Psychology.  Continues on fluoxetine.    Chronic diastolic congestive heart failure (H)  Stable, continues on furosemide and losartan    Chronic kidney disease, stage 3a (H)  Keep renal function in mind with all medication changes    Difficulty balancing  Her whole family is concerned about her balance.  MRI brain was normal in August.  Could be related to her back. Hasn't improved with Physical Therapy.  Offered Neurology consult.    Antiplatelet or Anticoagulation Medication Instructions   - Patient is on no antiplatelet or anticoagulation medications.    Additional Medication Instructions   - ACE/ARB/ARNI (lisinopril, enalapril, losartan, valsartan,  olmesartan, sacubritril/valsartan) : Continue without modification (e.g., MAC anesthesia, neurosurgery, spine surgery, heart failure, or labile hypertension with risk of hypertension).   - Diuretics (furosemide, hydrochlorothiazide, chlorothalidone): May continue due to heart failure.    Recommendation  Approval given to proceed with proposed procedure, without further diagnostic evaluation.    Carmelita Velasquez is a 64 year old, presenting for the following:  Pre-Op Exam          1/23/2025     1:27 PM   Additional Questions   Roomed by zee RODRIGEZ related to upcoming procedure: chronic back pain        1/23/2025   Pre-Op Questionnaire   Have you ever had a heart attack or stroke? No   Have you ever had surgery on your heart or blood vessels, such as a stent placement, a coronary artery bypass, or surgery on an artery in your head, neck, heart, or legs? No   Do you have chest pain with activity? No   Do you have a history of heart failure? (!) YES    Do you currently have a cold, bronchitis or symptoms of other infection? No   Do you have a cough, shortness of breath, or wheezing? Only when walking a far distance   Do you or anyone in your family have previous history of blood clots? (!) YES--personal history of PE   Do you or does anyone in your family have a serious bleeding problem such as prolonged bleeding following surgeries or cuts? No   Have you ever had problems with anemia or been told to take iron pills? No   Have you had any abnormal blood loss such as black, tarry or bloody stools, or abnormal vaginal bleeding? (!) YES--when she had GI bleed, nothing current   Have you ever had a blood transfusion? No   Are you willing to have a blood transfusion if it is medically needed before, during, or after your surgery? Yes   Have you or any of your relatives ever had problems with anesthesia? (!) YES--didn't get enough when had 3rd child   Do you have sleep apnea, excessive snoring or daytime drowsiness?  (!) YES--drowsiness   Do you have a CPAP machine? (!) NO    Do you have any artifical heart valves or other implanted medical devices like a pacemaker, defibrillator, or continuous glucose monitor? No   Do you have artificial joints? (!) YES   Are you allergic to latex? No     Health Care Directive  Patient does not have a Health Care Directive: Discussed advance care planning with patient; however, patient declined at this time.    Preoperative Review of    reviewed - no current presription      Patient Active Problem List    Diagnosis Date Noted    Chronic kidney disease, stage 3a (H) 01/23/2025     Priority: Medium    DDD (degenerative disc disease), lumbar 08/02/2024     Priority: Medium    Acute pulmonary embolism without acute cor pulmonale, unspecified pulmonary embolism type (H) 03/01/2024     Priority: Medium    Sigmoid diverticulosis 05/25/2023     Priority: Medium    Cholelithiasis 05/25/2023     Priority: Medium    Major depressive disorder, recurrent episode, moderate (H) 05/08/2020     Priority: Medium    Primary osteoarthritis of left hip 01/23/2020     Priority: Medium    Migraine without aura and without status migrainosus, not intractable 01/15/2016     Priority: Medium    Morbid obesity, unspecified obesity type (H) 12/02/2015     Priority: Medium    Diastolic CHF (H) 11/07/2014     Priority: Medium    HTN, goal below 140/90 03/26/2013     Priority: Medium      Past Medical History:   Diagnosis Date    Congestive heart failure (H) 3years    Depressive disorder 1-2 years    dealing with on a daily basis    DEPRESSIVE DISORDER NEC 05/04/2004    Diastolic dysfunction     Essential hypertension, benign     Focal Sigmoid diverticulitis 05/25/2023    Generalized osteoarthrosis, unspecified site     knees    Headache(784.0)     Heart disease 3 years    CHF    Lower GI bleed 05/25/2023    Lumbar stenosis with neurogenic claudication 07/01/2021    Added automatically from request for surgery [id number]     Mixed anxiety depressive disorder 01/15/2016    PANIC DISORDER 2004     Past Surgical History:   Procedure Laterality Date    COLONOSCOPY  10/06/10    attempt at colonscopy to 50cm    INJECT EPIDURAL LUMBAR N/A 10/18/2019    Procedure: INJECTION, SPINE, LUMBAR 4 - LUMBAR 5, EPIDURAL TRANSLAMINAR;  Surgeon: Trever Wheatley MD;  Location: [facility] OR    INJECT EPIDURAL LUMBAR N/A 2020    Procedure: INJECTION, SPINE, LUMBAR 4-5, EPIDURAL TRANSLAMINAR;  Surgeon: Trever Wheatley MD;  Location: [facility] OR    INJECT EPIDURAL LUMBAR N/A 2021    Procedure: Lumbar 4-5 Epidural Injection;  Surgeon: Trever Wheatley MD;  Location: [facility] OR    JOINT REPLACEMTN, KNEE RT/LT  2006    Right total knee arthroplasty.    JOINT REPLACEMTN, KNEE RT/LT  2006    Left total knee arthroplasty.    LAMINECTOMY LUMBAR POSTERIOR MICROSCOPIC TWO LEVELS N/A 2021    Procedure: Lumbar 3 to Lumbar 5 Laminectomy;  Surgeon: Leonard Wallis MD;  Location: [facility] OR    ZZC  DELIVERY ONLY      , Low Cervical    ZC  DELIVERY ONLY      Z VAGINAL HYSTERECTOMY      without oophorectomy    [facility]    Diverticulosis.  Internal hemorrhoids.     Current Outpatient Medications   Medication Sig Dispense Refill    busPIRone (BUSPAR) 15 MG tablet Take 1 tablet (15 mg) by mouth 3 times daily 270 tablet 3    clonazePAM (KLONOPIN) 1 MG tablet Take 1 tablet (1 mg) by mouth nightly as needed for sleep 14 tablet 0    cyclobenzaprine (FLEXERIL) 10 MG tablet Take 1 tablet (10 mg) by mouth nightly as needed for muscle spasms 90 tablet 3    FLUoxetine (PROZAC) 20 MG capsule Take 1 capsule (20 mg) by mouth daily To take with the 40 mg to equal 60 mg daily 90 capsule 3    FLUoxetine (PROZAC) 40 MG capsule Take 1 capsule (40 mg) by mouth daily 90 capsule 3    furosemide (LASIX) 20 MG tablet Take 2 tablets (40 mg) by mouth daily 180 tablet 3    losartan (COZAAR) 100 MG tablet Take 1  "tablet (100 mg) by mouth daily 90 tablet 3    omeprazole (PRILOSEC) 20 MG DR capsule Take 20 mg by mouth every morning       ondansetron (ZOFRAN) 8 MG tablet Take 1 tablet (8 mg) by mouth every 8 hours as needed for nausea 30 tablet 0    polyethylene glycol (MIRALAX) 17 GM/Dose powder Take 1 Capful by mouth twice a week      topiramate (TOPAMAX) 100 MG tablet Take 1 tablet (100 mg) by mouth 2 times daily (1.5 X 50 mg) 180 tablet 3       Allergies   Allergen Reactions    Lisinopril Cough        Social History     Tobacco Use    Smoking status: Never     Passive exposure: Never    Smokeless tobacco: Never    Tobacco comments:     no smokers in household   Substance Use Topics    Alcohol use: No       History   Drug Use No             Review of Systems  CONSTITUTIONAL: NEGATIVE for fever, chills, change in weight  INTEGUMENTARY/SKIN: NEGATIVE for worrisome rashes, moles or lesions  EYES: NEGATIVE for vision changes or irritation  ENT/MOUTH: NEGATIVE for ear, mouth and throat problems  RESP: NEGATIVE for significant cough or SOB  CV: NEGATIVE for chest pain, palpitations or peripheral edema  GI: NEGATIVE for nausea, abdominal pain, heartburn, or change in bowel habits  : NEGATIVE for frequency, dysuria, or hematuria  MUSCULOSKELETAL:chronic back pain  NEURO: NEGATIVE for weakness, dizziness or paresthesias  ENDOCRINE: NEGATIVE for temperature intolerance, skin/hair changes  HEME: NEGATIVE for bleeding problems  PSYCHIATRIC: NEGATIVE for changes in mood or affect    Objective    /80 (BP Location: Right arm, Patient Position: Sitting, Cuff Size: Adult Regular)   Pulse 58   Temp 97.4  F (36.3  C) (Temporal)   Resp 15   Wt 110.7 kg (244 lb)   SpO2 99%   BMI 40.17 kg/m     Estimated body mass index is 40.17 kg/m  as calculated from the following:    Height as of 8/16/24: 1.66 m (5' 5.35\").    Weight as of this encounter: 110.7 kg (244 lb).  Physical Exam  GENERAL: alert and no distress  EYES: Eyes grossly " normal to inspection, PERRL and conjunctivae and sclerae normal  HENT: ear canals and TM's normal, nose and mouth without ulcers or lesions  NECK: no adenopathy, no asymmetry, masses, or scars  RESP: lungs clear to auscultation - no rales, rhonchi or wheezes  CV: regular rate and rhythm, normal S1 S2, no S3 or S4, no murmur, click or rub, no peripheral edema  ABDOMEN: soft, nontender, no hepatosplenomegaly, no masses and bowel sounds normal  MS: no gross musculoskeletal defects noted, no edema  SKIN: no suspicious lesions or rashes  NEURO: Normal strength and tone, mentation intact and speech normal  PSYCH: mentation appears normal, affect normal/bright    Recent Labs   Lab Test 08/16/24  1009 01/27/24  1144   HGB 14.4 12.8    241    138   POTASSIUM 4.0 3.6   CR 1.23* 1.15*        Diagnostics  Labs pending at this time.  Results will be reviewed when available.   No EKG required for low risk surgery (cataract, skin procedure, breast biopsy, etc).    Revised Cardiac Risk Index (RCRI)  The patient has the following serious cardiovascular risks for perioperative complications:   - Congestive Heart Failure (pulmonary edema, PND, s3 ilia, CXR with pulmonary congestion, basilar rales) = 1 point     RCRI Interpretation: 1 point: Class II (low risk - 0.9% complication rate)         Signed Electronically by: Renetta Benitez MD  A copy of this evaluation report is provided to the requesting physician.

## 2025-01-25 ENCOUNTER — HEALTH MAINTENANCE LETTER (OUTPATIENT)
Age: 65
End: 2025-01-25

## 2025-01-27 DIAGNOSIS — Z00.00 ROUTINE ADULT HEALTH MAINTENANCE: Primary | ICD-10-CM

## 2025-01-29 ENCOUNTER — TELEPHONE (OUTPATIENT)
Dept: PSYCHOLOGY | Facility: CLINIC | Age: 65
End: 2025-01-29

## 2025-01-29 NOTE — TELEPHONE ENCOUNTER
Pt was not able to reschedule for Friday because she has another appt that day. She will call after that appt to reschedule. Thanks!

## 2025-01-31 ENCOUNTER — HOSPITAL ENCOUNTER (OUTPATIENT)
Dept: GENERAL RADIOLOGY | Facility: OTHER | Age: 65
Discharge: HOME | End: 2025-01-31
Attending: NEUROLOGICAL SURGERY
Payer: COMMERCIAL

## 2025-01-31 ENCOUNTER — LAB (OUTPATIENT)
Dept: LAB | Facility: OTHER | Age: 65
End: 2025-01-31
Attending: STUDENT IN AN ORGANIZED HEALTH CARE EDUCATION/TRAINING PROGRAM
Payer: COMMERCIAL

## 2025-01-31 VITALS
HEART RATE: 53 BPM | DIASTOLIC BLOOD PRESSURE: 75 MMHG | OXYGEN SATURATION: 100 % | SYSTOLIC BLOOD PRESSURE: 135 MMHG | RESPIRATION RATE: 16 BRPM | TEMPERATURE: 98.3 F

## 2025-01-31 DIAGNOSIS — M51.360 DEGENERATION OF INTERVERTEBRAL DISC OF LUMBAR REGION WITH DISCOGENIC BACK PAIN: ICD-10-CM

## 2025-01-31 DIAGNOSIS — Z00.00 ROUTINE ADULT HEALTH MAINTENANCE: ICD-10-CM

## 2025-01-31 LAB
ERYTHROCYTE [DISTWIDTH] IN BLOOD BY AUTOMATED COUNT: 12.8 % (ref 10–15)
HCT VFR BLD AUTO: 43.1 % (ref 35–47)
HGB BLD-MCNC: 15.1 G/DL (ref 11.7–15.7)
INR PPP: 0.95 (ref 0.85–1.15)
MCH RBC QN AUTO: 30.5 PG (ref 26.5–33)
MCHC RBC AUTO-ENTMCNC: 35 G/DL (ref 31.5–36.5)
MCV RBC AUTO: 87 FL (ref 78–100)
PLATELET # BLD AUTO: 247 10E3/UL (ref 150–450)
RBC # BLD AUTO: 4.95 10E6/UL (ref 3.8–5.2)
WBC # BLD AUTO: 6.1 10E3/UL (ref 4–11)

## 2025-01-31 PROCEDURE — 85610 PROTHROMBIN TIME: CPT | Mod: ZL

## 2025-01-31 PROCEDURE — 250N000011 HC RX IP 250 OP 636: Mod: JW | Performed by: RADIOLOGY

## 2025-01-31 PROCEDURE — 64635 DESTROY LUMB/SAC FACET JNT: CPT | Mod: 50

## 2025-01-31 PROCEDURE — 36415 COLL VENOUS BLD VENIPUNCTURE: CPT | Mod: ZL

## 2025-01-31 PROCEDURE — 64636 DESTROY L/S FACET JNT ADDL: CPT

## 2025-01-31 PROCEDURE — 85014 HEMATOCRIT: CPT | Mod: ZL

## 2025-01-31 PROCEDURE — 250N000009 HC RX 250: Performed by: RADIOLOGY

## 2025-01-31 RX ORDER — DEXAMETHASONE SODIUM PHOSPHATE 10 MG/ML
10 INJECTION, SOLUTION INTRAMUSCULAR; INTRAVENOUS ONCE
Status: COMPLETED | OUTPATIENT
Start: 2025-01-31 | End: 2025-01-31

## 2025-01-31 RX ORDER — LIDOCAINE HYDROCHLORIDE 10 MG/ML
20 INJECTION, SOLUTION INFILTRATION; PERINEURAL ONCE
Status: COMPLETED | OUTPATIENT
Start: 2025-01-31 | End: 2025-01-31

## 2025-01-31 RX ORDER — BUPIVACAINE HYDROCHLORIDE 5 MG/ML
10 INJECTION, SOLUTION EPIDURAL; INTRACAUDAL ONCE
Status: COMPLETED | OUTPATIENT
Start: 2025-01-31 | End: 2025-01-31

## 2025-01-31 RX ADMIN — LIDOCAINE HYDROCHLORIDE 6 ML: 10 INJECTION, SOLUTION INFILTRATION; PERINEURAL at 13:51

## 2025-01-31 RX ADMIN — DEXAMETHASONE SODIUM PHOSPHATE 10 MG: 10 INJECTION, SOLUTION INTRAMUSCULAR; INTRAVENOUS at 13:51

## 2025-01-31 RX ADMIN — BUPIVACAINE HYDROCHLORIDE 9 ML: 5 INJECTION, SOLUTION EPIDURAL; INTRACAUDAL; PERINEURAL at 13:50

## 2025-01-31 NOTE — DISCHARGE INSTRUCTIONS
"    RADIOFREQUENCY ABLATION     Radiofrequency (RF) rhizotomy, or radiofrequency ablation is a therapeutic procedure designed to decrease or eliminate nerve pain symptoms that have not responded to more conservative pain treatments. The procedure uses highly localized heat generated with radiofrequency to damage the nerves causing the pain. Damaging these nerves prevents pain signals from being transmitted from the spine to the brain. In other words, the procedure should \"turn down\" the intensity of the pain. A successful procedure reduces pain without reducing nerve function.    IMPORTANT    A responsible adult must stay with you for at least 24 hours after you leave the hospital. Do not make important or legal decisions for 24 hours.  Do not drive or use heavy equipment for 24 hours.  If you have weakness or tingling, don't drive or use heavy equipment until this feeling goes away.    MEDICATIONS    Take your medications as directed by your doctor. If you have questions about your medications, please contact your doctor's office directly. If you are on a blood thinner, please follow these directions after your procedure:  _____________________________________________________    DIET    You can resume your usual diet after the procedure. If you have an upset stomach, drink clear liquids like apple juice, ginger ale, broth or 7-up. Be sure to drink enough fluids. Rest can help, too. Move to normal foods when you feel up to it.    ACTIVITY    Rest if you are having increased pain. No weight lifting or vigorous exercise for 7 days after the procedure. Slowly resume activity as you feel you are able. You may feel lightheaded if you received pain or sedation medication today. If so, sit for a few minutes before standing. Have someone help you get up. You may also have a dry mouth or trouble sleeping. The symptoms from the pain or sedation medication should go away within 24 hours.    COMFORT    You may experience " numbness and/or relief from symptoms after the procedure due to the anesthetic. Once the local anesthetic effects have worn off, your usual symptoms may return and may be more severe for up to seven days after the procedure. You may have received a steroid during your procedure. It will take a couple of days for the steroid to begin working. If you have any discomfort of tenderness at the procedure site, you may take your usual or recommended pain medication. Avoid products containing aspirin the day of the procedure, unless approved by your doctor. You may use ice on the procedure site. Improvement occurs typically about two to three weeks after the procedure. However, it may take up to six weeks before symptoms improve and the beneficial effects of the rhizotomy are realized. Every patient is different and your outcome may vary. A sunburn-like discomfort is common, and icing the area may help with that. It should gradually go away. It is sometimes helpful to keep a record of your daily pain level to notice better the relief you may be experiencing.    CARE OF SITE    Keep the bandages on for 24 hours. Then you may remove the bandages and shower. Place new bandages over the needle sites and keep in place until the sites are healed. Do not submerge in a tub, pool, hot tub, or lake for 1 week following your procedure. You may experience a small amount of bleeding on the bandage. If you continue to have bleeding, apply gentle direct pressure to the needle site until the bleeding is stopped. Then, apply a new bandage. Bruising is not uncommon.     WHAT TO WATCH FOR    Anytime a procedure is done, there is a risk of infection. Call your doctor if you experience fever over 100.5 degrees, increased redness, swelling, persistent bleeding or drainage, foul smelling drainage, or increased pain at the procedure site.     WHEN TO RETURN    Return to an emergency room if you experience any of the following:    Fever over 100.5  degrees or chills  Uncontrolled bleeding  Uncontrolled pain  Unusual discharge from needle sites  New weakness or sensation changes in your extremities    QUESTIONS, PROBLEMS, OR CONCERNS    Please contact your doctor directly or leave a message for a doctor at [facility] by calling [phone].    Alternatively, you can reach an x-ray tech at [phone].             [facility] Procedure   Adult Discharge Orders & Instructions

## 2025-02-10 ENCOUNTER — TELEPHONE (OUTPATIENT)
Dept: GENERAL RADIOLOGY | Facility: OTHER | Age: 65
End: 2025-02-10
Payer: COMMERCIAL

## 2025-02-20 ENCOUNTER — VIRTUAL VISIT (OUTPATIENT)
Dept: PSYCHOLOGY | Facility: CLINIC | Age: 65
End: 2025-02-20
Payer: COMMERCIAL

## 2025-02-20 DIAGNOSIS — F33.1 MAJOR DEPRESSIVE DISORDER, RECURRENT EPISODE, MODERATE (H): Primary | ICD-10-CM

## 2025-02-20 ASSESSMENT — ANXIETY QUESTIONNAIRES
3. WORRYING TOO MUCH ABOUT DIFFERENT THINGS: MORE THAN HALF THE DAYS
GAD7 TOTAL SCORE: 9
5. BEING SO RESTLESS THAT IT IS HARD TO SIT STILL: NOT AT ALL
4. TROUBLE RELAXING: SEVERAL DAYS
6. BECOMING EASILY ANNOYED OR IRRITABLE: SEVERAL DAYS
7. FEELING AFRAID AS IF SOMETHING AWFUL MIGHT HAPPEN: SEVERAL DAYS
GAD7 TOTAL SCORE: 9
GAD7 TOTAL SCORE: 9
8. IF YOU CHECKED OFF ANY PROBLEMS, HOW DIFFICULT HAVE THESE MADE IT FOR YOU TO DO YOUR WORK, TAKE CARE OF THINGS AT HOME, OR GET ALONG WITH OTHER PEOPLE?: VERY DIFFICULT
1. FEELING NERVOUS, ANXIOUS, OR ON EDGE: MORE THAN HALF THE DAYS
IF YOU CHECKED OFF ANY PROBLEMS ON THIS QUESTIONNAIRE, HOW DIFFICULT HAVE THESE PROBLEMS MADE IT FOR YOU TO DO YOUR WORK, TAKE CARE OF THINGS AT HOME, OR GET ALONG WITH OTHER PEOPLE: VERY DIFFICULT
2. NOT BEING ABLE TO STOP OR CONTROL WORRYING: MORE THAN HALF THE DAYS
7. FEELING AFRAID AS IF SOMETHING AWFUL MIGHT HAPPEN: SEVERAL DAYS

## 2025-02-20 ASSESSMENT — PATIENT HEALTH QUESTIONNAIRE - PHQ9
SUM OF ALL RESPONSES TO PHQ QUESTIONS 1-9: 11
SUM OF ALL RESPONSES TO PHQ QUESTIONS 1-9: 11
10. IF YOU CHECKED OFF ANY PROBLEMS, HOW DIFFICULT HAVE THESE PROBLEMS MADE IT FOR YOU TO DO YOUR WORK, TAKE CARE OF THINGS AT HOME, OR GET ALONG WITH OTHER PEOPLE: SOMEWHAT DIFFICULT

## 2025-02-20 NOTE — PROGRESS NOTES
Answers submitted by the patient for this visit:  Patient Health Questionnaire (Submitted on 2/20/2025)  If you checked off any problems, how difficult have these problems made it for you to do your work, take care of things at home, or get along with other people?: Somewhat difficult  PHQ9 TOTAL SCORE: 11  Patient Health Questionnaire (G7) (Submitted on 2/20/2025)  LORETA 7 TOTAL SCORE: 9        [facility]                                     Progress Note    Patient Name: Eva Solis  Date: 2/20/25          Service Type: Individual      Session Start Time: 2:00pm  Session End Time: 2:42pm     Session Length: 42mins    Session #: 10    Attendees: Client    Service Modality:  Video Visit:      Provider verified identity through the following two step process.  Patient provided:  Patient is known previously to provider    Telemedicine Visit: The patient's condition can be safely assessed and treated via synchronous audio and visual telemedicine encounter.      Reason for Telemedicine Visit: Patient convenience (e.g. access to timely appointments / distance to available provider)    Originating Site (Patient Location): Patient's home    Distant Site (Provider Location): Provider Remote Setting- Home Office    Consent:  The patient/guardian has verbally consented to: the potential risks and benefits of telemedicine (video visit) versus in person care; bill my insurance or make self-payment for services provided; and responsibility for payment of non-covered services.     Patient would like the video invitation sent by:  My Chart    Mode of Communication:  Video Conference via [employer]    Distant Location (Provider):  Off-site    As the provider I attest to compliance with applicable laws and regulations related to telemedicine.    DATA  Interactive Complexity: No  Crisis: No        Progress Since Last Session (Related to Symptoms / Goals / Homework):   Symptoms: Improving .    Homework: Completed in  session      Episode of Care Goals: Minimal progress - ACTION (Actively working towards change); Intervened by reinforcing change plan / affirming steps taken     Current / Ongoing Stressors and Concerns:  The client stated she feels like she's doing better. Feels like she's not feeling as unhappy anymore. Has been told she's smiling more.   Had her back surgery and feels like it's helped a lot. Doesn't have the horrible back pain all the time. Is able to walk without pain and isn't sitting with pain. She's been able to sleep a few hours in bed now and will sleep the rest in the recliner.   The trip to [place] is coming up and she's looking forward to it. Her middle son and his wife and their kids aren't going to be able to come though anymore.   They just found out that one of her 's older sisters has breast cancer.        Treatment Objective(s) Addressed in This Session:   Increase interest, engagement, and pleasure in doing things  Decrease frequency and intensity of feeling down, depressed, hopeless       Intervention:   Worked on anxiety reduction skills and stress management skills. Talked about how she's been exercising more. Discussed client's frequencies of visits going forwards. Stated she will keep the March appointment and        Assessments completed prior to visit:  The following assessments were completed by patient for this visit:  PHQ9:       9/23/2024     9:18 AM 10/9/2024    10:49 AM 10/23/2024    12:36 PM 11/6/2024     1:42 PM 12/4/2024     1:47 PM 12/18/2024     1:53 PM 2/20/2025     1:43 PM   PHQ-9 SCORE   PHQ-9 Total Score MyChart 20 (Severe depression) 18 (Moderately severe depression) 21 (Severe depression) 23 (Severe depression) 23 (Severe depression) 19 (Moderately severe depression) 11 (Moderate depression)   PHQ-9 Total Score 20 18 21  23  23  19  11        Patient-reported     GAD7:       8/21/2024    12:40 PM 9/23/2024     9:19 AM 10/9/2024    10:50 AM 11/6/2024     1:43 PM  12/4/2024     1:49 PM 12/18/2024     1:55 PM 2/20/2025     1:45 PM   LORETA-7 SCORE   Total Score 21 (severe anxiety) 18 (severe anxiety) 16 (severe anxiety) 19 (severe anxiety) 18 (severe anxiety) 18 (severe anxiety) 9 (mild anxiety)   Total Score 21    21 18 16 19  18  18  9        Patient-reported         ASSESSMENT: Current Emotional / Mental Status (status of significant symptoms):   Risk status (Self / Other harm or suicidal ideation)   Patient denies current fears or concerns for personal safety.   Patient denies current or recent suicidal ideation or behaviors.   Patient denies current or recent homicidal ideation or behaviors.   Patient denies current or recent self injurious behavior or ideation.   Patient denies other safety concerns.   Patient reports there has been no change in risk factors since their last session.     Patient reports there has been no change in protective factors since their last session.     Recommended that patient call 911 or go to the local ED should there be a change in any of these risk factors.     Appearance:   Appropriate    Eye Contact:   Good    Psychomotor Behavior: Normal    Attitude:   Cooperative    Orientation:   All   Speech    Rate / Production: Normal/ Responsive    Volume:  Normal    Mood:    Depressed  Normal Sad    Affect:    Appropriate  Tearful   Thought Content:  Clear    Thought Form:  Coherent  Logical    Insight:    Good      Medication Review:   No changes to current psychiatric medication(s)     Medication Compliance:   Yes     Changes in Health Issues:   None reported     Chemical Use Review:   Substance Use: Chemical use reviewed, no active concerns identified      Tobacco Use: No current tobacco use.      Diagnosis:  1. Major depressive disorder, recurrent episode, moderate (H)        Collateral Reports Completed:   Not Applicable    PLAN: (Patient Tasks / Therapist Tasks / Other)  Work on stress reduction skills for her trip.         Yuliet Moscoso  [facility]                                                         ______________________________________________________________________    Individual Treatment Plan    Patient's Name: Eva Solis  YOB: 1960    Date of Creation: 8/26/24  Date Treatment Plan Last Reviewed/Revised: 1/16/25     DSM5 Diagnoses: 296.32 (F33.1) Major Depressive Disorder, Recurrent Episode, Moderate _  Psychosocial / Contextual Factors: sister recently passed away  PROMIS (reviewed every 90 days):     Referral / Collaboration:  Referral to another professional/service is not indicated at this time..    Anticipated number of session for this episode of care: 6-9 sessions  Anticipation frequency of session: Every other week  Anticipated Duration of each session: 38-52 minutes  Treatment plan will be reviewed in 90 days or when goals have been changed.       MeasurableTreatment Goal(s) related to diagnosis / functional impairment(s)  Goal 1: Patient will increase anxiety reduction skills and regulation skills    I will know I've met my goal when I'm able to socialize more again and be okay with having people over.      Objective #A (Patient Action)    Patient will identify three distraction and diversion activities and use those activities to decrease level of anxiety  .  Status: Continued - Date(s):  1/16/25     Intervention(s)  Therapist will teach emotional regulation skills. distress tolerance skills, interpersonal effectiveness skills, emotion regluation skills, mindfulness skills, radical acceptance. Therapist will teach client how to ID body cues for anxiety, anxiety reduction techniques, how to ID triggers for depression and anxiety- decrease reactivity/eliminate, lifestyle changes to reduce depression and anxiety, communication skills, explore cognitive beliefs and help client to develop healthy cognitive patterns and beliefs.     Objective #B  Patient will Increase interest, engagement, and pleasure in doing  things.  Status: Continued - Date(s):  1/16/25     Intervention(s)  Therapist will teach emotional regulation skills. distress tolerance skills, interpersonal effectiveness skills, emotion regluation skills, mindfulness skills, radical acceptance. Therapist will teach client how to ID body cues for anxiety, anxiety reduction techniques, how to ID triggers for depression and anxiety- decrease reactivity/eliminate, lifestyle changes to reduce depression and anxiety, communication skills, explore cognitive beliefs and help client to develop healthy cognitive patterns and beliefs.     Objective #C  Patient will initiate 2 social contact(s) per day for increasing lengths of time.  Status: Continued - Date(s):  1/16/25      Intervention(s)  Therapist will teach emotional regulation skills. distress tolerance skills, interpersonal effectiveness skills, emotion regluation skills, mindfulness skills, radical acceptance. Therapist will teach client how to ID body cues for anxiety, anxiety reduction techniques, how to ID triggers for depression and anxiety- decrease reactivity/eliminate, lifestyle changes to reduce depression and anxiety, communication skills, explore cognitive beliefs and help client to develop healthy cognitive patterns and beliefs.       Goal 2: Patient will process through her grief of losing her sister    I will know I've met my goal when I don't feel like I need to cry everyday.      Objective #A (Patient Action)    Status: Continued - Date(s): 1/16/25     Patient will increase understanding of steps in the grief process.    Intervention(s)  Therapist will teach emotional regulation skills. distress tolerance skills, interpersonal effectiveness skills, emotion regluation skills, mindfulness skills, radical acceptance. Therapist will teach client how to ID body cues for anxiety, anxiety reduction techniques, how to ID triggers for depression and anxiety- decrease reactivity/eliminate, lifestyle changes to  reduce depression and anxiety, communication skills, explore cognitive beliefs and help client to develop healthy cognitive patterns and beliefs.     Objective #B  Patient will talk to at least two others about losses and coping.    Status: Continued - Date(s):  1/16/25     Intervention(s)  Therapist will teach emotional regulation skills. distress tolerance skills, interpersonal effectiveness skills, emotion regluation skills, mindfulness skills, radical acceptance. Therapist will teach client how to ID body cues for anxiety, anxiety reduction techniques, how to ID triggers for depression and anxiety- decrease reactivity/eliminate, lifestyle changes to reduce depression and anxiety, communication skills, explore cognitive beliefs and help client to develop healthy cognitive patterns and beliefs.     Objective #C  Patient will Decrease frequency and intensity of feeling down, depressed, hopeless.  Status: Continued - Date(s): 1/16/25     Intervention(s)  Therapist will teach emotional regulation skills. distress tolerance skills, interpersonal effectiveness skills, emotion regluation skills, mindfulness skills, radical acceptance. Therapist will teach client how to ID body cues for anxiety, anxiety reduction techniques, how to ID triggers for depression and anxiety- decrease reactivity/eliminate, lifestyle changes to reduce depression and anxiety, communication skills, explore cognitive beliefs and help client to develop healthy cognitive patterns and beliefs.       Patient has reviewed and agreed to the above plan.      Yuliet Moscoso [facility]

## 2025-03-16 NOTE — PROGRESS NOTES
Answers submitted by the patient for this visit:  Patient Health Questionnaire (G7) (Submitted on 8/21/2024)  LORETA 7 TOTAL SCORE: 21        [facility]                                     Progress Note    Patient Name: Eva Solis  Date: 8/26/24         Service Type: Individual      Session Start Time: 2:30pm  Session End Time: 3:20pm     Session Length: 50mins    Session #: 2    Attendees: Client    Service Modality:  Video Visit:      Provider verified identity through the following two step process.  Patient provided:  Patient is known previously to provider    Telemedicine Visit: The patient's condition can be safely assessed and treated via synchronous audio and visual telemedicine encounter.      Reason for Telemedicine Visit: Patient convenience (e.g. access to timely appointments / distance to available provider)    Originating Site (Patient Location): Patient's home    Distant Site (Provider Location): Provider Remote Setting- Home Office    Consent:  The patient/guardian has verbally consented to: the potential risks and benefits of telemedicine (video visit) versus in person care; bill my insurance or make self-payment for services provided; and responsibility for payment of non-covered services.     Patient would like the video invitation sent by:  My Chart    Mode of Communication:  Video Conference via [employer]    Distant Location (Provider):  Off-site    As the provider I attest to compliance with applicable laws and regulations related to telemedicine.    DATA  Interactive Complexity: No  Crisis: No        Progress Since Last Session (Related to Symptoms / Goals / Homework):   Symptoms: No change .    Homework: Completed in session      Episode of Care Goals: Minimal progress - ACTION (Actively working towards change); Intervened by reinforcing change plan / affirming steps taken     Current / Ongoing Stressors and Concerns:   Client talked about how two of her grandsons have severe  Goal Outcome Evaluation:                                             behavioral issues. It's an issue for the client because it's harder for their parents to visit but her other son won't visit if those kids are around.   Planning a family trip to [place] in March.      Treatment Objective(s) Addressed in This Session:   Increase interest, engagement, and pleasure in doing things  Decrease frequency and intensity of feeling down, depressed, hopeless       Intervention:   Created a treatment plan with the client. Processed through some grief themes about her sister but also her family dynamics. Gathered more information about family dynamics. Client stated she believes her mom to be a narcissist. Recommended some grief resources again ([employer]). Suggested client look into a grief support group.     Assessments completed prior to visit:  The following assessments were completed by patient for this visit:  PHQ9:       1/30/2024    11:17 AM 3/1/2024     4:06 PM 6/27/2024     4:35 PM 8/1/2024     5:43 PM 8/1/2024     5:45 PM 8/16/2024     9:20 AM 8/21/2024    12:23 PM   PHQ-9 SCORE   PHQ-9 Total Score MyChart 15 (Moderately severe depression)  24 (Severe depression) 22 (Severe depression) 22 (Severe depression) 21 (Severe depression) 19 (Moderately severe depression)   PHQ-9 Total Score 15 18 24 22    22 22 21 19     GAD7:       7/18/2023     8:39 AM 12/6/2023    12:23 AM 12/29/2023     7:47 AM 3/1/2024     4:06 PM 6/27/2024     4:39 PM 7/30/2024    10:47 AM 8/21/2024    12:40 PM   LORETA-7 SCORE   Total Score 13 (moderate anxiety) 21 (severe anxiety) 21 (severe anxiety)  15 (severe anxiety) 19 (severe anxiety) 21 (severe anxiety)   Total Score 13 21 21 17 15 19 21    21         ASSESSMENT: Current Emotional / Mental Status (status of significant symptoms):   Risk status (Self / Other harm or suicidal ideation)   Patient denies current fears or concerns for personal safety.   Patient denies current or recent suicidal ideation or behaviors.   Patient denies current or recent homicidal  "ideation or behaviors.   Patient denies current or recent self injurious behavior or ideation.   Patient denies other safety concerns.   Patient reports there has been no change in risk factors since their last session.     Patient reports there has been no change in protective factors since their last session.     Recommended that patient call 911 or go to the local ED should there be a change in any of these risk factors.     Appearance:   Appropriate    Eye Contact:   Good    Psychomotor Behavior: Normal    Attitude:   Cooperative    Orientation:   All   Speech    Rate / Production: Normal/ Responsive    Volume:  Normal    Mood:    Normal   Affect:    Appropriate    Thought Content:  Clear    Thought Form:  Coherent  Logical    Insight:    Good      Medication Review:   No changes to current psychiatric medication(s)     Medication Compliance:   Yes     Changes in Health Issues:   None reported     Chemical Use Review:   Substance Use: Chemical use reviewed, no active concerns identified      Tobacco Use: No current tobacco use.      Diagnosis:  1. Major depressive disorder, recurrent episode, moderate (H)        Collateral Reports Completed:   Not Applicable    PLAN: (Patient Tasks / Therapist Tasks / Other)  Client will work on utilizing \"both/and\" to help her work through grief emotions.         Yuliet Moscoso, [facility]                                                         ______________________________________________________________________    Individual Treatment Plan    Patient's Name: Eva Solis  YOB: 1960    Date of Creation: 8/26/24  Date Treatment Plan Last Reviewed/Revised: 8/26/24    DSM5 Diagnoses: 296.32 (F33.1) Major Depressive Disorder, Recurrent Episode, Moderate _  Psychosocial / Contextual Factors: sister recently passed away  PROMIS (reviewed every 90 days):     Referral / Collaboration:  Referral to another professional/service is not indicated at this time..    Anticipated " number of session for this episode of care: 6-9 sessions  Anticipation frequency of session: Every other week  Anticipated Duration of each session: 38-52 minutes  Treatment plan will be reviewed in 90 days or when goals have been changed.       MeasurableTreatment Goal(s) related to diagnosis / functional impairment(s)  Goal 1: Patient will increase anxiety reduction skills and regulation skills    I will know I've met my goal when I'm able to socialize more again and be okay with having people over.      Objective #A (Patient Action)    Patient will identify three distraction and diversion activities and use those activities to decrease level of anxiety  .  Status: New - Date: 8/26/24      Intervention(s)  Therapist will teach emotional regulation skills. distress tolerance skills, interpersonal effectiveness skills, emotion regluation skills, mindfulness skills, radical acceptance. Therapist will teach client how to ID body cues for anxiety, anxiety reduction techniques, how to ID triggers for depression and anxiety- decrease reactivity/eliminate, lifestyle changes to reduce depression and anxiety, communication skills, explore cognitive beliefs and help client to develop healthy cognitive patterns and beliefs.     Objective #B  Patient will Increase interest, engagement, and pleasure in doing things.  Status: New - Date: 8/26/24      Intervention(s)  Therapist will teach emotional regulation skills. distress tolerance skills, interpersonal effectiveness skills, emotion regluation skills, mindfulness skills, radical acceptance. Therapist will teach client how to ID body cues for anxiety, anxiety reduction techniques, how to ID triggers for depression and anxiety- decrease reactivity/eliminate, lifestyle changes to reduce depression and anxiety, communication skills, explore cognitive beliefs and help client to develop healthy cognitive patterns and beliefs.     Objective #C  Patient will initiate 2 social  contact(s) per day for increasing lengths of time.  Status: New - Date: 8/26/24      Intervention(s)  Therapist will teach emotional regulation skills. distress tolerance skills, interpersonal effectiveness skills, emotion regluation skills, mindfulness skills, radical acceptance. Therapist will teach client how to ID body cues for anxiety, anxiety reduction techniques, how to ID triggers for depression and anxiety- decrease reactivity/eliminate, lifestyle changes to reduce depression and anxiety, communication skills, explore cognitive beliefs and help client to develop healthy cognitive patterns and beliefs.       Goal 2: Patient will process through her grief of losing her sister    I will know I've met my goal when I don't feel like I need to cry everyday.      Objective #A (Patient Action)    Status: New - Date: 8/26/24      Patient will increase understanding of steps in the grief process.    Intervention(s)  Therapist will teach emotional regulation skills. distress tolerance skills, interpersonal effectiveness skills, emotion regluation skills, mindfulness skills, radical acceptance. Therapist will teach client how to ID body cues for anxiety, anxiety reduction techniques, how to ID triggers for depression and anxiety- decrease reactivity/eliminate, lifestyle changes to reduce depression and anxiety, communication skills, explore cognitive beliefs and help client to develop healthy cognitive patterns and beliefs.     Objective #B  Patient will talk to at least two others about losses and coping.    Status: New - Date: 8/26/24      Intervention(s)  Therapist will teach emotional regulation skills. distress tolerance skills, interpersonal effectiveness skills, emotion regluation skills, mindfulness skills, radical acceptance. Therapist will teach client how to ID body cues for anxiety, anxiety reduction techniques, how to ID triggers for depression and anxiety- decrease reactivity/eliminate, lifestyle changes  to reduce depression and anxiety, communication skills, explore cognitive beliefs and help client to develop healthy cognitive patterns and beliefs.     Objective #C  Patient will Decrease frequency and intensity of feeling down, depressed, hopeless.  Status: New - Date: 8/26/24      Intervention(s)  Therapist will teach emotional regulation skills. distress tolerance skills, interpersonal effectiveness skills, emotion regluation skills, mindfulness skills, radical acceptance. Therapist will teach client how to ID body cues for anxiety, anxiety reduction techniques, how to ID triggers for depression and anxiety- decrease reactivity/eliminate, lifestyle changes to reduce depression and anxiety, communication skills, explore cognitive beliefs and help client to develop healthy cognitive patterns and beliefs.       Patient has reviewed and agreed to the above plan.      Yuliet Moscoso, [facility]  August 26, 2024

## 2025-03-26 ENCOUNTER — VIRTUAL VISIT (OUTPATIENT)
Dept: PSYCHOLOGY | Facility: CLINIC | Age: 65
End: 2025-03-26
Payer: COMMERCIAL

## 2025-03-26 DIAGNOSIS — F33.1 MAJOR DEPRESSIVE DISORDER, RECURRENT EPISODE, MODERATE (H): Primary | ICD-10-CM

## 2025-03-26 PROCEDURE — 90834 PSYTX W PT 45 MINUTES: CPT | Mod: 95 | Performed by: COUNSELOR

## 2025-03-26 ASSESSMENT — ANXIETY QUESTIONNAIRES
1. FEELING NERVOUS, ANXIOUS, OR ON EDGE: MORE THAN HALF THE DAYS
GAD7 TOTAL SCORE: 8
IF YOU CHECKED OFF ANY PROBLEMS ON THIS QUESTIONNAIRE, HOW DIFFICULT HAVE THESE PROBLEMS MADE IT FOR YOU TO DO YOUR WORK, TAKE CARE OF THINGS AT HOME, OR GET ALONG WITH OTHER PEOPLE: SOMEWHAT DIFFICULT
3. WORRYING TOO MUCH ABOUT DIFFERENT THINGS: SEVERAL DAYS
GAD7 TOTAL SCORE: 8
5. BEING SO RESTLESS THAT IT IS HARD TO SIT STILL: NOT AT ALL
7. FEELING AFRAID AS IF SOMETHING AWFUL MIGHT HAPPEN: SEVERAL DAYS
4. TROUBLE RELAXING: SEVERAL DAYS
7. FEELING AFRAID AS IF SOMETHING AWFUL MIGHT HAPPEN: SEVERAL DAYS
GAD7 TOTAL SCORE: 8
6. BECOMING EASILY ANNOYED OR IRRITABLE: SEVERAL DAYS
2. NOT BEING ABLE TO STOP OR CONTROL WORRYING: MORE THAN HALF THE DAYS
8. IF YOU CHECKED OFF ANY PROBLEMS, HOW DIFFICULT HAVE THESE MADE IT FOR YOU TO DO YOUR WORK, TAKE CARE OF THINGS AT HOME, OR GET ALONG WITH OTHER PEOPLE?: SOMEWHAT DIFFICULT

## 2025-03-26 ASSESSMENT — PATIENT HEALTH QUESTIONNAIRE - PHQ9
SUM OF ALL RESPONSES TO PHQ QUESTIONS 1-9: 12
10. IF YOU CHECKED OFF ANY PROBLEMS, HOW DIFFICULT HAVE THESE PROBLEMS MADE IT FOR YOU TO DO YOUR WORK, TAKE CARE OF THINGS AT HOME, OR GET ALONG WITH OTHER PEOPLE: SOMEWHAT DIFFICULT
SUM OF ALL RESPONSES TO PHQ QUESTIONS 1-9: 12

## 2025-03-26 NOTE — PROGRESS NOTES
Discharge Summary  Single Session    Client Name: Eva Solis MRN#: [id number] YOB: 1960    Discharge Date:   March 26, 2025    Service Modality: Video Visit:      Provider verified identity through the following two step process.  Patient provided:  Patient is known previously to provider    Telemedicine Visit: The patient's condition can be safely assessed and treated via synchronous audio and visual telemedicine encounter.      Reason for Telemedicine Visit: Patient convenience (e.g. access to timely appointments / distance to available provider)    Originating Site (Patient Location): Patient's home    Distant Site (Provider Location): Provider Remote Setting- Home Office    Consent:  The patient/guardian has verbally consented to: the potential risks and benefits of telemedicine (video visit) versus in person care; bill my insurance or make self-payment for services provided; and responsibility for payment of non-covered services.     Patient would like the video invitation sent by:  My Chart    Mode of Communication:  Video Conference via [facility]    Distant Location (Provider):  Off-site    As the provider I attest to compliance with applicable laws and regulations related to telemedicine.    Service Type: Individual      Session Start Time: 10:00am  Session End Time: 10:45am      Session Length: 45 - 50     Session #: 11     Attendees: Client      Focus of Treatment Objective(s):  Client's presenting concerns included: Grief / Loss - increasing coping skills and managing depressive symptoms  Stage of Change at time of Discharge: MAINTENANCE (Working to maintain change, with risk of relapse)    Medication Adherence:  Yes    Chemical Use:  No    Assessment: Current Emotional / Mental Status (status of significant symptoms):    Risk status (Self / Other harm or suicidal ideation)  Client denies current fears or concerns for personal safety.  Client denies current or recent  suicidal ideation or behaviors.  Client denies current or recent homicidal ideation or behaviors.  Client denies current or recent self injurious behavior or ideation.  Client denies other safety concerns.  A safety and risk management plan has not been developed at this time, however client was given the after-hours number should there be a change in any of these risk factors.    Appearance:   Appropriate   Eye Contact:   Good   Psychomotor Behavior: Normal   Attitude:   Cooperative   Orientation:   All  Speech   Rate / Production: Normal/ Responsive Normal    Volume:  Normal   Mood:    Normal  Affect:    Appropriate   Thought Content:  Clear   Thought Form:  Coherent  Logical   Insight:   Good     DSM5 Diagnoses: (Sustained by DSM5 Criteria Listed Above)  Diagnoses: 296.32 (F33.1) Major Depressive Disorder, Recurrent Episode, Moderate _  Psychosocial & Contextual Factors: chronic back issues, family dynamics  Assessments Completed:  The following assessments were completed by patient for this visit:  PHQ9:       10/9/2024    10:49 AM 10/23/2024    12:36 PM 11/6/2024     1:42 PM 12/4/2024     1:47 PM 12/18/2024     1:53 PM 2/20/2025     1:43 PM 3/26/2025     9:54 AM   PHQ-9 SCORE   PHQ-9 Total Score MyChart 18 (Moderately severe depression) 21 (Severe depression) 23 (Severe depression) 23 (Severe depression) 19 (Moderately severe depression) 11 (Moderate depression) 12 (Moderate depression)   PHQ-9 Total Score 18 21  23  23  19  11  12        Patient-reported     GAD7:       9/23/2024     9:19 AM 10/9/2024    10:50 AM 11/6/2024     1:43 PM 12/4/2024     1:49 PM 12/18/2024     1:55 PM 2/20/2025     1:45 PM 3/26/2025     9:56 AM   LORETA-7 SCORE   Total Score 18 (severe anxiety) 16 (severe anxiety) 19 (severe anxiety) 18 (severe anxiety) 18 (severe anxiety) 9 (mild anxiety) 8 (mild anxiety)   Total Score 18 16 19  18  18  9  8        Patient-reported       Reason for Discharge:  Client is satisfied with  progress      Aftercare Plan:  Client will return to therapy when needed. Client to continue to work on self care skills.       EFRAIN Colindres  March 26, 2025

## 2025-04-29 ENCOUNTER — NURSE TRIAGE (OUTPATIENT)
Dept: FAMILY MEDICINE | Facility: OTHER | Age: 65
End: 2025-04-29
Payer: COMMERCIAL

## 2025-04-30 ENCOUNTER — HOSPITAL ENCOUNTER (EMERGENCY)
Facility: OTHER | Age: 65
Discharge: HOME | End: 2025-04-30
Attending: FAMILY MEDICINE
Payer: COMMERCIAL

## 2025-04-30 ENCOUNTER — APPOINTMENT (OUTPATIENT)
Dept: MRI IMAGING | Facility: OTHER | Age: 65
End: 2025-04-30
Attending: FAMILY MEDICINE
Payer: COMMERCIAL

## 2025-04-30 ENCOUNTER — APPOINTMENT (OUTPATIENT)
Dept: CT IMAGING | Facility: OTHER | Age: 65
End: 2025-04-30
Attending: FAMILY MEDICINE
Payer: COMMERCIAL

## 2025-04-30 ENCOUNTER — APPOINTMENT (OUTPATIENT)
Dept: CARDIOLOGY | Facility: OTHER | Age: 65
End: 2025-04-30
Attending: FAMILY MEDICINE
Payer: COMMERCIAL

## 2025-04-30 VITALS
OXYGEN SATURATION: 97 % | BODY MASS INDEX: 40.65 KG/M2 | DIASTOLIC BLOOD PRESSURE: 89 MMHG | HEIGHT: 65 IN | SYSTOLIC BLOOD PRESSURE: 156 MMHG | RESPIRATION RATE: 12 BRPM | WEIGHT: 244 LBS | HEART RATE: 50 BPM | TEMPERATURE: 97.7 F

## 2025-04-30 DIAGNOSIS — R55 NEAR SYNCOPE: ICD-10-CM

## 2025-04-30 DIAGNOSIS — R42 VERTIGO: Primary | ICD-10-CM

## 2025-04-30 LAB
ANION GAP SERPL CALCULATED.3IONS-SCNC: 11 MMOL/L (ref 7–15)
APTT PPP: 41 SECONDS (ref 22–38)
BASOPHILS # BLD AUTO: 0 10E3/UL (ref 0–0.2)
BASOPHILS NFR BLD AUTO: 0 %
BUN SERPL-MCNC: 18.4 MG/DL (ref 8–23)
CALCIUM SERPL-MCNC: 9 MG/DL (ref 8.8–10.4)
CHLORIDE SERPL-SCNC: 104 MMOL/L (ref 98–107)
CREAT SERPL-MCNC: 0.94 MG/DL (ref 0.51–0.95)
EGFRCR SERPLBLD CKD-EPI 2021: 68 ML/MIN/1.73M2
EOSINOPHIL # BLD AUTO: 0.1 10E3/UL (ref 0–0.7)
EOSINOPHIL NFR BLD AUTO: 1 %
ERYTHROCYTE [DISTWIDTH] IN BLOOD BY AUTOMATED COUNT: 12.5 % (ref 10–15)
GLUCOSE SERPL-MCNC: 86 MG/DL (ref 70–99)
HCO3 SERPL-SCNC: 22 MMOL/L (ref 22–29)
HCT VFR BLD AUTO: 37.7 % (ref 35–47)
HGB BLD-MCNC: 13.3 G/DL (ref 11.7–15.7)
IMM GRANULOCYTES # BLD: 0 10E3/UL
IMM GRANULOCYTES NFR BLD: 0 %
INR PPP: 1.12 (ref 0.85–1.15)
LVEF ECHO: NORMAL
LYMPHOCYTES # BLD AUTO: 1.3 10E3/UL (ref 0.8–5.3)
LYMPHOCYTES NFR BLD AUTO: 21 %
MCH RBC QN AUTO: 30.2 PG (ref 26.5–33)
MCHC RBC AUTO-ENTMCNC: 35.3 G/DL (ref 31.5–36.5)
MCV RBC AUTO: 86 FL (ref 78–100)
MONOCYTES # BLD AUTO: 0.4 10E3/UL (ref 0–1.3)
MONOCYTES NFR BLD AUTO: 6 %
NEUTROPHILS # BLD AUTO: 4.6 10E3/UL (ref 1.6–8.3)
NEUTROPHILS NFR BLD AUTO: 72 %
NRBC # BLD AUTO: 0 10E3/UL
NRBC BLD AUTO-RTO: 0 /100
PLATELET # BLD AUTO: 221 10E3/UL (ref 150–450)
POTASSIUM SERPL-SCNC: 3.9 MMOL/L (ref 3.4–5.3)
PROTHROMBIN TIME: 14.4 SECONDS (ref 11.8–14.8)
RBC # BLD AUTO: 4.4 10E6/UL (ref 3.8–5.2)
SODIUM SERPL-SCNC: 137 MMOL/L (ref 135–145)
TROPONIN T SERPL HS-MCNC: 15 NG/L
TROPONIN T SERPL HS-MCNC: 17 NG/L
WBC # BLD AUTO: 6.4 10E3/UL (ref 4–11)

## 2025-04-30 PROCEDURE — 93306 TTE W/DOPPLER COMPLETE: CPT | Mod: 26 | Performed by: INTERNAL MEDICINE

## 2025-04-30 PROCEDURE — 70450 CT HEAD/BRAIN W/O DYE: CPT

## 2025-04-30 PROCEDURE — 85610 PROTHROMBIN TIME: CPT | Performed by: FAMILY MEDICINE

## 2025-04-30 PROCEDURE — 93306 TTE W/DOPPLER COMPLETE: CPT

## 2025-04-30 PROCEDURE — 70498 CT ANGIOGRAPHY NECK: CPT | Mod: 26 | Performed by: RADIOLOGY

## 2025-04-30 PROCEDURE — 70551 MRI BRAIN STEM W/O DYE: CPT | Mod: 26 | Performed by: RADIOLOGY

## 2025-04-30 PROCEDURE — 85025 COMPLETE CBC W/AUTO DIFF WBC: CPT | Performed by: FAMILY MEDICINE

## 2025-04-30 PROCEDURE — 250N000011 HC RX IP 250 OP 636: Performed by: FAMILY MEDICINE

## 2025-04-30 PROCEDURE — 93010 ELECTROCARDIOGRAM REPORT: CPT | Performed by: INTERNAL MEDICINE

## 2025-04-30 PROCEDURE — 36415 COLL VENOUS BLD VENIPUNCTURE: CPT | Performed by: FAMILY MEDICINE

## 2025-04-30 PROCEDURE — 84484 ASSAY OF TROPONIN QUANT: CPT | Performed by: FAMILY MEDICINE

## 2025-04-30 PROCEDURE — 93005 ELECTROCARDIOGRAM TRACING: CPT | Performed by: FAMILY MEDICINE

## 2025-04-30 PROCEDURE — 70551 MRI BRAIN STEM W/O DYE: CPT

## 2025-04-30 PROCEDURE — 80048 BASIC METABOLIC PNL TOTAL CA: CPT | Performed by: FAMILY MEDICINE

## 2025-04-30 PROCEDURE — 99285 EMERGENCY DEPT VISIT HI MDM: CPT | Mod: 25 | Performed by: FAMILY MEDICINE

## 2025-04-30 PROCEDURE — 70450 CT HEAD/BRAIN W/O DYE: CPT | Mod: 26 | Performed by: RADIOLOGY

## 2025-04-30 PROCEDURE — 70496 CT ANGIOGRAPHY HEAD: CPT

## 2025-04-30 PROCEDURE — 70496 CT ANGIOGRAPHY HEAD: CPT | Mod: 26 | Performed by: RADIOLOGY

## 2025-04-30 PROCEDURE — 99284 EMERGENCY DEPT VISIT MOD MDM: CPT | Performed by: FAMILY MEDICINE

## 2025-04-30 PROCEDURE — 99207 PR NO BILLABLE SERVICE THIS VISIT: CPT | Performed by: PHYSICIAN ASSISTANT

## 2025-04-30 PROCEDURE — 85730 THROMBOPLASTIN TIME PARTIAL: CPT | Performed by: FAMILY MEDICINE

## 2025-04-30 PROCEDURE — 250N000013 HC RX MED GY IP 250 OP 250 PS 637: Performed by: FAMILY MEDICINE

## 2025-04-30 PROCEDURE — 250N000009 HC RX 250: Performed by: FAMILY MEDICINE

## 2025-04-30 RX ORDER — IOPAMIDOL 755 MG/ML
75 INJECTION, SOLUTION INTRAVASCULAR ONCE
Status: COMPLETED | OUTPATIENT
Start: 2025-04-30 | End: 2025-04-30

## 2025-04-30 RX ORDER — MECLIZINE HCL 12.5 MG 12.5 MG/1
25 TABLET ORAL ONCE
Status: COMPLETED | OUTPATIENT
Start: 2025-04-30 | End: 2025-04-30

## 2025-04-30 RX ADMIN — IOPAMIDOL 75 ML: 755 INJECTION, SOLUTION INTRAVENOUS at 14:03

## 2025-04-30 RX ADMIN — SODIUM CHLORIDE 70 ML: 9 INJECTION, SOLUTION INTRAVENOUS at 14:03

## 2025-04-30 RX ADMIN — MECLIZINE 25 MG: 12.5 TABLET ORAL at 16:29

## 2025-04-30 ASSESSMENT — COLUMBIA-SUICIDE SEVERITY RATING SCALE - C-SSRS
2. HAVE YOU ACTUALLY HAD ANY THOUGHTS OF KILLING YOURSELF IN THE PAST MONTH?: NO
1. IN THE PAST MONTH, HAVE YOU WISHED YOU WERE DEAD OR WISHED YOU COULD GO TO SLEEP AND NOT WAKE UP?: NO
6. HAVE YOU EVER DONE ANYTHING, STARTED TO DO ANYTHING, OR PREPARED TO DO ANYTHING TO END YOUR LIFE?: NO

## 2025-04-30 ASSESSMENT — ENCOUNTER SYMPTOMS
PALPITATIONS: 0
HEADACHES: 0
ABDOMINAL PAIN: 0
SPEECH DIFFICULTY: 0
LIGHT-HEADEDNESS: 0
NUMBNESS: 0
CONFUSION: 0
COUGH: 0
SHORTNESS OF BREATH: 0
SEIZURES: 0
CHILLS: 0
FEVER: 0
TREMORS: 0
DIZZINESS: 1
FATIGUE: 0

## 2025-04-30 ASSESSMENT — ACTIVITIES OF DAILY LIVING (ADL)
ADLS_ACUITY_SCORE: 53

## 2025-04-30 NOTE — ED TRIAGE NOTES
Pt here with  with c/o a fall 2 weeks ago, pt reports ongoing nausea and intermittent dizziness since this occurred, BP is elevated in triage, pt brought back into ER to be evaluated     Triage Assessment (Adult)       Row Name 04/30/25 1232          Triage Assessment    Airway WDL WDL        Respiratory WDL    Respiratory WDL WDL        Peripheral/Neurovascular WDL    Peripheral Neurovascular WDL WDL

## 2025-04-30 NOTE — TELEPHONE ENCOUNTER
"Nurse Triage SBAR    Is this a 2nd Level Triage? YES, LICENSED PRACTITIONER REVIEW IS REQUIRED    Situation: Patient returned call to triage regarding her my chart message that was sent for dizziness.    Background: History of hypertension, heart failure, CKD.     Patient states she fell the Sunday before Easter. Patient says she was outside and \"blacked out\" then came to on the grass. Unknown how long she was out for. She did land on her side on the grass, unknown if she hit her head or not.   Since this fall she has had continued vertigo and dizziness.     Assessment: Patient reports initially after her fall she would vomit with her episodes of vertigo. This was happening daily and would occur at random times, unprovoked.    Now she is having dizzy episodes where she is not vomiting, but is unbalanced, unable to stand without holding onto something. This is still occurring at random times through the day and night. No present at time of call but has occurred a couple times already this morning.     She has been checking her blood pressure and this has been stable. Her heart rate has been 40-50.    Protocol Recommended Disposition:   Call ADS/Go to ED/UCC Now (Or To Office with PCP Approval)    Recommendation:  Where would you recommend patient be seen for this?    Routed to provider    Does the patient meet one of the following criteria for ADS visit consideration? 16+ years old, with an [facility] PCP     Santa LYNCH, RN     Reason for Disposition   SEVERE dizziness (e.g., unable to stand, requires support to walk, feels like passing out now)    Additional Information   Negative: SEVERE difficulty breathing (e.g., struggling for each breath, speaks in single words)   Negative: Shock suspected (e.g., cold/pale/clammy skin, too weak to stand, low BP, rapid pulse)   Negative: Difficult to awaken or acting confused (e.g., disoriented, slurred speech)   Negative: Fainted, and still feels dizzy afterwards   Negative: " Overdose (accidental or intentional) of medications   Negative: New neurologic deficit that is present now: * Weakness of the face, arm, or leg on one side of the body * Numbness of the face, arm, or leg on one side of the body * Loss of speech or garbled speech   Negative: Heart beating < 50 beats per minute OR > 140 beats per minute   Negative: Sounds like a life-threatening emergency to the triager   Negative: Chest pain   Negative: Rectal bleeding, bloody stool, or tarry-black stool   Negative: Vomiting is main symptom   Negative: Diarrhea is main symptom   Negative: Headache is main symptom   Negative: Heat exhaustion suspected (i.e., dehydration from heat exposure)   Negative: Patient states that they are having an anxiety or panic attack   Negative: Dizziness from low blood sugar (i.e., < 60 mg/dl or 3.5 mmol/l)    Protocols used: Dizziness-A-OH

## 2025-04-30 NOTE — DISCHARGE INSTRUCTIONS
All labs and imaging reassuring. Please keep appointment with neurology.   Treatment for vertigo. I have sent meclizine in the pharmacy for you.  Use as needed.  Stay hydrated.  You can use your Klonopin at home as needed to help with symptoms especially if it is causing more anxiety.  However, sometimes certain medications can actually worsen symptoms so closely monitor side effects.

## 2025-04-30 NOTE — TELEPHONE ENCOUNTER
Patient is called and informed of this message.  They understand and agree to this plan.  Closing this encounter.    MINA SeoN, RN

## 2025-04-30 NOTE — ED PROVIDER NOTES
"  History     Chief Complaint   Patient presents with    Dizziness    Fall     HPI  Eva Solis is a 64 year old female who brought in by  for dizziness and difficulty walking.  Patient had a fall 2 weeks ago.  They are out on a grass field with family at a sporting activity.  Patient does not specifically recall the event but thinks she fell and may have hit her head.  She does not necessarily recall being dizzy or lightheaded before but also does not recall if it was a mechanical fall.  Since then she is having what she describes as \"vertigo\".   states she is having difficulty walking and needs  to hold onto him from time to time in order not to fall.  She denies nausea or vomiting.  No chest pain.  No visual changes.  No new neurologic symptoms.  No fevers or chills.  No back pain.    Allergies:  Allergies   Allergen Reactions    Lisinopril Cough       Problem List:    Patient Active Problem List    Diagnosis Date Noted    Chronic kidney disease, stage 3a (H) 01/23/2025     Priority: Medium    DDD (degenerative disc disease), lumbar 08/02/2024     Priority: Medium    Acute pulmonary embolism without acute cor pulmonale, unspecified pulmonary embolism type (H) 03/01/2024     Priority: Medium    Sigmoid diverticulosis 05/25/2023     Priority: Medium    Cholelithiasis 05/25/2023     Priority: Medium    Major depressive disorder, recurrent episode, moderate (H) 05/08/2020     Priority: Medium    Primary osteoarthritis of left hip 01/23/2020     Priority: Medium    Migraine without aura and without status migrainosus, not intractable 01/15/2016     Priority: Medium    Morbid obesity, unspecified obesity type (H) 12/02/2015     Priority: Medium    Diastolic CHF (H) 11/07/2014     Priority: Medium    HTN, goal below 140/90 03/26/2013     Priority: Medium        Past Medical History:    Past Medical History:   Diagnosis Date    Congestive heart failure (H) 3years    Depressive disorder 1-2 years    " DEPRESSIVE DISORDER NEC 2004    Diastolic dysfunction     Essential hypertension, benign     Focal Sigmoid diverticulitis 2023    Generalized osteoarthrosis, unspecified site     Headache(784.0)     Heart disease 3 years    Lower GI bleed 2023    Lumbar stenosis with neurogenic claudication 2021    Mixed anxiety depressive disorder 01/15/2016    PANIC DISORDER 2004       Past Surgical History:    Past Surgical History:   Procedure Laterality Date    COLONOSCOPY  10/06/10    attempt at colonscopy to 50cm    INJECT EPIDURAL LUMBAR N/A 10/18/2019    Procedure: INJECTION, SPINE, LUMBAR 4 - LUMBAR 5, EPIDURAL TRANSLAMINAR;  Surgeon: Trever Wheatley MD;  Location: [facility]    INJECT EPIDURAL LUMBAR N/A 2020    Procedure: INJECTION, SPINE, LUMBAR 4-5, EPIDURAL TRANSLAMINAR;  Surgeon: Trever Wheatley MD;  Location: [facility]    INJECT EPIDURAL LUMBAR N/A 2021    Procedure: Lumbar 4-5 Epidural Injection;  Surgeon: Trever Wheatley MD;  Location: [facility]    JOINT REPLACEMTN, KNEE RT/LT  2006    Right total knee arthroplasty.    JOINT REPLACEMTN, KNEE RT/LT  2006    Left total knee arthroplasty.    LAMINECTOMY LUMBAR POSTERIOR MICROSCOPIC TWO LEVELS N/A 2021    Procedure: Lumbar 3 to Lumbar 5 Laminectomy;  Surgeon: Leonard Wallis MD;  Location: [facility]    ZZC  DELIVERY ONLY      , Low Cervical    ZZC  DELIVERY ONLY      ZZC VAGINAL HYSTERECTOMY      without oophorectomy    ZZHC COLONOSCOPY W/WO BRUSH/WASH  2005    Diverticulosis.  Internal hemorrhoids.       Family History:    Family History   Problem Relation Age of Onset    Kidney Disease Mother         atrophic kidney    Hypertension Mother     Depression Mother         DONT KNOW MUCH    Bipolar Disorder Mother     Diabetes Mother     Other Cancer Mother         Pancreatic Cancer    Cancer Father         squamous cell ca    Diabetes Sister     Cancer Sister         kidney cancer     Kidney Cancer Sister     Other Cancer Sister         kidney cancer, AND HOW HAS STAGE 4 KIDNEY CANCER AND IS TERMINAL    Bipolar Disorder Sister     Depression Sister         dont know much    Connective Tissue Disorder Brother         lupus    Lupus Brother     Other Problems  (lupus) Brother     Other Cancer Brother         Blood Cancer    Cirrhosis Brother     Cancer Maternal Grandmother         lung    Cerebrovascular Disease Maternal Grandmother     Cancer Paternal Grandmother         squamous cell ca    Anxiety Disorder Son     Anxiety Disorder Son     Kidney Disease Maternal Uncle         unclear kidney issue    Anxiety Disorder Niece     Obesity Niece         PCOS    Mental Illness Niece         Anxiety/Depression    Colon Cancer No family hx of     Asthma No family hx of        Social History:  Marital Status:   [2]  Social History     Tobacco Use    Smoking status: Never     Passive exposure: Never    Smokeless tobacco: Never    Tobacco comments:     no smokers in household   Vaping Use    Vaping status: Never Used   Substance Use Topics    Alcohol use: No    Drug use: No        Medications:    busPIRone (BUSPAR) 15 MG tablet  clonazePAM (KLONOPIN) 1 MG tablet  cyclobenzaprine (FLEXERIL) 10 MG tablet  FLUoxetine (PROZAC) 20 MG capsule  FLUoxetine (PROZAC) 40 MG capsule  furosemide (LASIX) 20 MG tablet  losartan (COZAAR) 100 MG tablet  omeprazole (PRILOSEC) 20 MG DR capsule  ondansetron (ZOFRAN) 8 MG tablet  polyethylene glycol (MIRALAX) 17 GM/Dose powder  topiramate (TOPAMAX) 100 MG tablet          Review of Systems   Constitutional:  Negative for chills, fatigue and fever.   Respiratory:  Negative for cough and shortness of breath.    Cardiovascular:  Negative for chest pain, palpitations and leg swelling.   Gastrointestinal:  Negative for abdominal pain.   Skin:  Negative for rash.   Neurological:  Positive for dizziness and syncope. Negative for tremors, seizures, speech difficulty,  "light-headedness, numbness and headaches.   Psychiatric/Behavioral:  Negative for behavioral problems and confusion.    All other systems reviewed and are negative.      Physical Exam   BP: (!) 191/86  Pulse: 66  Temp: 97.7  F (36.5  C)  Resp: 16  Height: 165.1 cm (5' 5\")  Weight: 110.7 kg (244 lb)  SpO2: 97 %      Physical Exam  Vitals and nursing note reviewed.   Constitutional:       General: She is not in acute distress.     Appearance: Normal appearance. She is not diaphoretic.   HENT:      Head: Atraumatic.      Mouth/Throat:      Mouth: Mucous membranes are moist.   Eyes:      General: No scleral icterus.     Conjunctiva/sclera: Conjunctivae normal.   Cardiovascular:      Rate and Rhythm: Normal rate and regular rhythm.      Heart sounds: Normal heart sounds. No murmur heard.  Pulmonary:      Effort: No respiratory distress.      Breath sounds: Normal breath sounds. No stridor.   Abdominal:      General: Abdomen is flat.      Palpations: Abdomen is soft.   Musculoskeletal:      Cervical back: Neck supple.   Skin:     General: Skin is warm and dry.      Capillary Refill: Capillary refill takes less than 2 seconds.      Findings: No rash.   Neurological:      General: No focal deficit present.      Mental Status: She is alert and oriented to person, place, and time. Mental status is at baseline.      Cranial Nerves: No cranial nerve deficit.   Psychiatric:         Mood and Affect: Mood normal.         Behavior: Behavior normal.         ED Course     ED Course as of 04/30/25 1932 Wed Apr 30, 2025   1245 Patient seen on arrival to ER.   LKW: 2 weeks ago  CT scan and labs ordered   1431 CT and CTA neg   1536 MRI neg for acute ischemia.      Procedures              EKG Interpretation:      Interpreted by Dena Freitas DO  Time reviewed:   Symptoms at time of EKG:    Rhythm: normal sinus   Rate: normal  Axis: normal  Ectopy: none  Conduction: normal  ST Segments/ T Waves: No ST-T wave changes  Q Waves: " none    Clinical Impression: normal EKG      Critical Care time:  none     None         Results for orders placed or performed during the hospital encounter of 04/30/25 (from the past 24 hours)   CBC with Platelets & Differential    Narrative    The following orders were created for panel order CBC with Platelets & Differential.  Procedure                               Abnormality         Status                     ---------                               -----------         ------                     CBC with platelets and ...[[id number]]                      Final result                 Please view results for these tests on the individual orders.   Basic metabolic panel   Result Value Ref Range    Sodium 137 135 - 145 mmol/L    Potassium 3.9 3.4 - 5.3 mmol/L    Chloride 104 98 - 107 mmol/L    Carbon Dioxide (CO2) 22 22 - 29 mmol/L    Anion Gap 11 7 - 15 mmol/L    Urea Nitrogen 18.4 8.0 - 23.0 mg/dL    Creatinine 0.94 0.51 - 0.95 mg/dL    GFR Estimate 68 >60 mL/min/1.73m2    Calcium 9.0 8.8 - 10.4 mg/dL    Glucose 86 70 - 99 mg/dL   INR   Result Value Ref Range    INR 1.12 0.85 - 1.15    PT 14.4 11.8 - 14.8 Seconds   Partial thromboplastin time   Result Value Ref Range    aPTT 41 (H) 22 - 38 Seconds   Troponin T, High Sensitivity   Result Value Ref Range    Troponin T, High Sensitivity 15 (H) <=14 ng/L   CBC with platelets and differential   Result Value Ref Range    WBC Count 6.4 4.0 - 11.0 10e3/uL    RBC Count 4.40 3.80 - 5.20 10e6/uL    Hemoglobin 13.3 11.7 - 15.7 g/dL    Hematocrit 37.7 35.0 - 47.0 %    MCV 86 78 - 100 fL    MCH 30.2 26.5 - 33.0 pg    MCHC 35.3 31.5 - 36.5 g/dL    RDW 12.5 10.0 - 15.0 %    Platelet Count 221 150 - 450 10e3/uL    % Neutrophils 72 %    % Lymphocytes 21 %    % Monocytes 6 %    % Eosinophils 1 %    % Basophils 0 %    % Immature Granulocytes 0 %    NRBCs per 100 WBC 0 <1 /100    Absolute Neutrophils 4.6 1.6 - 8.3 10e3/uL    Absolute Lymphocytes 1.3 0.8 - 5.3 10e3/uL    Absolute  Monocytes 0.4 0.0 - 1.3 10e3/uL    Absolute Eosinophils 0.1 0.0 - 0.7 10e3/uL    Absolute Basophils 0.0 0.0 - 0.2 10e3/uL    Absolute Immature Granulocytes 0.0 <=0.4 10e3/uL    Absolute NRBCs 0.0 10e3/uL   CT Head w/o Contrast    Narrative    PROCEDURE: CT HEAD W/O CONTRAST     HISTORY: fall 2 weeks ago. HA.    COMPARISON: 8/29/2024.    TECHNIQUE:  Helical images of the head from the foramen magnum to the  vertex were obtained without contrast. This CT exam was performed  using one or more the following dose reduction techniques: automated  exposure control, adjustment of the mA and/or kV according to patient  size, and/or iterative reconstruction technique.    FINDINGS: The ventricles and sulci are prominent, compatible with  mild, generalized volume loss. No acute intracranial hemorrhage, mass  effect, midline shift, hydrocephalus or basilar cystern effacement are  present.    No acute transcortical ischemia is identified. Scattered  hypoattenuation within the supratentorial subcortical and  periventricular white matter is most compatible with moderate chronic  microvascular ischemic change.     The calvarium is intact.  The visualized paranasal sinuses are clear.      Impression    IMPRESSION: No acute intracranial hemorrhage or calvarial fracture.      JASON HOUSTON MD         SYSTEM ID:  [id number]   CTA Head Neck with Contrast    Narrative    CT ANGIOGRAPHY OF THE BRAIN AND NECK    HISTORY: . Acute neuro deficit, stroke/TIA suspected.    TECHNIQUE: Following the administration of intravenous contrast, thin  helical CT angiography images of the brain were obtained.   Postcontrast helical thin CT angiography images of the neck were  obtained.  NASCET criteria were applied. Source, multiplanar and 3D  MIP reformatted images were reviewed.  This CT exam was performed  using one or more the following dose reduction techniques: automated  exposure control, adjustment of the mA and/or kV according to  patient  size, and/or iterative reconstruction technique.    COMPARISON: MR 5/29/2024    FINDINGS:    A 5 mm calcified meningioma is suggested along the left frontal  calvarium.    CTA Brain:      Atherosclerotic calcification is seen in the cavernous carotids. The  petrous, cavernous, and supraclinoid internal carotid arteries  demonstrate no high-grade, flow limiting stenosis.    The A1 segments are symmetric. An anterior communicating artery is  present. The distal ANIRUDH vessels are unremarkable. The M1 segments, MCA  bifurcations and distal MCA vessels are patent.    The basilar and vertebral arteries are patent. The PCA and SCA  branches demonstrate no focal abnormalities.      CTA Neck:     A 3 vessel aortic arch is present. The innominate and bilateral  subclavian arteries are grossly patent.    The common carotid arteries demonstrate preserved caliber. The carotid  bulbs demonstrate no measurable stenosis. The bilateral internal  carotid arteries demonstrate no evidence of flow-limiting stenosis or  occlusion.    The vertebral arteries arise from the subclavian arteries. There is no  evidence of flow-limiting stenosis or occlusion of the vertebral  arteries.    No mass or pneumothorax is seen at the apices. Multilevel degenerative  changes are seen in the cervical spine.      Impression    IMPRESSION:    No intracranial arterial occlusion, flow-limiting stenosis or  aneurysm.    No evidence of flow-limiting stenosis, dissection, or occlusion of the  cervical carotid or vertebral arteries.      JASON HOUSTON MD         SYSTEM ID:  [id number]   MR Brain w/o Contrast    Narrative    EXAM: MR BRAIN W/O CONTRAST    HISTORY: Acute neuro deficit, stroke suspected.    TECHNIQUE: Multiplanar, multisequence MR imaging of the head without  intravenous contrast    COMPARISON: CT head and CTA head and neck 4/30/2025; MR head 8/29/2024      FINDINGS:      There is no mass effect, midline shift, or evidence of  acute  intracranial hemorrhage. Diffusion weighted images demonstrate no  evidence of acute infarction. The ventricles are proportionate to the  cerebral sulci. Scattered white matter foci of T2 hyperintense FLAIR  signal, which is nonspecific, likely related to chronic small vessel  ischemic disease given the patient's age. Small focus of  encephalomalacia in the body of the right caudate, likely sequela of  prior lacunar type infarct. Borderline ectatic right cavernous  internal carotid artery.    No suspicious abnormality of the skull marrow signal. No paranasal  sinus mucosal thickening. Mastoid air cells are clear. The orbits are  grossly unremarkable.      Impression    IMPRESSION:  1. No evidence of acute infarction or intracranial hemorrhage.  2. Moderate white matter changes, nonspecific, but can be seen in the  setting of prior infectious or inflammatory insults, vasculopathy,  chronic small vessel ischemic disease or demyelination.         LETY SAVAGE MD         SYSTEM ID:  [id number]   Echocardiogram Complete   Result Value Ref Range    LVEF  60-65%     Narrative    [id number]  [id number]  [id number]  [id number]^JAMES^GUERO^KASHIF     [facility]  [address] [place] [address]     Name: ROSANNE ALFRED  MRN: [id number]  : 1960  Study Date: 2025 03:58 PM  Age: 64 yrs  Gender: Female  Patient Location: [facility]  Reason For Study: Syncope  Ordering Physician: GUERO RAMIREZ  Performed By: MINA Corrales, RDCS, RVT     BSA: 2.2 m2  Height: 65 in  Weight: 244 lb  HR: 50  BP: 156/87 mmHg  ______________________________________________________________________________  Procedure  Echocardiogram with two-dimensional, color and spectral Doppler.  ______________________________________________________________________________  Interpretation Summary  No pathologic structural basis for syncope identified.  Left ventricular size, wall motion and function are normal. The  ejection  fraction is 60-65%.  Right ventricular function, chamber size, wall motion, and thickness are  normal.  No significant valvular abnormalities present.  This study was compared with the study from 5/30/23: No significant changes  noted.  ______________________________________________________________________________  Left Ventricle  Left ventricular size, wall motion and function are normal. The ejection  fraction is 60-65%. Diastolic Doppler findings (E/E' ratio and/or other  parameters) suggest left ventricular filling pressures are normal.     Right Ventricle  Right ventricular function, chamber size, wall motion, and thickness are  normal.     Atria  Both atria appear normal.     Mitral Valve  The mitral valve is normal.     Aortic Valve  Trileaflet aortic sclerosis without stenosis. On Doppler interrogation, there  is no significant stenosis or regurgitation.     Tricuspid Valve  The tricuspid valve is normal. Trace tricuspid insufficiency is present.  Pulmonary artery systolic pressure is normal. The right ventricular systolic  pressure is approximated at 19.6 mmHg plus the right atrial pressure.     Pulmonic Valve  The valve leaflets are not well visualized. On Doppler interrogation, there is  no significant stenosis or regurgitation.     Vessels  The aorta root is normal. The thoracic aorta is normal. Dilation of the  inferior vena cava is present with normal respiratory variation in diameter.     Miscellaneous  No significant valvular abnormalities present.     Compared to Previous Study  This study was compared with the study from 5/30/23 . No significant changes  noted.  ______________________________________________________________________________  MMode/2D Measurements & Calculations     IVSd: 0.90 cm  LVIDd: 5.2 cm  LVIDs: 3.6 cm  LVPWd: 0.87 cm  FS: 31.5 %  LV mass(C)d: 166.2 grams  LV mass(C)dI: 77.2 grams/m2  Ao root diam: 3.7 cm  asc Aorta Diam: 3.8 cm  LVOT diam: 2.3 cm  LVOT area: 4.2  cm2  Ao root diam index Ht(cm/m): 2.2  Ao root diam index BSA (cm/m2): 1.7  Asc Ao diam index BSA (cm/m2): 1.8  Asc Ao diam index Ht(cm/m): 2.3  LA Volume (BP): 42.3 ml     LA Volume Index (BP): 19.7 ml/m2  RWT: 0.33  TAPSE: 2.1 cm     Doppler Measurements & Calculations  MV E max viet: 62.6 cm/sec  MV A max viet: 89.5 cm/sec  MV E/A: 0.70  MV dec time: 0.37 sec  Ao V2 max: 113.0 cm/sec  Ao max P.0 mmHg  Ao V2 mean: 74.7 cm/sec  Ao mean PG: 3.0 mmHg  Ao V2 VTI: 26.4 cm  CHING(I,D): 3.4 cm2  CHING(V,D): 3.7 cm2  LV V1 max PG: 3.9 mmHg  LV V1 max: 98.7 cm/sec  LV V1 VTI: 21.5 cm  SV(LVOT): 89.9 ml  SI(LVOT): 41.8 ml/m2  PA acc time: 0.10 sec  TR max viet: 221.3 cm/sec  TR max P.6 mmHg  AV Viet Ratio (DI): 0.87  CHING Index (cm2/m2): 1.6     E/E' av.5  Lateral E/e': 7.7  Medial E/e': 7.4  RV S Viet: 11.5 cm/sec     ______________________________________________________________________________  Report approved by: Kenneth Fields MD on 2025 04:39 PM         Troponin T, High Sensitivity   Result Value Ref Range    Troponin T, High Sensitivity 17 (H) <=14 ng/L       Medications   sodium chloride 0.9 % bag for CT scan flush (70 mLs Intravenous $Given 25 7124)   iopamidol (ISOVUE-370) solution 75 mL (75 mLs Intravenous $Given 25 6224)   meclizine (ANTIVERT) tablet 25 mg (25 mg Oral $Given 25 6138)       Assessments & Plan (with Medical Decision Making)     I have reviewed the nursing notes.    I have reviewed the findings, diagnosis, plan and need for follow up with the patient.           Medical Decision Making  The patient's presentation was of moderate complexity (an undiagnosed new problem with uncertain prognosis).    The patient's evaluation involved:  review of external note(s) from 1 sources (see separate area of note for details)  review of 3+ test result(s) ordered prior to this encounter (see separate area of note for details)  ordering and/or review of 3+ test(s) in this encounter (see  separate area of note for details)    The patient's management necessitated moderate risk (prescription drug management including medications given in the ED).        Discharge Medication List as of 4/30/2025  5:06 PM          Final diagnoses:   Vertigo   Near syncope   Patient presented with an episode of syncope versus near syncope 2 weeks ago.  She has had ongoing balance problems and vertigo.  Given concern for possible hemorrhage versus stroke, stroke code was called.  CT and CTA were negative.  MRI was able to be completed and was also negative for acute ischemic process.  Echocardiogram was normal with normal EF and no wall motion abnormalities.  Discussed plan of care and options with patient.  This likely represents vertigo.  However, with a near syncopal versus syncopal event 2 weeks ago we discussed consideration of a Zio patch.  They have both housing here and down in [place] near the [facility].  They will follow-up with their regular doctor for consideration of a Zio patch if needed.    EKG here normal sinus rhythm.  First troponin 15, repeat 17.  Heart rate slightly low at 50-55.  Blood pressure a little bit elevated he is on blood pressure medication at home which she will continue.    4/30/2025   [facility]       Dena Freitas DO  04/30/25 1932

## 2025-04-30 NOTE — CONSULTS
[facility]    Stroke Telephone Note    I was called by Dena Freitas on 04/30/25 regarding patient Eva Solis. The patient is a 64 year old female with pertinent past medical history of PE, migraines, HTN, CHF, history of prior GI bleed, panic disorder, not on ASA or AC.    She presented to [facility] ED today due to persistent vertigo and difficulty ambulating x 2 weeks that started after she fell. She doesn't recall whether she hit her head or if it was a mechanical fall or passed out. She normally lives in the [facility] [place]      Vitals  BP: (!) 191/86   Pulse: 66   Resp: 16   Temp: 97.7  F (36.5  C)   Weight: 110.7 kg (244 lb)    Imaging Findings  CT head:   negative for acute pathology    CTA head/neck:   No intracranial arterial occlusion, flow-limiting stenosis or  aneurysm. No evidence of flow-limiting stenosis, dissection, or occlusion of the  cervical carotid or vertebral arteries.     MRI brain:  1. No evidence of acute infarction or intracranial hemorrhage.  2. Moderate white matter changes, nonspecific, but can be seen in the  setting of prior infectious or inflammatory insults, vasculopathy,  chronic small vessel ischemic disease or demyelination.       Impression  Vertigo and gait instability x 2 weeks after a fall, MRI negative for stroke, suspect peripheral vertigo    Recommendations  -MRI negative for stroke. No further ischemic work-up needed. Reviewed MRI with Dr. Kraft, imaging does not appear concerning for demyelinating disease. Patient is already scheduled outpatient with general neurology.Work-up for alternative etiologies. Okay to treat for peripheral vertigo.    Case discussed with vascular neurology attending Dr. Kraft.    My recommendations are based on the information provided over the phone by Eva Solis's in-person providers. They are not intended to replace the clinical judgment of her in-person providers. I was  "not requested to personally see or examine the patient at this time.     Funmilayo Patel PA-C  Vascular Neurology    To page me or covering stroke neurology team member, click here: AMCOM  Choose \"On Call\" tab at top, then select \"NEUROLOGY/ALL SITES\" from middle drop-down box, press Enter, then look for \"stroke\" or \"telestroke\" for your site.   "

## 2025-04-30 NOTE — TELEPHONE ENCOUNTER
Attempt #1 to call.     RN has attempted to contact this patient by phone to return their call, but there is no response. RN left voicemail and requested return call to [facility] at [phone]    MINA SeoN, RN

## 2025-05-01 ENCOUNTER — TRANSCRIBE ORDERS (OUTPATIENT)
Dept: OTHER | Age: 65
End: 2025-05-01

## 2025-05-01 ENCOUNTER — TRANSCRIBE ORDERS (OUTPATIENT)
Facility: OTHER | Age: 65
End: 2025-05-01
Payer: COMMERCIAL

## 2025-05-01 DIAGNOSIS — R42 VERTIGO: Primary | ICD-10-CM

## 2025-05-01 LAB
ATRIAL RATE - MUSE: 51 BPM
DIASTOLIC BLOOD PRESSURE - MUSE: NORMAL MMHG
INTERPRETATION ECG - MUSE: NORMAL
P AXIS - MUSE: 24 DEGREES
PR INTERVAL - MUSE: 194 MS
QRS DURATION - MUSE: 88 MS
QT - MUSE: 480 MS
QTC - MUSE: 442 MS
R AXIS - MUSE: 18 DEGREES
SYSTOLIC BLOOD PRESSURE - MUSE: NORMAL MMHG
T AXIS - MUSE: 38 DEGREES
VENTRICULAR RATE- MUSE: 51 BPM

## 2025-06-23 NOTE — TELEPHONE ENCOUNTER
REASON FOR VISIT: Difficulty balancing    DATE OF APPT: 6/26/2025   NOTES (FOR ALL VISITS) STATUS DETAILS   OFFICE NOTE from referring provider [facility] Renetta Harley MD 1/23/2025   MEDICATION LIST N/A    IMAGING  (FOR ALL VISITS)     XR N/A    MRI (HEAD, NECK, SPINE) Received [facility]  MR Brain 4/30/2025   CT (HEAD, NECK, SPINE) Received [facility]  CT Head 4/30/2025  CTA Head neck 4/30/2025

## 2025-06-24 NOTE — PROGRESS NOTES
[facility]  Section of General Neurology  New Patient Visit      Eva Solis MRN# [id number]   Age: 65 year old YOB: 1960              Assessment and Plan:   Assessment:  Eva Solis is a pleasant 65 year old female who presents today for evaluation of balance issues.   She has confounding variables in chronic low back issues s/p  L3-5 laminectomy in 2021, further more recent procedures directed by Dr Wallis, migraine headaches that exacerbate dizziness at times, orthostasis at times.  I think her primary issue is that of proprioceptive loss.  She can't feel vibration below the knee bilaterally concerning for neuropathy.  I think this leads to falling when she isn't sure where her feet are in space.  Mainstay of this is PT.  I think she would [facility]  MRI brain and CTA H/N were not suggestive of any causes.  Also discussed optimizing migraines to improve dizziness.  Not abundantly interested in another daily medicine, is on topamax.  Finds excedrin migraine effective.  Discussed future options e.g. imitrex rescue.  Will start with neuropathy blood work, migraine vitamins, EMG, NDBC as below.   All questions answered.      Plan:  EMG of b/l LE neuropathy vs radiculopathy superimposed  Blood work: A1C, B1, B12, E, IFIX, SSA, SSB  NDBC referral, consideration of assistive device for walking  Magnesium oxide 400 mg Riboflavin (vitamin b2) 400 mg daily  RTC to review EMG and see how she is doing          Du Carrera MD   of Neurology   [facility]      History of Presenting Symptoms:   Eva Solis is a 65 year old female who presents today for evaluation of balance issues  Falls frequently  Does trip  Passes out at times  Times where will just go down  Some orthostasis   Does get migraines, vertigo   Sleeps in a recliner 2/2 back issues, several procedures previously  Has history of  "dizziness, migraine    History of MRI brain -- as below    MRI L spine/back history reviewed      Migraine history twice a week---  Mood --still with anxiety  Sleep---struggles as noted  Neuropathy --none that she knows of  No T2DM  No Etoh     Lost 110 pounds over 2 years, weight watchers.  Wonders of muscle mass loss      She lives in [place] and [place]  Retired       4/30 ER note reviewed:  RAIN  Eva Solis is a 64 year old female who brought in by  for dizziness and difficulty walking.  Patient had a fall 2 weeks ago.  They are out on a grass field with family at a sporting activity.  Patient does not specifically recall the event but thinks she fell and may have hit her head.  She does not necessarily recall being dizzy or lightheaded before but also does not recall if it was a mechanical fall.  Since then she is having what she describes as \"vertigo\".   states she is having difficulty walking and needs  to hold onto him from time to time in order not to fall.  She denies nausea or vomiting.  No chest pain.  No visual changes.  No new neurologic symptoms.  No fevers or chills.  No back pain.    Referring note reviewed (Dr Benitez)  Preop examination     Degeneration of intervertebral disc of lumbar region with discogenic back pain  Felt short lived improvements with medial branch block     HTN, goal below 140/90  Well controlled.  Labs drawn.     - BASIC METABOLIC PANEL; Future  - CBC with platelets; Future     Morbid obesity, unspecified obesity type (H)  Continues to lose weight     Major depressive disorder, recurrent episode, moderate (H)  Following with Psychology.  Continues on fluoxetine.     Chronic diastolic congestive heart failure (H)  Stable, continues on furosemide and losartan     Chronic kidney disease, stage 3a (H)  Keep renal function in mind with all medication changes     Difficulty balancing  Her whole family is concerned about her balance.  MRI brain was normal " in August.  Could be related to her back. Hasn't improved with Physical Therapy.  Offered Neurology consult.     Antiplatelet or Anticoagulation Medication Instructions   - Patient is on no antiplatelet or anticoagulation medications.     Additional Medication Instructions   - ACE/ARB/ARNI (lisinopril, enalapril, losartan, valsartan, olmesartan, sacubritril/valsartan) : Continue without modification (e.g., MAC anesthesia, neurosurgery, spine surgery, heart failure, or labile hypertension with risk of hypertension).   - Diuretics (furosemide, hydrochlorothiazide, chlorothalidone): May continue due to heart failure.     Recommendation  Approval given to proceed with proposed procedure, without further diagnostic evaluation.        Last Dr Wallis note reviewed:     A/P:  64 year old female s/p L3-5 laminectomy in 2021 returns with back pain     I had a discussion with the patient, reviewing the history, symptoms, and imaging  Leg pain and claudications resolved, with improved canal patency  Worsening back pain in the setting of significant spondylotic change and degenerative change  Will plan for L3-5 MBB/RFA  If no improvement, may potentially refer for Intracept      Past Medical History:     Patient Active Problem List   Diagnosis    HTN, goal below 140/90    Diastolic CHF (H)    Morbid obesity, unspecified obesity type (H)    Migraine without aura and without status migrainosus, not intractable    Primary osteoarthritis of left hip    Major depressive disorder, recurrent episode, moderate (H)    Sigmoid diverticulosis    Cholelithiasis    Acute pulmonary embolism without acute cor pulmonale, unspecified pulmonary embolism type (H)    DDD (degenerative disc disease), lumbar    Chronic kidney disease, stage 3a (H)     Past Medical History:   Diagnosis Date    Congestive heart failure (H) 3years    Depressive disorder 1-2 years    dealing with on a daily basis    DEPRESSIVE DISORDER NEC 05/04/2004    Diastolic  dysfunction     Essential hypertension, benign     Focal Sigmoid diverticulitis 2023    Generalized osteoarthrosis, unspecified site     knees    Headache(784.0)     Heart disease 3 years    CHF    Lower GI bleed 2023    Lumbar stenosis with neurogenic claudication 2021    Added automatically from request for surgery [id number]    Mixed anxiety depressive disorder 01/15/2016    PANIC DISORDER 2004        Past Surgical History:     Past Surgical History:   Procedure Laterality Date    COLONOSCOPY  10/06/10    attempt at colonscopy to 50cm    INJECT EPIDURAL LUMBAR N/A 10/18/2019    Procedure: INJECTION, SPINE, LUMBAR 4 - LUMBAR 5, EPIDURAL TRANSLAMINAR;  Surgeon: Trever Wheatley MD;  Location: [facility] OR    INJECT EPIDURAL LUMBAR N/A 2020    Procedure: INJECTION, SPINE, LUMBAR 4-5, EPIDURAL TRANSLAMINAR;  Surgeon: Trever Wheatley MD;  Location: [facility] OR    INJECT EPIDURAL LUMBAR N/A 2021    Procedure: Lumbar 4-5 Epidural Injection;  Surgeon: Trever hWeatley MD;  Location: [facility] OR    JOINT REPLACEMTN, KNEE RT/LT  2006    Right total knee arthroplasty.    JOINT REPLACEMTN, KNEE RT/LT  2006    Left total knee arthroplasty.    LAMINECTOMY LUMBAR POSTERIOR MICROSCOPIC TWO LEVELS N/A 2021    Procedure: Lumbar 3 to Lumbar 5 Laminectomy;  Surgeon: Leonard Wallis MD;  Location:  OR    Z  DELIVERY ONLY      , Low Cervical    [facility]  DELIVERY ONLY      Z VAGINAL HYSTERECTOMY      without oophorectomy    [facility]  2005    Diverticulosis.  Internal hemorrhoids.        Social History:     Social History     Tobacco Use    Smoking status: Never     Passive exposure: Never    Smokeless tobacco: Never    Tobacco comments:     no smokers in household   Vaping Use    Vaping status: Never Used   Substance Use Topics    Alcohol use: No    Drug use: No        Family History:     Family History   Problem Relation Age of Onset     Kidney Disease Mother         atrophic kidney    Hypertension Mother     Depression Mother         DONT KNOW MUCH    Bipolar Disorder Mother     Diabetes Mother     Other Cancer Mother         Pancreatic Cancer    Cancer Father         squamous cell ca    Diabetes Sister     Cancer Sister         kidney cancer    Kidney Cancer Sister     Other Cancer Sister         kidney cancer, AND HOW HAS STAGE 4 KIDNEY CANCER AND IS TERMINAL    Bipolar Disorder Sister     Depression Sister         dont know much    Connective Tissue Disorder Brother         lupus    Lupus Brother     Other Problems  (lupus) Brother     Other Cancer Brother         Blood Cancer    Cirrhosis Brother     Cancer Maternal Grandmother         lung    Cerebrovascular Disease Maternal Grandmother     Cancer Paternal Grandmother         squamous cell ca    Anxiety Disorder Son     Anxiety Disorder Son     Kidney Disease Maternal Uncle         unclear kidney issue    Anxiety Disorder Niece     Obesity Niece         PCOS    Mental Illness Niece         Anxiety/Depression    Colon Cancer No family hx of     Asthma No family hx of         Medications:     Current Outpatient Medications   Medication Sig Dispense Refill    busPIRone (BUSPAR) 15 MG tablet Take 1 tablet (15 mg) by mouth 3 times daily 270 tablet 3    clonazePAM (KLONOPIN) 1 MG tablet Take 1 tablet (1 mg) by mouth nightly as needed for sleep 14 tablet 0    cyclobenzaprine (FLEXERIL) 10 MG tablet Take 1 tablet (10 mg) by mouth nightly as needed for muscle spasms 90 tablet 3    FLUoxetine (PROZAC) 20 MG capsule Take 1 capsule (20 mg) by mouth daily To take with the 40 mg to equal 60 mg daily 90 capsule 3    FLUoxetine (PROZAC) 40 MG capsule Take 1 capsule (40 mg) by mouth daily 90 capsule 3    furosemide (LASIX) 20 MG tablet Take 2 tablets (40 mg) by mouth daily 180 tablet 3    losartan (COZAAR) 100 MG tablet Take 1 tablet (100 mg) by mouth daily 90 tablet 3    omeprazole (PRILOSEC) 20 MG DR capsule  Take 20 mg by mouth every morning       ondansetron (ZOFRAN) 8 MG tablet Take 1 tablet (8 mg) by mouth every 8 hours as needed for nausea 30 tablet 0    polyethylene glycol (MIRALAX) 17 GM/Dose powder Take 1 Capful by mouth twice a week      topiramate (TOPAMAX) 100 MG tablet Take 1 tablet (100 mg) by mouth 2 times daily (1.5 X 50 mg) 180 tablet 3     No current facility-administered medications for this visit.     Facility-Administered Medications Ordered in Other Visits   Medication Dose Route Frequency Provider Last Rate Last Admin    glucose gel 15-30 g  15-30 g Oral Q15 Min PRN Jonn Ott MD        Or    dextrose 50 % injection 25-50 mL  25-50 mL Intravenous Q15 Min PRJonn Booker MD        Or    glucagon injection 1 mg  1 mg Subcutaneous Q15 Min Jonn Duncan MD        fentaNYL (PF) (SUBLIMAZE) injection 25-50 mcg  25-50 mcg Intravenous Q5 Min PRN Jonn Ott MD   75 mcg at 01/31/25 0934    lidocaine (LMX4) cream   Topical Q1H PRN Jonn Ott MD        lidocaine 1 % 0.1-1 mL  0.1-1 mL Other Q1H PRJonn Booker MD        naloxone (NARCAN) injection 0.2 mg  0.2 mg Intravenous Q2 Min Jonn Duncan MD        Or    naloxone (NARCAN) injection 0.4 mg  0.4 mg Intravenous Q2 Min Jonn Duncan MD        Or    naloxone (NARCAN) injection 0.2 mg  0.2 mg Intramuscular Q2 Min Jonn Duncan MD        Or    naloxone (NARCAN) injection 0.4 mg  0.4 mg Intramuscular Q2 Min Jonn Duncan MD        naloxone (NARCAN) injection 0.2 mg  0.2 mg Intravenous Q2 Min Jonn Duncan MD        Or    naloxone (NARCAN) injection 0.4 mg  0.4 mg Intravenous Q2 Min Jonn Duncan MD        Or    naloxone (NARCAN) injection 0.2 mg  0.2 mg Intramuscular Q2 Min PRJonn Booker MD        Or    naloxone (NARCAN) injection 0.4 mg  0.4 mg Intramuscular Q2 Min PRN Jonn Ott MD        sodium chloride (PF) 0.9% PF  "flush 3 mL  3 mL Intracatheter Q8H Jonn Ott MD        sodium chloride (PF) 0.9% PF flush 3 mL  3 mL Intracatheter q1 min prn Jonn Ott MD        sodium chloride 0.9 % bag TABLE SOLN  1 Bag TABLE SOLN Q5 Min PRN Jonn Ott MD        sodium chloride 0.9 % infusion   Intravenous Continuous Jonn Ott MD            Allergies:     Allergies   Allergen Reactions    Lisinopril Cough        Review of Systems:   As noted above     Physical Exam:   Vitals: BP (!) 158/92 (BP Location: Right arm, Patient Position: Sitting, Cuff Size: Adult Regular)   Pulse 63   Ht 1.651 m (5' 5\")   Wt 110.7 kg (244 lb)   BMI 40.60 kg/m         Neuro:   General Appearance: No apparent distress, well-nourished, well-groomed, pleasant     Mental Status: Alert and oriented to person, place, and time. Speech fluent and comprehension intact. No dysarthria.     Cranial Nerves:   II: Visual fields: normal  III: Pupils: 3 mm, equal, round, reactive to light   III,IV,VI: Extraocular Movements: intact   V: Facial sensation: intact to light touch  VII: Facial strength: intact without asymmetry  VIII: Hearing: intact grossly    Motor Exam:   Upper Extremities  Deltoid  Bicep  Tricep  Wrist Extensors  strength Intrinsic Muscles    Right  5  5  5  5 5 5    Left  5  5  5  5 5 5      Lower Extremities  Hip Flexors  Knee Extensors  Knee   Flexors  Dorsi Flexion  Plantar   Flexion    Right  5  5  5  5  5    Left  5  5  5  5  5        No drift is present. No abnormal movements. Tone is normal throughout.    Sensory: vibration absent below knee b/l, PP present patchily starting at upper foot    Coordination: no dysmetria with finger-to-nose bilaterally    Reflexes: biceps, triceps 1+ patellar trace and ankle jerks absent     Gait: wide based, cautious, unsteady         Data: Pertinent prior to visit   Imaging:        Results for orders placed or performed during the hospital encounter of 04/30/25 (from the past 24 " hours)   CBC with Platelets & Differential     Narrative     The following orders were created for panel order CBC with Platelets & Differential.  Procedure                               Abnormality         Status                     ---------                               -----------         ------                     CBC with platelets and ...[[id number]]                      Final result                  Please view results for these tests on the individual orders.   Basic metabolic panel   Result Value Ref Range     Sodium 137 135 - 145 mmol/L     Potassium 3.9 3.4 - 5.3 mmol/L     Chloride 104 98 - 107 mmol/L     Carbon Dioxide (CO2) 22 22 - 29 mmol/L     Anion Gap 11 7 - 15 mmol/L     Urea Nitrogen 18.4 8.0 - 23.0 mg/dL     Creatinine 0.94 0.51 - 0.95 mg/dL     GFR Estimate 68 >60 mL/min/1.73m2     Calcium 9.0 8.8 - 10.4 mg/dL     Glucose 86 70 - 99 mg/dL   INR   Result Value Ref Range     INR 1.12 0.85 - 1.15     PT 14.4 11.8 - 14.8 Seconds   Partial thromboplastin time   Result Value Ref Range     aPTT 41 (H) 22 - 38 Seconds   Troponin T, High Sensitivity   Result Value Ref Range     Troponin T, High Sensitivity 15 (H) <=14 ng/L   CBC with platelets and differential   Result Value Ref Range     WBC Count 6.4 4.0 - 11.0 10e3/uL     RBC Count 4.40 3.80 - 5.20 10e6/uL     Hemoglobin 13.3 11.7 - 15.7 g/dL     Hematocrit 37.7 35.0 - 47.0 %     MCV 86 78 - 100 fL     MCH 30.2 26.5 - 33.0 pg     MCHC 35.3 31.5 - 36.5 g/dL     RDW 12.5 10.0 - 15.0 %     Platelet Count 221 150 - 450 10e3/uL     % Neutrophils 72 %     % Lymphocytes 21 %     % Monocytes 6 %     % Eosinophils 1 %     % Basophils 0 %     % Immature Granulocytes 0 %     NRBCs per 100 WBC 0 <1 /100     Absolute Neutrophils 4.6 1.6 - 8.3 10e3/uL     Absolute Lymphocytes 1.3 0.8 - 5.3 10e3/uL     Absolute Monocytes 0.4 0.0 - 1.3 10e3/uL     Absolute Eosinophils 0.1 0.0 - 0.7 10e3/uL     Absolute Basophils 0.0 0.0 - 0.2 10e3/uL     Absolute Immature  Granulocytes 0.0 <=0.4 10e3/uL     Absolute NRBCs 0.0 10e3/uL   CT Head w/o Contrast     Narrative     PROCEDURE: CT HEAD W/O CONTRAST      HISTORY: fall 2 weeks ago. HA.     COMPARISON: 8/29/2024.     TECHNIQUE:  Helical images of the head from the foramen magnum to the  vertex were obtained without contrast. This CT exam was performed  using one or more the following dose reduction techniques: automated  exposure control, adjustment of the mA and/or kV according to patient  size, and/or iterative reconstruction technique.     FINDINGS: The ventricles and sulci are prominent, compatible with  mild, generalized volume loss. No acute intracranial hemorrhage, mass  effect, midline shift, hydrocephalus or basilar cystern effacement are  present.     No acute transcortical ischemia is identified. Scattered  hypoattenuation within the supratentorial subcortical and  periventricular white matter is most compatible with moderate chronic  microvascular ischemic change.      The calvarium is intact.  The visualized paranasal sinuses are clear.        Impression     IMPRESSION: No acute intracranial hemorrhage or calvarial fracture.       JASON HOUSTON MD         SYSTEM ID:  [id number]   CTA Head Neck with Contrast     Narrative     CT ANGIOGRAPHY OF THE BRAIN AND NECK     HISTORY: . Acute neuro deficit, stroke/TIA suspected.     TECHNIQUE: Following the administration of intravenous contrast, thin  helical CT angiography images of the brain were obtained.   Postcontrast helical thin CT angiography images of the neck were  obtained.  NASCET criteria were applied. Source, multiplanar and 3D  MIP reformatted images were reviewed.  This CT exam was performed  using one or more the following dose reduction techniques: automated  exposure control, adjustment of the mA and/or kV according to patient  size, and/or iterative reconstruction technique.     COMPARISON: MR 5/29/2024     FINDINGS:     A 5 mm calcified meningioma is  suggested along the left frontal  calvarium.     CTA Brain:       Atherosclerotic calcification is seen in the cavernous carotids. The  petrous, cavernous, and supraclinoid internal carotid arteries  demonstrate no high-grade, flow limiting stenosis.     The A1 segments are symmetric. An anterior communicating artery is  present. The distal ANIRUDH vessels are unremarkable. The M1 segments, MCA  bifurcations and distal MCA vessels are patent.     The basilar and vertebral arteries are patent. The PCA and SCA  branches demonstrate no focal abnormalities.       CTA Neck:      A 3 vessel aortic arch is present. The innominate and bilateral  subclavian arteries are grossly patent.     The common carotid arteries demonstrate preserved caliber. The carotid  bulbs demonstrate no measurable stenosis. The bilateral internal  carotid arteries demonstrate no evidence of flow-limiting stenosis or  occlusion.     The vertebral arteries arise from the subclavian arteries. There is no  evidence of flow-limiting stenosis or occlusion of the vertebral  arteries.     No mass or pneumothorax is seen at the apices. Multilevel degenerative  changes are seen in the cervical spine.        Impression     IMPRESSION:     No intracranial arterial occlusion, flow-limiting stenosis or  aneurysm.     No evidence of flow-limiting stenosis, dissection, or occlusion of the  cervical carotid or vertebral arteries.           Tiny meningioma, incidental     Personally reviewed CTA, agree non contributory         MR Brain w/o Contrast     Narrative     EXAM: MR BRAIN W/O CONTRAST     HISTORY: Acute neuro deficit, stroke suspected.     TECHNIQUE: Multiplanar, multisequence MR imaging of the head without  intravenous contrast     COMPARISON: CT head and CTA head and neck 4/30/2025; MR head 8/29/2024        FINDINGS:       There is no mass effect, midline shift, or evidence of acute  intracranial hemorrhage. Diffusion weighted images demonstrate no  evidence  of acute infarction. The ventricles are proportionate to the  cerebral sulci. Scattered white matter foci of T2 hyperintense FLAIR  signal, which is nonspecific, likely related to chronic small vessel  ischemic disease given the patient's age. Small focus of  encephalomalacia in the body of the right caudate, likely sequela of  prior lacunar type infarct. Borderline ectatic right cavernous  internal carotid artery.     No suspicious abnormality of the skull marrow signal. No paranasal  sinus mucosal thickening. Mastoid air cells are clear. The orbits are  grossly unremarkable.        Impression     IMPRESSION:  1. No evidence of acute infarction or intracranial hemorrhage.  2. Moderate white matter changes, nonspecific, but can be seen in the  setting of prior infectious or inflammatory insults, vasculopathy,  chronic small vessel ischemic disease or demyelination.            Procedures:  Interpreted by Dena Freitas DO  Time reviewed:   Symptoms at time of EKG:    Rhythm: normal sinus   Rate: normal  Axis: normal  Ectopy: none  Conduction: normal  ST Segments/ T Waves: No ST-T wave changes  Q Waves: none     Clinical Impression: normal EKG    Laboratory:             The total time of this encounter today amounted to 62 minutes. This time included time spent with the patient, prep work, ordering tests, and performing post visit documentation.    The longitudinal plan of care for balance issues was addressed during this visit. Due to the added complexity in care, I will continue to support Ms Solis in the subsequent management of this condition(s) and with the ongoing continuity of care of this condition(s).

## 2025-06-26 ENCOUNTER — PRE VISIT (OUTPATIENT)
Dept: NEUROLOGY | Facility: CLINIC | Age: 65
End: 2025-06-26

## 2025-06-26 ENCOUNTER — OFFICE VISIT (OUTPATIENT)
Dept: NEUROLOGY | Facility: CLINIC | Age: 65
End: 2025-06-26
Attending: FAMILY MEDICINE
Payer: COMMERCIAL

## 2025-06-26 VITALS
WEIGHT: 244 LBS | HEART RATE: 63 BPM | SYSTOLIC BLOOD PRESSURE: 158 MMHG | DIASTOLIC BLOOD PRESSURE: 92 MMHG | BODY MASS INDEX: 40.65 KG/M2 | HEIGHT: 65 IN

## 2025-06-26 DIAGNOSIS — R20.2 NUMBNESS AND TINGLING OF BOTH FEET: ICD-10-CM

## 2025-06-26 DIAGNOSIS — G43.009 MIGRAINE WITHOUT AURA AND WITHOUT STATUS MIGRAINOSUS, NOT INTRACTABLE: ICD-10-CM

## 2025-06-26 DIAGNOSIS — R29.818 DIFFICULTY BALANCING: ICD-10-CM

## 2025-06-26 DIAGNOSIS — R20.0 NUMBNESS AND TINGLING OF BOTH FEET: ICD-10-CM

## 2025-06-26 DIAGNOSIS — R20.8 VIBRATION SENSORY LOSS: Primary | ICD-10-CM

## 2025-06-26 LAB
EST. AVERAGE GLUCOSE BLD GHB EST-MCNC: 97 MG/DL
HBA1C MFR BLD: 5 % (ref 0–5.6)
VIT B12 SERPL-MCNC: 298 PG/ML (ref 232–1245)

## 2025-06-26 NOTE — NURSING NOTE
"Eva Solis's goals for this visit include:   Chief Complaint   Patient presents with    New Patient     Difficulty balancing Ref Renetta Benitez MD scheduled by patient records in [facility] Patient having back surgery 1/31         She requests these members of her care team be copied on today's visit information: yes    PCP: Renetta Benitez    Referring Provider:  Renetta Benitez MD  [address]  [place] [address]    BP (!) 158/92 (BP Location: Right arm, Patient Position: Sitting, Cuff Size: Adult Regular)   Pulse 63   Ht 1.651 m (5' 5\")   Wt 110.7 kg (244 lb)   BMI 40.60 kg/m      Do you need any medication refills at today's visit? No  KITA Davalos, CMA ([facility])      "

## 2025-06-26 NOTE — PATIENT INSTRUCTIONS
Magnesium oxide 400 mg Riboflavin (vitamin b2) 400 mg daily  Excedrin migraine OK no more 3-4 days out of week   Next rescue option-- imitrex     Future options--increase topiramate vs adding other daily meds for migraine      [facility]  [facility]  Blood work for possible neuropathy    Come back to review EMG     Think about assistive device--walking stick/cane

## 2025-06-26 NOTE — LETTER
6/26/2025      Eva Solis  [address]  [facility] [address]      Dear Colleague,    Thank you for referring your patient, Eva Solis, to the [facility] [place]. Please see a copy of my visit note below.    [facility]/[place]  Section of General Neurology  New Patient Visit      Eva Solis MRN# [id number]   Age: 65 year old YOB: 1960              Assessment and Plan:   Assessment:  Eva Solis is a pleasant 65 year old female who presents today for evaluation of balance issues.   She has confounding variables in chronic low back issues s/p  L3-5 laminectomy in 2021, further more recent procedures directed by Dr Wallis, migraine headaches that exacerbate dizziness at times, orthostasis at times.  I think her primary issue is that of proprioceptive loss.  She can't feel vibration below the knee bilaterally concerning for neuropathy.  I think this leads to falling when she isn't sure where her feet are in space.  Mainstay of this is PT.  I think she would find [facility]  MRI brain and CTA H/N were not suggestive of any causes.  Also discussed optimizing migraines to improve dizziness.  Not abundantly interested in another daily medicine, is on topamax.  Finds excedrin migraine effective.  Discussed future options e.g. imitrex rescue.  Will start with neuropathy blood work, migraine vitamins, EMG, NDBC as below.   All questions answered.      Plan:  EMG of b/l LE neuropathy vs radiculopathy superimposed  Blood work: A1C, B1, B12, E, IFIX, SSA, SSB  NDBC referral, consideration of assistive device for walking  Magnesium oxide 400 mg Riboflavin (vitamin b2) 400 mg daily  RTC to review EMG and see how she is doing          Du Carrera MD   of Neurology   [facility]      History of Presenting Symptoms:   Eva Solis is a 65 year old female who presents today for  "evaluation of balance issues  Falls frequently  Does trip  Passes out at times  Times where will just go down  Some orthostasis   Does get migraines, vertigo   Sleeps in a recliner 2/2 back issues, several procedures previously  Has history of dizziness, migraine    History of MRI brain -- as below    MRI L spine/back history reviewed      Migraine history twice a week---  Mood --still with anxiety  Sleep---struggles as noted  Neuropathy --none that she knows of  No T2DM  No Etoh     Lost 110 pounds over 2 years, weight watchers.  Wonders of muscle mass loss      She lives in [facility] and [place]  Retired       4/30 ER note reviewed:  RAIN  Eva Solis is a 64 year old female who brought in by  for dizziness and difficulty walking.  Patient had a fall 2 weeks ago.  They are out on a grass field with family at a sporting activity.  Patient does not specifically recall the event but thinks she fell and may have hit her head.  She does not necessarily recall being dizzy or lightheaded before but also does not recall if it was a mechanical fall.  Since then she is having what she describes as \"vertigo\".   states she is having difficulty walking and needs  to hold onto him from time to time in order not to fall.  She denies nausea or vomiting.  No chest pain.  No visual changes.  No new neurologic symptoms.  No fevers or chills.  No back pain.    Referring note reviewed (Dr Benitez)  Preop examination     Degeneration of intervertebral disc of lumbar region with discogenic back pain  Felt short lived improvements with medial branch block     HTN, goal below 140/90  Well controlled.  Labs drawn.     - BASIC METABOLIC PANEL; Future  - CBC with platelets; Future     Morbid obesity, unspecified obesity type (H)  Continues to lose weight     Major depressive disorder, recurrent episode, moderate (H)  Following with Psychology.  Continues on fluoxetine.     Chronic diastolic congestive heart failure " (H)  Stable, continues on furosemide and losartan     Chronic kidney disease, stage 3a (H)  Keep renal function in mind with all medication changes     Difficulty balancing  Her whole family is concerned about her balance.  MRI brain was normal in August.  Could be related to her back. Hasn't improved with Physical Therapy.  Offered Neurology consult.     Antiplatelet or Anticoagulation Medication Instructions   - Patient is on no antiplatelet or anticoagulation medications.     Additional Medication Instructions   - ACE/ARB/ARNI (lisinopril, enalapril, losartan, valsartan, olmesartan, sacubritril/valsartan) : Continue without modification (e.g., MAC anesthesia, neurosurgery, spine surgery, heart failure, or labile hypertension with risk of hypertension).   - Diuretics (furosemide, hydrochlorothiazide, chlorothalidone): May continue due to heart failure.     Recommendation  Approval given to proceed with proposed procedure, without further diagnostic evaluation.        Last Dr Wallis note reviewed:     A/P:  64 year old female s/p L3-5 laminectomy in 2021 returns with back pain     I had a discussion with the patient, reviewing the history, symptoms, and imaging  Leg pain and claudications resolved, with improved canal patency  Worsening back pain in the setting of significant spondylotic change and degenerative change  Will plan for L3-5 MBB/RFA  If no improvement, may potentially refer for Intracept      Past Medical History:     Patient Active Problem List   Diagnosis     HTN, goal below 140/90     Diastolic CHF (H)     Morbid obesity, unspecified obesity type (H)     Migraine without aura and without status migrainosus, not intractable     Primary osteoarthritis of left hip     Major depressive disorder, recurrent episode, moderate (H)     Sigmoid diverticulosis     Cholelithiasis     Acute pulmonary embolism without acute cor pulmonale, unspecified pulmonary embolism type (H)     DDD (degenerative disc disease),  lumbar     Chronic kidney disease, stage 3a (H)     Past Medical History:   Diagnosis Date     Congestive heart failure (H) 3years     Depressive disorder 1-2 years    dealing with on a daily basis     DEPRESSIVE DISORDER NEC 2004     Diastolic dysfunction      Essential hypertension, benign      Focal Sigmoid diverticulitis 2023     Generalized osteoarthrosis, unspecified site     knees     Headache(784.0)      Heart disease 3 years    CHF     Lower GI bleed 2023     Lumbar stenosis with neurogenic claudication 2021    Added automatically from request for surgery [id number]     Mixed anxiety depressive disorder 01/15/2016     PANIC DISORDER 2004        Past Surgical History:     Past Surgical History:   Procedure Laterality Date     COLONOSCOPY  10/06/10    attempt at colonscopy to 50cm     INJECT EPIDURAL LUMBAR N/A 10/18/2019    Procedure: INJECTION, SPINE, LUMBAR 4 - LUMBAR 5, EPIDURAL TRANSLAMINAR;  Surgeon: Trever Wheatley MD;  Location: [facility] OR     INJECT EPIDURAL LUMBAR N/A 2020    Procedure: INJECTION, SPINE, LUMBAR 4-5, EPIDURAL TRANSLAMINAR;  Surgeon: Trever Wheatley MD;  Location: [facility] OR     INJECT EPIDURAL LUMBAR N/A 2021    Procedure: Lumbar 4-5 Epidural Injection;  Surgeon: Trever Wheatley MD;  Location: [facility] OR     JOINT REPLACEMTN, KNEE RT/LT  2006    Right total knee arthroplasty.     JOINT REPLACEMTN, KNEE RT/LT  2006    Left total knee arthroplasty.     LAMINECTOMY LUMBAR POSTERIOR MICROSCOPIC TWO LEVELS N/A 2021    Procedure: Lumbar 3 to Lumbar 5 Laminectomy;  Surgeon: Leonard Wallis MD;  Location:  OR     [facility]  DELIVERY ONLY      , Low Cervical     Z  DELIVERY ONLY       Z VAGINAL HYSTERECTOMY      without oophorectomy     [facility]WO BRUSH/WASH  2005    Diverticulosis.  Internal hemorrhoids.        Social History:     Social History     Tobacco Use     Smoking status: Never     Passive  exposure: Never     Smokeless tobacco: Never     Tobacco comments:     no smokers in household   Vaping Use     Vaping status: Never Used   Substance Use Topics     Alcohol use: No     Drug use: No        Family History:     Family History   Problem Relation Age of Onset     Kidney Disease Mother         atrophic kidney     Hypertension Mother      Depression Mother         DONT KNOW MUCH     Bipolar Disorder Mother      Diabetes Mother      Other Cancer Mother         Pancreatic Cancer     Cancer Father         squamous cell ca     Diabetes Sister      Cancer Sister         kidney cancer     Kidney Cancer Sister      Other Cancer Sister         kidney cancer, AND HOW HAS STAGE 4 KIDNEY CANCER AND IS TERMINAL     Bipolar Disorder Sister      Depression Sister         dont know much     Connective Tissue Disorder Brother         lupus     Lupus Brother      Other Problems  (lupus) Brother      Other Cancer Brother         Blood Cancer     Cirrhosis Brother      Cancer Maternal Grandmother         lung     Cerebrovascular Disease Maternal Grandmother      Cancer Paternal Grandmother         squamous cell ca     Anxiety Disorder Son      Anxiety Disorder Son      Kidney Disease Maternal Uncle         unclear kidney issue     Anxiety Disorder Niece      Obesity Niece         PCOS     Mental Illness Niece         Anxiety/Depression     Colon Cancer No family hx of      Asthma No family hx of         Medications:     Current Outpatient Medications   Medication Sig Dispense Refill     busPIRone (BUSPAR) 15 MG tablet Take 1 tablet (15 mg) by mouth 3 times daily 270 tablet 3     clonazePAM (KLONOPIN) 1 MG tablet Take 1 tablet (1 mg) by mouth nightly as needed for sleep 14 tablet 0     cyclobenzaprine (FLEXERIL) 10 MG tablet Take 1 tablet (10 mg) by mouth nightly as needed for muscle spasms 90 tablet 3     FLUoxetine (PROZAC) 20 MG capsule Take 1 capsule (20 mg) by mouth daily To take with the 40 mg to equal 60 mg daily 90  capsule 3     FLUoxetine (PROZAC) 40 MG capsule Take 1 capsule (40 mg) by mouth daily 90 capsule 3     furosemide (LASIX) 20 MG tablet Take 2 tablets (40 mg) by mouth daily 180 tablet 3     losartan (COZAAR) 100 MG tablet Take 1 tablet (100 mg) by mouth daily 90 tablet 3     omeprazole (PRILOSEC) 20 MG DR capsule Take 20 mg by mouth every morning        ondansetron (ZOFRAN) 8 MG tablet Take 1 tablet (8 mg) by mouth every 8 hours as needed for nausea 30 tablet 0     polyethylene glycol (MIRALAX) 17 GM/Dose powder Take 1 Capful by mouth twice a week       topiramate (TOPAMAX) 100 MG tablet Take 1 tablet (100 mg) by mouth 2 times daily (1.5 X 50 mg) 180 tablet 3     No current facility-administered medications for this visit.     Facility-Administered Medications Ordered in Other Visits   Medication Dose Route Frequency Provider Last Rate Last Admin     glucose gel 15-30 g  15-30 g Oral Q15 Min PRJonn Booker MD        Or     dextrose 50 % injection 25-50 mL  25-50 mL Intravenous Q15 Min PRJonn Booker MD        Or     glucagon injection 1 mg  1 mg Subcutaneous Q15 Min Jonn Duncan MD         fentaNYL (PF) (SUBLIMAZE) injection 25-50 mcg  25-50 mcg Intravenous Q5 Min PRJonn Booker MD   75 mcg at 01/31/25 0934     lidocaine (LMX4) cream   Topical Q1H PRN Jonn Ott MD         lidocaine 1 % 0.1-1 mL  0.1-1 mL Other Q1H Jonn Duncan MD         naloxone (NARCAN) injection 0.2 mg  0.2 mg Intravenous Q2 Min Jonn Duncan MD        Or     naloxone (NARCAN) injection 0.4 mg  0.4 mg Intravenous Q2 Min Jonn Duncan MD        Or     naloxone (NARCAN) injection 0.2 mg  0.2 mg Intramuscular Q2 Min Jonn Duncan MD        Or     naloxone (NARCAN) injection 0.4 mg  0.4 mg Intramuscular Q2 Min PRJonn Booker MD         naloxone (NARCAN) injection 0.2 mg  0.2 mg Intravenous Q2 Min PRJonn Booker MD        Or      "naloxone (NARCAN) injection 0.4 mg  0.4 mg Intravenous Q2 Min PRN Jonn Ott MD        Or     naloxone (NARCAN) injection 0.2 mg  0.2 mg Intramuscular Q2 Min PRN Jonn Ott MD        Or     naloxone (NARCAN) injection 0.4 mg  0.4 mg Intramuscular Q2 Min PRN Jonn Ott MD         sodium chloride (PF) 0.9% PF flush 3 mL  3 mL Intracatheter Q8H Jonn Ott MD         sodium chloride (PF) 0.9% PF flush 3 mL  3 mL Intracatheter q1 min prn Jonn Ott MD         sodium chloride 0.9 % bag TABLE SOLN  1 Bag TABLE SOLN Q5 Min PRN Jonn Ott MD         sodium chloride 0.9 % infusion   Intravenous Continuous Jonn Ott MD            Allergies:     Allergies   Allergen Reactions     Lisinopril Cough        Review of Systems:   As noted above     Physical Exam:   Vitals: BP (!) 158/92 (BP Location: Right arm, Patient Position: Sitting, Cuff Size: Adult Regular)   Pulse 63   Ht 1.651 m (5' 5\")   Wt 110.7 kg (244 lb)   BMI 40.60 kg/m         Neuro:   General Appearance: No apparent distress, well-nourished, well-groomed, pleasant     Mental Status: Alert and oriented to person, place, and time. Speech fluent and comprehension intact. No dysarthria.     Cranial Nerves:   II: Visual fields: normal  III: Pupils: 3 mm, equal, round, reactive to light   III,IV,VI: Extraocular Movements: intact   V: Facial sensation: intact to light touch  VII: Facial strength: intact without asymmetry  VIII: Hearing: intact grossly    Motor Exam:   Upper Extremities  Deltoid  Bicep  Tricep  Wrist Extensors  strength Intrinsic Muscles    Right  5  5  5  5 5 5    Left  5  5  5  5 5 5      Lower Extremities  Hip Flexors  Knee Extensors  Knee   Flexors  Dorsi Flexion  Plantar   Flexion    Right  5  5  5  5  5    Left  5  5  5  5  5        No drift is present. No abnormal movements. Tone is normal throughout.    Sensory: vibration absent below knee b/l, PP present patchily starting " at upper foot    Coordination: no dysmetria with finger-to-nose bilaterally    Reflexes: biceps, triceps 1+ patellar trace and ankle jerks absent     Gait: wide based, cautious, unsteady         Data: Pertinent prior to visit   Imaging:        Results for orders placed or performed during the hospital encounter of 04/30/25 (from the past 24 hours)   CBC with Platelets & Differential     Narrative     The following orders were created for panel order CBC with Platelets & Differential.  Procedure                               Abnormality         Status                     ---------                               -----------         ------                     CBC with platelets and ...[[id number]]                      Final result                  Please view results for these tests on the individual orders.   Basic metabolic panel   Result Value Ref Range     Sodium 137 135 - 145 mmol/L     Potassium 3.9 3.4 - 5.3 mmol/L     Chloride 104 98 - 107 mmol/L     Carbon Dioxide (CO2) 22 22 - 29 mmol/L     Anion Gap 11 7 - 15 mmol/L     Urea Nitrogen 18.4 8.0 - 23.0 mg/dL     Creatinine 0.94 0.51 - 0.95 mg/dL     GFR Estimate 68 >60 mL/min/1.73m2     Calcium 9.0 8.8 - 10.4 mg/dL     Glucose 86 70 - 99 mg/dL   INR   Result Value Ref Range     INR 1.12 0.85 - 1.15     PT 14.4 11.8 - 14.8 Seconds   Partial thromboplastin time   Result Value Ref Range     aPTT 41 (H) 22 - 38 Seconds   Troponin T, High Sensitivity   Result Value Ref Range     Troponin T, High Sensitivity 15 (H) <=14 ng/L   CBC with platelets and differential   Result Value Ref Range     WBC Count 6.4 4.0 - 11.0 10e3/uL     RBC Count 4.40 3.80 - 5.20 10e6/uL     Hemoglobin 13.3 11.7 - 15.7 g/dL     Hematocrit 37.7 35.0 - 47.0 %     MCV 86 78 - 100 fL     MCH 30.2 26.5 - 33.0 pg     MCHC 35.3 31.5 - 36.5 g/dL     RDW 12.5 10.0 - 15.0 %     Platelet Count 221 150 - 450 10e3/uL     % Neutrophils 72 %     % Lymphocytes 21 %     % Monocytes 6 %     % Eosinophils 1 %      % Basophils 0 %     % Immature Granulocytes 0 %     NRBCs per 100 WBC 0 <1 /100     Absolute Neutrophils 4.6 1.6 - 8.3 10e3/uL     Absolute Lymphocytes 1.3 0.8 - 5.3 10e3/uL     Absolute Monocytes 0.4 0.0 - 1.3 10e3/uL     Absolute Eosinophils 0.1 0.0 - 0.7 10e3/uL     Absolute Basophils 0.0 0.0 - 0.2 10e3/uL     Absolute Immature Granulocytes 0.0 <=0.4 10e3/uL     Absolute NRBCs 0.0 10e3/uL   CT Head w/o Contrast     Narrative     PROCEDURE: CT HEAD W/O CONTRAST      HISTORY: fall 2 weeks ago. HA.     COMPARISON: 8/29/2024.     TECHNIQUE:  Helical images of the head from the foramen magnum to the  vertex were obtained without contrast. This CT exam was performed  using one or more the following dose reduction techniques: automated  exposure control, adjustment of the mA and/or kV according to patient  size, and/or iterative reconstruction technique.     FINDINGS: The ventricles and sulci are prominent, compatible with  mild, generalized volume loss. No acute intracranial hemorrhage, mass  effect, midline shift, hydrocephalus or basilar cystern effacement are  present.     No acute transcortical ischemia is identified. Scattered  hypoattenuation within the supratentorial subcortical and  periventricular white matter is most compatible with moderate chronic  microvascular ischemic change.      The calvarium is intact.  The visualized paranasal sinuses are clear.        Impression     IMPRESSION: No acute intracranial hemorrhage or calvarial fracture.       JASON HOUSTON MD         SYSTEM ID:  [id number]   CTA Head Neck with Contrast     Narrative     CT ANGIOGRAPHY OF THE BRAIN AND NECK     HISTORY: . Acute neuro deficit, stroke/TIA suspected.     TECHNIQUE: Following the administration of intravenous contrast, thin  helical CT angiography images of the brain were obtained.   Postcontrast helical thin CT angiography images of the neck were  obtained.  NASCET criteria were applied. Source, multiplanar and 3D  MIP  reformatted images were reviewed.  This CT exam was performed  using one or more the following dose reduction techniques: automated  exposure control, adjustment of the mA and/or kV according to patient  size, and/or iterative reconstruction technique.     COMPARISON: MR 5/29/2024     FINDINGS:     A 5 mm calcified meningioma is suggested along the left frontal  calvarium.     CTA Brain:       Atherosclerotic calcification is seen in the cavernous carotids. The  petrous, cavernous, and supraclinoid internal carotid arteries  demonstrate no high-grade, flow limiting stenosis.     The A1 segments are symmetric. An anterior communicating artery is  present. The distal ANIRUDH vessels are unremarkable. The M1 segments, MCA  bifurcations and distal MCA vessels are patent.     The basilar and vertebral arteries are patent. The PCA and SCA  branches demonstrate no focal abnormalities.       CTA Neck:      A 3 vessel aortic arch is present. The innominate and bilateral  subclavian arteries are grossly patent.     The common carotid arteries demonstrate preserved caliber. The carotid  bulbs demonstrate no measurable stenosis. The bilateral internal  carotid arteries demonstrate no evidence of flow-limiting stenosis or  occlusion.     The vertebral arteries arise from the subclavian arteries. There is no  evidence of flow-limiting stenosis or occlusion of the vertebral  arteries.     No mass or pneumothorax is seen at the apices. Multilevel degenerative  changes are seen in the cervical spine.        Impression     IMPRESSION:     No intracranial arterial occlusion, flow-limiting stenosis or  aneurysm.     No evidence of flow-limiting stenosis, dissection, or occlusion of the  cervical carotid or vertebral arteries.           Tiny meningioma, incidental     Personally reviewed CTA, agree non contributory         MR Brain w/o Contrast     Narrative     EXAM: MR BRAIN W/O CONTRAST     HISTORY: Acute neuro deficit, stroke  suspected.     TECHNIQUE: Multiplanar, multisequence MR imaging of the head without  intravenous contrast     COMPARISON: CT head and CTA head and neck 4/30/2025; MR head 8/29/2024        FINDINGS:       There is no mass effect, midline shift, or evidence of acute  intracranial hemorrhage. Diffusion weighted images demonstrate no  evidence of acute infarction. The ventricles are proportionate to the  cerebral sulci. Scattered white matter foci of T2 hyperintense FLAIR  signal, which is nonspecific, likely related to chronic small vessel  ischemic disease given the patient's age. Small focus of  encephalomalacia in the body of the right caudate, likely sequela of  prior lacunar type infarct. Borderline ectatic right cavernous  internal carotid artery.     No suspicious abnormality of the skull marrow signal. No paranasal  sinus mucosal thickening. Mastoid air cells are clear. The orbits are  grossly unremarkable.        Impression     IMPRESSION:  1. No evidence of acute infarction or intracranial hemorrhage.  2. Moderate white matter changes, nonspecific, but can be seen in the  setting of prior infectious or inflammatory insults, vasculopathy,  chronic small vessel ischemic disease or demyelination.            Procedures:  Interpreted by Dena Freitas DO  Time reviewed:   Symptoms at time of EKG:    Rhythm: normal sinus   Rate: normal  Axis: normal  Ectopy: none  Conduction: normal  ST Segments/ T Waves: No ST-T wave changes  Q Waves: none     Clinical Impression: normal EKG    Laboratory:             The total time of this encounter today amounted to 62 minutes. This time included time spent with the patient, prep work, ordering tests, and performing post visit documentation.    The longitudinal plan of care for balance issues was addressed during this visit. Due to the added complexity in care, I will continue to support Ms Solis in the subsequent management of this condition(s) and with the ongoing  continuity of care of this condition(s).      Again, thank you for allowing me to participate in the care of your patient.        Sincerely,        Rober Carrera MD    Electronically signed

## 2025-06-28 LAB
ENA SS-A AB SER IA-ACNC: <0.5 U/ML
ENA SS-A AB SER IA-ACNC: NEGATIVE
ENA SS-B IGG SER IA-ACNC: <0.6 U/ML
ENA SS-B IGG SER IA-ACNC: NEGATIVE

## 2025-06-29 LAB
A-TOCOPHEROL VIT E SERPL-MCNC: 8.4 MG/L
BETA+GAMMA TOCOPHEROL SERPL-MCNC: 3.2 MG/L
VIT B1 PYROPHOSHATE BLD-SCNC: 126 NMOL/L

## 2025-06-30 DIAGNOSIS — I10 HTN, GOAL BELOW 140/90: ICD-10-CM

## 2025-06-30 DIAGNOSIS — I50.32 CHRONIC DIASTOLIC CONGESTIVE HEART FAILURE (H): ICD-10-CM

## 2025-06-30 RX ORDER — LOSARTAN POTASSIUM 100 MG/1
100 TABLET ORAL DAILY
Qty: 90 TABLET | Refills: 3 | OUTPATIENT
Start: 2025-06-30

## 2025-07-04 DIAGNOSIS — M51.369 DDD (DEGENERATIVE DISC DISEASE), LUMBAR: ICD-10-CM

## 2025-07-04 DIAGNOSIS — G43.009 MIGRAINE WITHOUT AURA AND WITHOUT STATUS MIGRAINOSUS, NOT INTRACTABLE: ICD-10-CM

## 2025-07-07 DIAGNOSIS — F33.1 MAJOR DEPRESSIVE DISORDER, RECURRENT EPISODE, MODERATE (H): ICD-10-CM

## 2025-07-07 RX ORDER — CYCLOBENZAPRINE HCL 10 MG
10 TABLET ORAL
Qty: 90 TABLET | Refills: 1 | Status: SHIPPED | OUTPATIENT
Start: 2025-07-07

## 2025-07-07 RX ORDER — BUSPIRONE HYDROCHLORIDE 15 MG/1
15 TABLET ORAL 3 TIMES DAILY
Qty: 270 TABLET | Refills: 1 | Status: SHIPPED | OUTPATIENT
Start: 2025-07-07

## 2025-07-07 RX ORDER — TOPIRAMATE 100 MG/1
100 TABLET, FILM COATED ORAL 2 TIMES DAILY
Qty: 180 TABLET | Refills: 1 | Status: SHIPPED | OUTPATIENT
Start: 2025-07-07

## 2025-07-17 ENCOUNTER — PATIENT OUTREACH (OUTPATIENT)
Dept: CARE COORDINATION | Facility: CLINIC | Age: 65
End: 2025-07-17
Payer: COMMERCIAL

## 2025-07-31 ENCOUNTER — PATIENT OUTREACH (OUTPATIENT)
Dept: CARE COORDINATION | Facility: CLINIC | Age: 65
End: 2025-07-31
Payer: COMMERCIAL

## 2025-08-06 ENCOUNTER — PATIENT OUTREACH (OUTPATIENT)
Dept: CARE COORDINATION | Facility: CLINIC | Age: 65
End: 2025-08-06
Payer: COMMERCIAL

## 2025-08-07 DIAGNOSIS — Z12.11 COLON CANCER SCREENING: ICD-10-CM

## 2025-08-20 ENCOUNTER — MYC REFILL (OUTPATIENT)
Dept: FAMILY MEDICINE | Facility: OTHER | Age: 65
End: 2025-08-20
Payer: COMMERCIAL

## 2025-08-20 DIAGNOSIS — M51.369 DDD (DEGENERATIVE DISC DISEASE), LUMBAR: ICD-10-CM

## 2025-08-20 DIAGNOSIS — I50.32 CHRONIC DIASTOLIC CONGESTIVE HEART FAILURE (H): ICD-10-CM

## 2025-08-20 DIAGNOSIS — G43.009 MIGRAINE WITHOUT AURA AND WITHOUT STATUS MIGRAINOSUS, NOT INTRACTABLE: ICD-10-CM

## 2025-08-20 DIAGNOSIS — I10 HTN, GOAL BELOW 140/90: ICD-10-CM

## 2025-08-21 ENCOUNTER — ORDERS ONLY (AUTO-RELEASED) (OUTPATIENT)
Dept: URGENT CARE | Facility: CLINIC | Age: 65
End: 2025-08-21
Payer: COMMERCIAL

## 2025-08-21 DIAGNOSIS — Z12.11 COLON CANCER SCREENING: ICD-10-CM

## 2025-08-21 RX ORDER — CYCLOBENZAPRINE HCL 10 MG
10 TABLET ORAL
Qty: 90 TABLET | Refills: 1 | Status: SHIPPED | OUTPATIENT
Start: 2025-08-21

## 2025-08-21 RX ORDER — LOSARTAN POTASSIUM 100 MG/1
100 TABLET ORAL DAILY
Qty: 90 TABLET | Refills: 0 | Status: SHIPPED | OUTPATIENT
Start: 2025-08-21

## 2025-08-21 RX ORDER — FUROSEMIDE 20 MG/1
40 TABLET ORAL DAILY
Qty: 180 TABLET | Refills: 0 | Status: SHIPPED | OUTPATIENT
Start: 2025-08-21

## 2025-08-21 RX ORDER — TOPIRAMATE 100 MG/1
100 TABLET, FILM COATED ORAL 2 TIMES DAILY
Qty: 180 TABLET | Refills: 0 | Status: SHIPPED | OUTPATIENT
Start: 2025-08-21

## 2025-08-28 ENCOUNTER — ALLIED HEALTH/NURSE VISIT (OUTPATIENT)
Dept: FAMILY MEDICINE | Facility: OTHER | Age: 65
End: 2025-08-28
Payer: COMMERCIAL

## 2025-08-28 ENCOUNTER — OFFICE VISIT (OUTPATIENT)
Dept: FAMILY MEDICINE | Facility: OTHER | Age: 65
End: 2025-08-28
Payer: COMMERCIAL

## 2025-08-28 VITALS
HEART RATE: 75 BPM | DIASTOLIC BLOOD PRESSURE: 88 MMHG | BODY MASS INDEX: 42.15 KG/M2 | WEIGHT: 253 LBS | OXYGEN SATURATION: 96 % | SYSTOLIC BLOOD PRESSURE: 132 MMHG | TEMPERATURE: 96.8 F | RESPIRATION RATE: 15 BRPM | HEIGHT: 65 IN

## 2025-08-28 DIAGNOSIS — K21.9 GASTROESOPHAGEAL REFLUX DISEASE WITHOUT ESOPHAGITIS: ICD-10-CM

## 2025-08-28 DIAGNOSIS — G43.009 MIGRAINE WITHOUT AURA AND WITHOUT STATUS MIGRAINOSUS, NOT INTRACTABLE: ICD-10-CM

## 2025-08-28 DIAGNOSIS — N18.31 CHRONIC KIDNEY DISEASE, STAGE 3A (H): ICD-10-CM

## 2025-08-28 DIAGNOSIS — I50.32 CHRONIC DIASTOLIC CONGESTIVE HEART FAILURE (H): ICD-10-CM

## 2025-08-28 DIAGNOSIS — I10 HTN, GOAL BELOW 140/90: ICD-10-CM

## 2025-08-28 DIAGNOSIS — Z23 ENCOUNTER FOR IMMUNIZATION: Primary | ICD-10-CM

## 2025-08-28 DIAGNOSIS — E66.01 MORBID OBESITY, UNSPECIFIED OBESITY TYPE (H): ICD-10-CM

## 2025-08-28 DIAGNOSIS — R11.0 NAUSEA: ICD-10-CM

## 2025-08-28 DIAGNOSIS — Z00.00 ROUTINE GENERAL MEDICAL EXAMINATION AT A HEALTH CARE FACILITY: Primary | ICD-10-CM

## 2025-08-28 DIAGNOSIS — F33.1 MAJOR DEPRESSIVE DISORDER, RECURRENT EPISODE, MODERATE (H): ICD-10-CM

## 2025-08-28 DIAGNOSIS — Z12.31 VISIT FOR SCREENING MAMMOGRAM: ICD-10-CM

## 2025-08-28 PROBLEM — Z86.711 HISTORY OF PULMONARY EMBOLISM: Status: ACTIVE | Noted: 2024-03-01

## 2025-08-28 LAB
ALBUMIN SERPL BCG-MCNC: 4.2 G/DL (ref 3.5–5.2)
ALP SERPL-CCNC: 88 U/L (ref 40–150)
ALT SERPL W P-5'-P-CCNC: 9 U/L (ref 0–50)
ANION GAP SERPL CALCULATED.3IONS-SCNC: 12 MMOL/L (ref 7–15)
AST SERPL W P-5'-P-CCNC: 17 U/L (ref 0–45)
BILIRUB SERPL-MCNC: 0.5 MG/DL
BUN SERPL-MCNC: 19.1 MG/DL (ref 8–23)
CALCIUM SERPL-MCNC: 9.5 MG/DL (ref 8.8–10.4)
CHLORIDE SERPL-SCNC: 106 MMOL/L (ref 98–107)
CHOLEST SERPL-MCNC: 240 MG/DL
CREAT SERPL-MCNC: 1.12 MG/DL (ref 0.51–0.95)
CREAT UR-MCNC: 128.4 MG/DL
EGFRCR SERPLBLD CKD-EPI 2021: 55 ML/MIN/1.73M2
FASTING STATUS PATIENT QL REPORTED: ABNORMAL
FASTING STATUS PATIENT QL REPORTED: ABNORMAL
GLUCOSE SERPL-MCNC: 90 MG/DL (ref 70–99)
HCO3 SERPL-SCNC: 25 MMOL/L (ref 22–29)
HDLC SERPL-MCNC: 46 MG/DL
LDLC SERPL CALC-MCNC: 165 MG/DL
MICROALBUMIN UR-MCNC: 12.3 MG/L
MICROALBUMIN/CREAT UR: 9.58 MG/G CR (ref 0–25)
NONHDLC SERPL-MCNC: 194 MG/DL
POTASSIUM SERPL-SCNC: 4.2 MMOL/L (ref 3.4–5.3)
PROT SERPL-MCNC: 7.5 G/DL (ref 6.4–8.3)
SODIUM SERPL-SCNC: 143 MMOL/L (ref 135–145)
TRIGL SERPL-MCNC: 143 MG/DL

## 2025-08-28 RX ORDER — FUROSEMIDE 20 MG/1
40 TABLET ORAL DAILY
Qty: 180 TABLET | Refills: 3 | Status: SHIPPED | OUTPATIENT
Start: 2025-08-28

## 2025-08-28 RX ORDER — LOSARTAN POTASSIUM 100 MG/1
100 TABLET ORAL DAILY
Qty: 90 TABLET | Refills: 3 | Status: SHIPPED | OUTPATIENT
Start: 2025-08-28

## 2025-08-28 RX ORDER — ONDANSETRON 8 MG/1
8 TABLET, FILM COATED ORAL EVERY 8 HOURS PRN
Qty: 30 TABLET | Refills: 0 | Status: SHIPPED | OUTPATIENT
Start: 2025-08-28

## 2025-08-28 RX ORDER — TOPIRAMATE 100 MG/1
100 TABLET, FILM COATED ORAL 2 TIMES DAILY
Qty: 180 TABLET | Refills: 3 | Status: SHIPPED | OUTPATIENT
Start: 2025-08-28

## 2025-08-28 RX ORDER — FLUOXETINE HYDROCHLORIDE 40 MG/1
40 CAPSULE ORAL DAILY
Qty: 90 CAPSULE | Refills: 3 | Status: SHIPPED | OUTPATIENT
Start: 2025-08-28

## 2025-08-28 RX ORDER — OMEPRAZOLE 20 MG/1
20 CAPSULE, DELAYED RELEASE ORAL EVERY MORNING
Qty: 90 CAPSULE | Refills: 3 | Status: SHIPPED | OUTPATIENT
Start: 2025-08-28

## 2025-08-28 SDOH — HEALTH STABILITY: PHYSICAL HEALTH: ON AVERAGE, HOW MANY DAYS PER WEEK DO YOU ENGAGE IN MODERATE TO STRENUOUS EXERCISE (LIKE A BRISK WALK)?: 1 DAY

## 2025-08-28 SDOH — HEALTH STABILITY: PHYSICAL HEALTH: ON AVERAGE, HOW MANY MINUTES DO YOU ENGAGE IN EXERCISE AT THIS LEVEL?: 20 MIN

## 2025-08-28 ASSESSMENT — PATIENT HEALTH QUESTIONNAIRE - PHQ9
10. IF YOU CHECKED OFF ANY PROBLEMS, HOW DIFFICULT HAVE THESE PROBLEMS MADE IT FOR YOU TO DO YOUR WORK, TAKE CARE OF THINGS AT HOME, OR GET ALONG WITH OTHER PEOPLE: SOMEWHAT DIFFICULT
SUM OF ALL RESPONSES TO PHQ QUESTIONS 1-9: 16
SUM OF ALL RESPONSES TO PHQ QUESTIONS 1-9: 16

## (undated) DEVICE — SU VICRYL 2-0 CT-2 CR 8X18" J726D

## (undated) DEVICE — ESU ELEC BLADE 6" COATED/INSULATED E1455-6

## (undated) DEVICE — GLOVE PROTEXIS BLUE W/NEU-THERA 8.0  2D73EB80

## (undated) DEVICE — SPINOCAN

## (undated) DEVICE — SYR 05ML LL W/O NDL

## (undated) DEVICE — SU VICRYL 0 CT-2 CR 8X18" J727D

## (undated) DEVICE — TRAY PROCEDURE SUPPORT PAIN MANAGEMENT 332114

## (undated) DEVICE — GLOVE PROTEXIS W/NEU-THERA 7.5  2D73TE75

## (undated) DEVICE — SYR EAR BULB 3OZ 0035830

## (undated) DEVICE — SPONGE RAY-TEC 4X8" 7318

## (undated) DEVICE — ESU PENCIL W/HOLSTER E2350H

## (undated) DEVICE — POSITIONER PT PRONESAFE HEAD REST W/DERMAPROX INSERT 40599

## (undated) DEVICE — PACK SPINE SM CUSTOM SNE15SSFSK

## (undated) DEVICE — SYR 10ML LL W/O NDL

## (undated) DEVICE — PREP CHLORAPREP 26ML TINTED ORANGE  260815

## (undated) DEVICE — NDL SPINAL 22GA 5" QUINCKE 405148

## (undated) DEVICE — ADH LIQUID MASTISOL TOPICAL VIAL 2-3ML 0523-48

## (undated) DEVICE — TUBING IV EXTENSION SET 34"

## (undated) DEVICE — DRAPE SHEET REV FOLD 3/4 9349

## (undated) DEVICE — SU MONOCRYL 4-0 PS-2 18" UND Y496G

## (undated) DEVICE — NDL ECLIPSE 18GA 1.5"

## (undated) DEVICE — DRAPE COVER C-ARM SEAMLESS SNAP-KAP 03-KP26 LATEX FREE

## (undated) DEVICE — RX SURGIFLO HEMOSTATIC MATRIX W/THROMBIN 8ML 2994

## (undated) DEVICE — DRSG BANDAID 1X3" FABRIC

## (undated) DEVICE — TOOL DISSECT MIDAS MR8 14CM MATCH HEAD 3MM MR8-14MH30

## (undated) DEVICE — SOL WATER IRRIG 1000ML BOTTLE 2F7114

## (undated) DEVICE — SU ETHILON 3-0 FS-1 18" 669H

## (undated) DEVICE — PACK UNIVERSAL SPLIT 29131

## (undated) DEVICE — SPONGE SURGIFOAM 50

## (undated) DEVICE — DRAPE MAYO STAND 23X54 8337

## (undated) DEVICE — DRAIN JACKSON PRATT 07MM FLAT SU130-1310

## (undated) DEVICE — TUBING SUCTION SOFT 20'X3/16" 0036570

## (undated) DEVICE — DRAIN JACKSON PRATT RESERVOIR 100ML SU130-1305

## (undated) DEVICE — SYR BULB IRRIG DOVER 60 ML LATEX FREE 67000

## (undated) DEVICE — DRAPE MICROSCOPE LEICA 54X150" AR8033650

## (undated) DEVICE — LINEN TOWEL PACK X5 5464

## (undated) DEVICE — ESU GROUND PAD UNIVERSAL W/O CORD

## (undated) DEVICE — SYR 10ML PERFIX LL 332152

## (undated) DEVICE — NDL SPINAL 18GA 3.5" 405184

## (undated) RX ORDER — DEXAMETHASONE SODIUM PHOSPHATE 4 MG/ML
INJECTION, SOLUTION INTRA-ARTICULAR; INTRALESIONAL; INTRAMUSCULAR; INTRAVENOUS; SOFT TISSUE
Status: DISPENSED
Start: 2023-10-23

## (undated) RX ORDER — BUPIVACAINE HYDROCHLORIDE 5 MG/ML
INJECTION, SOLUTION EPIDURAL; INTRACAUDAL
Status: DISPENSED
Start: 2021-08-11

## (undated) RX ORDER — FENTANYL CITRATE 50 UG/ML
INJECTION, SOLUTION INTRAMUSCULAR; INTRAVENOUS
Status: DISPENSED
Start: 2025-01-31

## (undated) RX ORDER — HYDROMORPHONE HCL IN WATER/PF 6 MG/30 ML
PATIENT CONTROLLED ANALGESIA SYRINGE INTRAVENOUS
Status: DISPENSED
Start: 2021-09-17

## (undated) RX ORDER — METRONIDAZOLE 500 MG/100ML
INJECTION, SOLUTION INTRAVENOUS
Status: DISPENSED
Start: 2023-05-25

## (undated) RX ORDER — KETOROLAC TROMETHAMINE 30 MG/ML
INJECTION, SOLUTION INTRAMUSCULAR; INTRAVENOUS
Status: DISPENSED
Start: 2023-10-23

## (undated) RX ORDER — ACETAMINOPHEN 325 MG/1
TABLET ORAL
Status: DISPENSED
Start: 2023-05-25

## (undated) RX ORDER — ONDANSETRON 2 MG/ML
INJECTION INTRAMUSCULAR; INTRAVENOUS
Status: DISPENSED
Start: 2021-09-17

## (undated) RX ORDER — PROPOFOL 10 MG/ML
INJECTION, EMULSION INTRAVENOUS
Status: DISPENSED
Start: 2021-08-11

## (undated) RX ORDER — LIDOCAINE HYDROCHLORIDE 10 MG/ML
INJECTION, SOLUTION INFILTRATION; PERINEURAL
Status: DISPENSED
Start: 2025-01-31

## (undated) RX ORDER — GABAPENTIN 300 MG/1
CAPSULE ORAL
Status: DISPENSED
Start: 2021-08-11

## (undated) RX ORDER — BUPIVACAINE HYDROCHLORIDE 5 MG/ML
INJECTION, SOLUTION EPIDURAL; INTRACAUDAL
Status: DISPENSED
Start: 2024-11-19

## (undated) RX ORDER — FENTANYL CITRATE 0.05 MG/ML
INJECTION, SOLUTION INTRAMUSCULAR; INTRAVENOUS
Status: DISPENSED
Start: 2021-09-17

## (undated) RX ORDER — DEXAMETHASONE SODIUM PHOSPHATE 10 MG/ML
INJECTION, SOLUTION INTRAMUSCULAR; INTRAVENOUS
Status: DISPENSED
Start: 2025-01-31

## (undated) RX ORDER — GLYCOPYRROLATE 0.2 MG/ML
INJECTION, SOLUTION INTRAMUSCULAR; INTRAVENOUS
Status: DISPENSED
Start: 2021-09-17

## (undated) RX ORDER — BUPIVACAINE HYDROCHLORIDE 5 MG/ML
INJECTION, SOLUTION EPIDURAL; INTRACAUDAL
Status: DISPENSED
Start: 2025-01-31

## (undated) RX ORDER — CEFAZOLIN SODIUM IN 0.9 % NACL 3 G/100 ML
INTRAVENOUS SOLUTION, PIGGYBACK (ML) INTRAVENOUS
Status: DISPENSED
Start: 2021-08-11

## (undated) RX ORDER — BUPIVACAINE HYDROCHLORIDE 5 MG/ML
INJECTION, SOLUTION EPIDURAL; INTRACAUDAL
Status: DISPENSED
Start: 2024-12-10

## (undated) RX ORDER — DIPHENHYDRAMINE HCL 25 MG
CAPSULE ORAL
Status: DISPENSED
Start: 2023-10-23

## (undated) RX ORDER — MECLIZINE HCL 12.5 MG 12.5 MG/1
TABLET ORAL
Status: DISPENSED
Start: 2025-04-30

## (undated) RX ORDER — LIDOCAINE HYDROCHLORIDE 10 MG/ML
INJECTION, SOLUTION INFILTRATION; PERINEURAL
Status: DISPENSED
Start: 2024-11-19

## (undated) RX ORDER — LIDOCAINE HYDROCHLORIDE 10 MG/ML
INJECTION, SOLUTION INFILTRATION; PERINEURAL
Status: DISPENSED
Start: 2024-12-10

## (undated) RX ORDER — HYDROMORPHONE HYDROCHLORIDE 1 MG/ML
INJECTION, SOLUTION INTRAMUSCULAR; INTRAVENOUS; SUBCUTANEOUS
Status: DISPENSED
Start: 2021-09-17

## (undated) RX ORDER — NEOSTIGMINE METHYLSULFATE 1 MG/ML
VIAL (ML) INJECTION
Status: DISPENSED
Start: 2021-09-17

## (undated) RX ORDER — CEFAZOLIN SODIUM IN 0.9 % NACL 3 G/100 ML
INTRAVENOUS SOLUTION, PIGGYBACK (ML) INTRAVENOUS
Status: DISPENSED
Start: 2021-09-17

## (undated) RX ORDER — REGADENOSON 0.08 MG/ML
INJECTION, SOLUTION INTRAVENOUS
Status: DISPENSED
Start: 2021-08-12

## (undated) RX ORDER — FENTANYL CITRATE 50 UG/ML
INJECTION, SOLUTION INTRAMUSCULAR; INTRAVENOUS
Status: DISPENSED
Start: 2021-08-11

## (undated) RX ORDER — NALOXONE HYDROCHLORIDE 0.4 MG/ML
INJECTION, SOLUTION INTRAMUSCULAR; INTRAVENOUS; SUBCUTANEOUS
Status: DISPENSED
Start: 2025-01-31

## (undated) RX ORDER — DEXAMETHASONE SODIUM PHOSPHATE 4 MG/ML
INJECTION, SOLUTION INTRA-ARTICULAR; INTRALESIONAL; INTRAMUSCULAR; INTRAVENOUS; SOFT TISSUE
Status: DISPENSED
Start: 2021-09-17

## (undated) RX ORDER — PROPOFOL 10 MG/ML
INJECTION, EMULSION INTRAVENOUS
Status: DISPENSED
Start: 2021-09-17

## (undated) RX ORDER — LIDOCAINE HYDROCHLORIDE 20 MG/ML
INJECTION, SOLUTION EPIDURAL; INFILTRATION; INTRACAUDAL; PERINEURAL
Status: DISPENSED
Start: 2021-09-17

## (undated) RX ORDER — FENTANYL CITRATE 50 UG/ML
INJECTION, SOLUTION INTRAMUSCULAR; INTRAVENOUS
Status: DISPENSED
Start: 2021-09-17

## (undated) RX ORDER — CIPROFLOXACIN 2 MG/ML
INJECTION, SOLUTION INTRAVENOUS
Status: DISPENSED
Start: 2023-05-25